# Patient Record
Sex: FEMALE | Race: WHITE | Employment: OTHER | URBAN - METROPOLITAN AREA
[De-identification: names, ages, dates, MRNs, and addresses within clinical notes are randomized per-mention and may not be internally consistent; named-entity substitution may affect disease eponyms.]

---

## 2019-05-08 ENCOUNTER — HOSPITAL ENCOUNTER (EMERGENCY)
Age: 69
Discharge: HOME | End: 2019-05-08
Attending: EMERGENCY MEDICINE
Payer: MEDICARE

## 2019-05-08 VITALS
HEIGHT: 63 IN | WEIGHT: 188.25 LBS | BODY MASS INDEX: 33.36 KG/M2 | HEART RATE: 84 BPM | TEMPERATURE: 98.4 F | RESPIRATION RATE: 16 BRPM | OXYGEN SATURATION: 95 % | DIASTOLIC BLOOD PRESSURE: 94 MMHG | SYSTOLIC BLOOD PRESSURE: 149 MMHG

## 2019-05-08 DIAGNOSIS — L30.9 ECZEMA, UNSPECIFIED TYPE: Primary | ICD-10-CM

## 2019-05-08 PROCEDURE — 6360000002 HC RX W HCPCS: Performed by: EMERGENCY MEDICINE

## 2019-05-08 PROCEDURE — 96372 THER/PROPH/DIAG INJ SC/IM: CPT

## 2019-05-08 PROCEDURE — 99282 EMERGENCY DEPT VISIT SF MDM: CPT

## 2019-05-08 RX ORDER — METHYLPREDNISOLONE SODIUM SUCCINATE 125 MG/2ML
250 INJECTION, POWDER, LYOPHILIZED, FOR SOLUTION INTRAMUSCULAR; INTRAVENOUS ONCE
Status: COMPLETED | OUTPATIENT
Start: 2019-05-08 | End: 2019-05-08

## 2019-05-08 RX ORDER — PREDNISONE 10 MG/1
TABLET ORAL
Qty: 30 TABLET | Refills: 0 | Status: SHIPPED | OUTPATIENT
Start: 2019-05-08 | End: 2019-07-15 | Stop reason: ALTCHOICE

## 2019-05-08 RX ADMIN — METHYLPREDNISOLONE SODIUM SUCCINATE 250 MG: 125 INJECTION, POWDER, FOR SOLUTION INTRAMUSCULAR; INTRAVENOUS at 08:34

## 2019-05-08 SDOH — HEALTH STABILITY: MENTAL HEALTH: HOW OFTEN DO YOU HAVE A DRINK CONTAINING ALCOHOL?: NEVER

## 2019-05-08 ASSESSMENT — ENCOUNTER SYMPTOMS
COUGH: 0
COLOR CHANGE: 0
SHORTNESS OF BREATH: 0
EYE DISCHARGE: 0
FACIAL SWELLING: 0
CONSTIPATION: 0
ABDOMINAL PAIN: 0
VOMITING: 0
DIARRHEA: 0
EYE REDNESS: 0

## 2019-05-08 NOTE — ED PROVIDER NOTES
[facility] ED  eMERGENCY dEPARTMENT eNCOUnter      Pt Name: Juliette Samaniego  MRN: [id number]  [place] 1950  Date of evaluation: 5/8/2019  Provider: Susana Schreiber MD    [address] [place] [address]       Chief Complaint   Patient presents with    Rash         HISTORY OF PRESENT ILLNESS  (Location/Symptom, Timing/Onset, Context/Setting, Quality, Duration, Modifying Factors, Severity.)   Juliette Samaniego is a 78 y.o. female who presents to the emergency department for rash. She's had it for several months and it's on various areas of her body including her back and her arms and her face near her hairline and in particular. She hasn't seen her doctor about it, she's been very busy taking care of her ill . Sometimes there is a small amount of drainage but no significant purulent drainage. No fever. It's continuous. Nursing Notes were reviewed. ALLERGIES     Patient has no known allergies. CURRENT MEDICATIONS       Previous Medications    No medications on file       PAST MEDICAL HISTORY   No past medical history on file. SURGICAL HISTORY     No past surgical history on file. FAMILY HISTORY     No family history on file. No family status information on file. SOCIAL HISTORY      reports that she has never smoked. She has never used smokeless tobacco. She reports that she drank alcohol. She reports that she does not use drugs. REVIEW OF SYSTEMS    (2-9 systems for level 4, 10 or more for level 5)     Review of Systems   Constitutional: Negative for chills, fatigue and fever. HENT: Negative for congestion, ear discharge and facial swelling. Eyes: Negative for discharge and redness. Respiratory: Negative for cough and shortness of breath. Cardiovascular: Negative for chest pain. Gastrointestinal: Negative for abdominal pain, constipation, diarrhea and vomiting. Genitourinary: Negative for dysuria and hematuria. Musculoskeletal: Negative for arthralgias.    Skin: Positive for rash. Negative for color change. Neurological: Negative for syncope, numbness and headaches. Hematological: Negative for adenopathy. Psychiatric/Behavioral: Negative for confusion. The patient is not nervous/anxious. Except as noted above the remainder of the review of systems was reviewed and negative. PHYSICAL EXAM    (up to 7 for level 4, 8 or more for level 5)     Vitals:    05/08/19 0821 05/08/19 0822   BP: (!) 149/94    Pulse: 84    Resp: 16    Temp: 98.4 °F (36.9 °C)    SpO2: 95%    Weight:  188 lb 4 oz (85.4 kg)   Height:  5' 3\" (1.6 m)       Physical Exam   Constitutional: She is oriented to person, place, and time. She appears well-developed and well-nourished. No distress. HENT:   Head: Normocephalic and atraumatic. Eyes: Right eye exhibits no discharge. Left eye exhibits no discharge. No scleral icterus. Neck: Neck supple. Cardiovascular: Normal rate and regular rhythm. Pulmonary/Chest: Effort normal and breath sounds normal. No stridor. No respiratory distress. She has no wheezes. She has no rales. Abdominal: Soft. She exhibits no distension. There is no tenderness. Musculoskeletal: Normal range of motion. Lymphadenopathy:     She has no cervical adenopathy. Neurological: She is alert and oriented to person, place, and time. Skin: Skin is warm and dry. Rash noted. She is not diaphoretic. No erythema. The patient has a dry scaling rash on various areas of her body. It is significant at the hairline on her forehead and in scattered areas on her neck arms and back and legs. Psychiatric: She has a normal mood and affect. Her behavior is normal.   Vitals reviewed.           DIAGNOSTIC RESULTS     EKG: All EKG's are interpreted by the Emergency Department Physician who either signs or Co-signs this chart in the absence of a cardiologist.    Not indicated    RADIOLOGY:   Non-plain film images such as CT, Ultrasound and MRI are read by the radiologist.

## 2019-05-21 ENCOUNTER — TELEPHONE (OUTPATIENT)
Dept: FAMILY MEDICINE CLINIC | Age: 69
End: 2019-05-21

## 2019-05-23 ENCOUNTER — OFFICE VISIT (OUTPATIENT)
Dept: FAMILY MEDICINE CLINIC | Age: 69
End: 2019-05-23
Payer: MEDICARE

## 2019-05-23 VITALS
OXYGEN SATURATION: 96 % | SYSTOLIC BLOOD PRESSURE: 130 MMHG | DIASTOLIC BLOOD PRESSURE: 80 MMHG | WEIGHT: 195 LBS | HEART RATE: 62 BPM | BODY MASS INDEX: 34.54 KG/M2

## 2019-05-23 DIAGNOSIS — L40.9 PSORIASIS: Primary | ICD-10-CM

## 2019-05-23 DIAGNOSIS — Z12.39 BREAST CANCER SCREENING: ICD-10-CM

## 2019-05-23 PROCEDURE — G8427 DOCREV CUR MEDS BY ELIG CLIN: HCPCS | Performed by: FAMILY MEDICINE

## 2019-05-23 PROCEDURE — 1090F PRES/ABSN URINE INCON ASSESS: CPT | Performed by: FAMILY MEDICINE

## 2019-05-23 PROCEDURE — 1036F TOBACCO NON-USER: CPT | Performed by: FAMILY MEDICINE

## 2019-05-23 PROCEDURE — 1123F ACP DISCUSS/DSCN MKR DOCD: CPT | Performed by: FAMILY MEDICINE

## 2019-05-23 PROCEDURE — G8400 PT W/DXA NO RESULTS DOC: HCPCS | Performed by: FAMILY MEDICINE

## 2019-05-23 PROCEDURE — 99203 OFFICE O/P NEW LOW 30 MIN: CPT | Performed by: FAMILY MEDICINE

## 2019-05-23 PROCEDURE — 3017F COLORECTAL CA SCREEN DOC REV: CPT | Performed by: FAMILY MEDICINE

## 2019-05-23 PROCEDURE — 4040F PNEUMOC VAC/ADMIN/RCVD: CPT | Performed by: FAMILY MEDICINE

## 2019-05-23 PROCEDURE — G8417 CALC BMI ABV UP PARAM F/U: HCPCS | Performed by: FAMILY MEDICINE

## 2019-05-23 RX ORDER — FLUOCINOLONE ACETONIDE 0.1 MG/ML
SOLUTION TOPICAL
Qty: 90 ML | Refills: 1 | Status: SHIPPED | OUTPATIENT
Start: 2019-05-23 | End: 2019-07-31 | Stop reason: ALTCHOICE

## 2019-05-23 RX ORDER — PREDNISONE 20 MG/1
TABLET ORAL
Qty: 20 TABLET | Refills: 0 | Status: SHIPPED | OUTPATIENT
Start: 2019-05-23 | End: 2019-07-15 | Stop reason: ALTCHOICE

## 2019-05-23 ASSESSMENT — PATIENT HEALTH QUESTIONNAIRE - PHQ9
SUM OF ALL RESPONSES TO PHQ QUESTIONS 1-9: 1
2. FEELING DOWN, DEPRESSED OR HOPELESS: 1
SUM OF ALL RESPONSES TO PHQ9 QUESTIONS 1 & 2: 1
1. LITTLE INTEREST OR PLEASURE IN DOING THINGS: 0
SUM OF ALL RESPONSES TO PHQ QUESTIONS 1-9: 1

## 2019-05-23 ASSESSMENT — ENCOUNTER SYMPTOMS
BACK PAIN: 0
NAUSEA: 0
DIARRHEA: 0
SINUS PRESSURE: 0
SHORTNESS OF BREATH: 0
VOMITING: 0
SORE THROAT: 0
COUGH: 0

## 2019-05-23 NOTE — PROGRESS NOTES
Kati Puckett is a 78 y.o. female who presents todayfor her medical conditions/complaints as noted below. Kati Puckett is here today c/oEstablish Care and Rash (onset middle of march)  78year old spouse of a pt of mine who presents as a new pt to this practice she is concerned over a rash that started in March. Was treated thru ER dx with eczema and given prednisone with little improvement in rash appearance.    :     Visit Information    Have you changed or started any medications since your last visit including any over-the-counter medicines, vitamins, or herbal medicines? no   Are you having any side effects from any of your medications? -  no  Have you stopped taking any of your medications? Is so, why? -  no    Have you seen any other physician or provider since your last visit? No  Have you had any other diagnostic tests since your last visit? No  Have you been seen in the emergency room and/or had an admission to a hospital since we last saw you? No  Have you had your routine dental cleaning in the past 6 months? no    Have you activated your [employer] account? If not, what are your barriers? No     Patient Care Team:  Constantino Banda MD as PCP - General (Family Medicine)    Medical History Review  Past Medical, Family, and Social History reviewed and does not contribute to the patient presenting condition    Health Maintenance   Topic Date Due    Hepatitis C screen  1950    DTaP/Tdap/Td vaccine (1 - Tdap) 02/04/1968    Lipid screen  02/04/1989    Diabetes screen  02/04/1989    Breast cancer screen  02/04/1999    Shingles Vaccine (1 of 2) 02/04/1999    Colon cancer screen colonoscopy  02/04/1999    DEXA (modify frequency per FRAX score)  02/04/2014    Pneumococcal 65+ years Vaccine (1 of 2 - PCV13) 05/23/2022 (Originally 2/4/2014)    Flu vaccine (Season Ended) 09/01/2019           HPI        History reviewed. No pertinent past medical history. History reviewed.  No pertinent surgical history. History reviewed. No pertinent family history. Social History     Tobacco Use    Smoking status: Never Smoker    Smokeless tobacco: Never Used   Substance Use Topics    Alcohol use: Not Currently     Frequency: Never      Current Outpatient Medications   Medication Sig Dispense Refill    fluocinolone acetonide (SYNALAR) 0.01 % external solution Apply topically 2 times daily. 90 mL 1    predniSONE (DELTASONE) 20 MG tablet Take 3 tabs daily for 3 days, then 2 a day for 3 days, then 1 a day for 3 days, then 1/2 a day for 3 days. 20 tablet 0    predniSONE (DELTASONE) 10 MG tablet Take 4 by mouth daily for 3 days then  3 by mouth daily for 3 days then  2 by mouth daily for 3 days then  1 by mouth daily for 3 days 30 tablet 0     No current facility-administered medications for this visit. No Known Allergies      Subjective:   Review of Systems   Constitutional: Negative for chills, diaphoresis and fever. HENT: Negative for congestion, sinus pressure and sore throat. Eyes: Negative for visual disturbance. Respiratory: Negative for cough and shortness of breath. Cardiovascular: Negative for chest pain and leg swelling. Gastrointestinal: Negative for diarrhea, nausea and vomiting. Genitourinary: Negative for dysuria, menstrual problem, urgency and vaginal discharge. Musculoskeletal: Negative for arthralgias, back pain and myalgias. Skin: Positive for rash. Neurological: Negative for weakness, numbness and headaches. Psychiatric/Behavioral: Negative for sleep disturbance.       :   /80   Pulse 62   Wt 195 lb (88.5 kg)   SpO2 96%   BMI 34.54 kg/m²     Physical Exam   Constitutional: She is oriented to person, place, and time. She appears well-developed and well-nourished. No distress. HENT:   Head: Normocephalic and atraumatic. Mouth/Throat: No oropharyngeal exudate. Eyes: No scleral icterus. Neck: Neck supple. Carotid bruit is not present.

## 2019-06-18 ENCOUNTER — TELEPHONE (OUTPATIENT)
Dept: DERMATOLOGY | Age: 69
End: 2019-06-18

## 2019-06-18 ENCOUNTER — HOSPITAL ENCOUNTER (OUTPATIENT)
Age: 69
Discharge: HOME | End: 2019-06-18
Payer: MEDICARE

## 2019-06-18 ENCOUNTER — OFFICE VISIT (OUTPATIENT)
Dept: DERMATOLOGY | Age: 69
End: 2019-06-18
Payer: MEDICARE

## 2019-06-18 VITALS
HEIGHT: 63 IN | BODY MASS INDEX: 34.76 KG/M2 | HEART RATE: 78 BPM | SYSTOLIC BLOOD PRESSURE: 146 MMHG | WEIGHT: 196.2 LBS | DIASTOLIC BLOOD PRESSURE: 83 MMHG

## 2019-06-18 DIAGNOSIS — Z01.89 ENCOUNTER FOR OTHER SPECIFIED SPECIAL EXAMINATIONS: ICD-10-CM

## 2019-06-18 DIAGNOSIS — Z51.81 THERAPEUTIC DRUG MONITORING: ICD-10-CM

## 2019-06-18 DIAGNOSIS — L40.9 PSORIASIS: ICD-10-CM

## 2019-06-18 DIAGNOSIS — L40.9 PSORIASIS: Primary | ICD-10-CM

## 2019-06-18 LAB
ABSOLUTE EOS #: 0.25 K/UL (ref 0–0.44)
ABSOLUTE IMMATURE GRANULOCYTE: 0.03 K/UL (ref 0–0.3)
ABSOLUTE LYMPH #: 2.51 K/UL (ref 1.1–3.7)
ABSOLUTE MONO #: 0.69 K/UL (ref 0.1–1.2)
ALT SERPL-CCNC: 23 U/L (ref 5–33)
AST SERPL-CCNC: 25 U/L
BASOPHILS # BLD: 1 % (ref 0–2)
BASOPHILS ABSOLUTE: 0.06 K/UL (ref 0–0.2)
BUN BLDV-MCNC: 7 MG/DL (ref 8–23)
CREAT SERPL-MCNC: 0.58 MG/DL (ref 0.5–0.9)
DIFFERENTIAL TYPE: ABNORMAL
EOSINOPHILS RELATIVE PERCENT: 3 % (ref 1–4)
GFR AFRICAN AMERICAN: >60 ML/MIN
GFR NON-AFRICAN AMERICAN: >60 ML/MIN
GFR SERPL CREATININE-BSD FRML MDRD: NORMAL ML/MIN/{1.73_M2}
GFR SERPL CREATININE-BSD FRML MDRD: NORMAL ML/MIN/{1.73_M2}
HAV IGM SER IA-ACNC: NONREACTIVE
HCT VFR BLD CALC: 47.4 % (ref 36.3–47.1)
HEMOGLOBIN: 14.3 G/DL (ref 11.9–15.1)
HEPATITIS B CORE IGM ANTIBODY: NONREACTIVE
HEPATITIS B SURFACE ANTIGEN: NONREACTIVE
HEPATITIS C ANTIBODY: NONREACTIVE
HIV AG/AB: NONREACTIVE
IMMATURE GRANULOCYTES: 0 %
LYMPHOCYTES # BLD: 33 % (ref 24–43)
MCH RBC QN AUTO: 28.3 PG (ref 25.2–33.5)
MCHC RBC AUTO-ENTMCNC: 30.2 G/DL (ref 28.4–34.8)
MCV RBC AUTO: 93.7 FL (ref 82.6–102.9)
MONOCYTES # BLD: 9 % (ref 3–12)
NRBC AUTOMATED: 0 PER 100 WBC
PDW BLD-RTO: 13.7 % (ref 11.8–14.4)
PLATELET # BLD: 469 K/UL (ref 138–453)
PLATELET ESTIMATE: ABNORMAL
PMV BLD AUTO: 9.5 FL (ref 8.1–13.5)
RBC # BLD: 5.06 M/UL (ref 3.95–5.11)
RBC # BLD: ABNORMAL 10*6/UL
SEG NEUTROPHILS: 54 % (ref 36–65)
SEGMENTED NEUTROPHILS ABSOLUTE COUNT: 4.03 K/UL (ref 1.5–8.1)
WBC # BLD: 7.6 K/UL (ref 3.5–11.3)
WBC # BLD: ABNORMAL 10*3/UL

## 2019-06-18 PROCEDURE — 86480 TB TEST CELL IMMUN MEASURE: CPT

## 2019-06-18 PROCEDURE — G8427 DOCREV CUR MEDS BY ELIG CLIN: HCPCS | Performed by: DERMATOLOGY

## 2019-06-18 PROCEDURE — 99203 OFFICE O/P NEW LOW 30 MIN: CPT | Performed by: DERMATOLOGY

## 2019-06-18 PROCEDURE — 87389 HIV-1 AG W/HIV-1&-2 AB AG IA: CPT

## 2019-06-18 PROCEDURE — 1090F PRES/ABSN URINE INCON ASSESS: CPT | Performed by: DERMATOLOGY

## 2019-06-18 PROCEDURE — G8400 PT W/DXA NO RESULTS DOC: HCPCS | Performed by: DERMATOLOGY

## 2019-06-18 PROCEDURE — 84450 TRANSFERASE (AST) (SGOT): CPT

## 2019-06-18 PROCEDURE — 84520 ASSAY OF UREA NITROGEN: CPT

## 2019-06-18 PROCEDURE — 1123F ACP DISCUSS/DSCN MKR DOCD: CPT | Performed by: DERMATOLOGY

## 2019-06-18 PROCEDURE — 84460 ALANINE AMINO (ALT) (SGPT): CPT

## 2019-06-18 PROCEDURE — G8417 CALC BMI ABV UP PARAM F/U: HCPCS | Performed by: DERMATOLOGY

## 2019-06-18 PROCEDURE — 85025 COMPLETE CBC W/AUTO DIFF WBC: CPT

## 2019-06-18 PROCEDURE — 82565 ASSAY OF CREATININE: CPT

## 2019-06-18 PROCEDURE — 36415 COLL VENOUS BLD VENIPUNCTURE: CPT

## 2019-06-18 PROCEDURE — 4040F PNEUMOC VAC/ADMIN/RCVD: CPT | Performed by: DERMATOLOGY

## 2019-06-18 PROCEDURE — 80074 ACUTE HEPATITIS PANEL: CPT

## 2019-06-18 PROCEDURE — 1036F TOBACCO NON-USER: CPT | Performed by: DERMATOLOGY

## 2019-06-18 PROCEDURE — 3017F COLORECTAL CA SCREEN DOC REV: CPT | Performed by: DERMATOLOGY

## 2019-06-18 RX ORDER — TRIAMCINOLONE ACETONIDE 1 MG/G
CREAM TOPICAL
Qty: 454 G | Refills: 1 | Status: SHIPPED | OUTPATIENT
Start: 2019-06-18 | End: 2019-07-31 | Stop reason: SDUPTHER

## 2019-06-18 RX ORDER — FOLIC ACID 1 MG/1
1 TABLET ORAL DAILY
Qty: 30 TABLET | Refills: 0 | Status: SHIPPED | OUTPATIENT
Start: 2019-06-18 | End: 2019-07-31 | Stop reason: ALTCHOICE

## 2019-06-18 NOTE — PROGRESS NOTES
Dermatology Patient Note  [facility] DERMATOLOGY  [address]  [address] [place] [address] [facility] [address]  Dept: [phone]  Dept Fax: [phone]      VISIT DATE: 6/18/2019   REFERRING PROVIDER: Jose Saleh MD      Ruth Ann Vazquez is a 78 y.o. female  who presents today in the office for:    New Patient (Patient is here for Psoriasis which is covering her body. She states it started in March but as of lately she is noticing it in different spot throughtout her body. She was given an oral steroid however it has not helped. She was also given Fluocinolone Acetonide but that does not seem to help either. She does have this on her scalp and her hair is falling out. )      HISTORY OF PRESENT ILLNESS:  HPI Rash:    Sajan Rodriguez was seen today for initial evaluation of Psoriasis    Duration of Rash: March    Course: Worsening    Areas of Involvement: Generalized    Associated Symptoms: Itching    Exacerbating Factors: none     Current Medications for this Rash:  prednisone, fluocinolone     Previously Tried Medications: None    Problem Specific Family Hx: No Contributory Family History      CURRENT MEDICATIONS:   Current Outpatient Medications   Medication Sig Dispense Refill    triamcinolone (KENALOG) 0.1 % cream Apply to rash twice daily (not face, armpit or groin) 454 g 1    fluocinolone acetonide (SYNALAR) 0.01 % external solution Apply topically 2 times daily. 90 mL 1    predniSONE (DELTASONE) 20 MG tablet Take 3 tabs daily for 3 days, then 2 a day for 3 days, then 1 a day for 3 days, then 1/2 a day for 3 days. 20 tablet 0    predniSONE (DELTASONE) 10 MG tablet Take 4 by mouth daily for 3 days then  3 by mouth daily for 3 days then  2 by mouth daily for 3 days then  1 by mouth daily for 3 days 30 tablet 0     No current facility-administered medications for this visit.         ALLERGIES:   No Known Allergies    SOCIAL HISTORY:  Social History     Tobacco Use    Smoking status: Never Smoker    Smokeless tobacco: Never Used   Substance Use Topics    Alcohol use: Not Currently     Frequency: Never       REVIEW OF SYSTEMS:  Review of Systems   Constitutional: Negative. Skin:Denies any new changing, growing or bleeding lesions or rashes except as described in the HPI     PHYSICAL EXAM:   BP (!) 146/83 (Site: Left Upper Arm, Position: Sitting, Cuff Size: Medium Adult)   Pulse 78   Ht 5' 2.99\" (1.6 m)   Wt 196 lb 3.2 oz (89 kg)   BMI 34.76 kg/m²     General Exam:  General Appearance: No acute distress, Well nourished     Neuro: Alert and oriented to person, place and time  Psych: Normal affect   Lymph Node: Not performed    Cutaneous Exam: Performed as documented in clinic note below. Total skin excluding undergarment areas, which includes the head/face, neck, both arms, chest, back, abdomen, both legs, digits and/or nails, was examined. Pertinent Physical Exam Findings:  Physical Exam   Skin:   Psoriasis scalp, trunk, arms and legs 50-70% BSA       Medical Necessity of Exam Performed:   Widespread Rash    Additional Diagnostic Testing performed during exam: Not performed ,  Not performed    ASSESSMENT:   Diagnosis Orders   1. Psoriasis  ALT    AST    BUN    CBC Auto Differential    Creatinine, Serum    Hepatitis Panel, Acute    HIV Screen    Quantiferon TB Gold   2. Therapeutic drug monitoring  ALT    AST    BUN    CBC Auto Differential    Creatinine, Serum    Hepatitis Panel, Acute    HIV Screen    Quantiferon TB Gold   3. Encounter for other specified special examinations   Hepatitis Panel, Acute       Plan of Action is as Follows:  Assessment 1. Psoriasis  1. Discussed the chronic nature of the diagnosis including the association with arthritis (10-15%) and Cardiovascular mortality  2. Discussed the treatments for psoriasis including topical medications, oral medications and biologics  3. Start triamcinolone cream BID to psoriasis on the body  4.  I discussed MTX treatment including risks of elevated LFT's, cytopenias, increased infection, increased malignancy risk, upset stomach, fatigue, sun sensitivity and mouth sores. I reviewed not to get live vaccines on this medication. I reviewed drug interactions of this medication. After discussing the r/b the patient wants to proceed. - Labs  - Pending normal labs start 15 mg once weekly  - Folic acid 1 mg daily    - ALT; Future  - AST; Future  - BUN; Future  - CBC Auto Differential; Future  - Creatinine, Serum; Future  - Hepatitis Panel, Acute; Future  - HIV Screen; Future  - Quantiferon TB Gold; Future    2. Therapeutic drug monitoring  - ALT; Future  - AST; Future  - BUN; Future  - CBC Auto Differential; Future  - Creatinine, Serum; Future  - Hepatitis Panel, Acute; Future  - HIV Screen; Future  - Quantiferon TB Gold; Future    3. Encounter for other specified special examinations   - Hepatitis Panel, Acute; Future          Patient Instructions   1. Labs today  2. Upon receipt of normal labs, prescription will be sent to pharmacy--6 pills one time weekly, folic acid every day  3. Apply triamcinolone twice daily to all of active psoriasis  4. Follow up in 4 weeks      Methotrexate    Side Effects    Methotrexate is a medication that can be helpful in a variety of skin conditions including psoriasis, atopic dermatitis, and morphea. It is a medication that suppresses or turns down the immune system and as a result, can make skin conditions caused by the immune system better. The medication is given by mouth or by shot and is usually given only once weekly when used to treat skin conditions. It is important that the medicine be given exactly as directed by a physician. Use of this medicine requires close monitoring including baseline blood work and a blood test at least once monthly after the medication is started. It takes about 3 months of using Methotrexate to see an effect, and many patients are on methotrexate for 1 year or more.  Severe side monthly and then every 2-3 months. Methotrexate can interact with other medications. Parents should check with their physicians and pharmacist before adding any new medications. Common medications that are likely to interact with methotrexate and that should be avoided if possible include ibuprofen or other non-steroidal antiinflammatory medications. Bactrim or trimethoprim-sulfamethoxazole can also interfere with methotrexate use     Nausea, Vomiting and/or Abdominal Pain:    Some children will complain of abdominal pain or stomach pain while on Methotrexate. This most commonly occurs a day or two after the medication is given. Some children will feel nausea the day after the medicine is given and may even vomit. Please let your physician know if this is occurring as he or she may recommend changes to the dose of the medication or times the medication is given to help improve this. Sun Sensitivity:    Your child will be more sensitive to the sun and more likely to develop sunburn while on methotrexate. Special attention should be given to always wearing sunscreen to exposed skin during peak hours of sun exposure or wearing clothing that protects the skin. Mouth Sores:    Sores in the mouth can occur with methotrexate use, but in general, do not occur at the lower doses we use for dermatology conditions. Please let your physician know if this occurs. Photo surveillance performed: No    Follow-up: 4 weeks    This note was created with the assistance of aspeech-recognition program.  Although the intention is to generate a document that actually reflects thecontent of the visit, no guarantees can be provided that every mistake has been identified and corrected by editing.     Electronically signed by Marya Ibarra MD on 6/18/19 at 12:54 PM

## 2019-06-18 NOTE — PATIENT INSTRUCTIONS
visiting with family members or others when they are ill. Please notify your physician if your child is not up to date on his/her immunizations. Live vaccines (Varivax, MMR, flu mist) should not be given while your child is on this medication, but other routine inactivated immunizations can be given (flu shot is ok but not the flu mist). If your child develops a fever of 101 or has a persistent cough or is otherwise acting ill, it is important that he/she be evaluated by a physician promptly while on this medication. Please let your physician know if your child has regular contact with someone who may have tuberculosis or who has tested positive for tuberculosis. Inflammation of the liver    Methotrexate is a medication that is broken down by the liver. In some instances, methotrexate may cause the liver to become inflamed. For that reason, in addition to checking your childs immune system, it is important that your child has blood tests performed that check on how the liver is doing, initially monthly and then every 2-3 months. Methotrexate can interact with other medications. Parents should check with their physicians and pharmacist before adding any new medications. Common medications that are likely to interact with methotrexate and that should be avoided if possible include ibuprofen or other non-steroidal antiinflammatory medications. Bactrim or trimethoprim-sulfamethoxazole can also interfere with methotrexate use     Nausea, Vomiting and/or Abdominal Pain:    Some children will complain of abdominal pain or stomach pain while on Methotrexate. This most commonly occurs a day or two after the medication is given. Some children will feel nausea the day after the medicine is given and may even vomit. Please let your physician know if this is occurring as he or she may recommend changes to the dose of the medication or times the medication is given to help improve this.     J[place]ica Valerio Sensitivity:    Your child will be more sensitive to the sun and more likely to develop sunburn while on methotrexate. Special attention should be given to always wearing sunscreen to exposed skin during peak hours of sun exposure or wearing clothing that protects the skin. Mouth Sores:    Sores in the mouth can occur with methotrexate use, but in general, do not occur at the lower doses we use for dermatology conditions. Please let your physician know if this occurs.

## 2019-06-21 LAB
QUANTI TB GOLD PLUS: NEGATIVE
QUANTI TB1 MINUS NIL: 0.02 IU/ML (ref 0–0.34)
QUANTI TB2 MINUS NIL: 0.02 IU/ML (ref 0–0.34)
QUANTIFERON MITOGEN: >10 IU/ML
QUANTIFERON NIL: 0.07 IU/ML

## 2019-07-15 ENCOUNTER — OFFICE VISIT (OUTPATIENT)
Dept: DERMATOLOGY | Age: 69
End: 2019-07-15
Payer: MEDICARE

## 2019-07-15 ENCOUNTER — HOSPITAL ENCOUNTER (OUTPATIENT)
Age: 69
Discharge: HOME | End: 2019-07-15
Payer: MEDICARE

## 2019-07-15 VITALS
DIASTOLIC BLOOD PRESSURE: 87 MMHG | HEART RATE: 77 BPM | HEIGHT: 63 IN | WEIGHT: 191.6 LBS | OXYGEN SATURATION: 95 % | BODY MASS INDEX: 33.95 KG/M2 | SYSTOLIC BLOOD PRESSURE: 165 MMHG

## 2019-07-15 DIAGNOSIS — L40.9 PSORIASIS: Primary | ICD-10-CM

## 2019-07-15 DIAGNOSIS — Z79.899 HIGH RISK MEDICATION USE: ICD-10-CM

## 2019-07-15 LAB
ABSOLUTE EOS #: 0.09 K/UL (ref 0–0.44)
ABSOLUTE IMMATURE GRANULOCYTE: 0.03 K/UL (ref 0–0.3)
ABSOLUTE LYMPH #: 2.46 K/UL (ref 1.1–3.7)
ABSOLUTE MONO #: 0.76 K/UL (ref 0.1–1.2)
ALT SERPL-CCNC: 90 U/L (ref 5–33)
AST SERPL-CCNC: 32 U/L
BASOPHILS # BLD: 1 % (ref 0–2)
BASOPHILS ABSOLUTE: 0.07 K/UL (ref 0–0.2)
BUN BLDV-MCNC: 11 MG/DL (ref 8–23)
CREAT SERPL-MCNC: 0.56 MG/DL (ref 0.5–0.9)
DIFFERENTIAL TYPE: ABNORMAL
EOSINOPHILS RELATIVE PERCENT: 1 % (ref 1–4)
GFR AFRICAN AMERICAN: >60 ML/MIN
GFR NON-AFRICAN AMERICAN: >60 ML/MIN
GFR SERPL CREATININE-BSD FRML MDRD: NORMAL ML/MIN/{1.73_M2}
GFR SERPL CREATININE-BSD FRML MDRD: NORMAL ML/MIN/{1.73_M2}
HCT VFR BLD CALC: 47.3 % (ref 36.3–47.1)
HEMOGLOBIN: 14.6 G/DL (ref 11.9–15.1)
IMMATURE GRANULOCYTES: 0 %
LYMPHOCYTES # BLD: 36 % (ref 24–43)
MCH RBC QN AUTO: 29.3 PG (ref 25.2–33.5)
MCHC RBC AUTO-ENTMCNC: 30.9 G/DL (ref 28.4–34.8)
MCV RBC AUTO: 94.8 FL (ref 82.6–102.9)
MONOCYTES # BLD: 11 % (ref 3–12)
NRBC AUTOMATED: 0 PER 100 WBC
PDW BLD-RTO: 14.4 % (ref 11.8–14.4)
PLATELET # BLD: 464 K/UL (ref 138–453)
PLATELET ESTIMATE: ABNORMAL
PMV BLD AUTO: 9.8 FL (ref 8.1–13.5)
RBC # BLD: 4.99 M/UL (ref 3.95–5.11)
RBC # BLD: ABNORMAL 10*6/UL
SEG NEUTROPHILS: 51 % (ref 36–65)
SEGMENTED NEUTROPHILS ABSOLUTE COUNT: 3.46 K/UL (ref 1.5–8.1)
WBC # BLD: 6.9 K/UL (ref 3.5–11.3)
WBC # BLD: ABNORMAL 10*3/UL

## 2019-07-15 PROCEDURE — 1036F TOBACCO NON-USER: CPT | Performed by: DERMATOLOGY

## 2019-07-15 PROCEDURE — 84520 ASSAY OF UREA NITROGEN: CPT

## 2019-07-15 PROCEDURE — 84450 TRANSFERASE (AST) (SGOT): CPT

## 2019-07-15 PROCEDURE — G8400 PT W/DXA NO RESULTS DOC: HCPCS | Performed by: DERMATOLOGY

## 2019-07-15 PROCEDURE — 11900 INJECT SKIN LESIONS </W 7: CPT | Performed by: DERMATOLOGY

## 2019-07-15 PROCEDURE — G8417 CALC BMI ABV UP PARAM F/U: HCPCS | Performed by: DERMATOLOGY

## 2019-07-15 PROCEDURE — 4040F PNEUMOC VAC/ADMIN/RCVD: CPT | Performed by: DERMATOLOGY

## 2019-07-15 PROCEDURE — 36415 COLL VENOUS BLD VENIPUNCTURE: CPT

## 2019-07-15 PROCEDURE — G8427 DOCREV CUR MEDS BY ELIG CLIN: HCPCS | Performed by: DERMATOLOGY

## 2019-07-15 PROCEDURE — 82565 ASSAY OF CREATININE: CPT

## 2019-07-15 PROCEDURE — 1090F PRES/ABSN URINE INCON ASSESS: CPT | Performed by: DERMATOLOGY

## 2019-07-15 PROCEDURE — 3017F COLORECTAL CA SCREEN DOC REV: CPT | Performed by: DERMATOLOGY

## 2019-07-15 PROCEDURE — 84460 ALANINE AMINO (ALT) (SGPT): CPT

## 2019-07-15 PROCEDURE — 1123F ACP DISCUSS/DSCN MKR DOCD: CPT | Performed by: DERMATOLOGY

## 2019-07-15 PROCEDURE — 85025 COMPLETE CBC W/AUTO DIFF WBC: CPT

## 2019-07-15 PROCEDURE — 99214 OFFICE O/P EST MOD 30 MIN: CPT | Performed by: DERMATOLOGY

## 2019-07-15 NOTE — PROGRESS NOTES
Upper Arm, Position: Sitting, Cuff Size: Large Adult)   Pulse 77   Ht 5' 3\" (1.6 m)   Wt 191 lb 9.6 oz (86.9 kg)   SpO2 95%   BMI 33.94 kg/m²     General Exam:  General Appearance: No acute distress, Well nourished     Neuro: Alert and oriented to person, place and time  Psych: Normal affect   Lymph Node: Not performed    Cutaneous Exam: Performed as documented in clinic note below. Total skin excluding undergarment areas, which includes the head/face, neck, both arms, chest, back, abdomen, both legs, digits and/or nails, was examined. Pertinent Physical Exam Findings:  Physical Exam   Skin:   Thick psoriasis on scalp    Thinning psoriasis on arms, legs and trunk still 50-70% BSA       Medical Necessity of Exam Performed:   Widespread Rash    Additional Diagnostic Testing performed during exam: Not performed ,  Not performed    ASSESSMENT:   Diagnosis Orders   1. Psoriasis  INJECTION INTO SKIN LESIONS, UP TO 7   2. High risk medication use  ALT    AST    BUN    CBC Auto Differential    Creatinine, Serum       Plan of Action is as Follows:  Assessment 1. Psoriasis  - Some improvement on body, none on scalp  - Labs today  - Pending normal labs continue MTX 15 mg weekly and folic acid daily  - ILK for scalp  - T/Gel for Scalp  - TAC 0.1% PRN to active areas on body  Intralesional Injection: After verbal consent of the risks of subcutaneous atrophy and hypopigmentation and cleansing with alcohol the 1 psoriasiform plaque (entire scalp) on the scalp were injected with 2 ml of Kenalog 10  - INJECTION INTO SKIN LESIONS, UP TO 7    2. High risk medication use  - ALT; Future  - AST; Future  - BUN; Future  - CBC Auto Differential; Future  - Creatinine, Serum; Future          Patient Instructions   1. Labs today  2. Upon receipt of normal labs, refill will be sent to pharmacy  3. Neutrogena Tgel shampoo daily--leave on for 5 minutes prior to rinsing out  4.  Follow up in 4 weeks      Photo surveillance performed:

## 2019-07-16 ENCOUNTER — TELEPHONE (OUTPATIENT)
Dept: DERMATOLOGY | Age: 69
End: 2019-07-16

## 2019-07-16 NOTE — TELEPHONE ENCOUNTER
I called and spoke with patient - we can't continue MTX due to bump in LFT's so will need to pursue alternative - please pursue PA for Humira - 80 mg day 0, 40 mg week 7 and then 40 mg every other week    Thanks,    Sloan Ruiz

## 2019-07-17 NOTE — TELEPHONE ENCOUNTER
Please contact patient as she has [employer] for prescription coverage. We need her Id#, RxBIN#, in order to process PA request for [facility]. States in her chart \"unable to obtain\".

## 2019-07-30 NOTE — TELEPHONE ENCOUNTER
Spoke with pt's  and he said he spoke with joaquín this morning and they told him that the copay would be $900.

## 2019-07-30 NOTE — TELEPHONE ENCOUNTER
Unfortunately with Medicare they are not eligible for any patient assistance/copay cards due to medicare rules, but there is a yearly maximum out of pocket (she will need to check with her insurance to see what it is) so it would be $900/month until she hits that. Will her secondary   any of the cost? - is there any scenario where either of the above is affordable for her?     If not, unfortunately I do not think MTX is a good option as her LFT's bumped considerably after 1 month on therapy     Any other biologic would have the same copay problems so switching to one of those is not an option    So unfortunately I think that only leaves us with topical medication choices if Humira is not affordable    Let me know,    Delia Weeks

## 2019-07-31 RX ORDER — TRIAMCINOLONE ACETONIDE 1 MG/G
CREAM TOPICAL
Qty: 80 G | Refills: 11 | Status: SHIPPED | OUTPATIENT
Start: 2019-07-31

## 2019-07-31 RX ORDER — FLUOCINONIDE TOPICAL SOLUTION USP, 0.05% 0.5 MG/ML
SOLUTION TOPICAL
Qty: 60 ML | Refills: 11 | Status: SHIPPED | OUTPATIENT
Start: 2019-07-31

## 2020-01-17 ENCOUNTER — TELEPHONE (OUTPATIENT)
Dept: FAMILY MEDICINE CLINIC | Age: 70
End: 2020-01-17

## 2020-07-09 ENCOUNTER — TELEPHONE (OUTPATIENT)
Dept: DERMATOLOGY | Age: 70
End: 2020-07-09

## 2020-07-09 NOTE — TELEPHONE ENCOUNTER
Talked to pt, asked to ayanna, pt said she would call back, her  just got out of hospital. Will wait for pt to call back

## 2023-01-14 ENCOUNTER — APPOINTMENT (OUTPATIENT)
Dept: CT IMAGING | Age: 73
DRG: 030 | End: 2023-01-14
Payer: MEDICARE

## 2023-01-14 ENCOUNTER — APPOINTMENT (OUTPATIENT)
Dept: GENERAL RADIOLOGY | Age: 73
DRG: 030 | End: 2023-01-14
Payer: MEDICARE

## 2023-01-14 ENCOUNTER — HOSPITAL ENCOUNTER (INPATIENT)
Age: 73
LOS: 3 days | Discharge: NURSING, REHAB OR LONG-TERM CARE | DRG: 030 | End: 2023-01-20
Attending: EMERGENCY MEDICINE | Admitting: SURGERY
Payer: MEDICARE

## 2023-01-14 DIAGNOSIS — S93.401A SPRAIN OF RIGHT ANKLE, UNSPECIFIED LIGAMENT, INITIAL ENCOUNTER: Primary | ICD-10-CM

## 2023-01-14 DIAGNOSIS — S14.129S CENTRAL CORD SYNDROME, SEQUELA (HCC): ICD-10-CM

## 2023-01-14 PROBLEM — S93.409A: Status: ACTIVE | Noted: 2023-01-14

## 2023-01-14 LAB
ABO/RH: NORMAL
ABO/RH: NORMAL
ANION GAP SERPL CALCULATED.3IONS-SCNC: 19 MMOL/L (ref 9–17)
ANION GAP SERPL CALCULATED.3IONS-SCNC: 19 MMOL/L (ref 9–17)
ANTIBODY SCREEN: NEGATIVE
ANTIBODY SCREEN: NEGATIVE
BLOOD BANK SPECIMEN: ABNORMAL
BLOOD BANK SPECIMEN: ABNORMAL
BUN BLDV-MCNC: 18 MG/DL (ref 8–23)
BUN BLDV-MCNC: 18 MG/DL (ref 8–23)
CARBOXYHEMOGLOBIN: 0.5 % (ref 0–5)
CARBOXYHEMOGLOBIN: 0.5 % (ref 0–5)
CHLORIDE BLD-SCNC: 97 MMOL/L (ref 98–107)
CHLORIDE BLD-SCNC: 97 MMOL/L (ref 98–107)
CO2: 19 MMOL/L (ref 20–31)
CO2: 19 MMOL/L (ref 20–31)
CREAT SERPL-MCNC: 1.13 MG/DL (ref 0.5–0.9)
CREAT SERPL-MCNC: 1.13 MG/DL (ref 0.5–0.9)
ETHANOL PERCENT: <0.01 %
ETHANOL PERCENT: <0.01 %
ETHANOL: <10 MG/DL
ETHANOL: <10 MG/DL
EXPIRATION DATE: NORMAL
EXPIRATION DATE: NORMAL
FIO2: ABNORMAL
FIO2: ABNORMAL
GFR SERPL CREATININE-BSD FRML MDRD: 33 ML/MIN/1.73M2
GFR SERPL CREATININE-BSD FRML MDRD: 33 ML/MIN/1.73M2
GLUCOSE BLD-MCNC: 120 MG/DL (ref 70–99)
GLUCOSE BLD-MCNC: 120 MG/DL (ref 70–99)
HCG QUALITATIVE: NEGATIVE
HCG QUALITATIVE: NEGATIVE
HCO3 VENOUS: 20.9 MMOL/L (ref 24–30)
HCO3 VENOUS: 20.9 MMOL/L (ref 24–30)
HCT VFR BLD CALC: 37.7 % (ref 36.3–47.1)
HCT VFR BLD CALC: 37.7 % (ref 36.3–47.1)
HEMOGLOBIN: 12.5 G/DL (ref 11.9–15.1)
HEMOGLOBIN: 12.5 G/DL (ref 11.9–15.1)
INR BLD: 1.1
INR BLD: 1.1
MCH RBC QN AUTO: 37 PG (ref 25.2–33.5)
MCH RBC QN AUTO: 37 PG (ref 25.2–33.5)
MCHC RBC AUTO-ENTMCNC: 33.2 G/DL (ref 28.4–34.8)
MCHC RBC AUTO-ENTMCNC: 33.2 G/DL (ref 28.4–34.8)
MCV RBC AUTO: 111.5 FL (ref 82.6–102.9)
MCV RBC AUTO: 111.5 FL (ref 82.6–102.9)
NEGATIVE BASE EXCESS, VEN: 3.5 MMOL/L (ref 0–2)
NEGATIVE BASE EXCESS, VEN: 3.5 MMOL/L (ref 0–2)
NRBC AUTOMATED: 0 PER 100 WBC
NRBC AUTOMATED: 0 PER 100 WBC
O2 SAT, VEN: 24.6 % (ref 60–85)
O2 SAT, VEN: 24.6 % (ref 60–85)
PARTIAL THROMBOPLASTIN TIME: 21.6 SEC (ref 20.5–30.5)
PARTIAL THROMBOPLASTIN TIME: 21.6 SEC (ref 20.5–30.5)
PATIENT TEMP: 37
PATIENT TEMP: 37
PCO2, VEN: 37.7 MM HG (ref 39–55)
PCO2, VEN: 37.7 MM HG (ref 39–55)
PDW BLD-RTO: 14 % (ref 11.8–14.4)
PDW BLD-RTO: 14 % (ref 11.8–14.4)
PH VENOUS: 7.36 (ref 7.32–7.42)
PH VENOUS: 7.36 (ref 7.32–7.42)
PLATELET # BLD: 340 K/UL (ref 138–453)
PLATELET # BLD: 340 K/UL (ref 138–453)
PMV BLD AUTO: 9.4 FL (ref 8.1–13.5)
PMV BLD AUTO: 9.4 FL (ref 8.1–13.5)
PO2, VEN: 19.7 MM HG (ref 30–50)
PO2, VEN: 19.7 MM HG (ref 30–50)
POTASSIUM SERPL-SCNC: 4.1 MMOL/L (ref 3.7–5.3)
POTASSIUM SERPL-SCNC: 4.1 MMOL/L (ref 3.7–5.3)
PROTHROMBIN TIME: 11.5 SEC (ref 9.1–12.3)
PROTHROMBIN TIME: 11.5 SEC (ref 9.1–12.3)
RBC # BLD: 3.38 M/UL (ref 3.95–5.11)
RBC # BLD: 3.38 M/UL (ref 3.95–5.11)
SARS-COV-2, RAPID: NOT DETECTED
SARS-COV-2, RAPID: NOT DETECTED
SODIUM BLD-SCNC: 135 MMOL/L (ref 135–144)
SODIUM BLD-SCNC: 135 MMOL/L (ref 135–144)
SPECIMEN DESCRIPTION: NORMAL
SPECIMEN DESCRIPTION: NORMAL
WBC # BLD: 5.5 K/UL (ref 3.5–11.3)
WBC # BLD: 5.5 K/UL (ref 3.5–11.3)

## 2023-01-14 PROCEDURE — 80051 ELECTROLYTE PANEL: CPT

## 2023-01-14 PROCEDURE — 2580000003 HC RX 258: Performed by: STUDENT IN AN ORGANIZED HEALTH CARE EDUCATION/TRAINING PROGRAM

## 2023-01-14 PROCEDURE — 6360000002 HC RX W HCPCS

## 2023-01-14 PROCEDURE — 72125 CT NECK SPINE W/O DYE: CPT

## 2023-01-14 PROCEDURE — 86850 RBC ANTIBODY SCREEN: CPT

## 2023-01-14 PROCEDURE — 87635 SARS-COV-2 COVID-19 AMP PRB: CPT

## 2023-01-14 PROCEDURE — 84520 ASSAY OF UREA NITROGEN: CPT

## 2023-01-14 PROCEDURE — 82805 BLOOD GASES W/O2 SATURATION: CPT

## 2023-01-14 PROCEDURE — 73610 X-RAY EXAM OF ANKLE: CPT

## 2023-01-14 PROCEDURE — 99285 EMERGENCY DEPT VISIT HI MDM: CPT

## 2023-01-14 PROCEDURE — 82565 ASSAY OF CREATININE: CPT

## 2023-01-14 PROCEDURE — 84703 CHORIONIC GONADOTROPIN ASSAY: CPT

## 2023-01-14 PROCEDURE — 3209999900 CT THORACIC SPINE TRAUMA RECONSTRUCTION

## 2023-01-14 PROCEDURE — 85027 COMPLETE CBC AUTOMATED: CPT

## 2023-01-14 PROCEDURE — 6360000004 HC RX CONTRAST MEDICATION: Performed by: STUDENT IN AN ORGANIZED HEALTH CARE EDUCATION/TRAINING PROGRAM

## 2023-01-14 PROCEDURE — 3209999900 CT LUMBAR SPINE TRAUMA RECONSTRUCTION

## 2023-01-14 PROCEDURE — 85730 THROMBOPLASTIN TIME PARTIAL: CPT

## 2023-01-14 PROCEDURE — G0378 HOSPITAL OBSERVATION PER HR: HCPCS

## 2023-01-14 PROCEDURE — 70450 CT HEAD/BRAIN W/O DYE: CPT

## 2023-01-14 PROCEDURE — 82947 ASSAY GLUCOSE BLOOD QUANT: CPT

## 2023-01-14 PROCEDURE — 86901 BLOOD TYPING SEROLOGIC RH(D): CPT

## 2023-01-14 PROCEDURE — 85610 PROTHROMBIN TIME: CPT

## 2023-01-14 PROCEDURE — 86900 BLOOD TYPING SEROLOGIC ABO: CPT

## 2023-01-14 PROCEDURE — 96374 THER/PROPH/DIAG INJ IV PUSH: CPT

## 2023-01-14 PROCEDURE — G0480 DRUG TEST DEF 1-7 CLASSES: HCPCS

## 2023-01-14 PROCEDURE — 71260 CT THORAX DX C+: CPT

## 2023-01-14 PROCEDURE — 6810039001 HC L1 TRAUMA PRIORITY

## 2023-01-14 PROCEDURE — 6360000002 HC RX W HCPCS: Performed by: STUDENT IN AN ORGANIZED HEALTH CARE EDUCATION/TRAINING PROGRAM

## 2023-01-14 RX ORDER — POTASSIUM CHLORIDE 7.45 MG/ML
10 INJECTION INTRAVENOUS PRN
Status: DISCONTINUED | OUTPATIENT
Start: 2023-01-14 | End: 2023-01-18

## 2023-01-14 RX ORDER — POLYETHYLENE GLYCOL 3350 17 G/17G
17 POWDER, FOR SOLUTION ORAL DAILY PRN
Status: DISCONTINUED | OUTPATIENT
Start: 2023-01-14 | End: 2023-01-18

## 2023-01-14 RX ORDER — SODIUM CHLORIDE 0.9 % (FLUSH) 0.9 %
5-40 SYRINGE (ML) INJECTION EVERY 12 HOURS SCHEDULED
Status: DISCONTINUED | OUTPATIENT
Start: 2023-01-14 | End: 2023-01-20 | Stop reason: HOSPADM

## 2023-01-14 RX ORDER — ACETAMINOPHEN 325 MG/1
650 TABLET ORAL EVERY 6 HOURS PRN
Status: DISCONTINUED | OUTPATIENT
Start: 2023-01-14 | End: 2023-01-18

## 2023-01-14 RX ORDER — FENTANYL CITRATE 50 UG/ML
INJECTION, SOLUTION INTRAMUSCULAR; INTRAVENOUS
Status: COMPLETED
Start: 2023-01-14 | End: 2023-01-14

## 2023-01-14 RX ORDER — ONDANSETRON 2 MG/ML
4 INJECTION INTRAMUSCULAR; INTRAVENOUS EVERY 4 HOURS PRN
Status: DISCONTINUED | OUTPATIENT
Start: 2023-01-14 | End: 2023-01-20 | Stop reason: HOSPADM

## 2023-01-14 RX ORDER — ACETAMINOPHEN 650 MG/1
650 SUPPOSITORY RECTAL EVERY 6 HOURS PRN
Status: DISCONTINUED | OUTPATIENT
Start: 2023-01-14 | End: 2023-01-18

## 2023-01-14 RX ORDER — ENOXAPARIN SODIUM 100 MG/ML
40 INJECTION SUBCUTANEOUS DAILY
Status: DISCONTINUED | OUTPATIENT
Start: 2023-01-15 | End: 2023-01-20 | Stop reason: HOSPADM

## 2023-01-14 RX ORDER — SODIUM CHLORIDE 0.9 % (FLUSH) 0.9 %
5-40 SYRINGE (ML) INJECTION PRN
Status: DISCONTINUED | OUTPATIENT
Start: 2023-01-14 | End: 2023-01-20 | Stop reason: HOSPADM

## 2023-01-14 RX ORDER — FENTANYL CITRATE 50 UG/ML
50 INJECTION, SOLUTION INTRAMUSCULAR; INTRAVENOUS ONCE
Status: COMPLETED | OUTPATIENT
Start: 2023-01-14 | End: 2023-01-14

## 2023-01-14 RX ORDER — SODIUM CHLORIDE 9 MG/ML
25 INJECTION, SOLUTION INTRAVENOUS PRN
Status: DISCONTINUED | OUTPATIENT
Start: 2023-01-14 | End: 2023-01-20 | Stop reason: HOSPADM

## 2023-01-14 RX ORDER — POTASSIUM CHLORIDE 20 MEQ/1
40 TABLET, EXTENDED RELEASE ORAL PRN
Status: DISCONTINUED | OUTPATIENT
Start: 2023-01-14 | End: 2023-01-18

## 2023-01-14 RX ADMIN — FENTANYL CITRATE: 50 INJECTION, SOLUTION INTRAMUSCULAR; INTRAVENOUS at 19:23

## 2023-01-14 RX ADMIN — FENTANYL CITRATE 50 MCG: 50 INJECTION INTRAMUSCULAR; INTRAVENOUS at 20:30

## 2023-01-14 RX ADMIN — IOPAMIDOL 130 ML: 755 INJECTION, SOLUTION INTRAVENOUS at 19:38

## 2023-01-14 RX ADMIN — SODIUM CHLORIDE, PRESERVATIVE FREE 10 ML: 5 INJECTION INTRAVENOUS at 22:44

## 2023-01-14 ASSESSMENT — ENCOUNTER SYMPTOMS
BACK PAIN: 0
SINUS PAIN: 0
CHEST TIGHTNESS: 0
ABDOMINAL PAIN: 0
VOICE CHANGE: 0
FACIAL SWELLING: 0
SHORTNESS OF BREATH: 0

## 2023-01-14 ASSESSMENT — PAIN SCALES - GENERAL: PAINLEVEL_OUTOF10: 10

## 2023-01-15 PROCEDURE — 6360000002 HC RX W HCPCS: Performed by: STUDENT IN AN ORGANIZED HEALTH CARE EDUCATION/TRAINING PROGRAM

## 2023-01-15 PROCEDURE — 6360000002 HC RX W HCPCS

## 2023-01-15 PROCEDURE — 6370000000 HC RX 637 (ALT 250 FOR IP): Performed by: STUDENT IN AN ORGANIZED HEALTH CARE EDUCATION/TRAINING PROGRAM

## 2023-01-15 PROCEDURE — G0378 HOSPITAL OBSERVATION PER HR: HCPCS

## 2023-01-15 PROCEDURE — 96372 THER/PROPH/DIAG INJ SC/IM: CPT

## 2023-01-15 PROCEDURE — 97162 PT EVAL MOD COMPLEX 30 MIN: CPT

## 2023-01-15 PROCEDURE — 97530 THERAPEUTIC ACTIVITIES: CPT

## 2023-01-15 PROCEDURE — 2580000003 HC RX 258: Performed by: STUDENT IN AN ORGANIZED HEALTH CARE EDUCATION/TRAINING PROGRAM

## 2023-01-15 PROCEDURE — 96376 TX/PRO/DX INJ SAME DRUG ADON: CPT

## 2023-01-15 RX ORDER — FENTANYL CITRATE 50 UG/ML
25 INJECTION, SOLUTION INTRAMUSCULAR; INTRAVENOUS ONCE
Status: COMPLETED | OUTPATIENT
Start: 2023-01-15 | End: 2023-01-15

## 2023-01-15 RX ADMIN — ACETAMINOPHEN 650 MG: 325 TABLET ORAL at 00:02

## 2023-01-15 RX ADMIN — ACETAMINOPHEN 650 MG: 325 TABLET ORAL at 08:26

## 2023-01-15 RX ADMIN — SODIUM CHLORIDE, PRESERVATIVE FREE 10 ML: 5 INJECTION INTRAVENOUS at 22:19

## 2023-01-15 RX ADMIN — ENOXAPARIN SODIUM 40 MG: 100 INJECTION SUBCUTANEOUS at 08:28

## 2023-01-15 RX ADMIN — SODIUM CHLORIDE, PRESERVATIVE FREE 10 ML: 5 INJECTION INTRAVENOUS at 08:28

## 2023-01-15 RX ADMIN — FENTANYL CITRATE 25 MCG: 50 INJECTION INTRAMUSCULAR; INTRAVENOUS at 02:31

## 2023-01-15 ASSESSMENT — PAIN DESCRIPTION - ORIENTATION
ORIENTATION: RIGHT

## 2023-01-15 ASSESSMENT — PAIN DESCRIPTION - LOCATION
LOCATION: ANKLE

## 2023-01-15 ASSESSMENT — PAIN - FUNCTIONAL ASSESSMENT: PAIN_FUNCTIONAL_ASSESSMENT: PREVENTS OR INTERFERES SOME ACTIVE ACTIVITIES AND ADLS

## 2023-01-15 ASSESSMENT — PAIN DESCRIPTION - DESCRIPTORS
DESCRIPTORS: THROBBING
DESCRIPTORS: STABBING;DISCOMFORT
DESCRIPTORS: THROBBING

## 2023-01-15 ASSESSMENT — PAIN SCALES - GENERAL
PAINLEVEL_OUTOF10: 0
PAINLEVEL_OUTOF10: 8
PAINLEVEL_OUTOF10: 10
PAINLEVEL_OUTOF10: 8

## 2023-01-15 NOTE — ED NOTES
Pt arrived via M25 c/o fall down 9 steps. Pt denies LOC denies blood thinners low back pain and tingling to hands.       Be Martinez RN  01/14/23 1929

## 2023-01-15 NOTE — H&P
[facility]  CDU / OBSERVATION eNCOUnter  Resident Note     Pt Name: Pallavi Michaels  MRN: [id number]  [place] 1950  Date of evaluation: 1/15/23  Patient's PCP is : No primary care provider on file. CHIEF COMPLAINT     No chief complaint on file. HISTORY OF PRESENT ILLNESS    Pallavi Michaels is a 67 y.o. female who was brought in by EMS after a fall down 9 steps at her home. She is currently complaining of right ankle pain. No LOC. Did not hit her head. Not on anticoagulation. Patient normally ambulates at home with a walker and lives alone. Location/Symptom: Right ankle pain  Timing/Onset: Yesterday  Provocation: Fall at home  Quality: Throbbing  Radiation: None  Severity: Moderate to severe  Timing/Duration: Yesterday, constant  Modifying Factors: Analgesics    REVIEW OF SYSTEMS       General ROS - No fevers, No malaise   Ophthalmic ROS - No discharge, No changes in vision  ENT ROS -  No sore throat, No rhinorrhea,   Respiratory ROS - no shortness of breath, no cough, no  wheezing  Cardiovascular ROS - No chest pain, no dyspnea on exertion  Gastrointestinal ROS - No abdominal pain, no nausea or vomiting, no change in bowel habits, no black or bloody stools  Genito-Urinary ROS - No dysuria, trouble voiding, or hematuria  Musculoskeletal ROS - + right ankle pain  Neurological ROS - No headache, no dizziness/lightheadedness, No focal weakness, no loss of sensation  Dermatological ROS - No lesions, No rash     (PQRS) Advance directives on face sheet per hospital policy. No change unless specifically mentioned in chart    PAST MEDICAL HISTORY    has no past medical history on file. I have reviewed the past medical history with the patient and it is pertinent to this complaint. SURGICAL HISTORY      has no past surgical history on file. I have reviewed and agree with Surgical History entered and it is pertinent to this complaint.      CURRENT MEDICATIONS     sodium chloride flush 0.9 % injection 5-40 mL, 2 times per day  sodium chloride flush 0.9 % injection 5-40 mL, PRN  0.9 % sodium chloride infusion, PRN  potassium chloride (KLOR-CON M) extended release tablet 40 mEq, PRN   Or  potassium bicarb-citric acid (EFFER-K) effervescent tablet 40 mEq, PRN   Or  potassium chloride 10 mEq/100 mL IVPB (Peripheral Line), PRN  enoxaparin (LOVENOX) injection 40 mg, Daily  ondansetron (ZOFRAN) injection 4 mg, Q4H PRN  polyethylene glycol (GLYCOLAX) packet 17 g, Daily PRN  acetaminophen (TYLENOL) tablet 650 mg, Q6H PRN   Or  acetaminophen (TYLENOL) suppository 650 mg, Q6H PRN      All medication charted and reviewed. ALLERGIES     has No Known Allergies. FAMILY HISTORY     has no family status information on file. family history is not on file. The patient denies any pertinent family history. I have reviewed and agree with the family history entered. I have reviewed the Family History and it is not significant to the case    SOCIAL HISTORY      reports that she quit smoking about 8 years ago. Her smoking use included cigarettes. She has never used smokeless tobacco. She reports that she does not drink alcohol and does not use drugs. I have reviewed and agree with all Social.  There are no concerns for substance abuse/use. PHYSICAL EXAM     INITIAL VITALS:  height is 5' 7\" (1.702 m) and weight is 180 lb (81.6 kg). Her temporal temperature is 97.2 °F (36.2 °C). Her blood pressure is 124/64 and her pulse is 78. Her respiration is 16 and oxygen saturation is 94%.       CONSTITUTIONAL: AOx4, no apparent distress, appears stated age    HEAD: normocephalic, atraumatic   EYES: PERRLA, EOMI    ENT: moist mucous membranes, uvula midline   NECK: supple, symmetric   BACK: symmetric   LUNGS: clear to auscultation bilaterally   CARDIOVASCULAR: regular rate and rhythm, no murmurs, rubs or gallops   ABDOMEN: soft, non-tender, non-distended with normal active bowel sounds   NEUROLOGIC: MAEx4, no focal sensory or motor deficits   MUSCULOSKELETAL: no clubbing, cyanosis or edema, tenderness to right ankle   SKIN: no rash or wounds       DIFFERENTIAL DIAGNOSIS/MDM:     Right ankle sprain    DIAGNOSTIC RESULTS     RADIOLOGY:   I directly visualized the following  images and reviewed the radiologist interpretations:    XR ANKLE RIGHT (MIN 3 VIEWS)    Result Date: 1/14/2023  EXAMINATION: THREE XRAY VIEWS OF THE RIGHT ANKLE 1/14/2023 4:54 pm COMPARISON: None. HISTORY: ORDERING SYSTEM PROVIDED HISTORY: fall, pain TECHNOLOGIST PROVIDED HISTORY: fall, pain FINDINGS: Deformity of the ankle, possibly posttraumatic. Benign appearing sclerosis in the distal tibia. .  There is no evidence of fracture or dislocation. Severe degenerative changes of the tibiotalar joint. .  The remaining joint spaces appear well maintained. Soft tissue swelling. Calcaneal spurs. Exostosis along the distal fibula. .     No acute bony abnormalities are noted Degenerative changes     CT HEAD WO CONTRAST    Result Date: 1/14/2023  EXAMINATION: CT OF THE HEAD WITHOUT CONTRAST  1/14/2023 7:27 pm TECHNIQUE: CT of the head was performed without the administration of intravenous contrast. Automated exposure control, iterative reconstruction, and/or weight based adjustment of the mA/kV was utilized to reduce the radiation dose to as low as reasonably achievable. COMPARISON: None. HISTORY: ORDERING SYSTEM PROVIDED HISTORY: trauma TECHNOLOGIST PROVIDED HISTORY: trauma Reason for Exam: fall FINDINGS: BRAIN/VENTRICLES: There is no acute intracranial hemorrhage, mass effect or midline shift. No abnormal extra-axial fluid collection. The gray-white differentiation is maintained without evidence of an acute infarct. There is no evidence of hydrocephalus. ORBITS: The visualized portion of the orbits demonstrate no acute abnormality. SINUSES: The visualized paranasal sinuses and mastoid air cells demonstrate no acute abnormality.  SOFT TISSUES/SKULL: No acute abnormality of the visualized skull or soft tissues. No acute intracranial abnormality. CT CERVICAL SPINE WO CONTRAST    Result Date: 1/14/2023  EXAMINATION: CT OF THE CERVICAL SPINE WITHOUT CONTRAST 1/14/2023 7:27 pm TECHNIQUE: CT of the cervical spine was performed without the administration of intravenous contrast. Multiplanar reformatted images are provided for review. Automated exposure control, iterative reconstruction, and/or weight based adjustment of the mA/kV was utilized to reduce the radiation dose to as low as reasonably achievable. COMPARISON: None. HISTORY: ORDERING SYSTEM PROVIDED HISTORY: trauma TECHNOLOGIST PROVIDED HISTORY: trauma Reason for Exam: fall FINDINGS: BONES/ALIGNMENT: There is no acute fracture or traumatic malalignment. DEGENERATIVE CHANGES: There is degenerative disc disease of the C6-C7 disc, with disc space narrowing and anterior and posterior osteophytes. Anterior osteophytes are noted at C3-C4, C4-C5 and C5-C6 discs. There are osteoarthritic changes of the facet joints noted bilaterally. SOFT TISSUES: There is no prevertebral soft tissue swelling. No acute abnormality of the cervical spine. CT CHEST ABDOMEN PELVIS W CONTRAST Additional Contrast? None    Result Date: 1/14/2023  EXAMINATION: CT OF THE CHEST, ABDOMEN, AND PELVIS WITH CONTRAST 1/14/2023 7:28 pm TECHNIQUE: CT of the chest, abdomen and pelvis was performed with the administration of intravenous contrast. Multiplanar reformatted images are provided for review. Automated exposure control, iterative reconstruction, and/or weight based adjustment of the mA/kV was utilized to reduce the radiation dose to as low as reasonably achievable.  COMPARISON: None HISTORY: ORDERING SYSTEM PROVIDED HISTORY: fall down 9 stairs: fall down steps, low back pain, tingling to the hands FINDINGS: Chest: Technical: Evaluation of the lungs degraded because of motion during imaging, blurring detail and resulting in misregistration artifact. Mediastinum: Cardiac structures and great vessels appear unremarkable. No pericardial effusion. Posterior mediastinal structures appear unremarkable. No mediastinal or hilar adenopathy. Lungs/pleura: Faint pulmonary opacity lateral right upper lobe. Focal subsegmental atelectasis posteromedial right lower lobe and at the lingula left upper lobe. Other portions of the lungs appear clear. No discrete nodule evident. No inspissated secretions or endobronchial lesion evident. No pleural effusion or pneumothorax. Soft Tissues/Bones: No acute traumatic abnormality. No acute superficial soft tissue or osseous structure abnormality evident. Confluent ossification of the anterior longitudinal ligament of thoracic spine reflects diffuse idiopathic skeletal hyperostosis (DISH). Abdomen/Pelvis: Organs: Hypoattenuation throughout the liver reflects hepatic steatosis. Craniocaudal liver length 15-16 cm. No discrete hepatic lesion or intrahepatic bile duct dilatation is seen. The gallbladder has a few scattered densities within it suggesting cholelithiasis without gallbladder wall thickening or pericholecystic fluid. The kidneys, spleen, adrenal glands and pancreas appear unremarkable. GI/Bowel: The stomach and small bowel appear unremarkable. No diffuse or focal small bowel wall thickening or inflammatory changes evident. No obstruction is seen. The appendix is visualized right lower quadrant, unremarkable in appearance. There are a few scattered colonic diverticula without definite inflammatory changes associated. The colon appears otherwise unremarkable. Pelvis: Popcorn like calcification right-sided the uterus measuring 10 x 15 mm indicative of degenerating fibroid (benign finding requiring no additional evaluation, no follow-up imaging recommended). The uterus and adnexal structures appear unremarkable. Urinary bladder is partially filled, unremarkable appearance.   No adenopathy or free fluid. Peritoneum/Retroperitoneum: Calcific atherosclerotic disease aorta. No aneurysm. Unremarkable appearance of the IVC. No adenopathy or fluid. No pneumoperitoneum. Bones/Soft Tissues: No acute superficial soft tissue abnormality evident. Age indeterminate 20-30% compression superior endplate L1, predominantly central endplate. No other definite acute traumatic abnormality demonstrated. 1.  CT CHEST: Evaluation of the lungs degraded because of motion during imaging, blurring detail and resulting in misregistration artifact. 2. No acute traumatic abnormality. 3. A few patchy opacities are demonstrated lateral right upper lobe, lingula left upper lobe and posteromedial right lung base. Focal pneumonia or areas of atelectasis or pulmonary contusion are considerations. No associated overlying rib fractures are evident. No pleural effusion or pneumothorax. 4. CT ABDOMEN/PELVIS: No acute intra-abdominal traumatic abnormality. 5. Age indeterminate 20-30% compression superior endplate L1, predominantly central endplate. Correlate with report from lumbar spine CT performed at the same time. Additional characterization with MRI may be indicated to discern acute versus chronic injury. 6. Hepatic steatosis. 7.  No CT evidence of an acute intra-abdominal or intrapelvic process. 8. Possible cholelithiasis without CT findings of acute cholecystitis. CT LUMBAR SPINE TRAUMA RECONSTRUCTION    Result Date: 1/14/2023  EXAMINATION: CT OF THE LUMBAR SPINE WITHOUT CONTRAST  1/14/2023 TECHNIQUE: CT of the lumbar spine was performed without the administration of intravenous contrast. Multiplanar reformatted images are provided for review. Adjustment of mA and/or kV according to patient size was utilized. Automated exposure control, iterative reconstruction, and/or weight based adjustment of the mA/kV was utilized to reduce the radiation dose to as low as reasonably achievable.  COMPARISON: None HISTORY: [address] PROVIDED HISTORY: TRAUMA TECHNOLOGIST PROVIDED HISTORY: trauma Reason for Exam: fall down steps FINDINGS: BONES/ALIGNMENT: There is normal alignment of the spine. The vertebral body heights are maintained. No osseous destructive lesion is seen. There is mild compression of the L1 vertebral body, without acute fracture lines or cortical breaks, in keeping with a old fracture. DEGENERATIVE CHANGES: No significant degenerative changes of the lumbar spine. Anterior osteophytes are noted in the the lumbar spine. There are osteoarthritic changes of the L4-L5 and L5-S1 facet joints. SOFT TISSUES/RETROPERITONEUM: No paraspinal mass is seen. Mild compression fracture of the L1 vertebral body which appears old. CT THORACIC SPINE TRAUMA RECONSTRUCTION    Result Date: 1/14/2023  EXAMINATION: CT OF THE THORACIC SPINE WITHOUT CONTRAST  1/14/2023 7:28 pm: TECHNIQUE: CT of the thoracic spine was performed without the administration of intravenous contrast. Multiplanar reformatted images are provided for review. Automated exposure control, iterative reconstruction, and/or weight based adjustment of the mA/kV was utilized to reduce the radiation dose to as low as reasonably achievable. COMPARISON: None. HISTORY: ORDERING SYSTEM PROVIDED HISTORY: trauma TECHNOLOGIST PROVIDED HISTORY: trauma Reason for Exam: fall down steps FINDINGS: BONES/ALIGNMENT: There is normal alignment of the spine. The vertebral body heights are maintained. No osseous destructive lesion is seen. There are no fractures noted. DEGENERATIVE CHANGES: Anterior osteophytes are noted in the mid and lower thoracic spine. No gross spinal canal stenosis or bony neural foraminal narrowing of the thoracic spine. SOFT TISSUES: No paraspinal mass is seen. No fractures noted. LABS:  I have reviewed and interpreted all available lab results.   Labs Reviewed   TRAUMA PANEL - Abnormal; Notable for the following components:       Result Value    RBC 3.38 (*) .5 (*)     MCH 37.0 (*)     Creatinine 1.13 (*)     Est, Glom Filt Rate 33 (*)     Glucose 120 (*)     Chloride 97 (*)     CO2 19 (*)     Anion Gap 19 (*)     pCO2, Aakash 37.7 (*)     pO2, Aakash 19.7 (*)     HCO3, Venous 20.9 (*)     Negative Base Excess, Aakash 3.5 (*)     O2 Sat, Aakash 24.6 (*)     All other components within normal limits   COVID-19, RAPID   TYPE AND SCREEN       CDU IMPRESSION / PLAN      Carin Morales is a 67 y.o. female who presents with right ankle pain after a fall    Right ankle pain  Pain control  PT/OT evaluation  Possible need for placement if unable to ambulate. Patient uses a walker at baseline. Continue home medications and pain control  Monitor vitals, labs, and imaging  DISPO: pending consults and clinical improvement    CONSULTS:    None    PROCEDURES:  Not indicated       PATIENT REFERRED TO:    No follow-up provider specified. --  George Jacobs MD   Emergency Medicine Resident     This dictation was generated by voice recognition computer software. Although all attempts are made to edit the dictation for accuracy, there may be errors in the transcription that are not intended.

## 2023-01-15 NOTE — ED NOTES
CTLS cleared per Dr. Nuris Talavera. Writer attempts to place aircast on pt, but she could not tolerate d/t pain. Walker boot placed on right foot. Writer and Juan R MONTE attempt to assist pt to standing position. Pt will not stand and is keeping knees straight while in seating position. Writer educates pt on bending her knees in order to stand. Pt continues to keep knees straight and states she is in too much pain to stand. Pt assisted back into cot with assist x 2 and positioned for comfort. Dr. Eris Mike made aware. Call light with in reach. No further needs expressed at this time.       Vidhi Segura RN  01/14/23 [id number]

## 2023-01-15 NOTE — ED NOTES
Dr. Lupe Burns updates pt on POC. Pt to be placed in observation for PT evaluation and pain control. Pt agreeable to admission.       Joe Reynolds RN  01/14/23 [id number]

## 2023-01-15 NOTE — ED PROVIDER NOTES
I performed a history and physical examination of the patient and discussed management with the resident. I reviewed the residents note and agree with the documented findings and plan of care. Any areas of disagreement are noted on the chart. I was personally present for the key portions of any procedures. I have documented in the chart those procedures where I was not present during the key portions. I have reviewed the emergency nurses triage note. I agree with the chief complaint, past medical history, past surgical history, allergies, medications, social and family history as documented unless otherwise noted below. Documentation of the HPI, Physical Exam and Medical Decision Making performed by medical students or scribes is based on my personal performance of the HPI, PE and MDM. For Phys Assistant/ Nurse Practitioner cases/documentation I have personally evaluated this patient and have completed at least one if not all key elements of the E/M (history, physical exam, and MDM). I find the patient's history and physical exam are consistent with the NP/PA documentation. I agree with the care provided, treatment rendered, disposition and followup plan. Additional findings are as noted. Alpa Franklin. Lissa Mcconnell MD  Attending Emergency  Physician    This patient presented to the emergency department with complaints of right ankle pain following a fall down stairs comprising approximately 8-9 steps. She was descending the steps when she fell forward. She denies any loss of consciousness. No neck pain. No chest or abdominal pain. No back pain. No numbness, weakness, tingling. No dizziness. No disturbance of vision, smell, taste, hearing, speech. No amnestic period. Patient does have a prior history of injury to the right ankle requiring surgical treatment. Patient was transported by EMS from the scene. No cervical collar is in evidence. On examination patient is awake and alert.   She is cooperative and responsive. GCS is 15. Speech is fluent and comprehension is normal.  Patient is oriented x4. Lungs are clear to auscultation bilaterally. Cardiac exam demonstrates an S1-S2, regular rate and rhythm. No murmurs, rubs, gallop. Abdomen is soft, nondistended, nontender. Normal bowel sounds are noted. Pelvis is stable and intact. Scalp is normocephalic and atraumatic. Neck is nontender. Patient is moving all extremities appropriately. Examination of the right ankle reveals swelling over the lateral aspect of the ankle and tenderness over the anterior talofibular and calcaneofibular ligaments. No significant tenderness over either medial nor lateral malleolus. Some mild tenderness over the base of the fifth metatarsal.  No tenderness over the deltoid ligament. Distal sensation, pulses, capillary refill, motor function appear to be preserved. No tenderness of the proximal fibula. Knee is nontender. Impression: Fall down steps. Plan: Trauma priority was declared and the trauma services evaluated the patient is continue to guide her work-up.           Stormy Mariee MD  01/14/23 [id number]

## 2023-01-15 NOTE — PROGRESS NOTES
[facility] CARE DEPARTMENT - [address]     Emergency/Trauma Note    PATIENT NAME: Mehrdad Trauma Vinnie Dillon    Shift date: 01/14/2023  Shift day: Saturday   Shift # 2    Room # TRAUMA A/TRAUMAA   Name: Cm Trauma [facility]            Age: 79 y.o. Gender: female            Trauma/Incident type: Adult Trauma Priority  Admit Date & Time: 1/14/2023  7:11 PM    ADVANCE DIRECTIVES IN CHART? No    NAME OF DECISION MAKER:     RELATIONSHIP OF DECISION MAKER TO PATIENT:     PATIENT/EVENT DESCRIPTION:  Mehrdad Dillon is a 79 y.o. female who arrived via EMS from event as a trauma priority. Per EMS patient fell down steps at home. Did not lose consciousness and arrived alert and oriented. Pt to be admitted to TRAUMA A/TRAUMAA. SPIRITUAL ASSESSMENT-INTERVENTION-OUTCOME:   provided a ministry of presence for the medical staff and patient.  spoke with patient to confirm date of birth and provided emotional support.  inquired if there was anyone on the way or any one they would like contacted. The patient responded that no one was on the way and they did not want anyone contacted at this time. The patient's chart lists Hassel Members as the patient's spouse [phone].  spoke to patient and explained once they were settled,  would return. The patient expressed gratitude and a desire for a visit. PATIENT BELONGINGS:  No belongings seen by this chaplian    ANY BELONGINGS OF SIGNIFICANT VALUE NOTED:      REGISTRATION STAFF NOTIFIED? Yes      WHAT IS YOUR SPIRITUAL CARE PLAN FOR THIS PATIENT?:   Continue to provide emotional and spiritual care as needed.      Electronically signed by Lori Suggs Resident, on 1/14/2023 at 7:30 PM.  [facility]  [phone]

## 2023-01-15 NOTE — DISCHARGE INSTR - COC
Continuity of Care Form    Patient Name: Samy Gilbert   :  1950  MRN:  [id number]    [facility] date:  2023  Discharge date:  23    Code Status Order: Full Code   Advance Directives:   Advance Care Flowsheet Documentation       Date/Time Healthcare Directive Type of Healthcare Directive Copy in [address] Agent's Name Healthcare Agent's Phone Number    [phone] Yes, patient has an advance directive for healthcare treatment Durable power of  for health care; Health care treatment directive; Living will No, copy requested from family -- -- --            Admitting Physician:  Cortes Cihn MD  PCP: No primary care provider on file.  [facility] Unit/Room#:   Discharging Unit Phone Number: [phone]    Emergency Contact:   Extended Emergency Contact Information  Primary Emergency Contact: Kristie Hogan  Home Phone: [phone]  Relation: Spouse  Secondary Emergency Contact: Stan Hogan  Mobile Phone: [phone]  Relation: Spouse  Preferred language: English    Past Surgical History:  Past Surgical History:   Procedure Laterality Date    CERVICAL FUSION      POSTERIOR CERVICAL DECOMPRESSION AND FUSION C3-7    CERVICAL FUSION N/A 2023    C 3-7 POSTERIOR CERVICAL DECOMPRESSION AND FUSION performed by Ely Thomason DO at [facility]       Immunization History: There is no immunization history on file for this patient.     Active Problems:  Patient Active Problem List   Diagnosis Code    Mild ankle sprain, initial encounter S93.409A    Mild sprain of right ankle, initial encounter S93.401A    Stenosis of cervical spine with myelopathy ([address] M48.02, G99.2    Central cord syndrome ([address] X82.683A       Isolation/Infection:   Isolation            No Isolation          Patient Infection Status       None to display            Nurse Assessment:  Last Vital Signs: /81   Pulse 77   Temp 97.5 °F (36.4 °C) (Oral)   Resp 18   Ht 5' 7\" (1.702 m)   Wt 180 lb (81.6 kg)   SpO2 96%   BMI 28.19 kg/m²     Last documented pain score (0-10 scale): Pain Level: 0  Last Weight:   Wt Readings from Last 1 Encounters:   01/14/23 180 lb (81.6 kg)     Mental Status:  oriented and alert    IV Access:  - None    Nursing Mobility/ADLs:  Walking   Dependent  Transfer  Dependent  Bathing  Dependent  Dressing  Dependent  Toileting Dependent  Feeding Needs Assist  Med Admin  Needs Assist  Med Delivery   whole    Wound Care Documentation and Therapy:  Incision 01/18/23 Neck Posterior (Active)   Dressing Status Old drainage noted 01/20/23 0343   Dressing/Treatment Dry dressing 01/20/23 0343   Closure Staples 01/20/23 0343   Drainage Amount Small 01/20/23 0343   Drainage Description Sanguinous 01/20/23 0343   Odor None 01/20/23 0343   Number of days: 1        Elimination:  Continence: Bowel: Yes  Bladder: Yes  Urinary Catheter: None   Colostomy/Ileostomy/Ileal Conduit: No       Date of Last BM: 1/15/23    Intake/Output Summary (Last 24 hours) at 1/20/2023 1059  Last data filed at 1/20/2023 0551  Gross per 24 hour   Intake 450 ml   Output 1140 ml   Net -690 ml     I/O last 3 completed shifts: In: 700 [P.O.:700]  Out: 1400 [Urine:1300; Drains:100]    Safety Concerns: At Risk for Falls    Impairments/Disabilities:      None    Nutrition Therapy:  Current Nutrition Therapy:   - Oral Diet:  General    Routes of Feeding: Oral  Liquids: No Restrictions  Daily Fluid Restriction: no  Last Modified Barium Swallow with Video (Video Swallowing Test): not done    Treatments at the Time of Hospital Discharge:   Respiratory Treatments: n/a  Oxygen Therapy:  is not on home oxygen therapy.   Ventilator:    - No ventilator support    Rehab Therapies: Physical Therapy and Occupational Therapy  Weight Bearing Status/Restrictions: No weight bearing restrictions  Other Medical Equipment (for information only, NOT a DME order):  cane and walker  Other Treatments: n/a    Patient's personal belongings (please select all that are sent with patient):  None    RN SIGNATURE:  Electronically signed by Alfredo Kenyon RN on 1/20/2023 at 12:39 PM EST    CASE MANAGEMENT/SOCIAL WORK SECTION    Inpatient Status Date: ***    Readmission Risk Assessment Score:  Readmission Risk              Risk of Unplanned Readmission:  13           Discharging to Facility/ Agency   Name: [facility]  Address: [address] [place] [address]  Phone: [phone]    / signature: Electronically signed by Dewey Forde RN on 1/20/23 at 10:59 AM EST    PHYSICIAN SECTION    Prognosis: Good    Condition at Discharge: Stable    Rehab Potential (if transferring to Rehab): Good    Recommended Labs or Other Treatments After Discharge:     Physician Certification: I certify the above information and transfer of Adiel Nelson  is necessary for the continuing treatment of the diagnosis listed and that she requires [place] for less 30 days.      Update Admission H&P: No change in H&P    PHYSICIAN SIGNATURE:  Electronically signed by Britt Crowell MD on 1/15/23 at 12:30 PM EST

## 2023-01-15 NOTE — H&P
TRAUMA H&P/CONSULT    PATIENT NAME: Mehrdad Trauma Vinnie  YOB: 1881  MEDICAL RECORD NO. [id number]   DATE: 1/14/2023  PRIMARY CARE PHYSICIAN: No primary care provider on file. PATIENT EVALUATED AT THE REQUEST OF : Gustavo Tavera    ACTIVATION   []Trauma Alert     [x] Trauma Priority     []Trauma Consult. Fall down stairs, (-) LOC      IMPRESSION AND PLAN:       Mechanical fall down stairs, (-) LOC   -Follow-up pan scans   -Follow-up trauma panel   -Follow-up right ankle x-ray   -Will admit to trauma services if any acute traumatic injuries are found    If intracranial hemorrhage is present, is it a:  [] BIG 1  [] BIG 2  [] BIG 3  If chest wall injury: Rib score___    CONSULT SERVICES    [] Neurosurgery     [] Orthopedic Surgery    [] Cardiothoracic     [] Facial Trauma    [] Plastic Surgery (Burn)    [] Pediatric Surgery     [] Internal Medicine    [] Pulmonary Medicine    [] Geriatrics    [] Other:        HISTORY:     Chief Complaint:  \"Fall down stairs\"    GENERAL DATA  Patient information was obtained from patient and EMS personnel. History/Exam limitations: none. Injury Date: 1/14/23   Approximate Injury Time: 7:30        Transport mode:   [x]Ambulance      [] Helicopter     []Car       [] Other  Referring Hospital:     [facility]   Location (e.g., home, farm, industry, street): Home  Specific Details of Location (e.g., bedroom, kitchen, garage, highway): Stairs    MECHANISM OF INJURY         [x] Fall    []From Standing     []From Height __ Ft     [x]Down _9__steps  []Other___    [] Eye protection  []Boots   []Flotation device   []Leather outerwear  []Sports gear []Other:___       HISTORY:     [place] is a female that presented to the Emergency Department following a mechanical fall down several stairs at her home. GCS 15 on arrival. Patient denies loss of consciousness. Patient denies any history of blood thinners. Only complaining of right ankle pain.   Previous surgical scar noted over right ankle. Patient states that she has previously broken that ankle. Traumatic loss of Consciousness [x]No   []Yes Duration(min)       [] Unknown     Total Fluids Given Prior To Arrival  mL    MEDICATIONS:   []  None     []  Information not available due to exam limitations documented above    Prior to Admission medications    Not on File       ALLERGIES:   []  None    []   Information not available due to exam limitations documented above     Patient has no allergy information on record. PAST MEDICAL/SURGICAL HISTORY: []  None   []   Information not available due to exam limitations documented above      has no past medical history on file. has no past surgical history on file. FAMILY HISTORY   []   Information not available due to exam limitations documented above    family history is not on file. SOCIAL HISTORY  []   Information not available due to exam limitations documented above     has no history on file for tobacco use.   has no history on file for alcohol use.   has no history on file for drug use. Review of Systems:    Review of Systems   Constitutional:  Negative for chills and fever. HENT:  Negative for facial swelling, sinus pain and voice change. Eyes:  Negative for visual disturbance. Respiratory:  Negative for chest tightness and shortness of breath. Cardiovascular:  Negative for chest pain. Gastrointestinal:  Negative for abdominal pain. Musculoskeletal:  Negative for back pain and neck pain. Reports right ankle pain   Skin:  Positive for rash. Neurological:  Negative for dizziness, facial asymmetry, numbness and headaches. Reports weakness in right foot         PHYSICAL EXAMINATION:     VITAL SIGNS:   Vitals:    01/14/23 1919   BP:    Pulse:    Resp:    Temp:    SpO2: 96%       Physical Exam  Vitals and nursing note reviewed. Constitutional:       Appearance: Normal appearance.    HENT:      Head: Normocephalic and atraumatic. Nose: Nose normal.      Mouth/Throat:      Mouth: Mucous membranes are moist.      Pharynx: Oropharynx is clear. Eyes:      Extraocular Movements: Extraocular movements intact. Conjunctiva/sclera: Conjunctivae normal.      Pupils: Pupils are equal, round, and reactive to light. Neck:      Comments: C-collar in place  Cardiovascular:      Rate and Rhythm: Normal rate and regular rhythm. Pulses: Normal pulses. Pulmonary:      Effort: Pulmonary effort is normal. No respiratory distress. Breath sounds: Normal breath sounds. No stridor. No wheezing. Abdominal:      General: There is no distension. Palpations: Abdomen is soft. Tenderness: There is no abdominal tenderness. There is no guarding. Musculoskeletal:         General: No deformity. Comments: Tenderness to palpation over right lateral and medial malleolus. No obvious right ankle deformity noted. Skin:     General: Skin is warm. Capillary Refill: Capillary refill takes less than 2 seconds. Findings: No bruising or erythema. Comments: Diffuse eczematous rash over torso and bilateral upper and lower extremities   Neurological:      General: No focal deficit present. Mental Status: She is alert and oriented to person, place, and time. Comments: Focal weakness of right distal lower extremity. Exam may be limited by pain. FOCUSED ABDOMINAL SONOGRAM FOR TRAUMA (FAST): A good  quality examination was performed by Dr. Lafonda Kayser and representative images were obtained.     [] No free fluid in the abdomen   [] Free fluid in RUQ   [] Free fluid in LUQ  [] Free fluid in Pelvis  [] Pericardial fluid  [] Other:        RADIOLOGY  CT CHEST ABDOMEN PELVIS W CONTRAST Additional Contrast? None    (Results Pending)   CT HEAD WO CONTRAST    (Results Pending)   CT CERVICAL SPINE WO CONTRAST    (Results Pending)   CT THORACIC SPINE TRAUMA RECONSTRUCTION    (Results Pending)   CT LUMBAR SPINE TRAUMA RECONSTRUCTION    (Results Pending)   XR ANKLE RIGHT (MIN 3 VIEWS)    (Results Pending)         LABS  Labs Reviewed   TRAUMA PANEL - Abnormal; Notable for the following components:       Result Value    RBC 3.38 (*)     .5 (*)     MCH 37.0 (*)     pCO2, Aakash 37.7 (*)     pO2, Aakash 19.7 (*)     HCO3, Venous 20.9 (*)     Negative Base Excess, Aakash 3.5 (*)     O2 Sat, Aakash 24.6 (*)     All other components within normal limits   TYPE AND SCREEN         Radha Wong DO  1/14/23, 7:42 PM          Attending Note    Patient seen as a trauma priority for a fall down 9 steps. CT imaging demonstrated old  L 1 compression fracture   I have reviewed the above TECSS note(s) and I either performed the key elements of the medical history and physical exam or was present with the resident when the key elements of the medical history and physical exam were performed. I have discussed the findings, established the care plan and recommendations with Residents Yosi Xie and Evan.     Paul Yoo MD  1/14/2023  9:28 PM

## 2023-01-15 NOTE — PROGRESS NOTES
C-Spine Evaluation for Spine Clearance:    Pt is a 79 y.o. female who was admitted on 01/14/23 s/p mechanical fall. Pt w/ complaints of R ankle pain and swelling. C-Spine precautions of C-collar with spinal neutrality maintained since arrival with current exam directed at further evaluation of spine for clearance purposes. Pt chart and current images reviewed. CT C-Spine negative for acute fracture, subluxation, or traumatic injury. Patient does not have a distracting injury, is not acutely intoxicated and is alert, oriented and fully able to participate in exam.      Pt denies c-spine pain while resting in c-collar. C-collar removed w/ c-spine neutrality maintained. Pt denies midline pain with palpation of spinous processes and axial loading. Pt demonstrated full flexion, extension, and SB ROM without complaints of pain. Supine position maintained since arrival.  Pt denies midline pain with palpation of spinous processes. C-spine is considered cleared w/out need for further imaging, evaluation, or continuation of c-collar.      Electronically signed by Nima Pinedo MD on 1/14/2023 at 9:23 PM

## 2023-01-15 NOTE — ED NOTES
Report called to [facility]. Awaiting transport.       Henretta Schaumann, RN  01/14/23 5083

## 2023-01-15 NOTE — PROGRESS NOTES
SPIRITUAL CARE DEPARTMENT - [facility]  PROGRESS NOTE    Shift date: 1/14/2023  Shift day: Saturday   Shift # 2    Room # 23/23   Name: [place]                  Referral:  Nurse and patient    Admit Date & Time: 1/14/2023  7:11 PM    Assessment:  Cm Any Shafer is a 79 y.o. female in the hospital because of a \"fall. \" Upon entering the room writer observes the patient lying in bed, verbally expressing pain. The patient appeared to be in pain and was calm. The patient asked  to call  and tell them the patient \"is fine, staying overnight, and that he [the ] can go to bed. \" Patient requested that  get nurse regarding pain management.  contacted nurse.  called , Flori Peraza [phone], and relayed patient's message.  explained that the patient underwent CT scan and X-ray, that those results were not in to know any details yet. The patient's  explained that they were also on the phone with their daughter in [place] and requested assistance in finding senior assisted living options. Per the , both the patient and they experience mobility issues that are a concern. The  connected with ED social work, who left voicemail for case management to discuss discharge planning with the patient and the patient's . Intervention:  Writer introduced self and title as  Writer offered space for the patient and the patient's   to express feelings, needs, and concerns and provided a ministry presence.  called patient's , connected with social work regarding 's request.     Outcome:  Patient and patient's  expressed gratitude. Plan:  Chaplains will remain available to offer spiritual and emotional support as needed. 01/14/23 2013   Encounter Summary   Service Provided For: Patient;Significant other   Referral/Consult From: Patient   Support System Spouse; Children   Last Encounter  01/14/23   Complexity of Encounter Low   Begin Time 1955   End Time  2010   Total Time Calculated 15 min   Assessment/Intervention/Outcome   Assessment Calm;Coping   Intervention Sustaining Presence/Ministry of presence; Active listening   Outcome Engaged in conversation;Expressed feelings, needs, and concerns;Expressed Gratitude       Electronically signed by Cheryl Mae Resident, on 1/14/2023 at 8:16 PM.  [facility]  [phone]

## 2023-01-15 NOTE — PROGRESS NOTES
[facility]  CDU / OBSERVATION eNCOUnter  Attending NOte       I performed a history and physical examination of the patient and discussed management with the resident. I reviewed the residents note and agree with the documented findings and plan of care. Any areas of disagreement are noted on the chart. I was personally present for the key portions of any procedures. I have documented in the chart those procedures where I was not present during the key portions. I have reviewed the nurses notes. I agree with the chief complaint, past medical history, past surgical history, allergies, medications, social and family history as documented unless otherwise noted below. The Family history, social history, and ROS are effectively unchanged since admission unless noted elsewhere in the chart. Right ankle sprain. Having difficulty with ambulation. Normally uses a walker. Plan for PT and OT to see. Social work.      Marita Hough MD  Attending Emergency  Physician

## 2023-01-15 NOTE — ED NOTES
Report received from [facility]. Pt to room 23 from trauma A. Call light within reach. Awaiting pastoral care to reach out to pts , pt aware.       Gennaro Haywood RN  01/14/23 2462

## 2023-01-15 NOTE — PROGRESS NOTES
Physical Therapy  Facility/Department: [address] OBSERVATION  Physical Therapy Initial Assessment    Name: Jj Huynh  : 1950  MRN: [id number]  Date of Service: 1/15/2023  \"This patient presented to the emergency department with complaints of right ankle pain following a fall down stairs comprising approximately 8-9 steps. She was descending the steps when she fell forward. \"    Discharge Recommendations:  Patient would benefit from continued therapy after discharge   PT Equipment Recommendations  Equipment Needed: No      Patient Diagnosis(es): The encounter diagnosis was Sprain of right ankle, unspecified ligament, initial encounter. Past Medical History:  has no past medical history on file. Past Surgical History:  has no past surgical history on file. Assessment   Body Structures, Functions, Activity Limitations Requiring Skilled Therapeutic Intervention: Decreased functional mobility ; Decreased strength; Increased pain;Decreased posture;Decreased ROM; Decreased endurance;Decreased coordination;Decreased balance  Assessment: Pt required maxA to perform bed mobility, maxA to attempt functional transfers. Pt unable to obtain standing position this date. Pt is a high fall risk and would be unsafe to return to prior living arrangements at this time secondary to poor awareness of deficits and level of assistance required to perform functional mobility. Recommending continued skilled physical therapy to address functional mobility deficits, decrease fall risk and return pt to baseline function.   Therapy Prognosis: Fair  Decision Making: Medium Complexity  Requires PT Follow-Up: Yes  Activity Tolerance  Activity Tolerance: Patient limited by pain     Plan   Physcial Therapy Plan  General Plan:  (5-6x/week)  Current Treatment Recommendations: Strengthening, ROM, Balance training, Endurance training, Safety education & training, Patient/Caregiver education & training, Therapeutic activities, Functional mobility training, Transfer training, Gait training, Equipment evaluation, education, & procurement, Stair training, Home exercise program  Safety Devices  Type of Devices: Nurse notified, Call light within reach, Bed alarm in place, Left in bed, Gait belt, Patient at risk for falls  Restraints  Restraints Initially in Place: No     Restrictions  Restrictions/Precautions  Restrictions/Precautions: Up as Tolerated  Required Braces or Orthoses?: Yes  Required Braces or Orthoses  Right Lower Extremity Brace: Boot  RLE Brace Type: Aircast Walking Boot present in room     Subjective   General  Patient assessed for rehabilitation services?: Yes  Response To Previous Treatment: Not applicable  Family / Caregiver Present: No  Follows Commands: Within Functional Limits  General Comment  Comments: Pt denies pain upon writer's entrance  Subjective  Subjective: RN and pt in agreement for PT eval. Pt supine in bed upon PT arrival, pt cooperative throughout session. Social/Functional History  Social/Functional History  Lives With: Spouse  Type of Home: House  Home Layout: Two level, Bed/Bath upstairs  Home Access: Stairs to enter without rails  Entrance Stairs - Number of Steps: 4  Bathroom Shower/Tub: Tub only  Bathroom Toilet: Standard  Bathroom Equipment: Grab bars in shower, Shower chair  Home Equipment: Walker, rolling  ADL Assistance: Independent  Homemaking Assistance: Independent  Homemaking Responsibilities: Yes  Ambulation Assistance: Independent  Transfer Assistance: Independent  Active : No  Mode of Transportation: Other (Neighbor or  performs driving tasks)  Occupation: Retired  Additional Comments: Pt reports spouse is scheduled to have surgery next week and will be unable to provide assistance upon discharge.   Vision/Hearing  Vision  Vision: Within Functional Limits  Hearing  Hearing: Within functional limits    Cognition   Orientation  Overall Orientation Status: Within Functional Limits  Cognition  Overall Cognitive Status: Exceptions  Safety Judgement: Decreased awareness of need for assistance;Decreased awareness of need for safety  Problem Solving: Assistance required to identify errors made;Assistance required to generate solutions  Insights: Decreased awareness of deficits  Initiation: Requires cues for some  Sequencing: Requires cues for some     Objective   Gross Assessment  Sensation: Intact     Joint Mobility  ROM RLE: Hip WFL; knee flexion lacking ~ 20 degrees; No active DF/PF noted  ROM LLE: Hip and knee WFL; Ankle DF to neutral  ROM RUE: WFL  ROM LUE: WFL  Strength RLE  Strength RLE: Exception  R Hip Flexion: 3/5  R Knee Flexion: 3-/5  R Knee Extension: 3-/5  R Ankle Dorsiflexion: 0/5  R Ankle Plantar flexion: 0/5  Strength LLE  Strength LLE: Exception  L Hip Flexion: 3+/5  L Knee Flexion: 3+/5  L Knee Extension: 3+/5  L Ankle Dorsiflexion: 3+/5  L Ankle Plantar Flexion: 3+/5  Strength RUE  Strength RUE: WFL  Strength LUE  Strength LUE: WFL           Bed mobility  Supine to Sit: Moderate assistance  Sit to Supine: Maximum assistance  Bed Mobility Comments: Significant increased time required to complete. Pt with significant fear of falling with poor awareness of proprioception with performance of bed mobility, pt pushing into trunk extension despite encouragement from writer to maintain upright seated position  Transfers  Sit to Stand: Maximum Assistance  Stand to Sit: Maximum Assistance  Comment: Pt with significant difficulty with attempting functional transfers. Boot donned prior to sit to stand transfer. Pt demonstrates significant difficulty with performing bilateral knee flexion to attempt sit to stand transfer despite max verbal and tactile cueing. Pt pushes into trunk extension with functional mobility.  Pt unable to obtain lift off from the side of the bed despite max A  Ambulation  More Ambulation?: No  Stairs/Curb  Stairs?: No     Balance  Posture: Fair  Sitting - Static: Fair;-  Sitting - Dynamic: Poor;+  Standing - Static: Poor;-  Standing - Dynamic: Poor;-  Comments: pt required maxA to bed mobility and STS transfer    AM-PAC Score  AM-PAC Inpatient Mobility Raw Score : 8 (01/15/23 1408)  AM-PAC Inpatient T-Scale Score : 28.52 (01/15/23 1408)  Mobility Inpatient CMS 0-100% Score: 86.62 (01/15/23 1408)  Mobility Inpatient CMS G-Code Modifier : CM (01/15/23 1408)  Goals  Short Term Goals  Time Frame for Short Term Goals: 14  Short Term Goal 1: Pt to perform bed mobility Dilcia  Short Term Goal 2: Pt to demonstrate functional transfers Dilcia  Short Term Goal 3: Pt to ambulate 50ft w/ RW Dilcia  Short Term Goal 4: Tolerate 30 minutes of therapy to demo increased endurance  Patient Goals   Patient Goals :  To go home       Education  Patient Education  Education Given To: Patient  Education Provided: Role of Therapy;Plan of Care  Education Provided Comments: Donning/doffing walking boot; decreasing fall risk within home  Education Method: Demonstration  Barriers to Learning: None  Education Outcome: Verbalized understanding;Demonstrated understanding      Therapy Time   Individual Concurrent Group Co-treatment   Time In 1104         Time Out 1144         Minutes 40         Timed Code Treatment Minutes: [address], PT

## 2023-01-15 NOTE — PLAN OF CARE
Problem: Skin/Tissue Integrity  Goal: Absence of new skin breakdown  Description: 1. Monitor for areas of redness and/or skin breakdown  2. Assess vascular access sites hourly  3. Every 4-6 hours minimum:  Change oxygen saturation probe site  4. Every 4-6 hours:  If on nasal continuous positive airway pressure, respiratory therapy assess nares and determine need for appliance change or resting period.   1/15/2023 0946 by Ector Nyhan, RN  Outcome: Progressing  1/15/2023 0331 by Jenny Sylvester  Outcome: Progressing     Problem: Safety - Adult  Goal: Free from fall injury  1/15/2023 0946 by Ector Nyhan, RN  Outcome: Progressing  1/15/2023 0331 by Jenny Sylvester  Outcome: Progressing     Problem: ABCDS Injury Assessment  Goal: Absence of physical injury  1/15/2023 0946 by Ector Nyhan, RN  Outcome: Progressing  1/15/2023 0331 by Jenny Sylvester  Outcome: Progressing     Problem: Pain  Goal: Verbalizes/displays adequate comfort level or baseline comfort level  1/15/2023 0946 by Ector Nyhan, RN  Outcome: Progressing  1/15/2023 0331 by Jenny Sylvester  Outcome: Progressing

## 2023-01-15 NOTE — PROGRESS NOTES
Trauma Tertiary Survey    Admit Date: 1/14/2023  Hospital day 0    Fall distance down one flight of stairs     No past medical history on file. Scheduled Meds:  Continuous Infusions:  PRN Meds:    Subjective:     Patient has complaints R ankle pain. Pain is worse on movement, ambulation, and palpation and some relief by rest.  There is not associated numbness. Objective:   Patient Vitals for the past 8 hrs:   BP Temp Temp src Pulse Resp SpO2 Height Weight   01/14/23 1945 (!) 149/78 98 °F (36.7 °C) Oral 97 22 95 % -- --   01/14/23 1943 139/75 -- -- 95 16 95 % -- --   01/14/23 1930 118/68 -- -- 98 18 94 % -- --   01/14/23 1923 (!) 144/75 -- -- 95 16 97 % -- --   01/14/23 1919 -- -- -- -- -- 96 % -- --   01/14/23 1918 -- -- -- -- -- -- 5' 7\" (1.702 m) 180 lb (81.6 kg)   01/14/23 1917 (!) 162/146 -- -- (!) 104 -- -- -- --   01/14/23 1917 -- 98.1 °F (36.7 °C) -- -- -- -- -- --   01/14/23 1912 (!) 158/86 -- -- (!) 104 16 98 % -- --       No intake/output data recorded. No intake/output data recorded. Radiology:  XR ANKLE RIGHT (MIN 3 VIEWS)    Result Date: 1/14/2023  No acute bony abnormalities are noted Degenerative changes     CT HEAD WO CONTRAST    Result Date: 1/14/2023  No acute intracranial abnormality. CT CERVICAL SPINE WO CONTRAST    Result Date: 1/14/2023  No acute abnormality of the cervical spine. CT CHEST ABDOMEN PELVIS W CONTRAST Additional Contrast? None    Result Date: 1/14/2023  1. CT CHEST: Evaluation of the lungs degraded because of motion during imaging, blurring detail and resulting in misregistration artifact. 2. No acute traumatic abnormality. 3. A few patchy opacities are demonstrated lateral right upper lobe, lingula left upper lobe and posteromedial right lung base. Focal pneumonia or areas of atelectasis or pulmonary contusion are considerations. No associated overlying rib fractures are evident. No pleural effusion or pneumothorax.  4. CT ABDOMEN/PELVIS: No acute intra-abdominal traumatic abnormality. 5. Age indeterminate 20-30% compression superior endplate L1, predominantly central endplate. Correlate with report from lumbar spine CT performed at the same time. Additional characterization with MRI may be indicated to discern acute versus chronic injury. 6. Hepatic steatosis. 7.  No CT evidence of an acute intra-abdominal or intrapelvic process. 8. Possible cholelithiasis without CT findings of acute cholecystitis. CT LUMBAR SPINE TRAUMA RECONSTRUCTION    Result Date: 1/14/2023  Mild compression fracture of the L1 vertebral body which appears old. CT THORACIC SPINE TRAUMA RECONSTRUCTION    Result Date: 1/14/2023  No fractures noted. PHYSICAL EXAM:   GCS:  4 - Opens eyes on own   6 - Follows simple motor commands  5 - Alert and oriented    Pupil size:  Left 3 mm Right 3 mm  Pupil reaction: Yes  Wiggles fingers: Left Yes Right Yes  Hand grasp:   Left normal   Right normal  Wiggles toes: Left Yes    Right Yes  Plantar flexion: Left decreased  Right decreased      BP (!) 149/78   Pulse 97   Temp 98 °F (36.7 °C) (Oral)   Resp 22   Ht 5' 7\" (1.702 m)   Wt 180 lb (81.6 kg)   SpO2 95%   BMI 28.19 kg/m²   General appearance: alert, appears stated age, cooperative, and no distress  Head: Normocephalic, without obvious abnormality, atraumatic  Eyes: PERRL, EOM intact  Nose: Nares normal. Septum midline. Mucosa normal. No drainage or sinus tenderness.   Throat: lips, mucosa, and tongue normal; teeth and gums normal  Neck: no JVD and supple, symmetrical, trachea midline  Lungs: clear to auscultation bilaterally  Heart: regular rate and rhythm, S1, S2 normal, no murmur, click, rub or gallop  Abdomen: soft, non-tender; bowel sounds normal; no masses,  no organomegaly  Extremities: extremities normal, atraumatic, no cyanosis or edema, psoriatic plaques evident over lower legs bilaterally  Pulses: 1+ and symmetric DP, 2+ and symmetric radials    Spine:     Spine Tenderness ROM   Cervical 0 /10 Normal   Thoracic 0 /10 Normal   Lumbar 0 /10 Not Indicated     Musculoskeletal    Joint Tenderness Swelling ROM   Right shoulder absent absent normal   Left shoulder absent absent normal   Right elbow absent absent normal   Left elbow absent absent normal   Right wrist absent absent normal   Left wrist absent absent normal   Right hand grasp absent absent normal   Left hand grasp absent absent normal   Right hip absent absent normal   Left hip absent absent normal   Right knee absent absent normal   Left knee absent absent normal   Right ankle present present limited   Left ankle absent absent limited (chronic)   Right foot absent absent normal   Left foot absent absent normal       CONSULTS: None    PROCEDURES: None    INJURIES:  ankle sprain vs strain      There is no problem list on file for this patient.         Assessment/Plan:     Mechanical fall down stairs, (-) LOC              - XR negative for acute fracture              - CT shows old L1 body compression fx   - no acute injury           DISPOSITION: dispo per ED      Alejandra Robbins MD  1/14/23, 9:10 PM

## 2023-01-15 NOTE — ED NOTES
Case management notified that spouse has concerns about both of them at home due to falls and other health concerns. He would like to discuss assisted living and nursing home options.      Abhijeet 32, LINDEN  01/14/23 9942

## 2023-01-15 NOTE — CARE COORDINATION
Case Management Assessment  Initial Evaluation    Date/Time of Evaluation: 1/15/2023 2:02 PM  Assessment Completed by: Reba Delarosa RN    If patient is discharged prior to next notation, then this note serves as note for discharge by case management. Patient Name: Adiel Nelson                   YOB: 1950  Diagnosis: Sprain of right ankle, unspecified ligament, initial encounter [S93.401A]  Mild ankle sprain, initial encounter Collin Molina                   Date / Time: 1/14/2023  7:11 PM    Patient Admission Status: Observation   Readmission Risk (Low < 19, Mod (19-27), High > 27): No data recorded  Current PCP: No primary care provider on file. PCP verified by CM? (P)  (no PCP - clinic list is given)    Chart Reviewed: Yes      History Provided by: (P) Patient  Patient Orientation: (P) Alert and Oriented    Patient Cognition: (P) Alert    Hospitalization in the last 30 days (Readmission):  No    If yes, Readmission Assessment in CM Navigator will be completed. Advance Directives:      Code Status: Full Code   Patient's Primary Decision Maker is:        Discharge Planning:    Patient lives with: (P) Spouse/Significant Other Type of Home: (P) House  Primary Care Giver: (P) Self  Patient Support Systems include: (P) Spouse/Significant Other   Current Financial resources:    Current community resources:    Current services prior to admission: (P) Durable Medical Equipment            Current DME: (P) Walker            Type of Home Care services:  (P) None    ADLS  Prior functional level: (P) Assistance with the following:, Cooking, Housework, Shopping  Current functional level: (P) Assistance with the following:, Cooking, Housework, Shopping    PT AM-PAC:   /24  OT AM-PAC:   /24    Family can provide assistance at DC: (P) Yes  Would you like Case Management to discuss the discharge plan with any other family members/significant others, and if so, who?     Plans to Return to Present Housing: (P) Yes  Other Identified Issues/Barriers to RETURNING to current housing: Inability to safely ambulate   Potential Assistance needed at discharge: (P) Ion Luis            Potential DME:    Patient expects to discharge to: [address] [place] [address] for transportation at discharge:      Financial    Payor: Raymundo Koehler / Plan: MEDICARE PART A AND B / Product Type: *No Product type* /     Does insurance require precert for SNF: No    Potential assistance Purchasing Medications:    Meds-to-Beds request:        [address], [place] - [address] [facility]. - P [phone] - F [phone]  [address] [facility]. [place] [address]  Phone: [phone] Fax: [phone]      Notes:    Factors facilitating achievement of predicted outcomes: Cooperative and Pleasant    Barriers to discharge: Limited safety awareness and Decreased endurance    Additional Case Management Notes: Patient wants a skilled stay at [facility] prior to safely returning home    The Plan for Transition of Care is related to the following treatment goals of Sprain of right ankle, unspecified ligament, initial encounter [S93.401A]  Mild ankle sprain, initial encounter [B23.702D]    IF APPLICABLE: The Patient and/or patient representative Jammie Matt and her family were provided with a choice of provider and agrees with the discharge plan. Freedom of choice list with basic dialogue that supports the patient's individualized plan of care/goals and shares the quality data associated with the providers was provided to:     Patient Representative Name:       The Patient and/or Patient Representative Agree with the Discharge Plan?                          Post Acute Facility/Agency List     Provided patient with the following list, the list includes the overall star ratings obtained from CMS per the Medicare Web site (www.Medicare.gov):     [] [address]  [] Acute Inpatient Rehabilitation Facilities  [x] Skilled Nursing Facilities  [] Home Care    Provided verbal instructions on how to utilize the QR Code to obtain additional detailed star ratings from www. Medicare. gov     offered to print and provide the detailed list:    []Accepted   [x]Declined    The following printed detailed lists were provided:      Neo Willett from [facility] informed writer that they are not accepting new patient's at this time. Writer spoke to the patient about alterative choices. Referrals are made.       Mary Grace Hermosillo RN  Case Management Department  Ph: [phone]

## 2023-01-15 NOTE — ED NOTES
Pt c/o right ankle pain. Pt reports previous dose of fentanyl did not help. Writer informs resident. Orders received.       Enio Azevedo, LAVELL  01/14/23 2028

## 2023-01-16 LAB
ABSOLUTE EOS #: 0.1 K/UL (ref 0–0.4)
ABSOLUTE EOS #: 0.1 K/UL (ref 0–0.4)
ABSOLUTE IMMATURE GRANULOCYTE: 0 K/UL (ref 0–0.3)
ABSOLUTE IMMATURE GRANULOCYTE: 0 K/UL (ref 0–0.3)
ABSOLUTE LYMPH #: 1.53 K/UL (ref 1–4.8)
ABSOLUTE LYMPH #: 1.53 K/UL (ref 1–4.8)
ABSOLUTE MONO #: 0.46 K/UL (ref 0.1–0.8)
ABSOLUTE MONO #: 0.46 K/UL (ref 0.1–0.8)
ALBUMIN SERPL-MCNC: 3.3 G/DL (ref 3.5–5.2)
ALBUMIN SERPL-MCNC: 3.3 G/DL (ref 3.5–5.2)
ALBUMIN/GLOBULIN RATIO: 0.9 (ref 1–2.5)
ALBUMIN/GLOBULIN RATIO: 0.9 (ref 1–2.5)
ALP BLD-CCNC: 71 U/L (ref 35–104)
ALP BLD-CCNC: 71 U/L (ref 35–104)
ALT SERPL-CCNC: 10 U/L (ref 5–33)
ALT SERPL-CCNC: 10 U/L (ref 5–33)
ANION GAP SERPL CALCULATED.3IONS-SCNC: 9 MMOL/L (ref 9–17)
ANION GAP SERPL CALCULATED.3IONS-SCNC: 9 MMOL/L (ref 9–17)
AST SERPL-CCNC: 19 U/L
AST SERPL-CCNC: 19 U/L
BASOPHILS # BLD: 1 % (ref 0–2)
BASOPHILS # BLD: 1 % (ref 0–2)
BASOPHILS ABSOLUTE: 0.05 K/UL (ref 0–0.2)
BASOPHILS ABSOLUTE: 0.05 K/UL (ref 0–0.2)
BILIRUB SERPL-MCNC: 0.6 MG/DL (ref 0.3–1.2)
BILIRUB SERPL-MCNC: 0.6 MG/DL (ref 0.3–1.2)
BUN BLDV-MCNC: 10 MG/DL (ref 8–23)
BUN BLDV-MCNC: 10 MG/DL (ref 8–23)
CALCIUM SERPL-MCNC: 8.8 MG/DL (ref 8.6–10.4)
CALCIUM SERPL-MCNC: 8.8 MG/DL (ref 8.6–10.4)
CHLORIDE BLD-SCNC: 104 MMOL/L (ref 98–107)
CHLORIDE BLD-SCNC: 104 MMOL/L (ref 98–107)
CO2: 25 MMOL/L (ref 20–31)
CO2: 25 MMOL/L (ref 20–31)
CREAT SERPL-MCNC: 0.66 MG/DL (ref 0.5–0.9)
CREAT SERPL-MCNC: 0.66 MG/DL (ref 0.5–0.9)
EOSINOPHILS RELATIVE PERCENT: 2 % (ref 1–4)
EOSINOPHILS RELATIVE PERCENT: 2 % (ref 1–4)
GFR SERPL CREATININE-BSD FRML MDRD: >60 ML/MIN/1.73M2
GFR SERPL CREATININE-BSD FRML MDRD: >60 ML/MIN/1.73M2
GLUCOSE BLD-MCNC: 87 MG/DL (ref 70–99)
GLUCOSE BLD-MCNC: 87 MG/DL (ref 70–99)
HCT VFR BLD CALC: 37.2 % (ref 36.3–47.1)
HCT VFR BLD CALC: 37.2 % (ref 36.3–47.1)
HEMOGLOBIN: 12.2 G/DL (ref 11.9–15.1)
HEMOGLOBIN: 12.2 G/DL (ref 11.9–15.1)
IMMATURE GRANULOCYTES: 0 %
IMMATURE GRANULOCYTES: 0 %
LYMPHOCYTES # BLD: 30 % (ref 24–44)
LYMPHOCYTES # BLD: 30 % (ref 24–44)
MCH RBC QN AUTO: 36.9 PG (ref 25.2–33.5)
MCH RBC QN AUTO: 36.9 PG (ref 25.2–33.5)
MCHC RBC AUTO-ENTMCNC: 32.8 G/DL (ref 28.4–34.8)
MCHC RBC AUTO-ENTMCNC: 32.8 G/DL (ref 28.4–34.8)
MCV RBC AUTO: 112.4 FL (ref 82.6–102.9)
MCV RBC AUTO: 112.4 FL (ref 82.6–102.9)
MONOCYTES # BLD: 9 % (ref 1–7)
MONOCYTES # BLD: 9 % (ref 1–7)
MORPHOLOGY: ABNORMAL
MORPHOLOGY: ABNORMAL
NRBC AUTOMATED: 0 PER 100 WBC
NRBC AUTOMATED: 0 PER 100 WBC
PDW BLD-RTO: 14.5 % (ref 11.8–14.4)
PDW BLD-RTO: 14.5 % (ref 11.8–14.4)
PLATELET # BLD: 290 K/UL (ref 138–453)
PLATELET # BLD: 290 K/UL (ref 138–453)
PMV BLD AUTO: 9.6 FL (ref 8.1–13.5)
PMV BLD AUTO: 9.6 FL (ref 8.1–13.5)
POTASSIUM SERPL-SCNC: 4.1 MMOL/L (ref 3.7–5.3)
POTASSIUM SERPL-SCNC: 4.1 MMOL/L (ref 3.7–5.3)
RBC # BLD: 3.31 M/UL (ref 3.95–5.11)
RBC # BLD: 3.31 M/UL (ref 3.95–5.11)
SEG NEUTROPHILS: 58 % (ref 36–66)
SEG NEUTROPHILS: 58 % (ref 36–66)
SEGMENTED NEUTROPHILS ABSOLUTE COUNT: 2.96 K/UL (ref 1.8–7.7)
SEGMENTED NEUTROPHILS ABSOLUTE COUNT: 2.96 K/UL (ref 1.8–7.7)
SODIUM BLD-SCNC: 138 MMOL/L (ref 135–144)
SODIUM BLD-SCNC: 138 MMOL/L (ref 135–144)
TOTAL PROTEIN: 6.9 G/DL (ref 6.4–8.3)
TOTAL PROTEIN: 6.9 G/DL (ref 6.4–8.3)
WBC # BLD: 5.1 K/UL (ref 3.5–11.3)
WBC # BLD: 5.1 K/UL (ref 3.5–11.3)

## 2023-01-16 PROCEDURE — 85025 COMPLETE CBC W/AUTO DIFF WBC: CPT

## 2023-01-16 PROCEDURE — 81001 URINALYSIS AUTO W/SCOPE: CPT

## 2023-01-16 PROCEDURE — G0378 HOSPITAL OBSERVATION PER HR: HCPCS

## 2023-01-16 PROCEDURE — 2580000003 HC RX 258: Performed by: STUDENT IN AN ORGANIZED HEALTH CARE EDUCATION/TRAINING PROGRAM

## 2023-01-16 PROCEDURE — 96372 THER/PROPH/DIAG INJ SC/IM: CPT

## 2023-01-16 PROCEDURE — 80053 COMPREHEN METABOLIC PANEL: CPT

## 2023-01-16 PROCEDURE — 6360000002 HC RX W HCPCS: Performed by: STUDENT IN AN ORGANIZED HEALTH CARE EDUCATION/TRAINING PROGRAM

## 2023-01-16 PROCEDURE — 6370000000 HC RX 637 (ALT 250 FOR IP): Performed by: STUDENT IN AN ORGANIZED HEALTH CARE EDUCATION/TRAINING PROGRAM

## 2023-01-16 PROCEDURE — 93005 ELECTROCARDIOGRAM TRACING: CPT | Performed by: EMERGENCY MEDICINE

## 2023-01-16 PROCEDURE — 36415 COLL VENOUS BLD VENIPUNCTURE: CPT

## 2023-01-16 RX ADMIN — ACETAMINOPHEN 650 MG: 325 TABLET ORAL at 08:16

## 2023-01-16 RX ADMIN — SODIUM CHLORIDE, PRESERVATIVE FREE 10 ML: 5 INJECTION INTRAVENOUS at 20:53

## 2023-01-16 RX ADMIN — ACETAMINOPHEN 650 MG: 325 TABLET ORAL at 21:53

## 2023-01-16 RX ADMIN — ENOXAPARIN SODIUM 40 MG: 100 INJECTION SUBCUTANEOUS at 08:16

## 2023-01-16 RX ADMIN — SODIUM CHLORIDE, PRESERVATIVE FREE 10 ML: 5 INJECTION INTRAVENOUS at 08:17

## 2023-01-16 ASSESSMENT — PAIN DESCRIPTION - ORIENTATION: ORIENTATION: RIGHT

## 2023-01-16 ASSESSMENT — PAIN SCALES - GENERAL: PAINLEVEL_OUTOF10: 7

## 2023-01-16 ASSESSMENT — PAIN DESCRIPTION - DESCRIPTORS: DESCRIPTORS: STABBING

## 2023-01-16 ASSESSMENT — PAIN DESCRIPTION - LOCATION: LOCATION: ANKLE

## 2023-01-16 NOTE — PROGRESS NOTES
[facility]  CDU / OBSERVATION ENCOUNTER  ATTENDING NOTE       I performed a history and physical examination of the patient and discussed management with the resident or midlevel provider. I reviewed the resident or midlevel provider's note and agree with the documented findings and plan of care. Any areas of disagreement are noted on the chart. I was personally present for the key portions of any procedures. I have documented in the chart those procedures where I was not present during the key portions. I have reviewed the nurses notes. I agree with the chief complaint, past medical history, past surgical history, allergies, medications, social and family history as documented unless otherwise noted below. The Family history, social history, and ROS are effectively unchanged since admission unless noted elsewhere in the chart. Patient admitted for placement. Patient mechanical fall. Patient seen by trauma. Work-up done in ED. Urinalysis does not seem to have been included and patient does have some discolored urine in the suction canister from her pure wick. We will check urinalysis. Patient also had EKG and cardiac enzymes done. Patient denied syncopal episode. Patient being evaluated for other causes of fall. Patient currently comfortable and has appropriate safe disposition. Awaiting urinalysis prior to discharge. Anticipating ECF placement today.     Geetha Calix MD  Attending Emergency  Physician

## 2023-01-16 NOTE — DISCHARGE INSTRUCTIONS
Discharge Instructions for Trauma         What to do after you leave the hospital:  General questions or concerns may be called to the trauma nurse line at [phone] and please leave a message. Trauma is a life-threatening condition. Your doctor will want to closely monitor you. Be sure to go to all of your appointments. Posterior Cervical Decompression and Fusion Surgery Discharge Instructions     Thank you for choosing [facility] for your surgical needs. The following instructions will help to ensure your comfort and that you are well prepared after your surgery. Post-Operative Visit:   Your post-operative appointment is scheduled for Wednesday, February 1, 2023 at 1pm.   The office is located at:    [address], [place] [address], [address] [place] [address], main floor     [address]    [phone]     Please also call your primary care physician to schedule an appointment for further evaluation and care. Diet:   You may resume your regular diet. Be sure to eat a well-balanced diet. Protein promotes wound healing. Pain medication and decreased activity can cause constipation. Drink 8-10 glasses of water a day, eat fresh fruits and vegetables, and add prunes, raisins and bran cereals to your diet if you do become constipated. A stool softener taken 1-2 times a day is helpful. Dulcolax suppositories or Fleets enemas are also available without a prescription. Call our office if the problem continues. Activity and Exercise:   No driving until you are seen in the office. Avoid riding in a car for the first two weeks until you come to the office to have your staples removed. Start taking short, frequent walks in the beginning. Daisy, more frequent walks throughout the day are more beneficial than one long walk each day. You may gradually increase the distance; as tolerated.  Your brace will help give support to your muscles while you walk. If your pain increases, you may be walking too much or too far. Try backing off for a day or two and then resume slowly. If physical therapy has been prescribed, you are not to perform range of motion, flexion, extension or lateral bending. No lifting greater than 5 lbs (gallon of milk) for first few weeks after surgery. No pushing, pulling, or overhead work. No baths, swimming or hot tub until you discuss this with your doctor. Brace: If a cervical collar is prescribed, it should be worn at all times, except while showering and should be replaced immediately thereafter. Incision Care and Hygiene: Your incision may be may be closed with sutures Steri-strips, staples, or glue. -The Steri-strips will fall off on their own in 7-10 days    -The staples or sutures should be removed about 2 weeks after surgery. If they are not removed please call the clinic to have them removed. -The glue will dissolve over time   No ointments or lotions on the incision   It is OK to shower 3-4 days after surgery. Let water run over the incision. Gently pat the incision dry with a clean towel, do not rub. Leave incision site open to air. Pain Management:   Do not take NSAID medications (Ibuprofen, Naprosyn, etc.) or Justin-2 inhibitors (Celebrex, etc.) for  12  weeks following surgery. You will be given a prescription for pain medication. Our hope is that you will eventually be weaned off all pain medications. Try not to take the pain medicine unless you need to. If you feel that you do not need something that strong, you may use regular or extra-strength Tylenol instead. DO NOT drink alcohol, drive or operate heavy machinery while taking your pain medications. Notify the office if your pain is not controlled or you need a medication refill before your appointment.      YOU SHOULD CALL THE OFFICE [phone] IF YOU HAVE ANY OF THE FOLLOWING: Increased pain or pain not relieved with current medications   Increased difficulty with swallowing   If you notice any signs of infection such as bleeding, redness, swelling, tenderness, odor, drainage or opening of the incision. Please check your incisions twice daily. Fevers greater than 101.5 degrees   Flu-like symptoms, chills, shakes, chest pain, shortness of breath, nausea, vomiting, diarrhea   New or increased pain, numbness or tingling in the arms or legs, as well as new or increased balance or coordination issues. New difficulty with urinating or holding your bladder or your bowels     *If you are unable to contact someone at the office and your symptoms persist or increase, call 911 or go to the emergency department.

## 2023-01-16 NOTE — CARE COORDINATION
Notes faxed to [facility] as requested per admissions    [phone] Call from Freida Hsu at [facility], notes refaxed as requested    [phone] Call from Freida Hsu at [facility] they can accept pt when ready.  [facility] notified

## 2023-01-16 NOTE — PROGRESS NOTES
OBS/CDU   RESIDENT NOTE      Patients PCP is: No primary care provider on file. SUBJECTIVE      This morning patient reporting significant pain to right ankle. Patient reports muscle spasm as well. No acute events overnight. Has been able to tolerate a full diet without nausea or vomiting. The patient is urinating on her own and is passing flatus. Denies fever, chills, nausea, vomiting, chest pain, shortness of breath, abdominal pain, focal weakness, numbness, tingling, urinary/bowel symptoms, vision changes, visual hallucinations, or headache. PHYSICAL EXAM      General: NAD, AO X 3  Heent: EMOI, PERRL  Neck: SUPPLE, NO JVD  Cardiovascular: RRR, S1S2  Pulmonary: CTAB, NO SOB  Abdomen: SOFT, NTTP, ND, +BS  Extremities: +2/4 PULSES DISTAL, NO SWELLING, right ankle tense with muscle spasm, nontender to palpation  Neuro / Psych: NO NUMBNESS OR TINGLING, MENTATION AT BASELINE    PERTINENT TEST /EXAMS      I have reviewed all available laboratory results. MEDICATIONS CURRENT   sodium chloride flush 0.9 % injection 5-40 mL, 2 times per day  sodium chloride flush 0.9 % injection 5-40 mL, PRN  0.9 % sodium chloride infusion, PRN  potassium chloride (KLOR-CON M) extended release tablet 40 mEq, PRN   Or  potassium bicarb-citric acid (EFFER-K) effervescent tablet 40 mEq, PRN   Or  potassium chloride 10 mEq/100 mL IVPB (Peripheral Line), PRN  enoxaparin (LOVENOX) injection 40 mg, Daily  ondansetron (ZOFRAN) injection 4 mg, Q4H PRN  polyethylene glycol (GLYCOLAX) packet 17 g, Daily PRN  acetaminophen (TYLENOL) tablet 650 mg, Q6H PRN   Or  acetaminophen (TYLENOL) suppository 650 mg, Q6H PRN        All medication charted and reviewed.     CONSULTS      None    ASSESSMENT/PLAN       Marsha Hager is a 67 y.o. female who presents with right ankle pain after fall    Right ankle pain  Pain controlled with Tylenol  PT and OT evaluated, will require placement in nursing facility  Patient uses walker at baseline  UA, CBC and CMP ordered, possible PREETHI on admission   Echo and EKG as well  Continue home medications and pain control  Monitor vitals, labs, and imaging  DISPO: pending placement    --  Solomon Lawson MD  Emergency Medicine Resident Physician     This dictation was generated by voice recognition computer software. Although all attempts are made to edit the dictation for accuracy, there may be errors in the transcription that are not intended.

## 2023-01-16 NOTE — CARE COORDINATION
SBIRT completed with pt  Pt admitted after fall down stairs    Pt reports no alcohol use in 8yrs. She denies all drug use. Pt denies any recent feelings of depression. SBIRT is negative            Alcohol Screening and Brief Intervention        Recent Labs     01/14/23 1927   ALC <10       Alcohol Pre-screening     (WOMEN ONLY) How many times in the past year have you had 4 or more drinks in a day?: None    Alcohol Screening Audit       Drug Pre-Screening   How many times in the past year have you used a recreational drug or used a prescription medication for nonmedical reasons?: None    Drug Screening DAST       Mood Pre-Screening (PHQ-2)  During the past two weeks, have you been bothered by little interest or pleasure in doing things?: No  During the past two weeks, have you been bothered by feeling down, depressed, or hopeless?: No    Mood Pre-Screening (PHQ-9)         I have interviewed Purnima Campa, [phone] regarding  Her alcohol consumption/drug use and risk for excessive use. Screenings were negative. Patient  N/A intervention at this time.    Deferred []    Completed on: 1/16/2023   JAZMYN Henry

## 2023-01-17 ENCOUNTER — APPOINTMENT (OUTPATIENT)
Dept: MRI IMAGING | Age: 73
DRG: 030 | End: 2023-01-17
Payer: MEDICARE

## 2023-01-17 PROBLEM — S93.401A MILD SPRAIN OF RIGHT ANKLE, INITIAL ENCOUNTER: Status: ACTIVE | Noted: 2023-01-17

## 2023-01-17 LAB
BILIRUBIN URINE: NEGATIVE
BILIRUBIN URINE: NEGATIVE
CASTS UA: ABNORMAL /LPF (ref 0–8)
CASTS UA: ABNORMAL /LPF (ref 0–8)
COLOR: YELLOW
COLOR: YELLOW
EKG ATRIAL RATE: 73 BPM
EKG ATRIAL RATE: 73 BPM
EKG P AXIS: -21 DEGREES
EKG P AXIS: -21 DEGREES
EKG P-R INTERVAL: 118 MS
EKG P-R INTERVAL: 118 MS
EKG Q-T INTERVAL: 396 MS
EKG Q-T INTERVAL: 396 MS
EKG QRS DURATION: 88 MS
EKG QRS DURATION: 88 MS
EKG QTC CALCULATION (BAZETT): 436 MS
EKG QTC CALCULATION (BAZETT): 436 MS
EKG R AXIS: 29 DEGREES
EKG R AXIS: 29 DEGREES
EKG T AXIS: 29 DEGREES
EKG T AXIS: 29 DEGREES
EKG VENTRICULAR RATE: 73 BPM
EKG VENTRICULAR RATE: 73 BPM
EPITHELIAL CELLS UA: ABNORMAL /HPF (ref 0–5)
EPITHELIAL CELLS UA: ABNORMAL /HPF (ref 0–5)
GLUCOSE URINE: NEGATIVE
GLUCOSE URINE: NEGATIVE
KETONES, URINE: ABNORMAL
KETONES, URINE: ABNORMAL
LEUKOCYTE ESTERASE, URINE: ABNORMAL
LEUKOCYTE ESTERASE, URINE: ABNORMAL
NITRITE, URINE: NEGATIVE
NITRITE, URINE: NEGATIVE
PH UA: 5.5 (ref 5–8)
PH UA: 5.5 (ref 5–8)
PROTEIN UA: NEGATIVE
PROTEIN UA: NEGATIVE
RBC UA: ABNORMAL /HPF (ref 0–4)
RBC UA: ABNORMAL /HPF (ref 0–4)
SPECIFIC GRAVITY UA: 1.02 (ref 1–1.03)
SPECIFIC GRAVITY UA: 1.02 (ref 1–1.03)
TURBIDITY: CLEAR
TURBIDITY: CLEAR
URINE HGB: NEGATIVE
URINE HGB: NEGATIVE
UROBILINOGEN, URINE: NORMAL
UROBILINOGEN, URINE: NORMAL
WBC UA: ABNORMAL /HPF (ref 0–5)
WBC UA: ABNORMAL /HPF (ref 0–5)

## 2023-01-17 PROCEDURE — 2580000003 HC RX 258: Performed by: STUDENT IN AN ORGANIZED HEALTH CARE EDUCATION/TRAINING PROGRAM

## 2023-01-17 PROCEDURE — 1200000000 HC SEMI PRIVATE

## 2023-01-17 PROCEDURE — 6360000002 HC RX W HCPCS: Performed by: STUDENT IN AN ORGANIZED HEALTH CARE EDUCATION/TRAINING PROGRAM

## 2023-01-17 PROCEDURE — 72141 MRI NECK SPINE W/O DYE: CPT

## 2023-01-17 PROCEDURE — G0378 HOSPITAL OBSERVATION PER HR: HCPCS

## 2023-01-17 PROCEDURE — 6370000000 HC RX 637 (ALT 250 FOR IP): Performed by: STUDENT IN AN ORGANIZED HEALTH CARE EDUCATION/TRAINING PROGRAM

## 2023-01-17 PROCEDURE — 93010 ELECTROCARDIOGRAM REPORT: CPT | Performed by: INTERNAL MEDICINE

## 2023-01-17 PROCEDURE — 96372 THER/PROPH/DIAG INJ SC/IM: CPT

## 2023-01-17 RX ORDER — SODIUM CHLORIDE, SODIUM LACTATE, POTASSIUM CHLORIDE, CALCIUM CHLORIDE 600; 310; 30; 20 MG/100ML; MG/100ML; MG/100ML; MG/100ML
INJECTION, SOLUTION INTRAVENOUS CONTINUOUS
Status: DISCONTINUED | OUTPATIENT
Start: 2023-01-17 | End: 2023-01-20 | Stop reason: HOSPADM

## 2023-01-17 RX ADMIN — SODIUM CHLORIDE, POTASSIUM CHLORIDE, SODIUM LACTATE AND CALCIUM CHLORIDE: 600; 310; 30; 20 INJECTION, SOLUTION INTRAVENOUS at 17:00

## 2023-01-17 RX ADMIN — ACETAMINOPHEN 650 MG: 325 TABLET ORAL at 02:28

## 2023-01-17 RX ADMIN — ACETAMINOPHEN 650 MG: 325 TABLET ORAL at 09:25

## 2023-01-17 RX ADMIN — ENOXAPARIN SODIUM 40 MG: 100 INJECTION SUBCUTANEOUS at 09:25

## 2023-01-17 RX ADMIN — SODIUM CHLORIDE, PRESERVATIVE FREE 10 ML: 5 INJECTION INTRAVENOUS at 09:25

## 2023-01-17 ASSESSMENT — PAIN SCALES - GENERAL
PAINLEVEL_OUTOF10: 3
PAINLEVEL_OUTOF10: 7

## 2023-01-17 ASSESSMENT — PAIN DESCRIPTION - LOCATION
LOCATION: ANKLE
LOCATION: ANKLE

## 2023-01-17 ASSESSMENT — PAIN DESCRIPTION - ORIENTATION
ORIENTATION: RIGHT
ORIENTATION: RIGHT

## 2023-01-17 ASSESSMENT — PAIN DESCRIPTION - DESCRIPTORS: DESCRIPTORS: THROBBING

## 2023-01-17 NOTE — CARE COORDINATION
Spoke with Jayden Hughes at [facility], they can accept when ready, awaiting echo    Awaiting MRI, transportation on will call with [facility], needs rapid covid before DC

## 2023-01-17 NOTE — PROGRESS NOTES
[address]  CDU / OBSERVATION ENCOUNTER  ATTENDING NOTE       I performed a history and physical examination of the patient and discussed management with the resident or midlevel provider. I reviewed the resident or midlevel provider's note and agree with the documented findings and plan of care. Any areas of disagreement are noted on the chart. I was personally present for the key portions of any procedures. I have documented in the chart those procedures where I was not present during the key portions. I have reviewed the nurses notes. I agree with the chief complaint, past medical history, past surgical history, allergies, medications, social and family history as documented unless otherwise noted below. The Family history, social history, and ROS are effectively unchanged since admission unless noted elsewhere in the chart. Patient with mechanical fall not considered safe at home. Patient seen by case management and  for placement. Waiting urinalysis currently. Patient's  on poor health and not able to safely take care of her. Patient with fall at home and going to [facility]. Awaiting transportation. No new medical complaint. Echo pending as well. Patient was describing ongoing numbness to her hands bilaterally. Concern for central cord syndrome. MRI done of neck. Patient had significant findings in cervical spine and neurosurgery was therefore consulted.     Joanna Lam MD  Attending Emergency  Physician

## 2023-01-17 NOTE — PLAN OF CARE
Please fill out the MRI Screening form and fax to dept @ [phone]. Any questions. .please call [facility] MRI @ [phone]. MRI exam will be scheduled after receiving the completed screening form.  Thank you!!

## 2023-01-17 NOTE — PROGRESS NOTES
OBS/CDU   RESIDENT NOTE      Patients PCP is: No primary care provider on file. SUBJECTIVE      No acute events overnight. Has been able to tolerate a full diet without nausea or vomiting. The patient is urinating on her own and is passing flatus. Patient had an episode of incontinence last night. Denies fever, chills, nausea, vomiting, chest pain, shortness of breath, abdominal pain, focal weakness, numbness, tingling, urinary/bowel symptoms, vision changes, visual hallucinations, or headache. PHYSICAL EXAM      General: NAD, AO X 3  Heent: EMOI, PERRL  Neck: SUPPLE, NO JVD  Cardiovascular: RRR, S1S2  Pulmonary: CTAB, NO SOB  Abdomen: SOFT, NTTP, ND, +BS  Extremities: +2/4 PULSES DISTAL, NO SWELLING  Neuro / Psych: NO NUMBNESS OR TINGLING, MENTATION AT BASELINE    PERTINENT TEST /EXAMS      I have reviewed all available laboratory results. MEDICATIONS CURRENT   sodium chloride flush 0.9 % injection 5-40 mL, 2 times per day  sodium chloride flush 0.9 % injection 5-40 mL, PRN  0.9 % sodium chloride infusion, PRN  potassium chloride (KLOR-CON M) extended release tablet 40 mEq, PRN   Or  potassium bicarb-citric acid (EFFER-K) effervescent tablet 40 mEq, PRN   Or  potassium chloride 10 mEq/100 mL IVPB (Peripheral Line), PRN  enoxaparin (LOVENOX) injection 40 mg, Daily  ondansetron (ZOFRAN) injection 4 mg, Q4H PRN  polyethylene glycol (GLYCOLAX) packet 17 g, Daily PRN  acetaminophen (TYLENOL) tablet 650 mg, Q6H PRN   Or  acetaminophen (TYLENOL) suppository 650 mg, Q6H PRN      All medication charted and reviewed.     CONSULTS      None    ASSESSMENT/PLAN       Nikolai Dejesus is a 67 y.o. female who presents with right ankle pain after fall    Right ankle pain  Pain controlled with Tylenol  PT and OT evaluated, will require placement in nursing facility  Patient uses walker at baseline  UA with ketones, LE and WBC but no bacteria, asymptomatic, likely asymptomatic pyuria with poor nutrition   PREETHI resolved  Echo pending, EKG showing sinus arrhythmia  Placement obtained  Arm weakness  MRI C spine for suspected central cord  Ct without fracture  Continue home medications and pain control  Monitor vitals, labs, and imaging  DISPO: pending results of echo and UA    --  Jose Canas MD  Emergency Medicine Resident Physician     This dictation was generated by voice recognition computer software. Although all attempts are made to edit the dictation for accuracy, there may be errors in the transcription that are not intended.

## 2023-01-17 NOTE — CONSULTS
Department of Neurosurgery                                                             Consult Note      Reason for Consult:  cervical stenosis  Requesting Physician:  RITA Talbert - CNP   Neurosurgeon:   [] Dr. Hong Kilpatrick  [x] Dr. Jose Jeffers  [] Dr. Brenda Cooper  [] Dr. Dionicia Hammans  [] Dr. Aydee Pandey  [] Dr. Emmanuel Phillips      History Obtained From:  patient    CHIEF COMPLAINT:         numbness    HISTORY OF PRESENT ILLNESS:       The patient is a 67 y.o. right handed female who presents with sprained right ankle after falling. Patient states she was going up the stairs in slippery shoes, tripped, and tumbled down. Since then she has had numbness and weakness of bilateral hands. Also has numbness of right ankle but only in area of ankle swelling from fall. Reports she can't feel some things when she's grabbing things out of her purse and that she will intermittently drop things like utensils. Denies hit to the head or LOC. Denies taking any medications at home. States she has no medical history other than maybe some arthritis. She is in of significant right ankle pain that shoots down her foot but denies any back pain or pain coming from her thigh/shooting down the leg. Denies any tingling. PAST MEDICAL HISTORY :       Past Medical History:    History reviewed. No pertinent past medical history. Past Surgical History:    No past surgical history on file.     Social History:   Social History     Socioeconomic History    Marital status: Single     Spouse name: Not on file    Number of children: Not on file    Years of education: Not on file    Highest education level: Not on file   Occupational History    Not on file   Tobacco Use    Smoking status: Former     Types: Cigarettes     Quit date: 2015     Years since quittin.0    Smokeless tobacco: Never   Substance and Sexual Activity    Alcohol use: Never    Drug use: Never    Sexual activity: Not on file   Other Topics Concern    Not on file   Social History Narrative    Not on file     Social Determinants of Health     Financial Resource Strain: Not on file   Food Insecurity: Not on file   Transportation Needs: Not on file   Physical Activity: Not on file   Stress: Not on file   Social Connections: Not on file   Intimate Partner Violence: Not on file   Housing Stability: Not on file       Family History:   No family history on file. Allergies:  Patient has no known allergies. Home Medications:  Prior to Admission medications    Not on File       Current Medications:   Current Facility-Administered Medications: lactated ringers infusion, , IntraVENous, Continuous  sodium chloride flush 0.9 % injection 5-40 mL, 5-40 mL, IntraVENous, 2 times per day  sodium chloride flush 0.9 % injection 5-40 mL, 5-40 mL, IntraVENous, PRN  0.9 % sodium chloride infusion, 25 mL, IntraVENous, PRN  potassium chloride (KLOR-CON M) extended release tablet 40 mEq, 40 mEq, Oral, PRN **OR** potassium bicarb-citric acid (EFFER-K) effervescent tablet 40 mEq, 40 mEq, Oral, PRN **OR** potassium chloride 10 mEq/100 mL IVPB (Peripheral Line), 10 mEq, IntraVENous, PRN  enoxaparin (LOVENOX) injection 40 mg, 40 mg, SubCUTAneous, Daily  ondansetron (ZOFRAN) injection 4 mg, 4 mg, IntraVENous, Q4H PRN  polyethylene glycol (GLYCOLAX) packet 17 g, 17 g, Oral, Daily PRN  acetaminophen (TYLENOL) tablet 650 mg, 650 mg, Oral, Q6H PRN **OR** acetaminophen (TYLENOL) suppository 650 mg, 650 mg, Rectal, Q6H PRN    REVIEW OF SYSTEMS:       Review of Systems   Musculoskeletal:  Positive for gait problem. Negative for back pain and neck pain. Neurological:  Positive for numbness. Negative for syncope and weakness.        PHYSICAL EXAM:       /82   Pulse 81   Temp 98 °F (36.7 °C) (Oral)   Resp 16   Ht 5' 7\" (1.702 m)   Wt 180 lb (81.6 kg)   SpO2 94%   BMI 28.19 kg/m²       CONSTITUTIONAL: no apparent distress, appears stated age   HEAD: normocephalic, atraumatic   ENT: moist mucous membranes NECK: supple, symmetric, ROM good   BACK: without midline tenderness, step-offs or deformities   LUNGS: Unlabored breaths   CARDIOVASCULAR: regular rate and rhythm   NEUROLOGIC:  EYE OPENING     Spontaneous - 4 [x]       To voice - 3 []       To pain - 2 []       None - 1 []    VERBAL RESPONSE     Appropriate, oriented - 5 [x]       Dazed or confused - 4 []       Syllables, expletives - 3 []       Grunts - 2 []       None - 1 []    MOTOR RESPONSE     Spontaneous, command - 6 [x]       Localizes pain - 5 []       Withdraws pain - 4 []       Abnormal flexion - 3 []       Abnormal extension - 2 []       None - 1 []            Total GCS: 15    Mental Status:  alert and oriented x3               Cranial Nerves:    cranial nerves II-XII are grossly intact    Motor Exam:    Drift:  absent  Tone:  normal      UE: 4/5 strength in bilateral UE in intrinsic hand muscles, finger flexion/extension, wrist extension/flexion, abduction worse than adduction, elbow extension/flexion,  strength. Right slightly worse than left. RLE: 4/5 strength with hip flexion, knee flexion/extension. Limited dorsiflexion.     LLE: 5/5 strength with hip flexion, knee flexion/extension, dorsiflexion    Limited ROM of trunk; patient unable to lift herself to sit up despite maximal effort; patient attributes to RLE pain    Decreased ROM of right ankle  Decreased ROM of bilateral hip flexors      Sensory:    No sensation to vibration at bilateral knees or ankles  Intact symmetric sensation to vibration at b/l UE  Decreased sensation to touch at right ankle, otherwise intact sensation to touch at all other limbs  Intact pinprick throughout  Symmetric sensation of temperature throughout  Able to identify she is touching a pen with eyes closed, but unable to identify what she is touching when paper is handed  Negative Tinel's      Deep Tendon Reflexes:    Right Bicep:  1+  Left Bicep:  1+  Right Knee:  1+  Left Knee:  1+      Gait:  not tested; pt unable to even sit up despite effort to sit and assistance from writer; pt attributes limited movement to ankle pain   SKIN: no rash       LABS AND IMAGING:     CBC with Differential:    Lab Results   Component Value Date/Time    WBC 5.1 01/16/2023 11:17 AM    RBC 3.31 01/16/2023 11:17 AM    HGB 12.2 01/16/2023 11:17 AM    HCT 37.2 01/16/2023 11:17 AM     01/16/2023 11:17 AM    .4 01/16/2023 11:17 AM    MCH 36.9 01/16/2023 11:17 AM    MCHC 32.8 01/16/2023 11:17 AM    RDW 14.5 01/16/2023 11:17 AM    LYMPHOPCT 30 01/16/2023 11:17 AM    MONOPCT 9 01/16/2023 11:17 AM    BASOPCT 1 01/16/2023 11:17 AM    MONOSABS 0.46 01/16/2023 11:17 AM    LYMPHSABS 1.53 01/16/2023 11:17 AM    EOSABS 0.10 01/16/2023 11:17 AM    BASOSABS 0.05 01/16/2023 11:17 AM     BMP:    Lab Results   Component Value Date/Time     01/16/2023 11:17 AM    K 4.1 01/16/2023 11:17 AM     01/16/2023 11:17 AM    CO2 25 01/16/2023 11:17 AM    BUN 10 01/16/2023 11:17 AM    LABALBU 3.3 01/16/2023 11:17 AM    CREATININE 0.66 01/16/2023 11:17 AM    CALCIUM 8.8 01/16/2023 11:17 AM    LABGLOM >60 01/16/2023 11:17 AM    GLUCOSE 87 01/16/2023 11:17 AM       Radiology Review:      XR ANKLE RIGHT (MIN 3 VIEWS)    Result Date: 1/14/2023  No acute bony abnormalities are noted Degenerative changes     CT HEAD WO CONTRAST    Result Date: 1/14/2023  No acute intracranial abnormality. CT CERVICAL SPINE WO CONTRAST    Result Date: 1/14/2023  No acute abnormality of the cervical spine. MRI CERVICAL SPINE WO CONTRAST    Result Date: 1/17/2023  Faint diffuse hyperintense T2 signal abnormality within the cervical cord extending from C3 through C6 likely representing mild myelomalacia. Mild congenital spinal canal stenosis superimposed over moderate diffuse degenerative disc disease. Moderate central canal stenosis at C3-C4 and C6-C7. Mild central canal stenosis identified within mid cervical levels.  Moderate left foraminal stenosis at C6-C7 and C7-T1.     CT CHEST ABDOMEN PELVIS W CONTRAST Additional Contrast? None    Result Date: 1/14/2023  1. CT CHEST: Evaluation of the lungs degraded because of motion during imaging, blurring detail and resulting in misregistration artifact. 2. No acute traumatic abnormality. 3. A few patchy opacities are demonstrated lateral right upper lobe, lingula left upper lobe and posteromedial right lung base. Focal pneumonia or areas of atelectasis or pulmonary contusion are considerations. No associated overlying rib fractures are evident. No pleural effusion or pneumothorax. 4. CT ABDOMEN/PELVIS: No acute intra-abdominal traumatic abnormality. 5. Age indeterminate 20-30% compression superior endplate L1, predominantly central endplate. Correlate with report from lumbar spine CT performed at the same time. Additional characterization with MRI may be indicated to discern acute versus chronic injury. 6. Hepatic steatosis. 7.  No CT evidence of an acute intra-abdominal or intrapelvic process. 8. Possible cholelithiasis without CT findings of acute cholecystitis. CT LUMBAR SPINE TRAUMA RECONSTRUCTION    Result Date: 1/14/2023  Mild compression fracture of the L1 vertebral body which appears old. CT THORACIC SPINE TRAUMA RECONSTRUCTION    Result Date: 1/14/2023  No fractures noted. ASSESSMENT AND PLAN:       Patient Active Problem List   Diagnosis    Mild ankle sprain, initial encounter    Mild sprain of right ankle, initial encounter         A/P:  This is a 67 y.o. female with bilateral UE numbness and decreased  strength after fall down the stairs. MRI cervical spine without contrast concerning for multilevel mild myelomalacia and cervical stenosis. MRI Cervical wo: Impression   Faint diffuse hyperintense T2 signal abnormality within the cervical cord   extending from C3 through C6 likely representing mild myelomalacia.        Mild congenital spinal canal stenosis superimposed over moderate diffuse   degenerative disc disease. Moderate central canal stenosis at C3-C4 and C6-C7. Mild central canal   stenosis identified within mid cervical levels. Moderate left foraminal stenosis at C6-C7 and C7-T1.                - No emergent neurosurgical interventions planned for now  - PT/OT  - HOB: 30 degrees   - Neuro checks per protocol  - Hold all antiplatelets and anticoagulants  - Ok to begin prophylactic anticoagulation from neurosurgery stand point. However, we recommend careful evaluation of all other risk factors associated with anticoagulation therapy as applied to this patient's medical condition  - We recommend SBP < 140   - Determine the lower limit of SBP clinically based on mentation    Case will be discussed with attending and patient to be reassessed by attending,    Additional recommendations may follow. Please contact neurosurgery with any changes in patients neurologic status. Thank you for your consult.        Kevin Martinez DO     1/17/2023  6:27 PM

## 2023-01-18 ENCOUNTER — APPOINTMENT (OUTPATIENT)
Dept: GENERAL RADIOLOGY | Age: 73
DRG: 030 | End: 2023-01-18
Payer: MEDICARE

## 2023-01-18 ENCOUNTER — ANESTHESIA EVENT (OUTPATIENT)
Dept: OPERATING ROOM | Age: 73
End: 2023-01-18
Payer: MEDICARE

## 2023-01-18 ENCOUNTER — ANESTHESIA (OUTPATIENT)
Dept: OPERATING ROOM | Age: 73
End: 2023-01-18
Payer: MEDICARE

## 2023-01-18 PROBLEM — M48.02 STENOSIS OF CERVICAL SPINE WITH MYELOPATHY (HCC): Status: ACTIVE | Noted: 2023-01-18

## 2023-01-18 PROBLEM — G99.2 STENOSIS OF CERVICAL SPINE WITH MYELOPATHY (HCC): Status: ACTIVE | Noted: 2023-01-18

## 2023-01-18 PROBLEM — S14.129A CENTRAL CORD SYNDROME (HCC): Status: ACTIVE | Noted: 2023-01-18

## 2023-01-18 PROBLEM — M48.00 SPINAL STENOSIS: Status: ACTIVE | Noted: 2023-01-18

## 2023-01-18 LAB
ABO/RH: NORMAL
ABO/RH: NORMAL
ABSOLUTE EOS #: 0.16 K/UL (ref 0–0.44)
ABSOLUTE EOS #: 0.16 K/UL (ref 0–0.44)
ABSOLUTE IMMATURE GRANULOCYTE: 0.05 K/UL (ref 0–0.3)
ABSOLUTE IMMATURE GRANULOCYTE: 0.05 K/UL (ref 0–0.3)
ABSOLUTE LYMPH #: 2.44 K/UL (ref 1.1–3.7)
ABSOLUTE LYMPH #: 2.44 K/UL (ref 1.1–3.7)
ABSOLUTE MONO #: 0.37 K/UL (ref 0.1–1.2)
ABSOLUTE MONO #: 0.37 K/UL (ref 0.1–1.2)
ALBUMIN SERPL-MCNC: 3 G/DL (ref 3.5–5.2)
ALBUMIN SERPL-MCNC: 3 G/DL (ref 3.5–5.2)
ANION GAP SERPL CALCULATED.3IONS-SCNC: 11 MMOL/L (ref 9–17)
ANION GAP SERPL CALCULATED.3IONS-SCNC: 11 MMOL/L (ref 9–17)
ANTIBODY SCREEN: NEGATIVE
ANTIBODY SCREEN: NEGATIVE
ARM BAND NUMBER: NORMAL
ARM BAND NUMBER: NORMAL
BASOPHILS # BLD: 1 % (ref 0–2)
BASOPHILS # BLD: 1 % (ref 0–2)
BASOPHILS ABSOLUTE: 0.05 K/UL (ref 0–0.2)
BASOPHILS ABSOLUTE: 0.05 K/UL (ref 0–0.2)
BUN BLDV-MCNC: 14 MG/DL (ref 8–23)
BUN BLDV-MCNC: 14 MG/DL (ref 8–23)
CALCIUM SERPL-MCNC: 8.6 MG/DL (ref 8.6–10.4)
CALCIUM SERPL-MCNC: 8.6 MG/DL (ref 8.6–10.4)
CHLORIDE BLD-SCNC: 104 MMOL/L (ref 98–107)
CHLORIDE BLD-SCNC: 104 MMOL/L (ref 98–107)
CO2: 23 MMOL/L (ref 20–31)
CO2: 23 MMOL/L (ref 20–31)
CREAT SERPL-MCNC: 0.72 MG/DL (ref 0.5–0.9)
CREAT SERPL-MCNC: 0.72 MG/DL (ref 0.5–0.9)
EOSINOPHILS RELATIVE PERCENT: 3 % (ref 1–4)
EOSINOPHILS RELATIVE PERCENT: 3 % (ref 1–4)
EXPIRATION DATE: NORMAL
EXPIRATION DATE: NORMAL
FOLATE: 16.4 NG/ML
FOLATE: 16.4 NG/ML
GFR SERPL CREATININE-BSD FRML MDRD: >60 ML/MIN/1.73M2
GFR SERPL CREATININE-BSD FRML MDRD: >60 ML/MIN/1.73M2
GLUCOSE BLD-MCNC: 112 MG/DL (ref 70–99)
GLUCOSE BLD-MCNC: 112 MG/DL (ref 70–99)
HCT VFR BLD CALC: 37.6 % (ref 36.3–47.1)
HCT VFR BLD CALC: 37.6 % (ref 36.3–47.1)
HEMOGLOBIN: 11.9 G/DL (ref 11.9–15.1)
HEMOGLOBIN: 11.9 G/DL (ref 11.9–15.1)
IMMATURE GRANULOCYTES: 1 %
IMMATURE GRANULOCYTES: 1 %
LV EF: 58 %
LV EF: 58 %
LVEF MODALITY: NORMAL
LVEF MODALITY: NORMAL
LYMPHOCYTES # BLD: 46 % (ref 24–43)
LYMPHOCYTES # BLD: 46 % (ref 24–43)
MCH RBC QN AUTO: 36.6 PG (ref 25.2–33.5)
MCH RBC QN AUTO: 36.6 PG (ref 25.2–33.5)
MCHC RBC AUTO-ENTMCNC: 31.6 G/DL (ref 28.4–34.8)
MCHC RBC AUTO-ENTMCNC: 31.6 G/DL (ref 28.4–34.8)
MCV RBC AUTO: 115.7 FL (ref 82.6–102.9)
MCV RBC AUTO: 115.7 FL (ref 82.6–102.9)
MONOCYTES # BLD: 7 % (ref 3–12)
MONOCYTES # BLD: 7 % (ref 3–12)
MORPHOLOGY: ABNORMAL
NRBC AUTOMATED: 0 PER 100 WBC
NRBC AUTOMATED: 0 PER 100 WBC
PDW BLD-RTO: 14.6 % (ref 11.8–14.4)
PDW BLD-RTO: 14.6 % (ref 11.8–14.4)
PLATELET # BLD: 289 K/UL (ref 138–453)
PLATELET # BLD: 289 K/UL (ref 138–453)
PMV BLD AUTO: 9.9 FL (ref 8.1–13.5)
PMV BLD AUTO: 9.9 FL (ref 8.1–13.5)
POTASSIUM SERPL-SCNC: 4.2 MMOL/L (ref 3.7–5.3)
POTASSIUM SERPL-SCNC: 4.2 MMOL/L (ref 3.7–5.3)
RBC # BLD: 3.25 M/UL (ref 3.95–5.11)
RBC # BLD: 3.25 M/UL (ref 3.95–5.11)
SEG NEUTROPHILS: 42 % (ref 36–65)
SEG NEUTROPHILS: 42 % (ref 36–65)
SEGMENTED NEUTROPHILS ABSOLUTE COUNT: 2.23 K/UL (ref 1.5–8.1)
SEGMENTED NEUTROPHILS ABSOLUTE COUNT: 2.23 K/UL (ref 1.5–8.1)
SODIUM BLD-SCNC: 138 MMOL/L (ref 135–144)
SODIUM BLD-SCNC: 138 MMOL/L (ref 135–144)
THYROXINE, FREE: 1.27 NG/DL (ref 0.93–1.7)
THYROXINE, FREE: 1.27 NG/DL (ref 0.93–1.7)
TSH SERPL DL<=0.05 MIU/L-ACNC: 3.86 UIU/ML (ref 0.3–5)
TSH SERPL DL<=0.05 MIU/L-ACNC: 3.86 UIU/ML (ref 0.3–5)
VITAMIN B-12: <150 PG/ML (ref 232–1245)
VITAMIN B-12: <150 PG/ML (ref 232–1245)
VITAMIN D 25-HYDROXY: 6.7 NG/ML
VITAMIN D 25-HYDROXY: 6.7 NG/ML
WBC # BLD: 5.3 K/UL (ref 3.5–11.3)
WBC # BLD: 5.3 K/UL (ref 3.5–11.3)

## 2023-01-18 PROCEDURE — 3700000001 HC ADD 15 MINUTES (ANESTHESIA): Performed by: NEUROLOGICAL SURGERY

## 2023-01-18 PROCEDURE — 22614 ARTHRD PST TQ 1NTRSPC EA ADD: CPT | Performed by: NEUROLOGICAL SURGERY

## 2023-01-18 PROCEDURE — C1713 ANCHOR/SCREW BN/BN,TIS/BN: HCPCS | Performed by: NEUROLOGICAL SURGERY

## 2023-01-18 PROCEDURE — 2500000003 HC RX 250 WO HCPCS: Performed by: NURSE ANESTHETIST, CERTIFIED REGISTERED

## 2023-01-18 PROCEDURE — 86850 RBC ANTIBODY SCREEN: CPT

## 2023-01-18 PROCEDURE — 86901 BLOOD TYPING SEROLOGIC RH(D): CPT

## 2023-01-18 PROCEDURE — 6370000000 HC RX 637 (ALT 250 FOR IP): Performed by: PHYSICIAN ASSISTANT

## 2023-01-18 PROCEDURE — 2060000000 HC ICU INTERMEDIATE R&B

## 2023-01-18 PROCEDURE — 2709999900 HC NON-CHARGEABLE SUPPLY: Performed by: NEUROLOGICAL SURGERY

## 2023-01-18 PROCEDURE — 3209999900 FLUORO FOR SURGICAL PROCEDURES

## 2023-01-18 PROCEDURE — 3700000000 HC ANESTHESIA ATTENDED CARE: Performed by: NEUROLOGICAL SURGERY

## 2023-01-18 PROCEDURE — 7100000001 HC PACU RECOVERY - ADDTL 15 MIN: Performed by: NEUROLOGICAL SURGERY

## 2023-01-18 PROCEDURE — 0RG2071 FUSION OF 2 OR MORE CERVICAL VERTEBRAL JOINTS WITH AUTOLOGOUS TISSUE SUBSTITUTE, POSTERIOR APPROACH, POSTERIOR COLUMN, OPEN APPROACH: ICD-10-PCS | Performed by: NEUROLOGICAL SURGERY

## 2023-01-18 PROCEDURE — 7100000000 HC PACU RECOVERY - FIRST 15 MIN: Performed by: NEUROLOGICAL SURGERY

## 2023-01-18 PROCEDURE — 6370000000 HC RX 637 (ALT 250 FOR IP): Performed by: NEUROLOGICAL SURGERY

## 2023-01-18 PROCEDURE — 3600000004 HC SURGERY LEVEL 4 BASE: Performed by: NEUROLOGICAL SURGERY

## 2023-01-18 PROCEDURE — 93306 TTE W/DOPPLER COMPLETE: CPT

## 2023-01-18 PROCEDURE — 84443 ASSAY THYROID STIM HORMONE: CPT

## 2023-01-18 PROCEDURE — 6360000002 HC RX W HCPCS: Performed by: ANESTHESIOLOGY

## 2023-01-18 PROCEDURE — 83036 HEMOGLOBIN GLYCOSYLATED A1C: CPT

## 2023-01-18 PROCEDURE — 3600000014 HC SURGERY LEVEL 4 ADDTL 15MIN: Performed by: NEUROLOGICAL SURGERY

## 2023-01-18 PROCEDURE — 2500000003 HC RX 250 WO HCPCS: Performed by: NEUROLOGICAL SURGERY

## 2023-01-18 PROCEDURE — 86900 BLOOD TYPING SEROLOGIC ABO: CPT

## 2023-01-18 PROCEDURE — 82607 VITAMIN B-12: CPT

## 2023-01-18 PROCEDURE — 2580000003 HC RX 258: Performed by: NEUROLOGICAL SURGERY

## 2023-01-18 PROCEDURE — 2720000010 HC SURG SUPPLY STERILE: Performed by: NEUROLOGICAL SURGERY

## 2023-01-18 PROCEDURE — 82746 ASSAY OF FOLIC ACID SERUM: CPT

## 2023-01-18 PROCEDURE — 6360000002 HC RX W HCPCS: Performed by: NURSE ANESTHETIST, CERTIFIED REGISTERED

## 2023-01-18 PROCEDURE — 63015 REMOVE SPINE LAMINA >2 CRVCL: CPT | Performed by: NEUROLOGICAL SURGERY

## 2023-01-18 PROCEDURE — 36415 COLL VENOUS BLD VENIPUNCTURE: CPT

## 2023-01-18 PROCEDURE — 84439 ASSAY OF FREE THYROXINE: CPT

## 2023-01-18 PROCEDURE — 82040 ASSAY OF SERUM ALBUMIN: CPT

## 2023-01-18 PROCEDURE — 6360000002 HC RX W HCPCS: Performed by: PHYSICIAN ASSISTANT

## 2023-01-18 PROCEDURE — 2580000003 HC RX 258: Performed by: STUDENT IN AN ORGANIZED HEALTH CARE EDUCATION/TRAINING PROGRAM

## 2023-01-18 PROCEDURE — 22600 ARTHRD PST TQ 1NTRSPC CRV: CPT | Performed by: NEUROLOGICAL SURGERY

## 2023-01-18 PROCEDURE — 22842 INSERT SPINE FIXATION DEVICE: CPT | Performed by: NEUROLOGICAL SURGERY

## 2023-01-18 PROCEDURE — 2580000003 HC RX 258: Performed by: PHYSICIAN ASSISTANT

## 2023-01-18 PROCEDURE — 00NW0ZZ RELEASE CERVICAL SPINAL CORD, OPEN APPROACH: ICD-10-PCS | Performed by: NEUROLOGICAL SURGERY

## 2023-01-18 PROCEDURE — 82306 VITAMIN D 25 HYDROXY: CPT

## 2023-01-18 PROCEDURE — 85025 COMPLETE CBC W/AUTO DIFF WBC: CPT

## 2023-01-18 PROCEDURE — 80048 BASIC METABOLIC PNL TOTAL CA: CPT

## 2023-01-18 PROCEDURE — 99223 1ST HOSP IP/OBS HIGH 75: CPT | Performed by: NEUROLOGICAL SURGERY

## 2023-01-18 PROCEDURE — 2580000003 HC RX 258: Performed by: NURSE ANESTHETIST, CERTIFIED REGISTERED

## 2023-01-18 DEVICE — SCREW SPNL 3.5X14MM SYMPHONY: Type: IMPLANTABLE DEVICE | Site: SPINE CERVICAL | Status: FUNCTIONAL

## 2023-01-18 DEVICE — ROD SPNL LORDTC 4X60 MM TI NS SYM: Type: IMPLANTABLE DEVICE | Site: SPINE CERVICAL | Status: FUNCTIONAL

## 2023-01-18 DEVICE — SCREW SPINAL F/SYMPHONY OCT SYSTEM: Type: IMPLANTABLE DEVICE | Site: SPINE CERVICAL | Status: FUNCTIONAL

## 2023-01-18 RX ORDER — ONDANSETRON 2 MG/ML
INJECTION INTRAMUSCULAR; INTRAVENOUS PRN
Status: DISCONTINUED | OUTPATIENT
Start: 2023-01-18 | End: 2023-01-18 | Stop reason: SDUPTHER

## 2023-01-18 RX ORDER — SODIUM CHLORIDE 9 MG/ML
INJECTION, SOLUTION INTRAVENOUS CONTINUOUS
Status: DISCONTINUED | OUTPATIENT
Start: 2023-01-18 | End: 2023-01-19

## 2023-01-18 RX ORDER — MAGNESIUM HYDROXIDE 1200 MG/15ML
LIQUID ORAL CONTINUOUS PRN
Status: COMPLETED | OUTPATIENT
Start: 2023-01-18 | End: 2023-01-18

## 2023-01-18 RX ORDER — SODIUM CHLORIDE 0.9 % (FLUSH) 0.9 %
5-40 SYRINGE (ML) INJECTION EVERY 12 HOURS SCHEDULED
Status: DISCONTINUED | OUTPATIENT
Start: 2023-01-18 | End: 2023-01-18 | Stop reason: HOSPADM

## 2023-01-18 RX ORDER — SODIUM CHLORIDE, SODIUM LACTATE, POTASSIUM CHLORIDE, CALCIUM CHLORIDE 600; 310; 30; 20 MG/100ML; MG/100ML; MG/100ML; MG/100ML
INJECTION, SOLUTION INTRAVENOUS CONTINUOUS PRN
Status: DISCONTINUED | OUTPATIENT
Start: 2023-01-18 | End: 2023-01-18 | Stop reason: SDUPTHER

## 2023-01-18 RX ORDER — PHENYLEPHRINE HCL IN 0.9% NACL 50MG/250ML
PLASTIC BAG, INJECTION (ML) INTRAVENOUS CONTINUOUS PRN
Status: DISCONTINUED | OUTPATIENT
Start: 2023-01-18 | End: 2023-01-18 | Stop reason: SDUPTHER

## 2023-01-18 RX ORDER — ROCURONIUM BROMIDE 10 MG/ML
INJECTION, SOLUTION INTRAVENOUS PRN
Status: DISCONTINUED | OUTPATIENT
Start: 2023-01-18 | End: 2023-01-18 | Stop reason: SDUPTHER

## 2023-01-18 RX ORDER — BISACODYL 10 MG
10 SUPPOSITORY, RECTAL RECTAL DAILY PRN
Status: DISCONTINUED | OUTPATIENT
Start: 2023-01-18 | End: 2023-01-20 | Stop reason: HOSPADM

## 2023-01-18 RX ORDER — SODIUM CHLORIDE 0.9 % (FLUSH) 0.9 %
5-40 SYRINGE (ML) INJECTION PRN
Status: DISCONTINUED | OUTPATIENT
Start: 2023-01-18 | End: 2023-01-20 | Stop reason: HOSPADM

## 2023-01-18 RX ORDER — ESMOLOL HYDROCHLORIDE 10 MG/ML
INJECTION INTRAVENOUS PRN
Status: DISCONTINUED | OUTPATIENT
Start: 2023-01-18 | End: 2023-01-18 | Stop reason: SDUPTHER

## 2023-01-18 RX ORDER — GLYCOPYRROLATE 0.2 MG/ML
INJECTION INTRAMUSCULAR; INTRAVENOUS PRN
Status: DISCONTINUED | OUTPATIENT
Start: 2023-01-18 | End: 2023-01-18 | Stop reason: SDUPTHER

## 2023-01-18 RX ORDER — PROPOFOL 10 MG/ML
INJECTION, EMULSION INTRAVENOUS PRN
Status: DISCONTINUED | OUTPATIENT
Start: 2023-01-18 | End: 2023-01-18 | Stop reason: SDUPTHER

## 2023-01-18 RX ORDER — CYCLOBENZAPRINE HCL 10 MG
10 TABLET ORAL 3 TIMES DAILY PRN
Status: DISCONTINUED | OUTPATIENT
Start: 2023-01-18 | End: 2023-01-19

## 2023-01-18 RX ORDER — OXYCODONE HYDROCHLORIDE 5 MG/1
5 TABLET ORAL EVERY 4 HOURS PRN
Status: DISCONTINUED | OUTPATIENT
Start: 2023-01-18 | End: 2023-01-19

## 2023-01-18 RX ORDER — GINSENG 100 MG
CAPSULE ORAL PRN
Status: DISCONTINUED | OUTPATIENT
Start: 2023-01-18 | End: 2023-01-18 | Stop reason: ALTCHOICE

## 2023-01-18 RX ORDER — SODIUM CHLORIDE 9 MG/ML
INJECTION, SOLUTION INTRAVENOUS PRN
Status: DISCONTINUED | OUTPATIENT
Start: 2023-01-18 | End: 2023-01-20 | Stop reason: HOSPADM

## 2023-01-18 RX ORDER — FAMOTIDINE 20 MG/1
20 TABLET, FILM COATED ORAL 2 TIMES DAILY
Status: DISCONTINUED | OUTPATIENT
Start: 2023-01-18 | End: 2023-01-20 | Stop reason: HOSPADM

## 2023-01-18 RX ORDER — POLYETHYLENE GLYCOL 3350 17 G/17G
17 POWDER, FOR SOLUTION ORAL DAILY
Status: DISCONTINUED | OUTPATIENT
Start: 2023-01-18 | End: 2023-01-20 | Stop reason: HOSPADM

## 2023-01-18 RX ORDER — LIDOCAINE HYDROCHLORIDE AND EPINEPHRINE 10; 10 MG/ML; UG/ML
INJECTION, SOLUTION INFILTRATION; PERINEURAL PRN
Status: DISCONTINUED | OUTPATIENT
Start: 2023-01-18 | End: 2023-01-18 | Stop reason: ALTCHOICE

## 2023-01-18 RX ORDER — SENNA AND DOCUSATE SODIUM 50; 8.6 MG/1; MG/1
1 TABLET, FILM COATED ORAL 2 TIMES DAILY
Status: DISCONTINUED | OUTPATIENT
Start: 2023-01-18 | End: 2023-01-20 | Stop reason: HOSPADM

## 2023-01-18 RX ORDER — SENNA PLUS 8.6 MG/1
1 TABLET ORAL DAILY PRN
Status: DISCONTINUED | OUTPATIENT
Start: 2023-01-18 | End: 2023-01-20 | Stop reason: HOSPADM

## 2023-01-18 RX ORDER — OXYCODONE HYDROCHLORIDE 5 MG/1
10 TABLET ORAL EVERY 4 HOURS PRN
Status: DISCONTINUED | OUTPATIENT
Start: 2023-01-18 | End: 2023-01-19

## 2023-01-18 RX ORDER — SODIUM CHLORIDE 9 MG/ML
INJECTION, SOLUTION INTRAVENOUS PRN
Status: DISCONTINUED | OUTPATIENT
Start: 2023-01-18 | End: 2023-01-18 | Stop reason: HOSPADM

## 2023-01-18 RX ORDER — FENTANYL CITRATE 50 UG/ML
INJECTION, SOLUTION INTRAMUSCULAR; INTRAVENOUS PRN
Status: DISCONTINUED | OUTPATIENT
Start: 2023-01-18 | End: 2023-01-18 | Stop reason: SDUPTHER

## 2023-01-18 RX ORDER — SODIUM CHLORIDE 0.9 % (FLUSH) 0.9 %
5-40 SYRINGE (ML) INJECTION EVERY 12 HOURS SCHEDULED
Status: DISCONTINUED | OUTPATIENT
Start: 2023-01-18 | End: 2023-01-20 | Stop reason: HOSPADM

## 2023-01-18 RX ORDER — ACETAMINOPHEN 325 MG/1
650 TABLET ORAL EVERY 6 HOURS
Status: DISCONTINUED | OUTPATIENT
Start: 2023-01-18 | End: 2023-01-19

## 2023-01-18 RX ORDER — SODIUM CHLORIDE 0.9 % (FLUSH) 0.9 %
5-40 SYRINGE (ML) INJECTION PRN
Status: DISCONTINUED | OUTPATIENT
Start: 2023-01-18 | End: 2023-01-18 | Stop reason: HOSPADM

## 2023-01-18 RX ORDER — LIDOCAINE HYDROCHLORIDE 10 MG/ML
INJECTION, SOLUTION EPIDURAL; INFILTRATION; INTRACAUDAL; PERINEURAL PRN
Status: DISCONTINUED | OUTPATIENT
Start: 2023-01-18 | End: 2023-01-18 | Stop reason: SDUPTHER

## 2023-01-18 RX ORDER — MIDAZOLAM HYDROCHLORIDE 1 MG/ML
INJECTION INTRAMUSCULAR; INTRAVENOUS PRN
Status: DISCONTINUED | OUTPATIENT
Start: 2023-01-18 | End: 2023-01-18 | Stop reason: SDUPTHER

## 2023-01-18 RX ORDER — PHENYLEPHRINE HYDROCHLORIDE 10 MG/ML
INJECTION INTRAVENOUS PRN
Status: DISCONTINUED | OUTPATIENT
Start: 2023-01-18 | End: 2023-01-18 | Stop reason: SDUPTHER

## 2023-01-18 RX ADMIN — ACETAMINOPHEN 650 MG: 325 TABLET ORAL at 20:24

## 2023-01-18 RX ADMIN — ESMOLOL HYDROCHLORIDE 30 MG: 100 INJECTION, SOLUTION INTRAVENOUS at 13:30

## 2023-01-18 RX ADMIN — PROPOFOL 80 MG: 10 INJECTION, EMULSION INTRAVENOUS at 13:37

## 2023-01-18 RX ADMIN — ROCURONIUM BROMIDE 30 MG: 10 SOLUTION INTRAVENOUS at 13:14

## 2023-01-18 RX ADMIN — FENTANYL CITRATE 100 MCG: 0.05 INJECTION, SOLUTION INTRAMUSCULAR; INTRAVENOUS at 12:41

## 2023-01-18 RX ADMIN — PROPOFOL 100 MG: 10 INJECTION, EMULSION INTRAVENOUS at 13:40

## 2023-01-18 RX ADMIN — LIDOCAINE HYDROCHLORIDE 100 MG: 10 INJECTION, SOLUTION EPIDURAL; INFILTRATION; INTRACAUDAL; PERINEURAL at 14:46

## 2023-01-18 RX ADMIN — ROCURONIUM BROMIDE 50 MG: 10 SOLUTION INTRAVENOUS at 12:22

## 2023-01-18 RX ADMIN — HYDROMORPHONE HYDROCHLORIDE 0.5 MG: 1 INJECTION, SOLUTION INTRAMUSCULAR; INTRAVENOUS; SUBCUTANEOUS at 16:04

## 2023-01-18 RX ADMIN — PROPOFOL 100 MG: 10 INJECTION, EMULSION INTRAVENOUS at 13:34

## 2023-01-18 RX ADMIN — SODIUM CHLORIDE, POTASSIUM CHLORIDE, SODIUM LACTATE AND CALCIUM CHLORIDE: 600; 310; 30; 20 INJECTION, SOLUTION INTRAVENOUS at 13:47

## 2023-01-18 RX ADMIN — PROPOFOL 100 MG: 10 INJECTION, EMULSION INTRAVENOUS at 13:32

## 2023-01-18 RX ADMIN — PHENYLEPHRINE HYDROCHLORIDE 100 MCG: 10 INJECTION INTRAVENOUS at 12:39

## 2023-01-18 RX ADMIN — SODIUM CHLORIDE, SODIUM LACTATE, POTASSIUM CHLORIDE, CALCIUM CHLORIDE: 600; 310; 30; 20 INJECTION, SOLUTION INTRAVENOUS at 12:32

## 2023-01-18 RX ADMIN — SODIUM CHLORIDE, PRESERVATIVE FREE 10 ML: 5 INJECTION INTRAVENOUS at 20:27

## 2023-01-18 RX ADMIN — PHENYLEPHRINE HYDROCHLORIDE 100 MCG: 10 INJECTION INTRAVENOUS at 13:47

## 2023-01-18 RX ADMIN — PHENYLEPHRINE HYDROCHLORIDE 100 MCG: 10 INJECTION INTRAVENOUS at 14:25

## 2023-01-18 RX ADMIN — PROPOFOL 70 MG: 10 INJECTION, EMULSION INTRAVENOUS at 12:43

## 2023-01-18 RX ADMIN — CYCLOBENZAPRINE 10 MG: 10 TABLET, FILM COATED ORAL at 20:24

## 2023-01-18 RX ADMIN — PHENYLEPHRINE HYDROCHLORIDE 200 MCG: 10 INJECTION INTRAVENOUS at 12:40

## 2023-01-18 RX ADMIN — Medication 2000 MG: at 20:24

## 2023-01-18 RX ADMIN — Medication 2 G: at 12:55

## 2023-01-18 RX ADMIN — SODIUM CHLORIDE: 9 INJECTION, SOLUTION INTRAVENOUS at 18:53

## 2023-01-18 RX ADMIN — HYDROMORPHONE HYDROCHLORIDE 0.5 MG: 1 INJECTION, SOLUTION INTRAMUSCULAR; INTRAVENOUS; SUBCUTANEOUS at 15:45

## 2023-01-18 RX ADMIN — ONDANSETRON 4 MG: 2 INJECTION INTRAMUSCULAR; INTRAVENOUS at 14:24

## 2023-01-18 RX ADMIN — GLYCOPYRROLATE 0.2 MG: 0.2 INJECTION INTRAMUSCULAR; INTRAVENOUS at 13:08

## 2023-01-18 RX ADMIN — PROPOFOL 150 MG: 10 INJECTION, EMULSION INTRAVENOUS at 12:22

## 2023-01-18 RX ADMIN — LIDOCAINE HYDROCHLORIDE 50 MG: 10 INJECTION, SOLUTION EPIDURAL; INFILTRATION; INTRACAUDAL; PERINEURAL at 12:22

## 2023-01-18 RX ADMIN — PHENYLEPHRINE HYDROCHLORIDE 50 MCG: 10 INJECTION INTRAVENOUS at 13:02

## 2023-01-18 RX ADMIN — Medication 25 MCG/MIN: at 13:02

## 2023-01-18 RX ADMIN — SENNOSIDES 8.6 MG: 8.6 TABLET, COATED ORAL at 20:24

## 2023-01-18 RX ADMIN — PHENYLEPHRINE HYDROCHLORIDE 100 MCG: 10 INJECTION INTRAVENOUS at 13:05

## 2023-01-18 RX ADMIN — PROPOFOL 100 MG: 10 INJECTION, EMULSION INTRAVENOUS at 13:53

## 2023-01-18 RX ADMIN — PHENYLEPHRINE HYDROCHLORIDE 250 MCG: 10 INJECTION INTRAVENOUS at 13:07

## 2023-01-18 RX ADMIN — SUGAMMADEX 320 MG: 100 INJECTION, SOLUTION INTRAVENOUS at 14:53

## 2023-01-18 RX ADMIN — SODIUM CHLORIDE, POTASSIUM CHLORIDE, SODIUM LACTATE AND CALCIUM CHLORIDE: 600; 310; 30; 20 INJECTION, SOLUTION INTRAVENOUS at 14:13

## 2023-01-18 RX ADMIN — FENTANYL CITRATE 100 MCG: 0.05 INJECTION, SOLUTION INTRAMUSCULAR; INTRAVENOUS at 12:22

## 2023-01-18 RX ADMIN — PROPOFOL 70 MG: 10 INJECTION, EMULSION INTRAVENOUS at 12:44

## 2023-01-18 RX ADMIN — FAMOTIDINE 20 MG: 20 TABLET, FILM COATED ORAL at 20:24

## 2023-01-18 RX ADMIN — PHENYLEPHRINE HYDROCHLORIDE 100 MCG: 10 INJECTION INTRAVENOUS at 12:55

## 2023-01-18 RX ADMIN — MIDAZOLAM 2 MG: 1 INJECTION INTRAMUSCULAR; INTRAVENOUS at 12:22

## 2023-01-18 RX ADMIN — PROPOFOL 70 MG: 10 INJECTION, EMULSION INTRAVENOUS at 13:14

## 2023-01-18 RX ADMIN — FENTANYL CITRATE 50 MCG: 0.05 INJECTION, SOLUTION INTRAMUSCULAR; INTRAVENOUS at 13:17

## 2023-01-18 RX ADMIN — OXYCODONE 10 MG: 5 TABLET ORAL at 20:24

## 2023-01-18 RX ADMIN — PHENYLEPHRINE HYDROCHLORIDE 200 MCG: 10 INJECTION INTRAVENOUS at 13:06

## 2023-01-18 ASSESSMENT — PAIN DESCRIPTION - DESCRIPTORS
DESCRIPTORS: SHARP;STABBING
DESCRIPTORS: PATIENT UNABLE TO DESCRIBE

## 2023-01-18 ASSESSMENT — PAIN DESCRIPTION - ONSET
ONSET: GRADUAL
ONSET: AWAKENED FROM SLEEP

## 2023-01-18 ASSESSMENT — PAIN DESCRIPTION - FREQUENCY
FREQUENCY: INTERMITTENT
FREQUENCY: INTERMITTENT

## 2023-01-18 ASSESSMENT — PAIN SCALES - GENERAL
PAINLEVEL_OUTOF10: 0
PAINLEVEL_OUTOF10: 8
PAINLEVEL_OUTOF10: 8
PAINLEVEL_OUTOF10: 5
PAINLEVEL_OUTOF10: 8
PAINLEVEL_OUTOF10: 2
PAINLEVEL_OUTOF10: 10

## 2023-01-18 ASSESSMENT — PAIN DESCRIPTION - LOCATION
LOCATION: NECK
LOCATION: FOOT

## 2023-01-18 ASSESSMENT — PAIN DESCRIPTION - ORIENTATION
ORIENTATION: RIGHT
ORIENTATION: POSTERIOR

## 2023-01-18 ASSESSMENT — PAIN DESCRIPTION - PAIN TYPE
TYPE: ACUTE PAIN
TYPE: SURGICAL PAIN

## 2023-01-18 ASSESSMENT — ENCOUNTER SYMPTOMS: BACK PAIN: 0

## 2023-01-18 NOTE — PROGRESS NOTES
Neurosurgery Post op Progress Note      SUBJECTIVE:  S/P C3-C7 posterior spinal decompression and fusion secondary to central cord syndrome. Patient seen in recovery. Sedated, but will follow commands. Eyes closed, but will open on command. She doesn't appear to be in discomfort. No nausea or emesis. OBJECTIVE      Physical exam   VITALS:    Vitals:    01/18/23 1130   BP: 134/82   Pulse: 68   Resp: 16   Temp: 97.2 °F (36.2 °C)   SpO2: 95%     INTAKE:    Intake/Output Summary (Last 24 hours) at 1/18/2023 1521  Last data filed at 1/18/2023 1508  Gross per 24 hour   Intake 800 ml   Output 710 ml   Net 90 ml          DRAIN/TUBE OUTPUT: less than 25 mi. No evidence of DVT seen on physical exam.    Neurological exam reveals Responds to voice and Responds to tactile stimuli  moves all extremities well and no involuntary movements  . the upper and lower extremities no muscle wasting or atrophy, no fasciculations noted, no involuntary movements, no abnormalities of position, and normal resting muscle tone  Difficult to fully access strength and motor function due to sedation. Wound   Post op wound:  posterior cervical spine   Dressing clean, dry and intact. TONEY drain in place and working. Closed w/ staples. Data      LABS:   Lab Results   Component Value Date    WBC 5.3 01/18/2023    HGB 11.9 01/18/2023    HCT 37.6 01/18/2023    .7 (H) 01/18/2023     01/18/2023      Lab Results   Component Value Date     01/18/2023    K 4.2 01/18/2023     01/18/2023    CO2 23 01/18/2023     Lab Results   Component Value Date    BUN 14 01/18/2023      Lab Results   Component Value Date    CREATININE 0.72 01/18/2023      Micro:    TREATMENTS; RADIOLOGY [24990]    ASSESSMENT AND PLAN  PT/OT to ambulate. Advance diet as tolerated. IV antibiotics for 24 hours. Radiographs: standing AP/lateral C- spine with flexion/extension views. C-collar when oob.   Start lovenox POD #1  if drain output isn't of signifance. Continue to follow closely.

## 2023-01-18 NOTE — ANESTHESIA PRE PROCEDURE
Department of Anesthesiology  Preprocedure Note       Name:  Adiel Nelson   Age:  67 y.o.  :  1950                                          MRN:  [id number]         Date:  2023      Surgeon: Carleen Ortiz):  Mignon Mckenzie DO    Procedure: Procedure(s):   *ADD- ON REQ TO FOLLOW** C 3-7 POSTERIOR CERVICAL DECOMPRESSION AND FUSION ( BHARGAV SPINE TABLE, CARRILLO PINS)    Medications prior to admission:   Prior to Admission medications    Not on File       Current medications:    Current Facility-Administered Medications   Medication Dose Route Frequency Provider Last Rate Last Admin    lactated ringers infusion   IntraVENous Continuous Mary Hodge MD 75 mL/hr at 23 1700 New Bag at 23 1700    sodium chloride flush 0.9 % injection 5-40 mL  5-40 mL IntraVENous 2 times per day Des Solano MD   10 mL at 23 0925    sodium chloride flush 0.9 % injection 5-40 mL  5-40 mL IntraVENous PRTRACY Solano MD        0.9 % sodium chloride infusion  25 mL IntraVENous PRN Des Solano MD        potassium chloride (KLOR-CON M) extended release tablet 40 mEq  40 mEq Oral PRN Des Solano MD        Or    potassium bicarb-citric acid (EFFER-K) effervescent tablet 40 mEq  40 mEq Oral ESTHERN Des Solano MD        Or    potassium chloride 10 mEq/100 mL IVPB (Peripheral Line)  10 mEq IntraVENous PRN Des Solano MD        [Held by provider] enoxaparin (LOVENOX) injection 40 mg  40 mg SubCUTAneous Daily Des Solano MD   40 mg at 23 0925    ondansetron (ZOFRAN) injection 4 mg  4 mg IntraVENous Q4H PRN Des Solano MD        polyethylene glycol (GLYCOLAX) packet 17 g  17 g Oral Daily PRN Des Solano MD        acetaminophen (TYLENOL) tablet 650 mg  650 mg Oral Q6H PRN Des Solano MD   650 mg at 23 2583    Or    acetaminophen (TYLENOL) suppository 650 mg  650 mg Rectal Q6H PRN Des Solano MD           Allergies:  No Known Allergies    Problem List:    Patient Active Problem List   Diagnosis Code    Mild ankle sprain, initial encounter S93.409A    Mild sprain of right ankle, initial encounter S93.401A       Past Medical History:  History reviewed. No pertinent past medical history. Past Surgical History:  No past surgical history on file. Social History:    Social History     Tobacco Use    Smoking status: Former     Types: Cigarettes     Quit date: 2015     Years since quittin.0    Smokeless tobacco: Never   Substance Use Topics    Alcohol use: Never                                Counseling given: No      Vital Signs (Current):   Vitals:    23 0751 23 0716 23 0737   BP: 136/82 125/77  122/85   Pulse: 81 68 75 70   Resp:    Temp: 98 °F (36.7 °C) 97.3 °F (36.3 °C)  97 °F (36.1 °C)   TempSrc: Oral Oral  Oral   SpO2: 94% 93%  93%   Weight:       Height:                                                  BP Readings from Last 3 Encounters:   23 122/85       NPO Status: Time of last liquid consumption: 1800                        Time of last solid consumption: 1800                        Date of last liquid consumption: 23                        Date of last solid food consumption: 23    BMI:   Wt Readings from Last 3 Encounters:   23 180 lb (81.6 kg)   23 180 lb (81.6 kg)     Body mass index is 28.19 kg/m².     CBC:   Lab Results   Component Value Date/Time    WBC 5.3 2023 05:11 AM    RBC 3.25 2023 05:11 AM    HGB 11.9 2023 05:11 AM    HCT 37.6 2023 05:11 AM    .7 2023 05:11 AM    RDW 14.6 2023 05:11 AM     2023 05:11 AM       CMP:   Lab Results   Component Value Date/Time     2023 05:11 AM    K 4.2 2023 05:11 AM     2023 05:11 AM    CO2 23 2023 05:11 AM    BUN 14 2023 05:11 AM    CREATININE 0.72 2023 05:11 AM    LABGLOM >60 2023 05:11 AM    GLUCOSE 112 2023 05:11 AM    PROT 6.9 01/16/2023 11:17 AM    CALCIUM 8.6 01/18/2023 05:11 AM    BILITOT 0.6 01/16/2023 11:17 AM    ALKPHOS 71 01/16/2023 11:17 AM    AST 19 01/16/2023 11:17 AM    ALT 10 01/16/2023 11:17 AM       POC Tests: No results for input(s): POCGLU, POCNA, POCK, POCCL, POCBUN, POCHEMO, POCHCT in the last 72 hours. Coags:   Lab Results   Component Value Date/Time    PROTIME 11.5 01/14/2023 07:27 PM    INR 1.1 01/14/2023 07:27 PM    APTT 21.6 01/14/2023 07:27 PM       HCG (If Applicable): No results found for: PREGTESTUR, PREGSERUM, HCG, HCGQUANT     ABGs: No results found for: PHART, PO2ART, UYC4BAR, SAB6USL, BEART, O2KQMECC     Type & Screen (If Applicable):  No results found for: LABABO, LABRH    Drug/Infectious Status (If Applicable):  No results found for: HIV, HEPCAB    COVID-19 Screening (If Applicable):   Lab Results   Component Value Date/Time    COVID19 Not Detected 01/14/2023 10:27 PM           Anesthesia Evaluation    Airway: Mallampati: II     Neck ROM: full     Dental: normal exam         Pulmonary:Negative Pulmonary ROS breath sounds clear to auscultation                             Cardiovascular:Negative CV ROS            Rhythm: regular  Rate: normal                 ROS comment: Global left ventricular systolic function is normal. Estimated LVEF 55-60%. Mild left ventricular hypertrophy. Grade I (mild) left ventricular diastolic dysfunction. Normal right ventricular size and function. No significant valvular regurgitation or stenosis seen. Neuro/Psych:   (+) neuromuscular disease:,             GI/Hepatic/Renal: Neg GI/Hepatic/Renal ROS            Endo/Other: Negative Endo/Other ROS                    Abdominal:         (-) obese       Vascular: negative vascular ROS. Other Findings:           Anesthesia Plan      general     ASA 2       Induction: intravenous. arterial line    Anesthetic plan and risks discussed with patient.     Use of blood products discussed with patient whom consented to blood products. Plan discussed with CRNA.                     Kal Pierce MD   1/18/2023

## 2023-01-18 NOTE — PROGRESS NOTES
I personally evaluated the patient and directed the medical decision making with Resident/MATHEUS after the physical/radiologic exam and laboratory values were reviewed and confirmed. 72yoF s/p mech fall down stairs w/ persistent numbness in bilateral hands. NSG plans for OR for decompression. RCRI 0. Echo shows EF 55-60%. No history of cardiac disease per patient. No further workup indicated. To OR w/ NSG today. JULIO CESAR Orellana MD  Acute Care Surgery          PROGRESS NOTE          PATIENT NAME: Mickiel Pulse  MEDICAL RECORD NO. [id number]  DATE: 1/18/2023  PRIMARY CARE PHYSICIAN: No primary care provider on file. HD: # 1    ASSESSMENT    Patient Active Problem List   Diagnosis    Mild ankle sprain, initial encounter    Mild sprain of right ankle, initial encounter   Patient is a female that presented to the Emergency Department following a mechanical fall down several stairs at her home. GCS 15 on arrival. Patient denies loss of consciousness. Patient denies any history of blood thinners. Only complaining of right ankle pain. Previous surgical scar noted over right ankle. Patient states that she has previously broken that ankle. MEDICAL DECISION MAKING AND PLAN     Mechanical fall down stairs, (-) LOC   MRI: 1/17  Central canal stenosis at C3-C4 and C6-C7   Plan: Neurosurgery is planning to take patient to the OR today for cervical spinal cord decompression for central cord syndrome. Asymptomatic pyuria   UA with ketones, LE and WBC but no bacteria, asymptomatic, likely asymptomatic pyuria with poor nutrition     ECHO: LVEF 55-60%         Chief Complaint: \"weakness on b/l hand\"    SUBJECTIVE    Mickiel Pulse is seen and examined this morning. This morning, patient was evaluated by neurosurgery team, patient complains ofnumbness of right ankle but only in area of ankle swelling from fall.  Reports she can't feel some things when she's grabbing things out of her purse and that she will intermittently drop things like utensils. OBJECTIVE  VITALS: Temp: Temp: 97 °F (36.1 °C)Temp  Av.2 °F (36.2 °C)  Min: 97 °F (36.1 °C)  Max: 97.3 °F (77.7 °C) BP Systolic (77CBQ), OAU:744 , Min:122 , IWF:599   Diastolic (64YRE), WCP:26, Min:77, Max:85   Pulse Pulse  Av  Min: 68  Max: 75 Resp Resp  Av  Min: 18  Max: 18 Pulse ox SpO2  Av %  Min: 93 %  Max: 93 %  GENERAL: alert, no distress  NEURO: AXO4  HEENT: normocephalic   : normal  LUNGS: clear to ausculation, without wheezes, rales or rhonci  HEART: normal rate and regular rhythm  ABDOMEN: soft, non-tender, non-distended, and no guarding or peritoneal signs present  EXTREMITY: +2/4 PULSES DISTAL, NO SWELLING  Neuro / Psych: Numbness and tingling of bilateral hands. Mild weakness on the left upper extremity      I/O last 3 completed shifts:  In: -   Out: 300 [Urine:300]    Drain/tube output: In: -   Out: 300 [Urine:300]    LAB:  CBC:   Recent Labs     23  1117 23  0511   WBC 5.1 5.3   HGB 12.2 11.9   HCT 37.2 37.6   .4* 115.7*    289     BMP:   Recent Labs     23  1117 23  0511    138   K 4.1 4.2    104   CO2 25 23   BUN 10 14   CREATININE 0.66 0.72   GLUCOSE 87 112*     COAGS:   Recent Labs     23  111   PROT 6.9       RADIOLOGY:  CXR:   MRI CERVICAL SPINE WO CONTRAST    Result Date: 2023  Faint diffuse hyperintense T2 signal abnormality within the cervical cord extending from C3 through C6 likely representing mild myelomalacia. Mild congenital spinal canal stenosis superimposed over moderate diffuse degenerative disc disease. Moderate central canal stenosis at C3-C4 and C6-C7. Mild central canal stenosis identified within mid cervical levels. Moderate left foraminal stenosis at C6-C7 and C7-T1.           Edu Alvarado, DO  23, 10:41 AM

## 2023-01-18 NOTE — ANESTHESIA POSTPROCEDURE EVALUATION
Department of Anesthesiology  Postprocedure Note    Patient: Samy Gilbert  MRN: [id number]  [place]: 1950  Date of evaluation: 1/18/2023      Procedure Summary     Date: 01/18/23 Room / Location: [facility] 12 / [facility]    Anesthesia Start: 3118 Anesthesia Stop: 0205    Procedure: C 3-7 POSTERIOR CERVICAL DECOMPRESSION AND FUSION Diagnosis:       Central cord syndrome, [facility] ([facility]      ([address] [facility])    Surgeons: Ely Thomason DO Responsible Provider: Britney William MD    Anesthesia Type: general ASA Status: 2          Anesthesia Type: No value filed.     Rachel Phase I: Rachel Score: 8    Rachel Phase II:        Anesthesia Post Evaluation    Patient location during evaluation: PACU  Patient participation: complete - patient participated  Level of consciousness: awake  Airway patency: patent  Nausea & Vomiting: no nausea and no vomiting  Complications: no  Cardiovascular status: blood pressure returned to baseline  Respiratory status: acceptable  Hydration status: euvolemic  Comments: BP (!) 96/50   Pulse 76   Temp 97.9 °F (36.6 °C) (Axillary)   Resp 19   Ht 5' 7\" (1.702 m)   Wt 180 lb (81.6 kg)   SpO2 91%   BMI 28.19 kg/m²

## 2023-01-18 NOTE — CARE COORDINATION
Updated by Guillaume Jolly RN trauma coordinator that patient may be going for OR with neurosurgery.   Updated Destini Booker at [facility]

## 2023-01-18 NOTE — PROGRESS NOTES
[facility]  Occupational Therapy Not Seen Note    DATE: 2023    NAME: Renée Miller  MRN: [id number]   : 1950      Patient not seen this date for Occupational Therapy due to:    Testing: Pt currently off floor at echo.  Will check back in PM as able if not     Electronically signed by DYLAN Francis on 2023 at 8:48 AM

## 2023-01-18 NOTE — PROGRESS NOTES
CDU Transfer Summary        Patient:  Javi Skinner  YOB: 1950    MRN: [id number]   Acct: [de-identified]    Primary Care Physician: No primary care provider on file. Admit date:  1/14/2023  7:11 PM  Transfer date: 1/18/2023    Transfer Diagnoses:     1.)  Bilateral hand numbness thought secondary to acute central cord syndrome. for OR per neurosurgery. 2.  Sprained ankle secondary to mechanical fall  3. Fall probably secondary to mechanical issue. Syncopal work-up initiated with echocardiogram.           Medication List      You have not been prescribed any medications. Diet:  Diet NPO, advance as tolerated     Activity:  As tolerated    Consultants: IP CONSULT TO NEUROSURGERY  IP CONSULT TO IV TEAM    Procedures: See neurosurgical note    Diagnostic Test:   Results for orders placed or performed during the hospital encounter of 01/14/23   COVID-19, Rapid    Specimen: Nasopharyngeal Swab   Result Value Ref Range    Specimen Description . NASOPHARYNGEAL SWAB     SARS-CoV-2, Rapid Not Detected Not Detected   Trauma Panel   Result Value Ref Range    Ethanol <10 <10 mg/dL    Ethanol percent <0.010 <0.010 %    Blood Bank Specimen BILL FOR SERVICES PERFORMED     BUN 18 8 - 23 mg/dL    WBC 5.5 3.5 - 11.3 k/uL    RBC 3.38 (L) 3.95 - 5.11 m/uL    Hemoglobin 12.5 11.9 - 15.1 g/dL    Hematocrit 37.7 36.3 - 47.1 %    .5 (H) 82.6 - 102.9 fL    MCH 37.0 (H) 25.2 - 33.5 pg    MCHC 33.2 28.4 - 34.8 g/dL    RDW 14.0 11.8 - 14.4 %    Platelets 665 801 - 522 k/uL    MPV 9.4 8.1 - 13.5 fL    NRBC Automated 0.0 0.0 per 100 WBC    Creatinine 1.13 (H) 0.50 - 0.90 mg/dL    Est, Glom Filt Rate 33 (L) >60 mL/min/1.73m2    Glucose 120 (H) 70 - 99 mg/dL    hCG Qual NEGATIVE NEGATIVE    Sodium 135 135 - 144 mmol/L    Potassium 4.1 3.7 - 5.3 mmol/L    Chloride 97 (L) 98 - 107 mmol/L    CO2 19 (L) 20 - 31 mmol/L    Anion Gap 19 (H) 9 - 17 mmol/L    Protime 11.5 9.1 - 12.3 sec    INR 1.1     PTT 21.6 20.5 - 30.5 sec    pH, Aakash 7.363 7.320 - 7.420    pCO2, Aakash 37.7 (L) 39 - 55 mm Hg    pO2, Aakash 19.7 (L) 30 - 50 mm Hg    HCO3, Venous 20.9 (L) 24 - 30 mmol/L    Negative Base Excess, Aakash 3.5 (H) 0.0 - 2.0 mmol/L    O2 Sat, Aakash 24.6 (L) 60.0 - 85.0 %    Carboxyhemoglobin 0.5 0 - 5 %    Pt Temp 37.0     FIO2 INFORMATION NOT PROVIDED    Comprehensive Metabolic Panel w/ Reflex to MG   Result Value Ref Range    Glucose 87 70 - 99 mg/dL    BUN 10 8 - 23 mg/dL    Creatinine 0.66 0.50 - 0.90 mg/dL    Est, Glom Filt Rate >60 >60 mL/min/1.73m2    Calcium 8.8 8.6 - 10.4 mg/dL    Sodium 138 135 - 144 mmol/L    Potassium 4.1 3.7 - 5.3 mmol/L    Chloride 104 98 - 107 mmol/L    CO2 25 20 - 31 mmol/L    Anion Gap 9 9 - 17 mmol/L    Alkaline Phosphatase 71 35 - 104 U/L    ALT 10 5 - 33 U/L    AST 19 <32 U/L    Total Bilirubin 0.6 0.3 - 1.2 mg/dL    Total Protein 6.9 6.4 - 8.3 g/dL    Albumin 3.3 (L) 3.5 - 5.2 g/dL    Albumin/Globulin Ratio 0.9 (L) 1.0 - 2.5   CBC with Auto Differential   Result Value Ref Range    WBC 5.1 3.5 - 11.3 k/uL    RBC 3.31 (L) 3.95 - 5.11 m/uL    Hemoglobin 12.2 11.9 - 15.1 g/dL    Hematocrit 37.2 36.3 - 47.1 %    .4 (H) 82.6 - 102.9 fL    MCH 36.9 (H) 25.2 - 33.5 pg    MCHC 32.8 28.4 - 34.8 g/dL    RDW 14.5 (H) 11.8 - 14.4 %    Platelets 152 206 - 148 k/uL    MPV 9.6 8.1 - 13.5 fL    NRBC Automated 0.0 0.0 per 100 WBC    Immature Granulocytes 0 0 %    Seg Neutrophils 58 36 - 66 %    Lymphocytes 30 24 - 44 %    Monocytes 9 (H) 1 - 7 %    Eosinophils % 2 1 - 4 %    Basophils 1 0 - 2 %    Absolute Immature Granulocyte 0.00 0.00 - 0.30 k/uL    Segs Absolute 2.96 1.8 - 7.7 k/uL    Absolute Lymph # 1.53 1.0 - 4.8 k/uL    Absolute Mono # 0.46 0.1 - 0.8 k/uL    Absolute Eos # 0.10 0.0 - 0.4 k/uL    Basophils Absolute 0.05 0.0 - 0.2 k/uL    Morphology MACROCYTOSIS PRESENT    Urinalysis with Microscopic   Result Value Ref Range    Color, UA Yellow Yellow    Turbidity UA Clear Clear    Glucose, Ur NEGATIVE NEGATIVE Bilirubin Urine NEGATIVE NEGATIVE    Ketones, Urine TRACE (A) NEGATIVE    Specific Gravity, UA 1.020 1.005 - 1.030    Urine Hgb NEGATIVE NEGATIVE    pH, UA 5.5 5.0 - 8.0    Protein, UA NEGATIVE NEGATIVE    Urobilinogen, Urine Normal Normal    Nitrite, Urine NEGATIVE NEGATIVE    Leukocyte Esterase, Urine MODERATE (A) NEGATIVE    WBC, UA 20 TO 50 0 - 5 /HPF    RBC, UA 2 TO 5 0 - 4 /HPF    Casts UA  0 - 8 /LPF     0 TO 2 HYALINE Reference range defined for non-centrifuged specimen.     Epithelial Cells UA 0 TO 2 0 - 5 /HPF   CBC with Auto Differential   Result Value Ref Range    WBC 5.3 3.5 - 11.3 k/uL    RBC 3.25 (L) 3.95 - 5.11 m/uL    Hemoglobin 11.9 11.9 - 15.1 g/dL    Hematocrit 37.6 36.3 - 47.1 %    .7 (H) 82.6 - 102.9 fL    MCH 36.6 (H) 25.2 - 33.5 pg    MCHC 31.6 28.4 - 34.8 g/dL    RDW 14.6 (H) 11.8 - 14.4 %    Platelets 948 113 - 607 k/uL    MPV 9.9 8.1 - 13.5 fL    NRBC Automated 0.0 0.0 per 100 WBC    Immature Granulocytes 1 (H) 0 %    Seg Neutrophils 42 36 - 65 %    Lymphocytes 46 (H) 24 - 43 %    Monocytes 7 3 - 12 %    Eosinophils % 3 1 - 4 %    Basophils 1 0 - 2 %    Absolute Immature Granulocyte 0.05 0.00 - 0.30 k/uL    Segs Absolute 2.23 1.50 - 8.10 k/uL    Absolute Lymph # 2.44 1.10 - 3.70 k/uL    Absolute Mono # 0.37 0.10 - 1.20 k/uL    Absolute Eos # 0.16 0.00 - 0.44 k/uL    Basophils Absolute 0.05 0.00 - 0.20 k/uL    Morphology ANISOCYTOSIS PRESENT     Morphology MACROCYTOSIS PRESENT    Basic Metabolic Panel   Result Value Ref Range    Glucose 112 (H) 70 - 99 mg/dL    BUN 14 8 - 23 mg/dL    Creatinine 0.72 0.50 - 0.90 mg/dL    Est, Glom Filt Rate >60 >60 mL/min/1.73m2    Calcium 8.6 8.6 - 10.4 mg/dL    Sodium 138 135 - 144 mmol/L    Potassium 4.2 3.7 - 5.3 mmol/L    Chloride 104 98 - 107 mmol/L    CO2 23 20 - 31 mmol/L    Anion Gap 11 9 - 17 mmol/L   EKG 12 Lead   Result Value Ref Range    Ventricular Rate 73 BPM    Atrial Rate 73 BPM    P-R Interval 118 ms    QRS Duration 88 ms    Q-T Interval 396 ms    QTc Calculation (Bazett) 436 ms    P Axis -21 degrees    R Axis 29 degrees    T Axis 29 degrees   TYPE AND SCREEN   Result Value Ref Range    Expiration Date 01/17/2023,2359     ABO/Rh O POSITIVE     Antibody Screen NEGATIVE    TYPE AND SCREEN   Result Value Ref Range    Expiration Date 01/21/2023,2359     Arm Band Number [id number]     ABO/Rh O POSITIVE     Antibody Screen NEGATIVE      XR ANKLE RIGHT (MIN 3 VIEWS)    Result Date: 1/14/2023  EXAMINATION: THREE XRAY VIEWS OF THE RIGHT ANKLE 1/14/2023 4:54 pm COMPARISON: None. HISTORY: ORDERING SYSTEM PROVIDED HISTORY: fall, pain TECHNOLOGIST PROVIDED HISTORY: fall, pain FINDINGS: Deformity of the ankle, possibly posttraumatic. Benign appearing sclerosis in the distal tibia. .  There is no evidence of fracture or dislocation. Severe degenerative changes of the tibiotalar joint. .  The remaining joint spaces appear well maintained. Soft tissue swelling. Calcaneal spurs. Exostosis along the distal fibula. .     No acute bony abnormalities are noted Degenerative changes     CT HEAD WO CONTRAST    Result Date: 1/14/2023  EXAMINATION: CT OF THE HEAD WITHOUT CONTRAST  1/14/2023 7:27 pm TECHNIQUE: CT of the head was performed without the administration of intravenous contrast. Automated exposure control, iterative reconstruction, and/or weight based adjustment of the mA/kV was utilized to reduce the radiation dose to as low as reasonably achievable. COMPARISON: None. HISTORY: ORDERING SYSTEM PROVIDED HISTORY: trauma TECHNOLOGIST PROVIDED HISTORY: trauma Reason for Exam: fall FINDINGS: BRAIN/VENTRICLES: There is no acute intracranial hemorrhage, mass effect or midline shift. No abnormal extra-axial fluid collection. The gray-white differentiation is maintained without evidence of an acute infarct. There is no evidence of hydrocephalus. ORBITS: The visualized portion of the orbits demonstrate no acute abnormality.  SINUSES: The visualized paranasal sinuses and mastoid air cells demonstrate no acute abnormality. SOFT TISSUES/SKULL:  No acute abnormality of the visualized skull or soft tissues. No acute intracranial abnormality. CT CERVICAL SPINE WO CONTRAST    Result Date: 1/14/2023  EXAMINATION: CT OF THE CERVICAL SPINE WITHOUT CONTRAST 1/14/2023 7:27 pm TECHNIQUE: CT of the cervical spine was performed without the administration of intravenous contrast. Multiplanar reformatted images are provided for review. Automated exposure control, iterative reconstruction, and/or weight based adjustment of the mA/kV was utilized to reduce the radiation dose to as low as reasonably achievable. COMPARISON: None. HISTORY: ORDERING SYSTEM PROVIDED HISTORY: trauma TECHNOLOGIST PROVIDED HISTORY: trauma Reason for Exam: fall FINDINGS: BONES/ALIGNMENT: There is no acute fracture or traumatic malalignment. DEGENERATIVE CHANGES: There is degenerative disc disease of the C6-C7 disc, with disc space narrowing and anterior and posterior osteophytes. Anterior osteophytes are noted at C3-C4, C4-C5 and C5-C6 discs. There are osteoarthritic changes of the facet joints noted bilaterally. SOFT TISSUES: There is no prevertebral soft tissue swelling. No acute abnormality of the cervical spine. CT CHEST ABDOMEN PELVIS W CONTRAST Additional Contrast? None    Result Date: 1/14/2023  EXAMINATION: CT OF THE CHEST, ABDOMEN, AND PELVIS WITH CONTRAST 1/14/2023 7:28 pm TECHNIQUE: CT of the chest, abdomen and pelvis was performed with the administration of intravenous contrast. Multiplanar reformatted images are provided for review. Automated exposure control, iterative reconstruction, and/or weight based adjustment of the mA/kV was utilized to reduce the radiation dose to as low as reasonably achievable.  COMPARISON: None HISTORY: ORDERING SYSTEM PROVIDED HISTORY: fall down 9 stairs: fall down steps, low back pain, tingling to the hands FINDINGS: Chest: Technical: Evaluation of the lungs degraded because of motion during imaging, blurring detail and resulting in misregistration artifact. Mediastinum: Cardiac structures and great vessels appear unremarkable. No pericardial effusion. Posterior mediastinal structures appear unremarkable. No mediastinal or hilar adenopathy. Lungs/pleura: Faint pulmonary opacity lateral right upper lobe. Focal subsegmental atelectasis posteromedial right lower lobe and at the lingula left upper lobe. Other portions of the lungs appear clear. No discrete nodule evident. No inspissated secretions or endobronchial lesion evident. No pleural effusion or pneumothorax. Soft Tissues/Bones: No acute traumatic abnormality. No acute superficial soft tissue or osseous structure abnormality evident. Confluent ossification of the anterior longitudinal ligament of thoracic spine reflects diffuse idiopathic skeletal hyperostosis (DISH). Abdomen/Pelvis: Organs: Hypoattenuation throughout the liver reflects hepatic steatosis. Craniocaudal liver length 15-16 cm. No discrete hepatic lesion or intrahepatic bile duct dilatation is seen. The gallbladder has a few scattered densities within it suggesting cholelithiasis without gallbladder wall thickening or pericholecystic fluid. The kidneys, spleen, adrenal glands and pancreas appear unremarkable. GI/Bowel: The stomach and small bowel appear unremarkable. No diffuse or focal small bowel wall thickening or inflammatory changes evident. No obstruction is seen. The appendix is visualized right lower quadrant, unremarkable in appearance. There are a few scattered colonic diverticula without definite inflammatory changes associated. The colon appears otherwise unremarkable. Pelvis: Popcorn like calcification right-sided the uterus measuring 10 x 15 mm indicative of degenerating fibroid (benign finding requiring no additional evaluation, no follow-up imaging recommended). The uterus and adnexal structures appear unremarkable.   Urinary bladder is partially filled, unremarkable appearance. No adenopathy or free fluid. Peritoneum/Retroperitoneum: Calcific atherosclerotic disease aorta. No aneurysm. Unremarkable appearance of the IVC. No adenopathy or fluid. No pneumoperitoneum. Bones/Soft Tissues: No acute superficial soft tissue abnormality evident. Age indeterminate 20-30% compression superior endplate L1, predominantly central endplate. No other definite acute traumatic abnormality demonstrated. 1.  CT CHEST: Evaluation of the lungs degraded because of motion during imaging, blurring detail and resulting in misregistration artifact. 2. No acute traumatic abnormality. 3. A few patchy opacities are demonstrated lateral right upper lobe, lingula left upper lobe and posteromedial right lung base. Focal pneumonia or areas of atelectasis or pulmonary contusion are considerations. No associated overlying rib fractures are evident. No pleural effusion or pneumothorax. 4. CT ABDOMEN/PELVIS: No acute intra-abdominal traumatic abnormality. 5. Age indeterminate 20-30% compression superior endplate L1, predominantly central endplate. Correlate with report from lumbar spine CT performed at the same time. Additional characterization with MRI may be indicated to discern acute versus chronic injury. 6. Hepatic steatosis. 7.  No CT evidence of an acute intra-abdominal or intrapelvic process. 8. Possible cholelithiasis without CT findings of acute cholecystitis. CT LUMBAR SPINE TRAUMA RECONSTRUCTION    Result Date: 1/14/2023  EXAMINATION: CT OF THE LUMBAR SPINE WITHOUT CONTRAST  1/14/2023 TECHNIQUE: CT of the lumbar spine was performed without the administration of intravenous contrast. Multiplanar reformatted images are provided for review. Adjustment of mA and/or kV according to patient size was utilized.   Automated exposure control, iterative reconstruction, and/or weight based adjustment of the mA/kV was utilized to reduce the radiation dose to as low as reasonably achievable. COMPARISON: None HISTORY: ORDERING SYSTEM PROVIDED HISTORY: TRAUMA TECHNOLOGIST PROVIDED HISTORY: trauma Reason for Exam: fall down steps FINDINGS: BONES/ALIGNMENT: There is normal alignment of the spine. The vertebral body heights are maintained. No osseous destructive lesion is seen. There is mild compression of the L1 vertebral body, without acute fracture lines or cortical breaks, in keeping with a old fracture. DEGENERATIVE CHANGES: No significant degenerative changes of the lumbar spine. Anterior osteophytes are noted in the the lumbar spine. There are osteoarthritic changes of the L4-L5 and L5-S1 facet joints. SOFT TISSUES/RETROPERITONEUM: No paraspinal mass is seen. Mild compression fracture of the L1 vertebral body which appears old. CT THORACIC SPINE TRAUMA RECONSTRUCTION    Result Date: 1/14/2023  EXAMINATION: CT OF THE THORACIC SPINE WITHOUT CONTRAST  1/14/2023 7:28 pm: TECHNIQUE: CT of the thoracic spine was performed without the administration of intravenous contrast. Multiplanar reformatted images are provided for review. Automated exposure control, iterative reconstruction, and/or weight based adjustment of the mA/kV was utilized to reduce the radiation dose to as low as reasonably achievable. COMPARISON: None. HISTORY: ORDERING SYSTEM PROVIDED HISTORY: trauma TECHNOLOGIST PROVIDED HISTORY: trauma Reason for Exam: fall down steps FINDINGS: BONES/ALIGNMENT: There is normal alignment of the spine. The vertebral body heights are maintained. No osseous destructive lesion is seen. There are no fractures noted. DEGENERATIVE CHANGES: Anterior osteophytes are noted in the mid and lower thoracic spine. No gross spinal canal stenosis or bony neural foraminal narrowing of the thoracic spine. SOFT TISSUES: No paraspinal mass is seen. No fractures noted.            Physical Exam:    General appearance - NAD, AOx 3    Lungs -CTAB, no R/R/R  Heart - RRR, no M/R/G  Abdomen - Soft, NT/ND  Neurological: Bilateral numbness to hands  Extremities - Cap refil <2 sec in all ext., no edema  Skin -warm, dry        Hospital Course:  Clinical course has improved, labs and imaging reviewed. Roman Alfonso originally presented to the hospital on 1/14/2023  7:11 PM with mechanical fall. At that time it was determined that She required further observation and analgesia for sprained ankle. While patient was here she was seen by PT OT and assessment was being made for ECF placement secondary to difficulty caring for self at home. Over the course of 24 to 48 hours patient did develop some complaints of numbness to her hands. This was further evaluated with MRI which showed concerns for potential central cord syndrome. Patient was evaluated by neurosurgery and due to nature of injury was transferred back to trauma service. Patient also had initiation for syncopal work-up while here. Patient was unable to give a clear memory of what caused the fall and there were concerns that this may have been a syncopal event. Patient currently well. .     Discussed with trauma team    Pt discussed directly with the admitting team    Disposition: Transfer  Condition: Good    Time Spent: 1 day      --  Celine Chan MD  Emergency Medicine Attending Physician    This dictation was generated by voice recognition computer software. Although all attempts are made to edit the dictation for accuracy, there may be errors in the transcription that are not intended.

## 2023-01-18 NOTE — PROGRESS NOTES
OBS/CDU   Attending NOTE      Patients PCP is: No primary care provider on file. SUBJECTIVE      Persistent numbness and weakness to bilateral hands. Patient has echo done. Patient without cardiac complaint. Patient has ongoing neurologic discomfort and findings. For reevaluation by neurosurgery today. PHYSICAL EXAM      General: NAD, AO X 3  Heent: EMOI, PERRL  Neck: SUPPLE, NO JVD  Cardiovascular: RRR, S1S2  Pulmonary: CTAB, NO SOB  Abdomen: SOFT, NTTP, ND, +BS  Extremities: +2/4 PULSES DISTAL, NO SWELLING  Neuro / Psych: Numbness to bilateral hands. PERTINENT TEST /EXAMS      I have reviewed all available laboratory results. MEDICATIONS CURRENT   polyethylene glycol (GLYCOLAX) packet 17 g, Daily  lactated ringers infusion, Continuous  sodium chloride flush 0.9 % injection 5-40 mL, 2 times per day  sodium chloride flush 0.9 % injection 5-40 mL, PRN  0.9 % sodium chloride infusion, PRN  [Held by provider] enoxaparin (LOVENOX) injection 40 mg, Daily  ondansetron (ZOFRAN) injection 4 mg, Q4H PRN        All medication charted and reviewed. CONSULTS      IP CONSULT TO NEUROSURGERY  IP CONSULT TO IV TEAM    ASSESSMENT/PLAN       Marlee Miller is a 67 y.o. female who presents with       Mechanical fall  Syncopal work-up to include echocardiogram.  Done today. Evidence of central cord syndrome on clinical grounds. Patient with MRI done  Appreciate neurosurgical intervention. Patient for operative intervention today. Ongoing pain to ankle. History of ankle sprain after mechanical fall. Case management following regarding home care situation. Due to traumatic nature of the event and ongoing symptoms and need for inpatient evaluation and treatment patient will be transferred back to trauma service.     Continue home medications and pain control  Monitor vitals, labs, and imaging  DISPO: pending consults and clinical improvement    --  Candice Mohamud MD  Emergency Medicine Attending Physician     This dictation was generated by voice recognition computer software. Although all attempts are made to edit the dictation for accuracy, there may be errors in the transcription that are not intended.

## 2023-01-18 NOTE — PROGRESS NOTES
Physical Therapy        Physical Therapy Cancel Note      DATE: 2023    NAME: Rudi Marroquin  MRN: [id number]   : 1950      Patient not seen this date for Physical Therapy due to:      Per james Cunningham scheduled for C 3-7 POSTERIOR CERVICAL DECOMPRESSION AND FUSION today late afternoon, defer PT until after surgery.     Electronically signed by Lyndsay Story PT on 2023 at 10:49 AM

## 2023-01-19 ENCOUNTER — APPOINTMENT (OUTPATIENT)
Dept: GENERAL RADIOLOGY | Age: 73
DRG: 030 | End: 2023-01-19
Payer: MEDICARE

## 2023-01-19 LAB
ABSOLUTE EOS #: 0.16 K/UL (ref 0–0.44)
ABSOLUTE EOS #: 0.16 K/UL (ref 0–0.44)
ABSOLUTE IMMATURE GRANULOCYTE: 0 K/UL (ref 0–0.3)
ABSOLUTE IMMATURE GRANULOCYTE: 0 K/UL (ref 0–0.3)
ABSOLUTE LYMPH #: 1.64 K/UL (ref 1.1–3.7)
ABSOLUTE LYMPH #: 1.64 K/UL (ref 1.1–3.7)
ABSOLUTE MONO #: 0.48 K/UL (ref 0.1–1.2)
ABSOLUTE MONO #: 0.48 K/UL (ref 0.1–1.2)
ANION GAP SERPL CALCULATED.3IONS-SCNC: 11 MMOL/L (ref 9–17)
ANION GAP SERPL CALCULATED.3IONS-SCNC: 11 MMOL/L (ref 9–17)
BASOPHILS # BLD: 1 % (ref 0–2)
BASOPHILS # BLD: 1 % (ref 0–2)
BASOPHILS ABSOLUTE: 0.05 K/UL (ref 0–0.2)
BASOPHILS ABSOLUTE: 0.05 K/UL (ref 0–0.2)
BUN BLDV-MCNC: 8 MG/DL (ref 8–23)
BUN BLDV-MCNC: 8 MG/DL (ref 8–23)
CALCIUM SERPL-MCNC: 8.5 MG/DL (ref 8.6–10.4)
CALCIUM SERPL-MCNC: 8.5 MG/DL (ref 8.6–10.4)
CHLORIDE BLD-SCNC: 104 MMOL/L (ref 98–107)
CHLORIDE BLD-SCNC: 104 MMOL/L (ref 98–107)
CO2: 25 MMOL/L (ref 20–31)
CO2: 25 MMOL/L (ref 20–31)
CREAT SERPL-MCNC: 0.69 MG/DL (ref 0.5–0.9)
CREAT SERPL-MCNC: 0.69 MG/DL (ref 0.5–0.9)
EOSINOPHILS RELATIVE PERCENT: 3 % (ref 1–4)
EOSINOPHILS RELATIVE PERCENT: 3 % (ref 1–4)
ESTIMATED AVERAGE GLUCOSE: 105 MG/DL
ESTIMATED AVERAGE GLUCOSE: 105 MG/DL
GFR SERPL CREATININE-BSD FRML MDRD: >60 ML/MIN/1.73M2
GFR SERPL CREATININE-BSD FRML MDRD: >60 ML/MIN/1.73M2
GLUCOSE BLD-MCNC: 126 MG/DL (ref 70–99)
GLUCOSE BLD-MCNC: 126 MG/DL (ref 70–99)
HBA1C MFR BLD: 5.3 % (ref 4–6)
HBA1C MFR BLD: 5.3 % (ref 4–6)
HCT VFR BLD CALC: 35.6 % (ref 36.3–47.1)
HCT VFR BLD CALC: 35.6 % (ref 36.3–47.1)
HEMOGLOBIN: 11.6 G/DL (ref 11.9–15.1)
HEMOGLOBIN: 11.6 G/DL (ref 11.9–15.1)
IMMATURE GRANULOCYTES: 0 %
IMMATURE GRANULOCYTES: 0 %
LYMPHOCYTES # BLD: 31 % (ref 24–43)
LYMPHOCYTES # BLD: 31 % (ref 24–43)
MAGNESIUM: 1.4 MG/DL (ref 1.6–2.6)
MAGNESIUM: 1.4 MG/DL (ref 1.6–2.6)
MCH RBC QN AUTO: 36.9 PG (ref 25.2–33.5)
MCH RBC QN AUTO: 36.9 PG (ref 25.2–33.5)
MCHC RBC AUTO-ENTMCNC: 32.6 G/DL (ref 28.4–34.8)
MCHC RBC AUTO-ENTMCNC: 32.6 G/DL (ref 28.4–34.8)
MCV RBC AUTO: 113.4 FL (ref 82.6–102.9)
MCV RBC AUTO: 113.4 FL (ref 82.6–102.9)
MONOCYTES # BLD: 9 % (ref 3–12)
MONOCYTES # BLD: 9 % (ref 3–12)
MORPHOLOGY: ABNORMAL
NRBC AUTOMATED: 0 PER 100 WBC
NRBC AUTOMATED: 0 PER 100 WBC
PDW BLD-RTO: 14.6 % (ref 11.8–14.4)
PDW BLD-RTO: 14.6 % (ref 11.8–14.4)
PHOSPHORUS: 3.4 MG/DL (ref 2.6–4.5)
PHOSPHORUS: 3.4 MG/DL (ref 2.6–4.5)
PLATELET # BLD: 257 K/UL (ref 138–453)
PLATELET # BLD: 257 K/UL (ref 138–453)
PMV BLD AUTO: 9.9 FL (ref 8.1–13.5)
PMV BLD AUTO: 9.9 FL (ref 8.1–13.5)
POTASSIUM SERPL-SCNC: 4 MMOL/L (ref 3.7–5.3)
POTASSIUM SERPL-SCNC: 4 MMOL/L (ref 3.7–5.3)
RBC # BLD: 3.14 M/UL (ref 3.95–5.11)
RBC # BLD: 3.14 M/UL (ref 3.95–5.11)
SEG NEUTROPHILS: 56 % (ref 36–65)
SEG NEUTROPHILS: 56 % (ref 36–65)
SEGMENTED NEUTROPHILS ABSOLUTE COUNT: 2.97 K/UL (ref 1.5–8.1)
SEGMENTED NEUTROPHILS ABSOLUTE COUNT: 2.97 K/UL (ref 1.5–8.1)
SODIUM BLD-SCNC: 140 MMOL/L (ref 135–144)
SODIUM BLD-SCNC: 140 MMOL/L (ref 135–144)
WBC # BLD: 5.3 K/UL (ref 3.5–11.3)
WBC # BLD: 5.3 K/UL (ref 3.5–11.3)

## 2023-01-19 PROCEDURE — 97530 THERAPEUTIC ACTIVITIES: CPT

## 2023-01-19 PROCEDURE — 6370000000 HC RX 637 (ALT 250 FOR IP): Performed by: PHYSICIAN ASSISTANT

## 2023-01-19 PROCEDURE — 2060000000 HC ICU INTERMEDIATE R&B

## 2023-01-19 PROCEDURE — 6360000002 HC RX W HCPCS: Performed by: PHYSICIAN ASSISTANT

## 2023-01-19 PROCEDURE — 97167 OT EVAL HIGH COMPLEX 60 MIN: CPT

## 2023-01-19 PROCEDURE — 2700000000 HC OXYGEN THERAPY PER DAY

## 2023-01-19 PROCEDURE — APPSS15 APP SPLIT SHARED TIME 0-15 MINUTES: Performed by: NURSE PRACTITIONER

## 2023-01-19 PROCEDURE — 6370000000 HC RX 637 (ALT 250 FOR IP)

## 2023-01-19 PROCEDURE — 36415 COLL VENOUS BLD VENIPUNCTURE: CPT

## 2023-01-19 PROCEDURE — 2580000003 HC RX 258: Performed by: PHYSICIAN ASSISTANT

## 2023-01-19 PROCEDURE — 83735 ASSAY OF MAGNESIUM: CPT

## 2023-01-19 PROCEDURE — 94761 N-INVAS EAR/PLS OXIMETRY MLT: CPT

## 2023-01-19 PROCEDURE — 80048 BASIC METABOLIC PNL TOTAL CA: CPT

## 2023-01-19 PROCEDURE — 84100 ASSAY OF PHOSPHORUS: CPT

## 2023-01-19 PROCEDURE — 72050 X-RAY EXAM NECK SPINE 4/5VWS: CPT

## 2023-01-19 PROCEDURE — 90791 PSYCH DIAGNOSTIC EVALUATION: CPT | Performed by: SOCIAL WORKER

## 2023-01-19 PROCEDURE — 97164 PT RE-EVAL EST PLAN CARE: CPT

## 2023-01-19 PROCEDURE — 6370000000 HC RX 637 (ALT 250 FOR IP): Performed by: STUDENT IN AN ORGANIZED HEALTH CARE EDUCATION/TRAINING PROGRAM

## 2023-01-19 PROCEDURE — 85025 COMPLETE CBC W/AUTO DIFF WBC: CPT

## 2023-01-19 RX ORDER — FENTANYL CITRATE 50 UG/ML
25 INJECTION, SOLUTION INTRAMUSCULAR; INTRAVENOUS
Status: DISCONTINUED | OUTPATIENT
Start: 2023-01-19 | End: 2023-01-20 | Stop reason: HOSPADM

## 2023-01-19 RX ORDER — ACETAMINOPHEN 500 MG
1000 TABLET ORAL EVERY 8 HOURS SCHEDULED
Status: DISCONTINUED | OUTPATIENT
Start: 2023-01-19 | End: 2023-01-20 | Stop reason: HOSPADM

## 2023-01-19 RX ORDER — METHOCARBAMOL 750 MG/1
750 TABLET, FILM COATED ORAL EVERY 6 HOURS
Status: DISCONTINUED | OUTPATIENT
Start: 2023-01-19 | End: 2023-01-20 | Stop reason: HOSPADM

## 2023-01-19 RX ORDER — OXYCODONE HYDROCHLORIDE 5 MG/1
2.5 TABLET ORAL EVERY 4 HOURS PRN
Status: DISCONTINUED | OUTPATIENT
Start: 2023-01-19 | End: 2023-01-20 | Stop reason: HOSPADM

## 2023-01-19 RX ORDER — GABAPENTIN 300 MG/1
300 CAPSULE ORAL EVERY 8 HOURS
Status: DISCONTINUED | OUTPATIENT
Start: 2023-01-19 | End: 2023-01-20 | Stop reason: HOSPADM

## 2023-01-19 RX ADMIN — METHOCARBAMOL TABLETS 750 MG: 750 TABLET, COATED ORAL at 03:53

## 2023-01-19 RX ADMIN — GABAPENTIN 300 MG: 300 CAPSULE ORAL at 03:53

## 2023-01-19 RX ADMIN — Medication 2000 MG: at 03:53

## 2023-01-19 RX ADMIN — SODIUM CHLORIDE, PRESERVATIVE FREE 10 ML: 5 INJECTION INTRAVENOUS at 09:26

## 2023-01-19 RX ADMIN — SENNOSIDES 8.6 MG: 8.6 TABLET, COATED ORAL at 20:13

## 2023-01-19 RX ADMIN — METHOCARBAMOL TABLETS 750 MG: 750 TABLET, COATED ORAL at 20:47

## 2023-01-19 RX ADMIN — GABAPENTIN 300 MG: 300 CAPSULE ORAL at 17:33

## 2023-01-19 RX ADMIN — FAMOTIDINE 20 MG: 20 TABLET, FILM COATED ORAL at 09:21

## 2023-01-19 RX ADMIN — ACETAMINOPHEN 1000 MG: 500 TABLET ORAL at 06:43

## 2023-01-19 RX ADMIN — SODIUM CHLORIDE, PRESERVATIVE FREE 10 ML: 5 INJECTION INTRAVENOUS at 03:53

## 2023-01-19 RX ADMIN — FAMOTIDINE 20 MG: 20 TABLET, FILM COATED ORAL at 20:10

## 2023-01-19 RX ADMIN — SODIUM CHLORIDE, PRESERVATIVE FREE 10 ML: 5 INJECTION INTRAVENOUS at 09:29

## 2023-01-19 RX ADMIN — ACETAMINOPHEN 650 MG: 325 TABLET ORAL at 00:48

## 2023-01-19 RX ADMIN — GABAPENTIN 300 MG: 300 CAPSULE ORAL at 20:47

## 2023-01-19 RX ADMIN — SODIUM CHLORIDE, PRESERVATIVE FREE 10 ML: 5 INJECTION INTRAVENOUS at 20:11

## 2023-01-19 RX ADMIN — ACETAMINOPHEN 1000 MG: 500 TABLET ORAL at 17:36

## 2023-01-19 RX ADMIN — ACETAMINOPHEN 1000 MG: 500 TABLET ORAL at 20:10

## 2023-01-19 RX ADMIN — OXYCODONE HYDROCHLORIDE 2.5 MG: 5 TABLET ORAL at 16:59

## 2023-01-19 RX ADMIN — METHOCARBAMOL TABLETS 750 MG: 750 TABLET, COATED ORAL at 14:47

## 2023-01-19 RX ADMIN — SENNOSIDES 8.6 MG: 8.6 TABLET, COATED ORAL at 20:10

## 2023-01-19 RX ADMIN — SENNOSIDES 8.6 MG: 8.6 TABLET, COATED ORAL at 20:12

## 2023-01-19 RX ADMIN — DOCUSATE SODIUM 50 MG AND SENNOSIDES 8.6 MG 1 TABLET: 8.6; 5 TABLET, FILM COATED ORAL at 09:21

## 2023-01-19 RX ADMIN — POLYETHYLENE GLYCOL 3350 17 G: 17 POWDER, FOR SOLUTION ORAL at 09:26

## 2023-01-19 ASSESSMENT — PAIN DESCRIPTION - ORIENTATION
ORIENTATION: RIGHT
ORIENTATION: RIGHT

## 2023-01-19 ASSESSMENT — PAIN SCALES - GENERAL
PAINLEVEL_OUTOF10: 6
PAINLEVEL_OUTOF10: 2
PAINLEVEL_OUTOF10: 7
PAINLEVEL_OUTOF10: 4
PAINLEVEL_OUTOF10: 1
PAINLEVEL_OUTOF10: 8
PAINLEVEL_OUTOF10: 8
PAINLEVEL_OUTOF10: 2

## 2023-01-19 ASSESSMENT — PAIN SCALES - WONG BAKER
WONGBAKER_NUMERICALRESPONSE: 2
WONGBAKER_NUMERICALRESPONSE: 2

## 2023-01-19 ASSESSMENT — PAIN DESCRIPTION - LOCATION
LOCATION: ANKLE
LOCATION: ANKLE

## 2023-01-19 ASSESSMENT — PAIN DESCRIPTION - DESCRIPTORS
DESCRIPTORS: ACHING
DESCRIPTORS: ACHING

## 2023-01-19 ASSESSMENT — PAIN - FUNCTIONAL ASSESSMENT: PAIN_FUNCTIONAL_ASSESSMENT: PREVENTS OR INTERFERES SOME ACTIVE ACTIVITIES AND ADLS

## 2023-01-19 NOTE — PLAN OF CARE
Problem: Skin/Tissue Integrity  Goal: Absence of new skin breakdown  Description: 1. Monitor for areas of redness and/or skin breakdown  2. Assess vascular access sites hourly  3. Every 4-6 hours minimum:  Change oxygen saturation probe site  4. Every 4-6 hours:  If on nasal continuous positive airway pressure, respiratory therapy assess nares and determine need for appliance change or resting period.   1/19/2023 1838 by Bettie Galeazzi, RN  Outcome: Progressing  1/19/2023 1410 by Bettie Galeazzi, RN  Outcome: Progressing  1/19/2023 0540 by Yolanda Ventura RN  Outcome: Progressing     Problem: Safety - Adult  Goal: Free from fall injury  1/19/2023 1838 by Bettie Galeazzi, RN  Outcome: Progressing  1/19/2023 1410 by Bettie Galeazzi, RN  Outcome: Progressing  1/19/2023 0540 by Yolanda Ventura RN  Outcome: Progressing     Problem: ABCDS Injury Assessment  Goal: Absence of physical injury  1/19/2023 1838 by Bettie Galeazzi, RN  Outcome: Progressing  1/19/2023 1410 by Bettie Galeazzi, RN  Outcome: Progressing  1/19/2023 0540 by Yolanda Ventura RN  Outcome: Progressing     Problem: Pain  Goal: Verbalizes/displays adequate comfort level or baseline comfort level  1/19/2023 1838 by Bettie Galeazzi, RN  Outcome: Progressing  Flowsheets (Taken 1/19/2023 1833)  Verbalizes/displays adequate comfort level or baseline comfort level: Encourage patient to monitor pain and request assistance  1/19/2023 1410 by Bettie Galeazzi, RN  Outcome: Progressing  1/19/2023 0540 by Yolanda Ventura RN  Outcome: Progressing     Problem: Discharge Planning  Goal: Discharge to home or other facility with appropriate resources  1/19/2023 1838 by Bettie Galeazzi, RN  Outcome: Progressing  1/19/2023 1410 by Bettie Galeazzi, RN  Outcome: Progressing  1/19/2023 0540 by Yolanda Ventura RN  Outcome: Progressing

## 2023-01-19 NOTE — PROGRESS NOTES
Neurosurgery MATHEUS/Resident    Daily Progress Note   CC:No chief complaint on file. 1/19/2023  12:12 PM    Chart reviewed. No acute events overnight. No new complaints.   Was patient's morning of patient reports that she could not feel or locate her left arm, patient did have surgery yesterday posterior cervical decompression fusion C3-7, has not been out of bed yet since surgery    Vitals:    01/18/23 2012 01/18/23 2030 01/19/23 0045 01/19/23 0350   BP:  (!) 141/72 (!) 165/92 (!) 167/85   Pulse: 69 83 71 86   Resp: 15 21 13 13   Temp:  98.1 °F (36.7 °C) 98 °F (36.7 °C) 98 °F (36.7 °C)   TempSrc:  Axillary Axillary Axillary   SpO2: 99% 94% 97% 95%   Weight:       Height:           PE:   AOx3   PERRL, EOMI  Cranial Nerves:    II: Visual acuity:  normal  III: Pupils:  equal, round, reactive to light  III,IV,VI: Extra Ocular Movements:intact  V: Facial sensation:  intact  VII: Facial strength: intact  VIII: Hearing:  intact  IX: Palate:  intact  XI: Shoulder shrug: intact  XII: Tongue movement: intact      Motor   L deltoid 2-3/5; R deltoid 5/5  L biceps 3+-4/5; R biceps 5/5  L triceps 4/5; R triceps 5/5  L wrist extension 5/5; R wrist extension 5/5  L intrinsics 5/5; R intrinsics 5/5      L iliopsoas 5/5 , R iliopsoas 5/5  L quadriceps 5/5; R quadriceps 5/5  L Dorsiflexion 5/5; R dorsiflexion 5/5  L Plantarflexion 5/5; R plantarflexion 5/5  L EHL 5/5; R EHL 5/5      Sensation: Intact    Incision: CDI      Lab Results   Component Value Date    WBC 5.3 01/19/2023    HGB 11.6 (L) 01/19/2023    HCT 35.6 (L) 01/19/2023     01/19/2023    ALT 10 01/16/2023    AST 19 01/16/2023     01/19/2023    K 4.0 01/19/2023     01/19/2023    CREATININE 0.69 01/19/2023    BUN 8 01/19/2023    CO2 25 01/19/2023    TSH 3.86 01/18/2023    INR 1.1 01/14/2023       Radiology   Pending postop cervical x-rays      A/P  67 y.o. female who presents with central cord syndrome, cervical stenosis with myelopathy, C5 nerve palsy  POD #1 s/p posterior cervical decompression fusion C3-7    Patient will need therapy for C5 nerve palsy  Cervical collar on while out of bed okay to remove while resting in bed eating and drinking  PT OT  Encourage incentive spirometer  Neuro checks per floor protocol  Okay for Lovenox for DVT prophylaxis      Please contact neurosurgery with any changes in patients neurologic status.        Shabbir Zelaya CNP  1/19/23  12:12 PM

## 2023-01-19 NOTE — CONSULTS
[id number] [facility]. JAZMYN SCHULER   1/19/2023  4:29 PM  Fara Sandifer  1950  [id number]      Length of session:  20 Minutes         Presenting Situation/Reason for Referral:  Therapist met with pt following [address] placed by the trauma team for geriatric screening. Pt presented as agitated and in pain. Pt's mood was low and irritable, but was appropriate for current situation and pain level. Therapist offered pt space to express thoughts and feelings. Pt denied any current or hx of anxiety and depression and reported she was \"upset to be hurt\" but wasn't going to let herself be too bothered by it. Pt has no thoughts of hurting herself or others. Pt had no other bedside therapy needs at this time     Living Situation, Family History and Pertinent information:  Pt lives with her  and has animals. Pt reports her  needs home senior care. Therapist provided pt with ways to access information about caregivers. Pt also reports she is usually active and does a lot of house work. Pt prefers to stay home rather than go out. Pt has six grandchildren. Loss, Trauma History:   No trauma history reported. Strengths & Limitations:  Pt is a hard worker. Social, Peer support, Friendships, Meaningful Activity, Community Support:  Pt has good support from her  and daughter who is visiting from [facility]. Pt also has some friends. Spirituality:  None reported. Sexual History/Concerns:  None reported. Cultural, Ethnic Issues, Concerns:  None reported. Education History:  -Learning or cognitive difficulties:  None reported. Special Communication Needs:  None reported. Employment History:  Pt is retired but worked as a . 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  History:  None reported.         Alcohol, Drug Use, History:(SBIRT available in flow sheets)  None       Prior Mental Health Treatment/History:  Diagnoses: None reported. Medications: None reported. Agency Involvement: None reported. Psychiatrist: None reported. Therapist:None reported. MSE:     Appearance    tired, in pain, mild distress, pale  Appetite normal  Sleep disturbance Yes  Fatigue Yes  Loss of pleasure No  Impulsive behavior No  Speech    normal volume, well articulated, and slow  Mood    Irritability  Affect    agitation  Thought Content    Negative   Thought Process    linear, goal directed, and coherent  Associations    logical connections  Insight    Good  Judgment    Intact  Orientation    oriented to person, place, time, and general circumstances  Memory    recent and remote memory intact  Attention/Concentration    intact  Morbid ideation No  Suicide Assessment    no suicidal ideation    (See C-SSRS in flow sheets if suicidal ideations are present)  (PCL-5, PHQ-9, SERVANDO-7 available in flow sheets)    Assessment:  Pt presented inpatient after a fall down her stairs. Pt was in pain and needed her bed adjusted multiple dimes during assessment, which nurse helped pt with. Pt was agitated and irritable but denied any anxiety or depression. Pt's pain and current hospitalization likely influenced her mood. Pt's mood is appropriate for situation. Pt is typically happy and in good spirits. Despite her low mood, pt had a good outlook on her situation and was looking forward to getting to do more PT at a nursing home. Pt scored a 1 on the Geriatric depression screen. Pt denied any bedside needs at this time but was given business card if needs arise. Screening Scale Results:   Geriatric Depression Screen: 1 (no depression indicated)       Diagnoses: The following Diagnoses are based on currently available information and may change as additional information becomes available.     No psychiatric disorder found after evaluation (Z03.89)      Recommendations / Plan:  -Pt or tx team may contact therapist if pt wishes to process anything/engage in bedside services Massiel Ricketts: ext [phone])  -Monitor for intrusive memories or nightmares  -No other needs at this time

## 2023-01-19 NOTE — CARE COORDINATION
Patient updated that [facility] can accept.  Update given to Markos Ceballos from [facility]

## 2023-01-19 NOTE — PROGRESS NOTES
Physical Therapy  Facility/Department: [facility] CAR 2- STEPDOWN  Physical Therapy Re-Evaluation Assessment    Name: Carin Morales  : 1950  MRN: [id number]  Date of Service: 2023    Discharge Recommendations:  Patient would benefit from continued therapy after discharge   PT Equipment Recommendations  Equipment Needed: No      Patient Diagnosis(es): The encounter diagnosis was Sprain of right ankle, unspecified ligament, initial encounter. Past Medical History:  has a past medical history of Psoriasis. Past Surgical History:  has a past surgical history that includes cervical fusion and cervical fusion (N/A, 2023). Assessment   Body Structures, Functions, Activity Limitations Requiring Skilled Therapeutic Intervention: Decreased functional mobility ; Decreased strength; Increased pain;Decreased posture;Decreased ROM; Decreased endurance;Decreased coordination;Decreased balance  Assessment: Pt required maxAx2 to perform bed mobility, pt unable to safely attempt sit to stand transfers at this time. Pt is a high fall risk and would be unsafe to perform functional mobility unassisted. Recommending continued skilled physical therapy to address functional mobility deficits, decrease fall risk and return pt to baseline function.   Therapy Prognosis: Fair  Decision Making: Medium Complexity  Requires PT Follow-Up: Yes  Activity Tolerance  Activity Tolerance: Patient limited by pain;Treatment limited secondary to decreased cognition     Plan   Physcial Therapy Plan  General Plan:  (5-6x/week)  Current Treatment Recommendations: Strengthening, ROM, Balance training, Endurance training, Safety education & training, Patient/Caregiver education & training, Therapeutic activities, Functional mobility training, Transfer training, Gait training, Equipment evaluation, education, & procurement, Stair training, Home exercise program  Safety Devices  Type of Devices: Nurse notified, Call light within reach, Bed alarm in place, Left in bed, Gait belt, Patient at risk for falls  Restraints  Restraints Initially in Place: No     Restrictions  Restrictions/Precautions  Restrictions/Precautions: Up as Tolerated  Required Braces or Orthoses?: Yes  Required Braces or Orthoses  Cervical: c-collar (hard c-collar when out of bed)  Right Lower Extremity Brace: Boot  RLE Brace Type: Aircast Walking Boot present in room  Position Activity Restriction  Other position/activity restrictions: activity as tolerated, ambulate pt; s/p C 3-7 POSTERIOR CERVICAL DECOMPRESSION AND FUSION (1/18/23)     Subjective   General  Patient assessed for rehabilitation services?: Yes  Response To Previous Treatment: Patient with no complaints from previous session. Family / Caregiver Present: No  Follows Commands: Within Functional Limits  General Comment  Comments: pt reporting 2/10 cervical pain upon writer's entrance. Subjective  Subjective: RN and pt in agreement for PT re-eval. Pt supine in bed upon PT arrival, pt cooperative throughout session. C-collar donned prior to mobility. Significant bloody drainage noted from TONEY site upon writer's arrival, RN notified and aware.          Social/Functional History  Social/Functional History  Lives With: Spouse  Type of Home: House  Home Layout: Two level, Bed/Bath upstairs  Home Access: Stairs to enter without rails  Entrance Stairs - Number of Steps: 4  Bathroom Shower/Tub: Tub only  Bathroom Toilet: Standard  Bathroom Equipment: Grab bars in shower, Shower chair  Home Equipment: Walker, rolling  ADL Assistance: Independent  Homemaking Assistance: Independent  Homemaking Responsibilities: Yes  Ambulation Assistance: Independent  Transfer Assistance: Independent  Active : No  Patient's  Info:  or neighbor drive  Mode of Transportation: Other (Neighbor or  performs driving tasks)  Occupation: Retired  Additional Comments: Pt reports spouse is scheduled to have surgery next week and will be unable to provide assistance upon discharge. Vision/Hearing  Vision  Vision: Within Functional Limits  Hearing  Hearing: Within functional limits    Cognition   Orientation  Overall Orientation Status: Within Functional Limits  Cognition  Overall Cognitive Status: Exceptions  Safety Judgement: Decreased awareness of need for assistance;Decreased awareness of need for safety  Problem Solving: Assistance required to identify errors made;Assistance required to generate solutions  Insights: Decreased awareness of deficits  Initiation: Requires cues for some  Sequencing: Requires cues for some     Objective   Gross Assessment  Sensation: Impaired (pt reports acute numbness of LUE)     Joint Mobility  ROM RLE: Hip WFL; knee flexion lacking ~ 20 degrees; No active DF/PF noted  ROM LLE: Hip and knee WFL; Ankle DF to neutral  ROM RUE: WFL  ROM LUE: WFL  Strength RLE  Strength RLE: Exception  R Hip Flexion: 4-/5  R Knee Flexion: 4-/5  R Knee Extension: 4-/5  R Ankle Dorsiflexion: 0/5  R Ankle Plantar flexion: 0/5  Strength LLE  Strength LLE: Exception  L Hip Flexion: 4-/5  L Knee Flexion: 4-/5  L Knee Extension: 4-/5  L Ankle Dorsiflexion: 3+/5  L Ankle Plantar Flexion: 3+/5  Strength RUE  Strength RUE: WFL  Strength LUE  Strength LUE: Exception  L Shoulder Flexion: 2-/5  L Elbow Flexion: 1+/5  L Elbow Extension: 1+/5  L Wrist Flexion: 2-/5  L Wrist Extension: 2-/5    Bed mobility  Supine to Sit: Maximum assistance;2 Person assistance  Sit to Supine: Maximum assistance;2 Person assistance  Bed Mobility Comments: Pt educated on log roll technique with poor return demo. Pt require mod-maxA to maintain seated balance while EOB for ~7 minutes. Transfers  Comment: Attempted functional transfers with use of RW. Pt demonstrates signifcant posterior lean with increasing extensor tone with attempts of sit to stand transfer, significant trunk extension with inability to maintain bilateral LE knee flexion.  Unable to safely attempt at this time. Ambulation  More Ambulation?: No  Stairs/Curb  Stairs?: No     Balance  Posture: Fair  Sitting - Static: Poor;+  Sitting - Dynamic: Poor;+  Standing - Static: Poor;-  Standing - Dynamic: Poor;-  Comments: pt required maxA to bed mobility and STS transfers    AM-PAC Score  AM-PAC Inpatient Mobility Raw Score : 6 (01/19/23 1503)  AM-PAC Inpatient T-Scale Score : 23.55 (01/19/23 1503)  Mobility Inpatient CMS 0-100% Score: 100 (01/19/23 1503)  Mobility Inpatient CMS G-Code Modifier : CN (01/19/23 1503)    Goals  Short Term Goals  Time Frame for Short Term Goals: 14  Short Term Goal 1: Pt to perform bed mobility Dilcia  Short Term Goal 2: Pt to demonstrate functional transfers modA  Short Term Goal 3: Pt to to ambulate 10ft w/ RW modA  Short Term Goal 4: Tolerate 30 minutes of therapy to demo increased endurance  Patient Goals   Patient Goals :  To go home       Education  Patient Education  Education Given To: Patient  Education Provided: Role of Therapy;Plan of Care  Education Provided Comments: Donning/doffing walking boot; donning/doffing c-collar  Education Method: Demonstration  Barriers to Learning: Cognition  Education Outcome: Verbalized understanding;Continued education needed      Therapy Time   Individual Concurrent Group Co-treatment   Time In 1101         Time Out 1137         Minutes 36         Timed Code Treatment Minutes: 8 Minutes       Art Martines PT

## 2023-01-19 NOTE — PROGRESS NOTES
PROGRESS NOTE          PATIENT NAME: Alicia Fox  MEDICAL RECORD NO. [id number]  DATE: 1/19/2023  PRIMARY CARE PHYSICIAN: No primary care provider on file. HD: # 2    ASSESSMENT    Patient Active Problem List   Diagnosis    Mild ankle sprain, initial encounter    Mild sprain of right ankle, initial encounter    Stenosis of cervical spine with myelopathy ([address]    Central cord syndrome [facility])   Patient is a female that presented to the Emergency Department following a mechanical fall down several stairs at her home. GCS 15 on arrival. Patient denies loss of consciousness. Patient denies any history of blood thinners. Only complaining of right ankle pain. Previous surgical scar noted over right ankle. Patient states that she has previously broken that ankle. MEDICAL DECISION MAKING AND PLAN     Mechanical fall down stairs, (-) LOC   MRI: 1/17  Central canal stenosis at C3-C4 and C6-C7   Plan: Neurosurgery Laminectomy of C3-C7 and segmental fixation of C3-C7. Asymptomatic pyuria   UA with ketones, LE and WBC but no bacteria, asymptomatic, likely asymptomatic pyuria with poor nutrition     Constipation   Resume bowel regimen    ECHO: LVEF 55-60%         Chief Complaint: \"weakness on b/l hand\"    SUBJECTIVE    Alicia Fox is seen and examined this morning. This morning, patient was not able to locate or find her left upper arm with mild weakness there. She was able to feel and have intact sensations and able to wrinkle her fingers. Nursing staff called it and worry about she is having a stroke. Trauma team and neurosurgery was notified and evaluated patient promptly. Neurosurgery did not think it is a stroke, it is most likely a C5 neuro palsy from the surgery therefore no stroke alert is called. Patient also mentioned she did not have bowel movement for last couple days since admission. Denies chest pain, shortness of breath or other medical problem.     OBJECTIVE  VITALS: Temp: Temp: 98 °F (36.7 °C)Temp  Av.7 °F (36.5 °C)  Min: 96.1 °F (35.6 °C)  Max: 98.1 °F (64.3 °C) BP Systolic (62WFT), WTK:863 , Min:87 , UNQ:982   Diastolic (20JJO), GDW:24, Min:40, Max:92   Pulse Pulse  Av.2  Min: 61  Max: 87 Resp Resp  Avg: 15.4  Min: 11  Max: 21 Pulse ox SpO2  Av %  Min: 91 %  Max: 99 %  GENERAL: alert, no distress  NEURO: AXO4  HEENT: normocephalic   : normal  LUNGS: clear to ausculation, without wheezes, rales or rhonci  HEART: normal rate and regular rhythm  ABDOMEN: soft, non-tender, non-distended, and no guarding or peritoneal signs present  EXTREMITY: was not able to locate or find her left upper arm with mild weakness there. She was able to feel and have intact sensations and able to wrinkle her fingers. Neuro / Psych: Numbness and tingling of bilateral hands. Mild weakness on the left upper extremity      I/O last 3 completed shifts: In: 1100 [I.V.:1100]  Out: 1085 [Urine:860; Drains:175; Blood:50]    Drain/tube output: In: 1100 [I.V.:1100]  Out: 1085 [Urine:860; Drains:175]    LAB:  CBC:   Recent Labs     23  1117 23  0511   WBC 5.1 5.3   HGB 12.2 11.9   HCT 37.2 37.6   .4* 115.7*    289       BMP:   Recent Labs     23  1117 23  0511    138   K 4.1 4.2    104   CO2 25 23   BUN 10 14   CREATININE 0.66 0.72   GLUCOSE 87 112*       COAGS:   Recent Labs     23  1117   PROT 6.9         RADIOLOGY:  MRI CERVICAL SPINE WO CONTRAST    Result Date: 2023  Faint diffuse hyperintense T2 signal abnormality within the cervical cord extending from C3 through C6 likely representing mild myelomalacia. Mild congenital spinal canal stenosis superimposed over moderate diffuse degenerative disc disease. Moderate central canal stenosis at C3-C4 and C6-C7. Mild central canal stenosis identified within mid cervical levels. Moderate left foraminal stenosis at C6-C7 and C7-T1.         Electronically signed by Saman Piña DO on 2023 at 8: 40 AM          Trauma Attending Attestation      I have reviewed the above GCS note(s) and confirmed the key elements of the medical history and physical exam. I have seen and examined the pt. I have discussed the findings, established the care plan and recommendations with Resident.         Mariangel Glynn DO  1/19/2023  8:53 PM

## 2023-01-19 NOTE — PLAN OF CARE
Trauma Tertiary Survey    Admit Date: 1/14/2023  Hospital day 2      Subjective:     Martina Tomas is seen and examined this morning. This morning, patient was not able to locate or find her left upper arm with mild weakness there. She was able to feel and have intact sensations and able to wrinkle her fingers. Nursing staff called it and worry about she is having a stroke. Trauma team and neurosurgery was notified and evaluated patient promptly. Neurosurgery did not think it is a stroke, it is most likely a C5 neuro palsy from the surgery therefore no stroke alert is called. Patient also mentioned she did not have bowel movement for last couple days since admission. Denies chest pain, shortness of breath or other medical problem. Objective:   PHYSICAL EXAM:   GENERAL: alert, no distress  NEURO: AXO4  HEENT: normocephalic   : normal  LUNGS: clear to ausculation, without wheezes, rales or rhonci  HEART: normal rate and regular rhythm  ABDOMEN: soft, non-tender, non-distended, and no guarding or peritoneal signs present  EXTREMITY: was not able to locate or find her left upper arm with mild weakness there. She was able to feel and have intact sensations and able to wrinkle her fingers. Neuro / Psych: Numbness and tingling of bilateral hands.   Mild weakness on the left upper extremity         Spine:     Spine Tenderness ROM   Cervical 5 /10 Reduced due to pain   Thoracic 0 /10 Normal   Lumbar 0 /10 Normal     Musculoskeletal    Joint Tenderness Swelling ROM   Right shoulder absent absent normal   Left shoulder absent absent normal   Right elbow absent absent abnormal - reduced    Left elbow absent absent abnormal - reduced    Right wrist absent absent normal   Left wrist absent absent normal   Right hand grasp absent absent normal   Left hand grasp absent absent normal   Right hip absent absent normal   Left hip absent absent normal   Right knee absent absent normal   Left knee absent absent normal Right ankle absent absent normal   Left ankle absent absent normal   Right foot absent absent normal   Left foot absent absent normal       CONSULTS:      PROCEDURES: surgery with NS   Laminectomy to decompress the spinal cord at C3, C4, C5, C6, C7  Posterior lateral arthrodesis at C3, C4, C5, C6, C7  Segmental fixation with lateral mass screws and rods at C3, C4, C5, C6, C7  Use of morselized autograft via same incision  Use of fluoroscopy    [] Reviewed radiology reports  MRI CERVICAL SPINE WO CONTRAST    Result Date: 1/17/2023  Faint diffuse hyperintense T2 signal abnormality within the cervical cord extending from C3 through C6 likely representing mild myelomalacia. Mild congenital spinal canal stenosis superimposed over moderate diffuse degenerative disc disease. Moderate central canal stenosis at C3-C4 and C6-C7. Mild central canal stenosis identified within mid cervical levels. Moderate left foraminal stenosis at C6-C7 and C7-T1. [] Incidental findings:    [] Patient/family notified and letter given    Assessment/Plan:   Mechanical fall down stairs, (-) LOC              MRI: 1/17  Central canal stenosis at C3-C4 and C6-C7              Plan: Neurosurgery Laminectomy of C3-C7 and segmental fixation of C3-C7.     PT, OT     Asymptomatic pyuria   UA with ketones, LE and WBC but no bacteria, asymptomatic, likely asymptomatic pyuria with poor nutrition      Constipation              Resume bowel regimen     ECHO: LVEF 55-60%

## 2023-01-19 NOTE — PLAN OF CARE
15 Variable Trauma-Specific Frailty Index   Comorbidities   Cancer History Yes (1) No (0)   Coronary Heart Disease MI (1) CABG (0.75) Mild (0.25)  No (0)   Dementia Severe (1) Moderate (0.5) Mild (0.25)  No (0)   Daily Activities   Help With Grooming Yes (1) No (0)   Help with Managing Money Yes (1) No (0)   Help doing Housework Yes (1) No (0)   Help with Toileting Yes (1) No (0)   Help Walking Wheelchair (1) Walker (0.75) Cane (0.5) No (0)   Health Attitude   Feel Less Useful Most Time (1) Sometimes (0.5) Never (0)   Feel Sad Most Time (1) Sometimes (0.5) Never (0)   Feel Effort to Do Everything Most Time (1) Sometimes (0.5) Never (0)   Feels Lonely Most Time (1) Sometimes (0.5) Never (0)   Falls Most Time (1) Sometimes (0.5) Never (0)   Function   Sexually Active Yes (0)  No(1)   Nutrition   Albumin < 3g/dL (1)  > 3g/dL   Scoring   Score   FI (Score/15)   > 0.25 = Frail   *Based on 2-weeks prior to hospital admission   Trauma Specific Fraility Index > or = to 4 (4/15 = 0.26)  Trauma Specific Fraility Index Score:    1.75

## 2023-01-19 NOTE — PROGRESS NOTES
Occupational Therapy  Facility/Department: [facility] CAR 2- STEPDOWN  Occupational Therapy Initial Assessment    Name: Javi Skinner  : 1950  MRN: [id number]  Date of Service: 2023    Patient Active Problem List   Diagnosis    Mild ankle sprain, initial encounter    Mild sprain of right ankle, initial encounter    Stenosis of cervical spine with myelopathy ([facility] [address]    Central cord syndrome [facility])   Patient is a female that presented to the Emergency Department following a mechanical fall down several stairs at her home. GCS 15 on arrival. Patient denies loss of consciousness. Patient denies any history of blood thinners. Only complaining of right ankle pain. Previous surgical scar noted over right ankle. Patient states that she has previously broken that ankle. Discharge Recommendations:  Patient would benefit from continued therapy after discharge  OT Equipment Recommendations  Equipment Needed:  (CTA)       Patient Diagnosis(es): The encounter diagnosis was Sprain of right ankle, unspecified ligament, initial encounter. Past Medical History:  has a past medical history of Psoriasis. Past Surgical History:  has a past surgical history that includes cervical fusion. Assessment   Performance deficits / Impairments: Decreased functional mobility ; Decreased ADL status; Decreased ROM; Decreased strength;Decreased endurance;Decreased high-level IADLs;Decreased fine motor control;Decreased balance;Decreased coordination;Decreased posture;Decreased cognition  Assessment: Pt demo decreased use of LUE throughout session, unable to raise L shoulder or elbow against gravity. Pt demo ability to squeeze L hand. Pt required 2 person assist for all transfers and tasks this date and was unable to maintain sitting balance without significant assistance d/t posterior lean with acitivty.  OT/PT attempted to assist pt in completing sit<>stand transfer however unable to follow through safely d/t pt flexing BLE into straight position and unable to keep feet under knees for safe transfer. Pt would benefit from continued therapy to increase IND/safety in ADL's prior to returning home. Prognosis: Fair  Decision Making: High Complexity  REQUIRES OT FOLLOW-UP: Yes  Activity Tolerance  Activity Tolerance: Patient Tolerated treatment well;Patient limited by fatigue        Plan   Occupational Therapy Plan  Times Per Week: 3-5x/week  Current Treatment Recommendations: Strengthening, ROM, Balance training, Functional mobility training, Endurance training, Neuromuscular re-education, Cognitive reorientation, Pain management, Safety education & training, Patient/Caregiver education & training, Self-Care / ADL, Coordination training     Restrictions  Restrictions/Precautions  Restrictions/Precautions: Up as Tolerated  Required Braces or Orthoses?: Yes  Required Braces or Orthoses  Cervical: c-collar  Right Lower Extremity Brace: Boot  RLE Brace Type: Aircast Walking Boot present in room  Position Activity Restriction  Other position/activity restrictions: activity as tolerated, ambulate pt, C-collar OOB    Subjective   General  Patient assessed for rehabilitation services?: Yes  Family / Caregiver Present: No  General Comment  Comments: RN okayed for therapy. Pt agreeable and cooperative with therapy evaluation. pt reports 2/10 pain at surgical site throughout session and is able to progress with therapy.      Social/Functional History  Social/Functional History  Lives With: Spouse  Type of Home: House  Home Layout: Two level, Bed/Bath upstairs  Home Access: Stairs to enter without rails  Entrance Stairs - Number of Steps: 3 back door, 5 front door  Bathroom Shower/Tub: Tub only  Bathroom Toilet: Standard  Bathroom Equipment: Grab bars in shower, Shower chair  Home Equipment: Deepa Ply, rolling  ADL Assistance: [facility]: Independent  Homemaking Responsibilities: Yes  Ambulation Assistance: Independent  Transfer Assistance: Independent  Active : No  Patient's  Info:  or neighbor drive  Mode of Transportation: Other (Neighbor or  performs driving tasks)  Occupation: Retired  Additional Comments: Pt reports spouse had surgery recently and is unable to provide assistance upon discharge       Objective        Safety Devices  Type of Devices: Call light within reach; Left in bed;Gait belt;Patient at risk for falls;Nurse notified  Restraints  Restraints Initially in Place: No    Bed Mobility Training  Bed Mobility Training: Yes  Overall Level of Assistance: Maximum assistance;Assist X2;Additional time  Rolling: Maximum assistance;Assist X2;Additional time (education for log roll technique in bed)  Supine to Sit: Maximum assistance;Assist X2;Additional time  Sit to Supine: Maximum assistance;Assist X2;Additional time  Scooting: Maximum assistance;Assist X2;Additional time    Balance  Sitting: With support (Max A to maintain sitting balance EOB)  Standing:  (unable to attempt this date for pt safety)    Transfer Training  Transfer Training: No (began to attempt however sit<>stand transfer defered d/t pt unable to keep knees bent and kicking B legs out)     AROM: Generally decreased, functional (RUE: shoulder ~90 degrees flexion; elbow/hand WFL; LUE: able to  however no AROM proximal LUE noted)  PROM: Within functional limits  Strength: Generally decreased, functional (RUE: 4/5 grossly; LUE: 1/5 shoulder/elbow, 3/5 hand)  Coordination: Generally decreased, functional (decreased FMC/GMC LUE)  Tone: Abnormal (increased tone in LE/trunk at times; not obsered in UE)  Sensation: Intact    ADL  Feeding: Minimal assistance  Grooming: Moderate assistance  UE Bathing: Moderate assistance  LE Bathing: Maximum assistance  UE Dressing:  Moderate assistance  UE Dressing Skilled Clinical Factors: assist to don/doff c-collar  LE Dressing: Maximum assistance  LE Dressing Skilled Clinical Factors: assist to don B socks and boot  Toileting: Maximum assistance  Additional Comments: Pt demo decreased use of LUE limiting performance in ADL's requiring bilateral UE use. Pt unable to stand this date limiting performance in ADL's              Vision  Vision: Within Functional Limits  Hearing  Hearing: Within functional limits    Cognition  Overall Cognitive Status: Exceptions  Problem Solving: Assistance required to identify errors made;Assistance required to generate solutions  Initiation: Requires cues for some  Sequencing: Requires cues for some  Orientation  Overall Orientation Status: Within Functional Limits                  Education Given To: Patient  Education Provided: Role of Therapy;Plan of Care;Transfer Training;Precautions  Education Provided Comments: percautions, log roll technique, use of c-collar, safety;  Fair return  Education Method: Verbal;Demonstration  Barriers to Learning: None  Education Outcome: Continued education needed        AM-PAC Score        AM-PAC Inpatient Daily Activity Raw Score: 13 (01/19/23 1317)  AM-PAC Inpatient ADL T-Scale Score : 32.03 (01/19/23 1317)  ADL Inpatient CMS 0-100% Score: 63.03 (01/19/23 1317)  ADL Inpatient CMS G-Code Modifier : CL (01/19/23 1317)    Goals  Short Term Goals  Time Frame for Short Term Goals: By discharge  Short Term Goal 1: Pt will complete UB ADL's Min A with AE/DME/modified techniques  Short Term Goal 2: Pt will complete LB ADL's Mod A with AE/DME/modified techniques  Short Term Goal 3: Pt will complete bed mobility Min A x2  Short Term Goal 4: Pt will tolerate sitting EOB 5+ mins Min A to complete functoinal task  Short Term Goal 5: Pt will complete transfer Mod A x2 w/RW  Short Term Goal 6: Pt will don/doff c-collar with Mod A  Short Term Goal 7: Pt will demo Good safety throughout session with min VC's  Long Term Goals  Time Frame for Long Term Goals : NOTIFY OTR TO UPDATE GOALS AS PT PROGRESSESS       Therapy Time   Individual Concurrent Group Co-treatment Time In 1101         Time Out 1136         Minutes 35      Co-eval w/PT   Timed Code Treatment Minutes: [address], OTR/L

## 2023-01-19 NOTE — OP NOTE
Operative Note      Patient: Tata Pulido  YOB: 1950  MRN: [id number]    Date of Procedure: 1/18/2023    Pre-Op Diagnosis: CENTRAL CORD SYNDROME, cervical  stenosis with myelopathy. Post-Op Diagnosis: Same    Indications for procedure. Patient with severe cervical stenosis with deficits indicative of central cord syndrome along with severe myelopathy. MRI indicating severe multilevel stenosis warranting decompression posteriorly. I explained to the patient that decompression in the setting of stenosis does require fixation due to the progressive risk of kyphotic deformity and swan-neck deformity which is high in elderly patients. Procedure  Laminectomy to decompress the spinal cord at C3, C4, C5, C6, C7  Posterior lateral arthrodesis at C3, C4, C5, C6, C7  Segmental fixation with lateral mass screws and rods at C3, C4, C5, C6, C7  Use of morselized autograft via same incision  Use of fluoroscopy    Surgeon(s):  Mai Velarde DO    Assistant:   * No surgical staff found *    Anesthesia: General    Estimated Blood Loss (mL): 134     Complications: None    Specimens:   * No specimens in log *    Implants:  Implant Name Type Inv.  Item Serial No.  Lot No. LRB No. Used Action   SCREW SPNL 3.5X14MM SYMPHONY - [id number]  SCREW SPNL 3.5X14MM SYMPHONY  [facility]  N/A 1 Implanted   SCREW SPNL 3.5X14MM SYMPHONY - [id number]  SCREW SPNL 3.5X14MM SYMPHONY  [facility] SPINE-WD  N/A 1 Implanted   SCREW SPNL 3.5X14MM SYMPHONY - [id number]  SCREW SPNL 3.5X14MM SYMPHONY  [facility] [employer] SYNTHES SPINE-WD  N/A 1 Implanted   SCREW SPNL 3.5X14MM SYMPHONY - [id number]  SCREW SPNL 3.5X14MM SYMPHONY  [facility] [employer] SYNTHES SPINE-WD  N/A 1 Implanted   SCREW SPNL 3.5X14MM SYMPHONY - [id number]  SCREW SPNL 3.5X14MM SYMPHONY  [facility] [employer] SPINE-WD  N/A 1 Implanted   SCREW SPNL 3.5X14MM SYMPHONY - [id number]  SCREW SPNL 3.5X14MM SYMPHONY  [facility] [employer] SPINE-WD  N/A 1 Implanted SCREW SPNL 3.5X14MM SYMPHONY - [id number]  SCREW SPNL 3.5X14MM SYMPHONY  [facility] SPINE-WD  N/A 1 Implanted   SCREW SPNL 3.5X14MM SYMPHONY - [id number]  SCREW SPNL 3.5X14MM SYMPHONY  [facility] SPINE-WD  N/A 1 Implanted   SCREW SPNL 3.5X14MM SYMPHONY - [id number]  SCREW SPNL 3.5X14MM SYMPHONY  [facility] SPINE-WD  N/A 1 Implanted   SCREW SPNL 3.5X14MM SYMPHONY - [id number]  SCREW SPNL 3.5X14MM SYMPHONY  [facility] SPINE-WD  N/A 1 Implanted   SCREW SPINAL F/SYMPHONY OCT SYSTEM - [id number]  SCREW SPINAL F/SYMPHONY OCT SYSTEM  [facility] SPINE-WD  N/A 1 Implanted   SCREW SPINAL F/SYMPHONY OCT SYSTEM - [id number]  SCREW SPINAL F/SYMPHONY OCT SYSTEM  [facility] SPINE-WD  N/A 1 Implanted   SCREW SPINAL F/SYMPHONY OCT SYSTEM - [id number]  SCREW SPINAL F/SYMPHONY OCT SYSTEM  [facility] SPINE-WD  N/A 1 Implanted   SCREW SPINAL F/SYMPHONY OCT SYSTEM - [id number]  SCREW SPINAL F/SYMPHONY OCT SYSTEM  [facility] SPINE-WD  N/A 1 Implanted   SCREW SPINAL F/SYMPHONY OCT SYSTEM - [id number]  SCREW SPINAL F/SYMPHONY OCT SYSTEM  [facility] SPINE-WD  N/A 1 Implanted   SCREW SPINAL F/SYMPHONY OCT SYSTEM - [id number]  SCREW SPINAL F/SYMPHONY OCT SYSTEM  [facility] SPINE-WD  N/A 1 Implanted   SCREW SPINAL F/SYMPHONY OCT SYSTEM - [id number]  SCREW SPINAL F/SYMPHONY OCT SYSTEM  [facility] SPINE-WD  N/A 1 Implanted   SCREW SPINAL F/SYMPHONY OCT SYSTEM - [id number]  SCREW SPINAL F/SYMPHONY OCT SYSTEM  [facility] SPINE-WD  N/A 1 Implanted   SCREW SPINAL F/SYMPHONY OCT SYSTEM - [id number]  SCREW SPINAL F/SYMPHONY OCT SYSTEM  [facility] SPINE-WD  N/A 1 Implanted   SCREW SPINAL F/SYMPHONY OCT SYSTEM - [id number]  SCREW SPINAL F/SYMPHONY OCT SYSTEM  [employer] SYNTHES SPINE-WD  N/A 1 Implanted   DYLON SPNL LORDTC 4X60 MM TI NS SYM - [id number]  DYLON SPNL LORDTC 4X60 MM TI NS SYM  [facility] DEPUY SYNTHES SPINE-WD  N/A 1 Implanted   DYLON SPNL LORDTC 4X60 MM TI NS SYM - [id number]  DYLON SPNL LORDTC 4X60 MM TI NS SYM  [employer] SYNTHES SPINE-WD  N/A 1 Implanted         Drains:   Closed/Suction Drain Posterior Neck Bulb (Active)   Site Description Clean, dry & intact 01/18/23 1930   Dressing Status Clean, dry & intact 01/18/23 1930   Drainage Appearance Bright red 01/18/23 1930   Drain Status To bulb suction 01/18/23 1930   Output (ml) 55 ml 01/18/23 1830       External Urinary Catheter (Active)   Site Assessment Clean,dry & intact 01/18/23 1834   Placement Initiated 01/18/23 1834   Securement Method Tape 01/18/23 1834   Catheter Care Catheter/Wick replaced 01/18/23 1834   Perineal Care Yes 01/18/23 1834   Suction 40 mmgHg continuous 01/18/23 1834   Urine Color Yellow 01/18/23 1834       [REMOVED] Urinary Catheter 01/18/23 Ca (Removed)       [REMOVED] External Urinary Catheter (Removed)   Catheter Care Catheter/Wick replaced 01/17/23 0930   Suction 40 mmgHg continuous 01/17/23 0930   Urine Color Edwina 01/17/23 0930       Findings: Severe cervical stenosis with relaxation of the dura. Intraoperative fluoroscopy showed good placement of all hardware    Detailed Description of Procedure: The patient was consented preoperatively. She was brought into the operative room and placed under general esthesia. She was placed in the Gonzalez head doyle by myself. She was placed prone on a Jaiden table with all pressure points padded arms tucked at the sides of the Gonzalez head doyle placed neutrally in the bed frame. All pressure points were tucked and padded. Shoulders were taped caudad. Suboccipital region was shaved and midline of the posterior cervical spine was marked after neutral positioning of the neck. After sterile prep and draping and timeout was performed I infiltrated the incision with 1% lidocaine with epinephrine. 10 blade is used to make the incision followed Andriy to perform a subperiosteal dissection to the lateral facet line from C3-C7. Retractors were then advanced. Appropriate levels were confirmed under fluoroscopy. High-speed bur was then used to make gutters bilaterally medial to the medial facet line, pleat release of the C3-C6 lamina was then ensured. Bur was then used to make starting points at the midpoint of the lateral masses bilaterally from C3-C7 followed by the handrolled used to drill trajectories 40 mm depth 45 degrees cephalad and 25 degrees lateralized. Ball-tipped was used to ensure that there were no breaches. 14 mm depth lateral mass screws were then placed bilaterally from C3-C7. Heads were appropriately oriented the riki was appropriately measured and bent to anatomic lordosis and affixed with multiple caps with final tightening. AP and lateral x-ray showed all hardware to be in good position. Bovie was then used to cut the interspinous and supraspinous ligament. The C6 lamina was then tented followed by upgoing curette and 2 punch used to resect the flavum and en bloc removal of the lamina from C3-C6 was completed. The superior half of the C7 lamina was removed with a drill followed an upgoing curette and 2 punch. After circumferential decompression from C3-C7. Gelfoam used to obtain hemostasis within the gutter of the laminectomy. Thorough irrigation with Irrisept was then performed. High-speed bur was then used to arthrodesed bilaterally along the facet joints and lateral to the hardware from C3-C7. The lamina was then morselized using the bone mill and placed lateral to the hardware. 7 flat TONEY drain was tunneled below the fascia and secured with a drain stitch. Strata fix was used to close the fascia followed by inverted 2-0 Vicryl's for the subcutaneous layer and staples for the skin along with a drain stitch. Bacitracin island were placed over the wound. The patient was then taken out of the [place] head doyle flipped back supine and extubated in stable condition.     Electronically signed by Collin Medrano DO Paulo on 1/18/2023 at 10:23 PM

## 2023-01-19 NOTE — PROGRESS NOTES
POST OP NOTE    SUBJECTIVE  Pt s/p laminectomy and posterior lateral arthrodesis of C3, C4, C5, C6, C7    OBJECTIVE  VITALS:  BP (!) 165/92   Pulse 71   Temp 98 °F (36.7 °C) (Axillary)   Resp 13   Ht 5' 7\" (1.702 m)   Wt 180 lb (81.6 kg)   SpO2 97%   BMI 28.19 kg/m²         GENERAL:  Awake and alert. No acute distress  CARDIOVASCULAR:  Regular Rate and Rhythm   LUNGS:  CTA Bilaterally  ABDOMEN:   Abdomen soft, non-tender, non-distended  INCISION: Incision site with scant bloody discharge, TONEY drain with 60 mL serosanguinous discharge. ASSESSMENT  1. POD# 0 s/p laminectomy and posterior lateral arthrodesis of C3, C4, C5, C6, C7    PLAN  1. Pain management- MMPT  2.  DVT proph-HELD  3. GI proph-radha Nieto MD  Trauma/Surgery Service  1/19/2023 at 2:28 AM

## 2023-01-19 NOTE — PLAN OF CARE
Problem: Skin/Tissue Integrity  Goal: Absence of new skin breakdown  Description: 1. Monitor for areas of redness and/or skin breakdown  2. Assess vascular access sites hourly  3. Every 4-6 hours minimum:  Change oxygen saturation probe site  4. Every 4-6 hours:  If on nasal continuous positive airway pressure, respiratory therapy assess nares and determine need for appliance change or resting period.   1/19/2023 1410 by Guzman Jarquin RN  Outcome: Progressing  1/19/2023 0540 by Mt Boyer RN  Outcome: Progressing     Problem: Safety - Adult  Goal: Free from fall injury  1/19/2023 1410 by Guzman Jarquin RN  Outcome: Progressing  1/19/2023 0540 by Mt Boyer RN  Outcome: Progressing     Problem: ABCDS Injury Assessment  Goal: Absence of physical injury  1/19/2023 1410 by Guzman Jarquin RN  Outcome: Progressing  1/19/2023 0540 by Mt Boyer RN  Outcome: Progressing     Problem: Pain  Goal: Verbalizes/displays adequate comfort level or baseline comfort level  1/19/2023 1410 by Guzman Jarquin RN  Outcome: Progressing  1/19/2023 0540 by Mt Boyer RN  Outcome: Progressing     Problem: Discharge Planning  Goal: Discharge to home or other facility with appropriate resources  1/19/2023 1410 by Guzman Jarquin RN  Outcome: Progressing  1/19/2023 0540 by Mt Boyer RN  Outcome: Progressing

## 2023-01-19 NOTE — PLAN OF CARE
Problem: Skin/Tissue Integrity  Goal: Absence of new skin breakdown  Description: 1. Monitor for areas of redness and/or skin breakdown  2. Assess vascular access sites hourly  3. Every 4-6 hours minimum:  Change oxygen saturation probe site  4. Every 4-6 hours:  If on nasal continuous positive airway pressure, respiratory therapy assess nares and determine need for appliance change or resting period.   Outcome: Progressing     Problem: Safety - Adult  Goal: Free from fall injury  Outcome: Progressing     Problem: ABCDS Injury Assessment  Goal: Absence of physical injury  Outcome: Progressing     Problem: Pain  Goal: Verbalizes/displays adequate comfort level or baseline comfort level  Outcome: Progressing     Problem: Discharge Planning  Goal: Discharge to home or other facility with appropriate resources  Outcome: Progressing

## 2023-01-20 VITALS
RESPIRATION RATE: 18 BRPM | BODY MASS INDEX: 28.25 KG/M2 | HEART RATE: 77 BPM | RESPIRATION RATE: 18 BRPM | SYSTOLIC BLOOD PRESSURE: 128 MMHG | OXYGEN SATURATION: 96 % | WEIGHT: 180 LBS | HEIGHT: 67 IN | TEMPERATURE: 97.5 F | SYSTOLIC BLOOD PRESSURE: 128 MMHG | WEIGHT: 180 LBS | TEMPERATURE: 97.5 F | BODY MASS INDEX: 28.25 KG/M2 | DIASTOLIC BLOOD PRESSURE: 81 MMHG | HEIGHT: 67 IN | OXYGEN SATURATION: 96 % | HEART RATE: 77 BPM | DIASTOLIC BLOOD PRESSURE: 81 MMHG

## 2023-01-20 PROCEDURE — 2580000003 HC RX 258: Performed by: PHYSICIAN ASSISTANT

## 2023-01-20 PROCEDURE — 6360000002 HC RX W HCPCS: Performed by: STUDENT IN AN ORGANIZED HEALTH CARE EDUCATION/TRAINING PROGRAM

## 2023-01-20 PROCEDURE — 6370000000 HC RX 637 (ALT 250 FOR IP): Performed by: PHYSICIAN ASSISTANT

## 2023-01-20 PROCEDURE — APPSS15 APP SPLIT SHARED TIME 0-15 MINUTES: Performed by: NURSE PRACTITIONER

## 2023-01-20 PROCEDURE — 97110 THERAPEUTIC EXERCISES: CPT

## 2023-01-20 PROCEDURE — 6370000000 HC RX 637 (ALT 250 FOR IP): Performed by: STUDENT IN AN ORGANIZED HEALTH CARE EDUCATION/TRAINING PROGRAM

## 2023-01-20 PROCEDURE — 6370000000 HC RX 637 (ALT 250 FOR IP)

## 2023-01-20 PROCEDURE — 97530 THERAPEUTIC ACTIVITIES: CPT

## 2023-01-20 RX ORDER — POLYETHYLENE GLYCOL 3350 17 G/17G
17 POWDER, FOR SOLUTION ORAL DAILY
Qty: 30 EACH | Refills: 0 | Status: SHIPPED | OUTPATIENT
Start: 2023-01-21 | End: 2023-02-20

## 2023-01-20 RX ORDER — GABAPENTIN 300 MG/1
300 CAPSULE ORAL EVERY 8 HOURS
Qty: 21 CAPSULE | Refills: 0 | Status: SHIPPED | OUTPATIENT
Start: 2023-01-20 | End: 2023-01-27

## 2023-01-20 RX ORDER — OXYCODONE HYDROCHLORIDE 5 MG/1
2.5 TABLET ORAL EVERY 6 HOURS PRN
Qty: 15 TABLET | Refills: 0 | Status: SHIPPED | OUTPATIENT
Start: 2023-01-20 | End: 2023-01-20 | Stop reason: HOSPADM

## 2023-01-20 RX ORDER — METHOCARBAMOL 750 MG/1
750 TABLET, FILM COATED ORAL EVERY 6 HOURS
Qty: 40 TABLET | Refills: 0 | Status: SHIPPED | OUTPATIENT
Start: 2023-01-20 | End: 2023-01-30

## 2023-01-20 RX ADMIN — ACETAMINOPHEN 1000 MG: 500 TABLET ORAL at 05:02

## 2023-01-20 RX ADMIN — SODIUM CHLORIDE, PRESERVATIVE FREE 10 ML: 5 INJECTION INTRAVENOUS at 09:00

## 2023-01-20 RX ADMIN — ENOXAPARIN SODIUM 40 MG: 100 INJECTION SUBCUTANEOUS at 09:17

## 2023-01-20 RX ADMIN — METHOCARBAMOL TABLETS 750 MG: 750 TABLET, COATED ORAL at 05:02

## 2023-01-20 RX ADMIN — METHOCARBAMOL TABLETS 750 MG: 750 TABLET, COATED ORAL at 09:18

## 2023-01-20 RX ADMIN — SODIUM CHLORIDE, PRESERVATIVE FREE 10 ML: 5 INJECTION INTRAVENOUS at 09:18

## 2023-01-20 RX ADMIN — SODIUM CHLORIDE, POTASSIUM CHLORIDE, SODIUM LACTATE AND CALCIUM CHLORIDE: 600; 310; 30; 20 INJECTION, SOLUTION INTRAVENOUS at 05:08

## 2023-01-20 RX ADMIN — FAMOTIDINE 20 MG: 20 TABLET, FILM COATED ORAL at 09:18

## 2023-01-20 RX ADMIN — GABAPENTIN 300 MG: 300 CAPSULE ORAL at 05:02

## 2023-01-20 RX ADMIN — ACETAMINOPHEN 1000 MG: 500 TABLET ORAL at 15:11

## 2023-01-20 RX ADMIN — GABAPENTIN 300 MG: 300 CAPSULE ORAL at 12:41

## 2023-01-20 RX ADMIN — METHOCARBAMOL TABLETS 750 MG: 750 TABLET, COATED ORAL at 15:11

## 2023-01-20 ASSESSMENT — PAIN SCALES - GENERAL: PAINLEVEL_OUTOF10: 0

## 2023-01-20 ASSESSMENT — PAIN SCALES - WONG BAKER: WONGBAKER_NUMERICALRESPONSE: 0

## 2023-01-20 NOTE — PROGRESS NOTES
PROGRESS NOTE          PATIENT NAME: Fara Sandifer  MEDICAL RECORD NO. [id number]  DATE: 1/20/2023  PRIMARY CARE PHYSICIAN: No primary care provider on file. HD: # 3    ASSESSMENT    Patient Active Problem List   Diagnosis    Mild ankle sprain, initial encounter    Mild sprain of right ankle, initial encounter    Stenosis of cervical spine with myelopathy ([facility] [address]    Central cord syndrome [facility])   Patient is a female that presented to the Emergency Department following a mechanical fall down several stairs at her home. GCS 15 on arrival. Patient denies loss of consciousness. Patient denies any history of blood thinners. Only complaining of right ankle pain. Previous surgical scar noted over right ankle. Patient states that she has previously broken that ankle. MEDICAL DECISION MAKING AND PLAN     Mechanical fall down stairs, (-) LOC   MRI: 1/17  Central canal stenosis at C3-C4 and C6-C7   Plan: Neurosurgery Laminectomy of C3-C7 and segmental fixation of C3-C7.    -C5 nerve palsy NS recommend rehab.    -On Lovenox for DVT prophylaxis    -remove drain per NS today    Asymptomatic pyuria   UA with ketones, LE and WBC but no bacteria, asymptomatic, likely asymptomatic pyuria with poor nutrition     Constipation   Resume bowel regimen    ECHO: LVEF 55-60%     Dispo: Patient is medically stable to be discharged to rehab once worked with PT OT, neurosurgery cleared the drain. Plan for [facility]       Chief Complaint: \"weakness on b/l hand\"    SUBJECTIVE    Fara Sandifer is seen and examined this morning. Patient is resting in bed comfortably. She worked with PT OT yesterday, tolerated diet but had poor appetite. Patient still have weakness on left upper extremity. Neurosurgery evaluated patient for left upper extremity weakness, they recommend C5 nerve palsy treatment and for PE at the rehab center. Patient denies of chest pain, shortness of breath.     OBJECTIVE  VITALS: Temp: Temp: 97.5 °F (36.4 °C)Temp  Av.7 °F (36.5 °C)  Min: 97.5 °F (36.4 °C)  Max: 97.9 °F (34.8 °C) BP Systolic (83OTY), FGH:993 , Min:111 , CASSIUS:744   Diastolic (96FUN), MAN:67, Min:72, Max:84   Pulse Pulse  Av.2  Min: 77  Max: 95 Resp Resp  Av.8  Min: 18  Max: 21 Pulse ox SpO2  Av.2 %  Min: 88 %  Max: 96 %  GENERAL: alert, no distress  NEURO: AXO4  HEENT: normocephalic   : normal  LUNGS: clear to ausculation, without wheezes, rales or rhonci  HEART: normal rate and regular rhythm  ABDOMEN: soft, non-tender, non-distended, and no guarding or peritoneal signs present  EXTREMITY: was not able to locate or find her left upper arm with mild weakness there. She was able to feel and have intact sensations and able to wrinkle her fingers. Neuro / Psych: Numbness and tingling of bilateral hands. Mild weakness on the left upper extremity      I/O last 3 completed shifts: In: 700 [P.O.:700]  Out: 1400 [Urine:1300; Drains:100]    Drain/tube output: In: 700 [P.O.:700]  Out: 1140 [Urine:1100; Drains:40]    LAB:  CBC:   Recent Labs     23  0511 23  1015   WBC 5.3 5.3   HGB 11.9 11.6*   HCT 37.6 35.6*   .7* 113.4*    257       BMP:   Recent Labs     23  0511 23  1015    140   K 4.2 4.0    104   CO2 23 25   BUN 14 8   CREATININE 0.72 0.69   GLUCOSE 112* 126*       COAGS:   No results for input(s): APTT, PROT, INR in the last 72 hours. RADIOLOGY:  MRI CERVICAL SPINE WO CONTRAST    Result Date: 2023  Faint diffuse hyperintense T2 signal abnormality within the cervical cord extending from C3 through C6 likely representing mild myelomalacia. Mild congenital spinal canal stenosis superimposed over moderate diffuse degenerative disc disease. Moderate central canal stenosis at C3-C4 and C6-C7. Mild central canal stenosis identified within mid cervical levels. Moderate left foraminal stenosis at C6-C7 and C7-T1.         Electronically signed by Mary Muse DO on 1/20/2023 at 10:54 AM

## 2023-01-20 NOTE — DISCHARGE SUMMARY
Physician Discharge Summary     Patient ID:  Melodie Olson  [id number]  73 y.o.  1950    Admit date: 1/14/2023    Discharge date and time: No discharge date for patient encounter. Admitting Physician: Sherie Knapp MD     Discharge Physician: Dr Gary Rao    Admission Diagnoses: Sprain of right ankle, unspecified ligament, initial encounter [S93.401A]  Mild ankle sprain, initial encounter [S93.409A]  Mild sprain of right ankle, initial encounter [S93.401A]  Spinal stenosis [M48.00]    Discharge Diagnoses: same    Admission Condition: fair    Discharged Condition: good    Indication for Admission: [address] Course:   Fall down 9 stairs, (-) LOC, GCS 15 on arrival, central cord syndrome    Inj: no traumatic injuries, chronic appearing L1 endplate fx    0/41: NS surgery C3-7, C5 nerve palsy after surgery  1/19: restarted lovenox, CLD, 0.25 naheed, DRAIN OUT    Mechanical fall down stairs, (-) LOC              MRI: 1/17  Central canal stenosis at C3-C4 and C6-C7              Plan: Neurosurgery Laminectomy of C3-C7 and segmental fixation of C3-C7.                          -C5 nerve palsy NS recommend rehab.                          -On Lovenox for DVT prophylaxis                          -remove drain per NS today     Asymptomatic pyuria   UA with ketones, LE and WBC but no bacteria, asymptomatic, likely asymptomatic pyuria with poor nutrition      Constipation              Resume bowel regimen     ECHO: LVEF 55-60%     Dispo: Patient is medically stable to be discharged to rehab once worked with PT OT, neurosurgery cleared the drain. Plan for [facility]        Consults: neurosurgery    Significant Diagnostic Studies:   XR CERVICAL SPINE (4-5 VIEWS)    Result Date: 1/19/2023  Normal baseline study after posterior decompression and metallic fusion from C3 through C7.         Treatments: surgery     Discharge Exam:  GENERAL: alert, no distress  NEURO: AXO4  HEENT: normocephalic   : normal  LUNGS: clear to ausculation, without wheezes, rales or rhonci  HEART: normal rate and regular rhythm  ABDOMEN: soft, non-tender, non-distended, and no guarding or peritoneal signs present  EXTREMITY: was not able to locate or find her left upper arm with mild weakness there. She was able to feel and have intact sensations and able to wrinkle her fingers. Neuro / Psych: Numbness and tingling of bilateral hands. Mild weakness on the left upper extremity          Disposition:   [facility]    In process/preliminary results:  Outstanding Order Results       Date and Time Order Name Status Description    1/18/2023 10:19 AM BLOOD BANK SPECIMEN In process             Patient Instructions: There are no discharge medications for this patient. Activity: activity as tolerated  Diet: regular diet  Wound Care: keep wound clean and dry    Follow-up with neurosurgery in 2 weeks.     Signed:  Electronically signed by Cheng Fernandez DO on 1/20/2023 at 11:54 AM

## 2023-01-20 NOTE — PROGRESS NOTES
Physical Therapy  Facility/Department: [facility]- [facility]PD[facility]  Physical Therapy daily treatment note    Name: Carolynn Tavera  : 1950  MRN: [id number]  Date of Service: 2023    Discharge Recommendations:  Patient would benefit from continued therapy after discharge   PT Equipment Recommendations  Equipment Needed: No      Patient Diagnosis(es): The encounter diagnosis was Sprain of right ankle, unspecified ligament, initial encounter. Past Medical History:  has a past medical history of Psoriasis. Past Surgical History:  has a past surgical history that includes cervical fusion and cervical fusion (N/A, 2023). Assessment   Body Structures, Functions, Activity Limitations Requiring Skilled Therapeutic Intervention: Decreased functional mobility ; Decreased strength; Increased pain;Decreased posture;Decreased ROM; Decreased endurance;Decreased coordination;Decreased balance  Assessment: Pt requires MAX A x2 for bed mobility and nunu steady transfer to chair. LImited by weakness, decreased balance, strength, and c/o numbness to B UE. Pt would benefit from continued PT after d/c to address deficits.   Therapy Prognosis: Fair  Activity Tolerance  Activity Tolerance: Patient tolerated treatment well;Patient limited by endurance     Plan   Physcial Therapy Plan  General Plan:  (5-6x)  Current Treatment Recommendations: Strengthening, ROM, Balance training, Endurance training, Safety education & training, Patient/Caregiver education & training, Therapeutic activities, Functional mobility training, Transfer training, Gait training, Equipment evaluation, education, & procurement, Stair training, Home exercise program  Safety Devices  Type of Devices: Nurse notified, Call light within reach, Gait belt, Patient at risk for falls, Left in chair, Chair alarm in place  Restraints  Restraints Initially in Place: No     Restrictions  Restrictions/Precautions  Restrictions/Precautions: Up as Tolerated  Required Braces or Orthoses?: Yes  Required Braces or Orthoses  Cervical: c-collar (C collar when out of bed)  Right Lower Extremity Brace: Boot  RLE Brace Type: Aircast Walking Boot present in room  Position Activity Restriction  Other position/activity restrictions: activity as tolerated, ambulate pt; s/p C 3-7 POSTERIOR CERVICAL DECOMPRESSION AND FUSION (1/18/23)     Subjective   General  Patient assessed for rehabilitation services?: Yes  Response To Previous Treatment: Patient with no complaints from previous session. Family / Caregiver Present: No  Follows Commands: Within Functional Limits  Subjective  Subjective: Pt resting in bed upon arrival, agreeable to PT, pleasant and cooperative. Noted significant swelling to distal L UE and hand, RN aware, reports keeping an eye on it        Cognition   Orientation  Overall Orientation Status: Within Functional Limits     Objective      Bed mobility  Rolling to Left: Maximum assistance  Supine to Sit: Maximum assistance;2 Person assistance  Scooting: Maximal assistance  Bed Mobility Comments: C collar donned in supine, prior to mobility. CUes for log rolling  Transfers  Sit to Stand: Maximum Assistance;2 Person Assistance  Stand to Sit: Maximum Assistance;2 Person Assistance  Bed to Chair: Dependent/Total (nunu steady to chair)  Comment: Aircast boot donned to R LE prior to transfer  Ambulation  More Ambulation?: No     Balance  Posture: Fair  Sitting - Static: Fair;-  Sitting - Dynamic: Poor;+ (Pt sat EOB x ~12 min, requiring up to MIN A due to post lean)  Standing - Static: Poor  Standing - Dynamic: Poor (Pt requires MAX A x2 to  nunu steady.  Tolerated standing 2 trials, <1 min each, MAX A to maintain)   Exercise  Seated LAQ and ankle pumps x 15  Reclined sitting SLR, hip abd/add x 10, AAROM  B gastroc stretch 2 x 30 sec  L UE PROM in all planes x 10    AM-PAC Score  AM-PAC Inpatient Mobility Raw Score : 9 (01/20/23 1133)  AM-PAC Inpatient T-Scale Score : 30.55 (01/20/23 1133)  Mobility Inpatient CMS 0-100% Score: 81.38 (01/20/23 1133)  Mobility Inpatient CMS G-Code Modifier : CM (01/20/23 1133)        Goals  Short Term Goals  Time Frame for Short Term Goals: 14  Short Term Goal 1: Pt to perform bed mobility Dilcia  Short Term Goal 2: Pt to demonstrate functional transfers modA  Short Term Goal 3: Pt to to ambulate 10ft w/ RW modA  Short Term Goal 4: Tolerate 30 minutes of therapy to demo increased endurance  Patient Goals   Patient Goals :  To go home     Therapy Time   Individual Concurrent Group Co-treatment   Time In 7763         Time Out 1115         Minutes 40         Timed Code Treatment Minutes: [address] PTA

## 2023-01-20 NOTE — PLAN OF CARE
Output by Drain (mL) 01/18/23 0701 - 01/18/23 1900 01/18/23 1901 - 01/19/23 0700 01/19/23 0701 - 01/19/23 1900 01/19/23 1901 - 01/20/23 0700 01/20/23 0701 - 01/20/23 1141   Closed/Suction Drain Posterior Neck Bulb 115 60  40       Drain Removal Note    [x]Subfasial Drain   []Subgaleal Drain  []Ventriculostomy Drain    Drain removed from suction, prepped with betadine and sterile towels placed to create sterile field. Drain suture cut and drain removed. A 2x2 and tegaderm placed  drain hole with hemostasis. No complications, patient tolerated procedure well.     --  Memo Ravi CNP  11:41 AM EST

## 2023-01-20 NOTE — PLAN OF CARE
Problem: Skin/Tissue Integrity  Goal: Absence of new skin breakdown  Description: 1. Monitor for areas of redness and/or skin breakdown  2. Assess vascular access sites hourly  3. Every 4-6 hours minimum:  Change oxygen saturation probe site  4. Every 4-6 hours:  If on nasal continuous positive airway pressure, respiratory therapy assess nares and determine need for appliance change or resting period.   Outcome: Progressing     Problem: Safety - Adult  Goal: Free from fall injury  Outcome: Progressing     Problem: ABCDS Injury Assessment  Goal: Absence of physical injury  Outcome: Progressing     Problem: Pain  Goal: Verbalizes/displays adequate comfort level or baseline comfort level  Outcome: Progressing  Flowsheets (Taken 1/20/2023 0925)  Verbalizes/displays adequate comfort level or baseline comfort level:   Encourage patient to monitor pain and request assistance   Assess pain using appropriate pain scale   Administer analgesics based on type and severity of pain and evaluate response     Problem: Discharge Planning  Goal: Discharge to home or other facility with appropriate resources  Outcome: Progressing  Flowsheets (Taken 1/20/2023 0800)  Discharge to home or other facility with appropriate resources:   Identify barriers to discharge with patient and caregiver   Arrange for needed discharge resources and transportation as appropriate   Identify discharge learning needs (meds, wound care, etc)

## 2023-01-20 NOTE — CARE COORDINATION
Patient ready for discharge today once TONEY drain is taken out. Transportation request faxed to [facility] for [address] from [facility]. Notified patient    1200 notified by Ene Howard from  [address] that [facility] will transport patient at [address] [facility]. Patient notified. Notified her , Jessica Amaro, per her request. Voicemail left for Christine Langston from [facility] to notify.  PAS completed    1423 AVS faxed to [facility]    1600 H&P and 3 day [facility] faxed to Christine Langston from [place] as requested    [facility]  Written by: Vik Ellsworth RN    Patient Name: Tata Pulido  Attending Provider: Howard Jeffrey MD  Admit Date: 2023  7:11 PM  MRN: [id number]  Account: [de-identified]                     : 1950  Discharge Date: 2023      Disposition: [facility]    Vik Ellsworth RN

## 2023-01-20 NOTE — PLAN OF CARE
Problem: Skin/Tissue Integrity  Goal: Absence of new skin breakdown  Description: 1. Monitor for areas of redness and/or skin breakdown  2. Assess vascular access sites hourly  3. Every 4-6 hours minimum:  Change oxygen saturation probe site  4. Every 4-6 hours:  If on nasal continuous positive airway pressure, respiratory therapy assess nares and determine need for appliance change or resting period.   1/19/2023 2222 by Cheyenne Clark RN  Outcome: Progressing  1/19/2023 1838 by Saige Soto RN  Outcome: Progressing  1/19/2023 1410 by Saige Soto RN  Outcome: Progressing     Problem: Safety - Adult  Goal: Free from fall injury  1/19/2023 2222 by Cheyenne Clark RN  Outcome: Progressing  1/19/2023 1838 by Saige Soto RN  Outcome: Progressing  1/19/2023 1410 by Saige Soto RN  Outcome: Progressing     Problem: ABCDS Injury Assessment  Goal: Absence of physical injury  1/19/2023 2222 by Cheyenne Clark RN  Outcome: Progressing  1/19/2023 1838 by Saige Soto RN  Outcome: Progressing  1/19/2023 1410 by Saige Soto RN  Outcome: Progressing     Problem: Pain  Goal: Verbalizes/displays adequate comfort level or baseline comfort level  1/19/2023 2222 by Cheyenne Clark RN  Outcome: Progressing  1/19/2023 1838 by Saige Soto RN  Outcome: Progressing  Flowsheets (Taken 1/19/2023 1833)  Verbalizes/displays adequate comfort level or baseline comfort level: Encourage patient to monitor pain and request assistance  1/19/2023 1410 by Saige Soto RN  Outcome: Progressing     Problem: Discharge Planning  Goal: Discharge to home or other facility with appropriate resources  1/19/2023 2222 by Cheyenne Clark RN  Outcome: Progressing  1/19/2023 1838 by Saige Soto RN  Outcome: Progressing  Flowsheets (Taken 1/19/2023 1700)  Discharge to home or other facility with appropriate resources:   Identify barriers to discharge with patient and caregiver   Identify discharge learning needs (meds, wound care, etc)  1/19/2023 1410 by Bettie Galeazzi, RN  Outcome: Progressing  Flowsheets (Taken 1/19/2023 0900)  Discharge to home or other facility with appropriate resources: Identify barriers to discharge with patient and caregiver

## 2023-01-20 NOTE — PLAN OF CARE
Problem: Skin/Tissue Integrity  Goal: Absence of new skin breakdown  Description: 1. Monitor for areas of redness and/or skin breakdown  2. Assess vascular access sites hourly  3. Every 4-6 hours minimum:  Change oxygen saturation probe site  4. Every 4-6 hours:  If on nasal continuous positive airway pressure, respiratory therapy assess nares and determine need for appliance change or resting period.   1/20/2023 1648 by Jered Patterson RN  Outcome: Adequate for Discharge  1/20/2023 1548 by Jered Patterson RN  Outcome: Progressing     Problem: Safety - Adult  Goal: Free from fall injury  1/20/2023 1648 by Jered Patterson RN  Outcome: Adequate for Discharge  1/20/2023 1548 by Jered Patterson RN  Outcome: Progressing     Problem: ABCDS Injury Assessment  Goal: Absence of physical injury  1/20/2023 1648 by Jered Patterson RN  Outcome: Adequate for Discharge  1/20/2023 1548 by Jered Patterson RN  Outcome: Progressing     Problem: Pain  Goal: Verbalizes/displays adequate comfort level or baseline comfort level  1/20/2023 1648 by Jered Patterson RN  Outcome: Adequate for Discharge  1/20/2023 1548 by Jered Patterson RN  Outcome: Progressing  Flowsheets (Taken 1/20/2023 0925)  Verbalizes/displays adequate comfort level or baseline comfort level:   Encourage patient to monitor pain and request assistance   Assess pain using appropriate pain scale   Administer analgesics based on type and severity of pain and evaluate response     Problem: Discharge Planning  Goal: Discharge to home or other facility with appropriate resources  1/20/2023 1648 by Jered Patterson RN  Outcome: Adequate for Discharge  1/20/2023 1548 by Jeerd Patterson RN  Outcome: Progressing  Flowsheets (Taken 1/20/2023 0800)  Discharge to home or other facility with appropriate resources:   Identify barriers to discharge with patient and caregiver   Arrange for needed discharge resources and transportation as appropriate   Identify discharge learning needs (meds, wound care, etc)

## 2023-01-20 NOTE — PROGRESS NOTES
Neurosurgery MATHEUS/Resident    Daily Progress Note   CC:No chief complaint on file. 1/20/2023  8:15 AM    Chart reviewed. No acute events overnight. No new complaints. Resting in bed worked with PT yesterday MAX Ax2, tolerating  diet well, afebrile, drain in place with 60ml/12 hours     Vitals:    01/19/23 1833 01/19/23 1915 01/19/23 2300 01/20/23 0412   BP: 126/76 111/72 116/75 (!) 146/84   Pulse: 95 85 79 80   Resp: 21 20 19 21   Temp: 97.9 °F (36.6 °C) 97.7 °F (36.5 °C) 97.9 °F (36.6 °C)    TempSrc: Oral Oral Oral    SpO2: 96% 96% 95% (!) 88%   Weight:       Height:           PE:   AOx3   PERRL, EOMI  Cranial Nerves:    II: Visual acuity:  normal  III: Pupils:  equal, round, reactive to light  III,IV,VI: Extra Ocular Movements:intact  V: Facial sensation:  intact  VII: Facial strength: intact  VIII: Hearing:  intact  IX: Palate:  intact  XI: Shoulder shrug: intact  XII: Tongue movement: intact      Motor   L deltoid 1/5; R deltoid 5/5  L biceps 2/5; R biceps 5/5  L triceps 4/5; R triceps 5/5  L wrist extension 5/5; R wrist extension 5/5  L intrinsics 5/5; R intrinsics 5/5      L iliopsoas 5/5 , R iliopsoas 5/5  L quadriceps 5/5; R quadriceps 5/5  L Dorsiflexion 5/5; R dorsiflexion 5/5  L Plantarflexion 5/5; R plantarflexion 5/5  L EHL 5/5; R EHL 5/5    Sensation: intact     Drain output: 60ml/12 hours   Incision: CDI       Lab Results   Component Value Date    WBC 5.3 01/19/2023    HGB 11.6 (L) 01/19/2023    HCT 35.6 (L) 01/19/2023     01/19/2023    ALT 10 01/16/2023    AST 19 01/16/2023     01/19/2023    K 4.0 01/19/2023     01/19/2023    CREATININE 0.69 01/19/2023    BUN 8 01/19/2023    CO2 25 01/19/2023    TSH 3.86 01/18/2023    INR 1.1 01/14/2023    LABA1C 5.3 01/18/2023       Radiology   XR CERVICAL SPINE (4-5 VIEWS)    Result Date: 1/19/2023  EXAMINATION: 5 XRAY VIEWS OF THE CERVICAL SPINE 1/19/2023 10:14 am COMPARISON: None.  HISTORY: ORDERING SYSTEM PROVIDED HISTORY: followup postop; UPRIGHT AP AND LATERAL, FLEX/EXTENSION TECHNOLOGIST PROVIDED HISTORY: followup postop; UPRIGHT AP AND LATERAL, FLEX/EXTENSION followup postop; UPRIGHT AP AND LATERAL, FLEX/EXTENSION Reason for Exam: uprt on stretcher FINDINGS: Postop changes related to laminectomies and posterior metallic fusion from C3 through C7 are now noted. The hardware appears normally position with no evidence of loosening. Spinal alignment appears normal.  There is a drain in place. There is no prevertebral soft tissue swelling. Normal baseline study after posterior decompression and metallic fusion from C3 through C7. A/P  67 y.o. female who presents with  central cord syndrome, cervical stenosis with myelopathy, C5 nerve palsy  POD #2 s/p posterior cervical decompression fusion C3-7    Patient will need therapy for C5 nerve palsy- [facility] able to accept patient   Cervical collar on while out of bed okay to remove while resting in bed eating and drinking  PT OT  Encourage incentive spirometer  Neuro checks per floor protocol  On Lovenox for DVT prophylaxis  Will likely remove drain today     Please contact neurosurgery with any changes in patients neurologic status.        Alireza Parekh CNP  1/20/23  8:15 AM

## 2023-02-01 ENCOUNTER — OFFICE VISIT (OUTPATIENT)
Dept: NEUROSURGERY | Age: 73
End: 2023-02-01

## 2023-02-01 VITALS
HEIGHT: 67 IN | OXYGEN SATURATION: 99 % | WEIGHT: 180 LBS | SYSTOLIC BLOOD PRESSURE: 122 MMHG | DIASTOLIC BLOOD PRESSURE: 79 MMHG | TEMPERATURE: 97.3 F | HEART RATE: 90 BPM | BODY MASS INDEX: 28.25 KG/M2

## 2023-02-01 DIAGNOSIS — S14.129D CENTRAL CORD SYNDROME, SUBSEQUENT ENCOUNTER (HCC): Primary | ICD-10-CM

## 2023-02-01 DIAGNOSIS — Z98.1 S/P CERVICAL SPINAL FUSION: ICD-10-CM

## 2023-02-01 DIAGNOSIS — G99.2 STENOSIS OF CERVICAL SPINE WITH MYELOPATHY (HCC): ICD-10-CM

## 2023-02-01 DIAGNOSIS — M48.02 STENOSIS OF CERVICAL SPINE WITH MYELOPATHY (HCC): ICD-10-CM

## 2023-02-01 PROCEDURE — 99024 POSTOP FOLLOW-UP VISIT: CPT | Performed by: NURSE PRACTITIONER

## 2023-02-01 NOTE — PROGRESS NOTES
[address]  [facility] [address] [facility] [address]  Dept: [phone]    Patient:  Callie Dickinson  YOB: 1950  Date: 2/1/23    The patient is a 75 y.o. female who presents today for consult of the following problems:     Chief Complaint   Patient presents with    Other     Post op, wound check         HPI:     Callie Dickinson is a 75 y.o. female who presents to the office for post-op evaluation s/p posterior C3-C7 decompression fusion. Feels slight interval improvement to left arm and leg strength. Has been working ongoing therapy at facility. Still with axial discomfort, fairly well controlled with current regimen at facility. Incision healing well, no discharge, drainage, fevers or chills. Has been compliant with cervical collar. RUE 5/5  LUE 4/5 distally; 1/5 deltoid, 2-3/5 bicep  RLE 5/5 (exception ankle-in ortho boot)  LLE 3-4/5, diminished sensation. Date of surgery: 1/18/2023    Assessment and Plan:      1. Central cord syndrome, subsequent encounter ([facility] [address]    2. Stenosis of cervical spine with myelopathy ([facility] [address]    3. S/P cervical spinal fusion          Plan: Incision healing well, intact staples removed without difficulty, patient tolerated well. Reviewed cervical collar instructions okay to remove to sleep, eat and for hygiene, written order provided for facility. We will obtain upright x-rays prior to next postop in 6 weeks. Continue working with therapy, occupational as well as physical.    Followup: Return in about 6 weeks (around 3/15/2023), or if symptoms worsen or fail to improve. Prescriptions Ordered:  No orders of the defined types were placed in this encounter.        Orders Placed:  Orders Placed This Encounter   Procedures    XR CERVICAL SPINE (2-3 VIEWS)     Standing Status:   Future     Standing Expiration Date:   2/1/2024     Scheduling Instructions:      Standing AP/lateral     Order Specific Question:   Reason for exam:     Answer:   post-op posterior fusion    Nursing communication     Standing Status:   Future     Standing Expiration Date:   5/1/2023     Scheduling Instructions:      Collar when out of bed, ok to remove to eat, shower and for hygiene. Electronically signed by RITA Clayton CNP on 2/6/2023 at 1:42 PM    Please note that this chart was generated using voice recognition Dragon dictation software. Although every effort was made to ensure the accuracy of this automated transcription, some errors in transcription may have occurred.

## 2023-03-08 ENCOUNTER — APPOINTMENT (OUTPATIENT)
Dept: GENERAL RADIOLOGY | Age: 73
DRG: 853 | End: 2023-03-08
Payer: MEDICARE

## 2023-03-08 ENCOUNTER — ANESTHESIA (OUTPATIENT)
Dept: OPERATING ROOM | Age: 73
End: 2023-03-08
Payer: MEDICARE

## 2023-03-08 ENCOUNTER — APPOINTMENT (OUTPATIENT)
Dept: CT IMAGING | Age: 73
DRG: 853 | End: 2023-03-08
Payer: MEDICARE

## 2023-03-08 ENCOUNTER — HOSPITAL ENCOUNTER (INPATIENT)
Age: 73
LOS: 8 days | Discharge: NURSING, REHAB OR LONG-TERM CARE | DRG: 853 | End: 2023-03-16
Attending: EMERGENCY MEDICINE | Admitting: INTERNAL MEDICINE
Payer: MEDICARE

## 2023-03-08 ENCOUNTER — ANESTHESIA EVENT (OUTPATIENT)
Dept: OPERATING ROOM | Age: 73
End: 2023-03-08
Payer: MEDICARE

## 2023-03-08 DIAGNOSIS — R65.21 SEPTIC SHOCK (HCC): ICD-10-CM

## 2023-03-08 DIAGNOSIS — R79.89 ELEVATED BRAIN NATRIURETIC PEPTIDE (BNP) LEVEL: ICD-10-CM

## 2023-03-08 DIAGNOSIS — R41.82 ALTERED MENTAL STATUS, UNSPECIFIED ALTERED MENTAL STATUS TYPE: ICD-10-CM

## 2023-03-08 DIAGNOSIS — I26.99 OTHER ACUTE PULMONARY EMBOLISM, UNSPECIFIED WHETHER ACUTE COR PULMONALE PRESENT (HCC): Primary | ICD-10-CM

## 2023-03-08 DIAGNOSIS — K83.09 CHOLANGITIS: ICD-10-CM

## 2023-03-08 DIAGNOSIS — A41.9 SEPTIC SHOCK (HCC): ICD-10-CM

## 2023-03-08 DIAGNOSIS — N39.0 ACUTE UTI: ICD-10-CM

## 2023-03-08 DIAGNOSIS — K80.50 CHOLEDOCHOLITHIASIS: ICD-10-CM

## 2023-03-08 PROBLEM — J96.01 ACUTE RESPIRATORY FAILURE WITH HYPOXIA (HCC): Status: ACTIVE | Noted: 2023-03-08

## 2023-03-08 PROBLEM — A41.50 GRAM NEGATIVE SEPTICEMIA (HCC): Status: ACTIVE | Noted: 2023-03-08

## 2023-03-08 PROBLEM — G93.41 METABOLIC ENCEPHALOPATHY: Status: ACTIVE | Noted: 2023-03-08

## 2023-03-08 PROBLEM — K80.20 CHOLELITHIASIS: Status: ACTIVE | Noted: 2023-03-08

## 2023-03-08 LAB
ABSOLUTE EOS #: 0 K/UL (ref 0–0.4)
ABSOLUTE EOS #: <0.03 K/UL (ref 0–0.44)
ABSOLUTE IMMATURE GRANULOCYTE: 0 K/UL (ref 0–0.3)
ABSOLUTE IMMATURE GRANULOCYTE: 0.04 K/UL (ref 0–0.3)
ABSOLUTE LYMPH #: 0.23 K/UL (ref 1.1–3.7)
ABSOLUTE LYMPH #: 0.47 K/UL (ref 1–4.8)
ABSOLUTE MONO #: 0 K/UL (ref 0.1–0.8)
ABSOLUTE MONO #: 0.04 K/UL (ref 0.1–1.2)
ALBUMIN SERPL-MCNC: 2.4 G/DL (ref 3.5–5.2)
ALBUMIN SERPL-MCNC: 2.5 G/DL (ref 3.5–5.2)
ALBUMIN/GLOBULIN RATIO: 0.8 (ref 1–2.5)
ALBUMIN/GLOBULIN RATIO: 1 (ref 1–2.5)
ALLEN TEST: ABNORMAL
ALP SERPL-CCNC: 294 U/L (ref 35–104)
ALP SERPL-CCNC: 308 U/L (ref 35–104)
ALT SERPL-CCNC: 215 U/L (ref 5–33)
ALT SERPL-CCNC: 232 U/L (ref 5–33)
AMMONIA PLAS-SCNC: 39 UMOL/L (ref 11–51)
ANION GAP SERPL CALCULATED.3IONS-SCNC: 11 MMOL/L (ref 9–17)
ANION GAP SERPL CALCULATED.3IONS-SCNC: 16 MMOL/L (ref 9–17)
ANION GAP SERPL CALCULATED.3IONS-SCNC: 20 MMOL/L (ref 9–17)
ANION GAP: 12 MMOL/L (ref 7–16)
AST SERPL-CCNC: 463 U/L
AST SERPL-CCNC: 476 U/L
BACTERIA: ABNORMAL
BASOPHILS # BLD: 0 % (ref 0–2)
BASOPHILS # BLD: 1 % (ref 0–2)
BASOPHILS ABSOLUTE: 0 K/UL (ref 0–0.2)
BASOPHILS ABSOLUTE: 0.03 K/UL (ref 0–0.2)
BILIRUB SERPL-MCNC: 3.3 MG/DL (ref 0.3–1.2)
BILIRUB SERPL-MCNC: 4.2 MG/DL (ref 0.3–1.2)
BILIRUBIN URINE: ABNORMAL
BNP SERPL-MCNC: 4111 PG/ML
BUN SERPL-MCNC: 11 MG/DL (ref 8–23)
BUN SERPL-MCNC: 11 MG/DL (ref 8–23)
BUN SERPL-MCNC: 13 MG/DL (ref 8–23)
CA-I BLD-SCNC: 0.97 MMOL/L (ref 1.13–1.33)
CA-I BLD-SCNC: 1.15 MMOL/L (ref 1.13–1.33)
CALCIUM SERPL-MCNC: 6.9 MG/DL (ref 8.6–10.4)
CALCIUM SERPL-MCNC: 7.3 MG/DL (ref 8.6–10.4)
CALCIUM SERPL-MCNC: 8.1 MG/DL (ref 8.6–10.4)
CASTS UA: ABNORMAL /LPF (ref 0–2)
CASTS UA: ABNORMAL /LPF (ref 0–2)
CHLORIDE SERPL-SCNC: 109 MMOL/L (ref 98–107)
CHLORIDE SERPL-SCNC: 112 MMOL/L (ref 98–107)
CHLORIDE SERPL-SCNC: 118 MMOL/L (ref 98–107)
CO2 SERPL-SCNC: 13 MMOL/L (ref 20–31)
CO2 SERPL-SCNC: 14 MMOL/L (ref 20–31)
CO2 SERPL-SCNC: 16 MMOL/L (ref 20–31)
COLOR: ABNORMAL
CORTISOL COLLECTION INFO: ABNORMAL
CORTISOL COLLECTION INFO: ABNORMAL
CORTISOL: 47 UG/DL (ref 2.7–18.4)
CORTISOL: 93 UG/DL (ref 2.7–18.4)
CREAT SERPL-MCNC: 0.28 MG/DL (ref 0.5–0.9)
CREAT SERPL-MCNC: 0.34 MG/DL (ref 0.5–0.9)
CREAT SERPL-MCNC: 0.44 MG/DL (ref 0.5–0.9)
CRP SERPL HS-MCNC: 105.8 MG/L (ref 0–5)
EGFR, POC: >60 ML/MIN/1.73M2
EOSINOPHILS RELATIVE PERCENT: 0 % (ref 1–4)
EOSINOPHILS RELATIVE PERCENT: 0 % (ref 1–4)
EPITHELIAL CELLS UA: ABNORMAL /HPF (ref 0–5)
FDP: >5 UG/ML
FIBRINOGEN: 426 MG/DL (ref 140–420)
FIO2: 40
GFR SERPL CREATININE-BSD FRML MDRD: >60 ML/MIN/1.73M2
GLUCOSE BLD-MCNC: 106 MG/DL (ref 65–105)
GLUCOSE BLD-MCNC: 125 MG/DL (ref 74–100)
GLUCOSE BLD-MCNC: 129 MG/DL (ref 74–100)
GLUCOSE SERPL-MCNC: 107 MG/DL (ref 70–99)
GLUCOSE SERPL-MCNC: 113 MG/DL (ref 70–99)
GLUCOSE SERPL-MCNC: 126 MG/DL (ref 70–99)
GLUCOSE UR STRIP.AUTO-MCNC: NEGATIVE MG/DL
HCO3 VENOUS: 22.4 MMOL/L (ref 22–29)
HCT VFR BLD AUTO: 33.2 % (ref 36.3–47.1)
HCT VFR BLD AUTO: 33.4 % (ref 36.3–47.1)
HCT VFR BLD AUTO: 38.8 % (ref 36.3–47.1)
HGB BLD-MCNC: 10.8 G/DL (ref 11.9–15.1)
HGB BLD-MCNC: 11.1 G/DL (ref 11.9–15.1)
HGB BLD-MCNC: 13.3 G/DL (ref 11.9–15.1)
IMMATURE GRANULOCYTES: 0 %
IMMATURE GRANULOCYTES: 1 %
INR PPP: 1.9
KETONES UR STRIP.AUTO-MCNC: ABNORMAL MG/DL
LACTIC ACID, SEPSIS WHOLE BLOOD: 3.1 MMOL/L (ref 0.5–1.9)
LACTIC ACID, SEPSIS WHOLE BLOOD: 3.2 MMOL/L (ref 0.5–1.9)
LACTIC ACID, WHOLE BLOOD: 1.9 MMOL/L (ref 0.7–2.1)
LACTIC ACID, WHOLE BLOOD: 2.5 MMOL/L (ref 0.7–2.1)
LEUKOCYTE ESTERASE UR QL STRIP.AUTO: ABNORMAL
LIPASE SERPL-CCNC: 382 U/L (ref 13–60)
LYMPHOCYTES # BLD: 6 % (ref 24–43)
LYMPHOCYTES # BLD: 6 % (ref 24–44)
MAGNESIUM SERPL-MCNC: 1.1 MG/DL (ref 1.6–2.6)
MAGNESIUM SERPL-MCNC: 2.2 MG/DL (ref 1.6–2.6)
MCH RBC QN AUTO: 33.8 PG (ref 25.2–33.5)
MCH RBC QN AUTO: 34.5 PG (ref 25.2–33.5)
MCH RBC QN AUTO: 34.8 PG (ref 25.2–33.5)
MCHC RBC AUTO-ENTMCNC: 32.3 G/DL (ref 28.4–34.8)
MCHC RBC AUTO-ENTMCNC: 33.4 G/DL (ref 28.4–34.8)
MCHC RBC AUTO-ENTMCNC: 34.3 G/DL (ref 28.4–34.8)
MCV RBC AUTO: 101.2 FL (ref 82.6–102.9)
MCV RBC AUTO: 101.6 FL (ref 82.6–102.9)
MCV RBC AUTO: 106.7 FL (ref 82.6–102.9)
MONOCYTES # BLD: 0 % (ref 1–7)
MONOCYTES # BLD: 1 % (ref 3–12)
MORPHOLOGY: ABNORMAL
NEGATIVE BASE EXCESS, ART: 8 (ref 0–2)
NITRITE UR QL STRIP.AUTO: POSITIVE
NRBC AUTOMATED: 0 PER 100 WBC
NUCLEATED RED BLOOD CELLS: 3 PER 100 WBC
O2 DEVICE/FLOW/%: ABNORMAL
O2 SAT, VEN: 64 % (ref 60–85)
PCO2, VEN: 27.4 MM HG (ref 41–51)
PDW BLD-RTO: 15.8 % (ref 11.8–14.4)
PDW BLD-RTO: 15.9 % (ref 11.8–14.4)
PDW BLD-RTO: 16 % (ref 11.8–14.4)
PH VENOUS: 7.52 (ref 7.32–7.43)
PLATELET # BLD AUTO: 101 K/UL (ref 138–453)
PLATELET # BLD AUTO: 102 K/UL (ref 138–453)
PLATELET # BLD AUTO: 197 K/UL (ref 138–453)
PMV BLD AUTO: 11.7 FL (ref 8.1–13.5)
PMV BLD AUTO: 9.7 FL (ref 8.1–13.5)
PMV BLD AUTO: 9.9 FL (ref 8.1–13.5)
PO2, VEN: 28.7 MM HG (ref 30–50)
POC BUN: 15 MG/DL (ref 8–26)
POC CHLORIDE: 103 MMOL/L (ref 98–107)
POC CREATININE: 0.9 MG/DL (ref 0.51–1.19)
POC HCO3: 15.1 MMOL/L (ref 21–28)
POC HEMATOCRIT: 44 % (ref 36–46)
POC HEMOGLOBIN: 14.9 G/DL (ref 12–16)
POC IONIZED CALCIUM: 0.98 MMOL/L (ref 1.15–1.33)
POC LACTIC ACID: 2.28 MMOL/L (ref 0.56–1.39)
POC O2 SATURATION: 99 % (ref 94–98)
POC PCO2: 24.6 MM HG (ref 35–48)
POC PH: 7.4 (ref 7.35–7.45)
POC PO2: 120.8 MM HG (ref 83–108)
POC POTASSIUM: 7.3 MMOL/L (ref 3.5–5.1)
POC SODIUM: 135 MMOL/L (ref 138–146)
POC TCO2: 21 MMOL/L (ref 22–30)
POSITIVE BASE EXCESS, VEN: 1 (ref 0–3)
POTASSIUM SERPL-SCNC: 2.8 MMOL/L (ref 3.7–5.3)
POTASSIUM SERPL-SCNC: 4 MMOL/L (ref 3.7–5.3)
POTASSIUM SERPL-SCNC: 4.2 MMOL/L (ref 3.7–5.3)
PROCALCITONIN SERPL-MCNC: 6.62 NG/ML
PROT SERPL-MCNC: 5.1 G/DL (ref 6.4–8.3)
PROT SERPL-MCNC: 5.3 G/DL (ref 6.4–8.3)
PROT UR STRIP.AUTO-MCNC: 5.5 MG/DL (ref 5–8)
PROT UR STRIP.AUTO-MCNC: ABNORMAL MG/DL
PROTHROMBIN TIME: 19.7 SEC (ref 9.1–12.3)
RBC # BLD: 3.13 M/UL (ref 3.95–5.11)
RBC # BLD: 3.28 M/UL (ref 3.95–5.11)
RBC # BLD: 3.82 M/UL (ref 3.95–5.11)
RBC # BLD: ABNORMAL 10*6/UL
RBC CLUMPS #/AREA URNS AUTO: ABNORMAL /HPF (ref 0–4)
REASON FOR REJECTION: NORMAL
SAMPLE SITE: ABNORMAL
SARS-COV-2 RDRP RESP QL NAA+PROBE: NOT DETECTED
SEG NEUTROPHILS: 91 % (ref 36–65)
SEG NEUTROPHILS: 94 % (ref 36–66)
SEGMENTED NEUTROPHILS ABSOLUTE COUNT: 3.26 K/UL (ref 1.5–8.1)
SEGMENTED NEUTROPHILS ABSOLUTE COUNT: 7.33 K/UL (ref 1.8–7.7)
SODIUM SERPL-SCNC: 141 MMOL/L (ref 135–144)
SODIUM SERPL-SCNC: 143 MMOL/L (ref 135–144)
SODIUM SERPL-SCNC: 145 MMOL/L (ref 135–144)
SPECIFIC GRAVITY UA: 1.02 (ref 1–1.03)
SPECIMEN DESCRIPTION: NORMAL
TROPONIN I SERPL DL<=0.01 NG/ML-MCNC: 31 NG/L (ref 0–14)
TROPONIN I SERPL DL<=0.01 NG/ML-MCNC: 44 NG/L (ref 0–14)
TROPONIN I SERPL DL<=0.01 NG/ML-MCNC: 47 NG/L (ref 0–14)
TURBIDITY: ABNORMAL
URINE HGB: NEGATIVE
UROBILINOGEN, URINE: ABNORMAL
WBC # BLD AUTO: 3.6 K/UL (ref 3.5–11.3)
WBC # BLD AUTO: 7.8 K/UL (ref 3.5–11.3)
WBC # BLD AUTO: 8.5 K/UL (ref 3.5–11.3)
WBC UA: ABNORMAL /HPF (ref 0–5)
ZZ NTE CLEAN UP: ORDERED TEST: NORMAL
ZZ NTE WITH NAME CLEAN UP: SPECIMEN SOURCE: NORMAL

## 2023-03-08 PROCEDURE — 87186 SC STD MICRODIL/AGAR DIL: CPT

## 2023-03-08 PROCEDURE — 6360000004 HC RX CONTRAST MEDICATION: Performed by: EMERGENCY MEDICINE

## 2023-03-08 PROCEDURE — 99291 CRITICAL CARE FIRST HOUR: CPT

## 2023-03-08 PROCEDURE — 82565 ASSAY OF CREATININE: CPT

## 2023-03-08 PROCEDURE — 74328 X-RAY BILE DUCT ENDOSCOPY: CPT | Performed by: INTERNAL MEDICINE

## 2023-03-08 PROCEDURE — 3609014900 HC ERCP W/SPHINCTEROTOMY &/OR PAPILLOTOMY: Performed by: INTERNAL MEDICINE

## 2023-03-08 PROCEDURE — 85610 PROTHROMBIN TIME: CPT

## 2023-03-08 PROCEDURE — 2580000003 HC RX 258

## 2023-03-08 PROCEDURE — 82140 ASSAY OF AMMONIA: CPT

## 2023-03-08 PROCEDURE — C1769 GUIDE WIRE: HCPCS | Performed by: INTERNAL MEDICINE

## 2023-03-08 PROCEDURE — 82803 BLOOD GASES ANY COMBINATION: CPT

## 2023-03-08 PROCEDURE — 6360000002 HC RX W HCPCS: Performed by: PEDIATRICS

## 2023-03-08 PROCEDURE — 70450 CT HEAD/BRAIN W/O DYE: CPT

## 2023-03-08 PROCEDURE — 93005 ELECTROCARDIOGRAM TRACING: CPT

## 2023-03-08 PROCEDURE — C2625 STENT, NON-COR, TEM W/DEL SY: HCPCS | Performed by: INTERNAL MEDICINE

## 2023-03-08 PROCEDURE — 96367 TX/PROPH/DG ADDL SEQ IV INF: CPT

## 2023-03-08 PROCEDURE — 2000000000 HC ICU R&B

## 2023-03-08 PROCEDURE — 87088 URINE BACTERIA CULTURE: CPT

## 2023-03-08 PROCEDURE — 81001 URINALYSIS AUTO W/SCOPE: CPT

## 2023-03-08 PROCEDURE — 87086 URINE CULTURE/COLONY COUNT: CPT

## 2023-03-08 PROCEDURE — 36556 INSERT NON-TUNNEL CV CATH: CPT | Performed by: INTERNAL MEDICINE

## 2023-03-08 PROCEDURE — 85025 COMPLETE CBC W/AUTO DIFF WBC: CPT

## 2023-03-08 PROCEDURE — 94002 VENT MGMT INPAT INIT DAY: CPT

## 2023-03-08 PROCEDURE — 71045 X-RAY EXAM CHEST 1 VIEW: CPT

## 2023-03-08 PROCEDURE — 83605 ASSAY OF LACTIC ACID: CPT

## 2023-03-08 PROCEDURE — 87635 SARS-COV-2 COVID-19 AMP PRB: CPT

## 2023-03-08 PROCEDURE — 85014 HEMATOCRIT: CPT

## 2023-03-08 PROCEDURE — 87040 BLOOD CULTURE FOR BACTERIA: CPT

## 2023-03-08 PROCEDURE — 2580000003 HC RX 258: Performed by: STUDENT IN AN ORGANIZED HEALTH CARE EDUCATION/TRAINING PROGRAM

## 2023-03-08 PROCEDURE — 85027 COMPLETE CBC AUTOMATED: CPT

## 2023-03-08 PROCEDURE — 3700000000 HC ANESTHESIA ATTENDED CARE: Performed by: INTERNAL MEDICINE

## 2023-03-08 PROCEDURE — 36620 INSERTION CATHETER ARTERY: CPT

## 2023-03-08 PROCEDURE — 3609015100 HC ERCP STENT PLACEMENT BILIARY/PANCREATIC DUCT: Performed by: INTERNAL MEDICINE

## 2023-03-08 PROCEDURE — 74018 RADEX ABDOMEN 1 VIEW: CPT

## 2023-03-08 PROCEDURE — 83735 ASSAY OF MAGNESIUM: CPT

## 2023-03-08 PROCEDURE — 82533 TOTAL CORTISOL: CPT

## 2023-03-08 PROCEDURE — 80053 COMPREHEN METABOLIC PANEL: CPT

## 2023-03-08 PROCEDURE — 0FC98ZZ EXTIRPATION OF MATTER FROM COMMON BILE DUCT, VIA NATURAL OR ARTIFICIAL OPENING ENDOSCOPIC: ICD-10-PCS | Performed by: INTERNAL MEDICINE

## 2023-03-08 PROCEDURE — 3609015200 HC ERCP REMOVE CALCULI/DEBRIS BILIARY/PANCREAS DUCT: Performed by: INTERNAL MEDICINE

## 2023-03-08 PROCEDURE — 87077 CULTURE AEROBIC IDENTIFY: CPT

## 2023-03-08 PROCEDURE — 3700000001 HC ADD 15 MINUTES (ANESTHESIA): Performed by: INTERNAL MEDICINE

## 2023-03-08 PROCEDURE — 72125 CT NECK SPINE W/O DYE: CPT

## 2023-03-08 PROCEDURE — 36620 INSERTION CATHETER ARTERY: CPT | Performed by: INTERNAL MEDICINE

## 2023-03-08 PROCEDURE — 83880 ASSAY OF NATRIURETIC PEPTIDE: CPT

## 2023-03-08 PROCEDURE — 6370000000 HC RX 637 (ALT 250 FOR IP): Performed by: EMERGENCY MEDICINE

## 2023-03-08 PROCEDURE — 2720000010 HC SURG SUPPLY STERILE: Performed by: INTERNAL MEDICINE

## 2023-03-08 PROCEDURE — 84145 PROCALCITONIN (PCT): CPT

## 2023-03-08 PROCEDURE — 96374 THER/PROPH/DIAG INJ IV PUSH: CPT

## 2023-03-08 PROCEDURE — 43274 ERCP DUCT STENT PLACEMENT: CPT | Performed by: INTERNAL MEDICINE

## 2023-03-08 PROCEDURE — 80048 BASIC METABOLIC PNL TOTAL CA: CPT

## 2023-03-08 PROCEDURE — 6360000004 HC RX CONTRAST MEDICATION: Performed by: INTERNAL MEDICINE

## 2023-03-08 PROCEDURE — 86140 C-REACTIVE PROTEIN: CPT

## 2023-03-08 PROCEDURE — 2580000003 HC RX 258: Performed by: EMERGENCY MEDICINE

## 2023-03-08 PROCEDURE — 96366 THER/PROPH/DIAG IV INF ADDON: CPT

## 2023-03-08 PROCEDURE — 84484 ASSAY OF TROPONIN QUANT: CPT

## 2023-03-08 PROCEDURE — 80051 ELECTROLYTE PANEL: CPT

## 2023-03-08 PROCEDURE — 43264 ERCP REMOVE DUCT CALCULI: CPT | Performed by: INTERNAL MEDICINE

## 2023-03-08 PROCEDURE — 5A1935Z RESPIRATORY VENTILATION, LESS THAN 24 CONSECUTIVE HOURS: ICD-10-PCS | Performed by: INTERNAL MEDICINE

## 2023-03-08 PROCEDURE — 36556 INSERT NON-TUNNEL CV CATH: CPT

## 2023-03-08 PROCEDURE — 37799 UNLISTED PX VASCULAR SURGERY: CPT

## 2023-03-08 PROCEDURE — 71260 CT THORAX DX C+: CPT | Performed by: EMERGENCY MEDICINE

## 2023-03-08 PROCEDURE — 03HY32Z INSERTION OF MONITORING DEVICE INTO UPPER ARTERY, PERCUTANEOUS APPROACH: ICD-10-PCS | Performed by: INTERNAL MEDICINE

## 2023-03-08 PROCEDURE — 87205 SMEAR GRAM STAIN: CPT

## 2023-03-08 PROCEDURE — 85384 FIBRINOGEN ACTIVITY: CPT

## 2023-03-08 PROCEDURE — 83690 ASSAY OF LIPASE: CPT

## 2023-03-08 PROCEDURE — 74177 CT ABD & PELVIS W/CONTRAST: CPT

## 2023-03-08 PROCEDURE — 2500000003 HC RX 250 WO HCPCS: Performed by: EMERGENCY MEDICINE

## 2023-03-08 PROCEDURE — 96365 THER/PROPH/DIAG IV INF INIT: CPT

## 2023-03-08 PROCEDURE — 99291 CRITICAL CARE FIRST HOUR: CPT | Performed by: INTERNAL MEDICINE

## 2023-03-08 PROCEDURE — 6360000002 HC RX W HCPCS: Performed by: EMERGENCY MEDICINE

## 2023-03-08 PROCEDURE — 82947 ASSAY GLUCOSE BLOOD QUANT: CPT

## 2023-03-08 PROCEDURE — 6360000002 HC RX W HCPCS

## 2023-03-08 PROCEDURE — 87154 CUL TYP ID BLD PTHGN 6+ TRGT: CPT

## 2023-03-08 PROCEDURE — 0F798DZ DILATION OF COMMON BILE DUCT WITH INTRALUMINAL DEVICE, VIA NATURAL OR ARTIFICIAL OPENING ENDOSCOPIC: ICD-10-PCS | Performed by: INTERNAL MEDICINE

## 2023-03-08 PROCEDURE — 2500000003 HC RX 250 WO HCPCS

## 2023-03-08 PROCEDURE — 85362 FIBRIN DEGRADATION PRODUCTS: CPT

## 2023-03-08 PROCEDURE — 2709999900 HC NON-CHARGEABLE SUPPLY: Performed by: INTERNAL MEDICINE

## 2023-03-08 PROCEDURE — 0BH17EZ INSERTION OF ENDOTRACHEAL AIRWAY INTO TRACHEA, VIA NATURAL OR ARTIFICIAL OPENING: ICD-10-PCS | Performed by: INTERNAL MEDICINE

## 2023-03-08 PROCEDURE — 99221 1ST HOSP IP/OBS SF/LOW 40: CPT | Performed by: INTERNAL MEDICINE

## 2023-03-08 PROCEDURE — 02HV33Z INSERTION OF INFUSION DEVICE INTO SUPERIOR VENA CAVA, PERCUTANEOUS APPROACH: ICD-10-PCS | Performed by: INTERNAL MEDICINE

## 2023-03-08 PROCEDURE — 82330 ASSAY OF CALCIUM: CPT

## 2023-03-08 PROCEDURE — 6360000002 HC RX W HCPCS: Performed by: STUDENT IN AN ORGANIZED HEALTH CARE EDUCATION/TRAINING PROGRAM

## 2023-03-08 PROCEDURE — 96372 THER/PROPH/DIAG INJ SC/IM: CPT

## 2023-03-08 PROCEDURE — 3209999900 FLUORO FOR SURGICAL PROCEDURES

## 2023-03-08 PROCEDURE — 84520 ASSAY OF UREA NITROGEN: CPT

## 2023-03-08 DEVICE — BILIARY STENT WITH NAVIFLEXTM RX DELIVERY SYSTEM
Type: IMPLANTABLE DEVICE | Status: FUNCTIONAL
Brand: ADVANIX™ BILIARY

## 2023-03-08 RX ORDER — ROCURONIUM BROMIDE 10 MG/ML
INJECTION, SOLUTION INTRAVENOUS PRN
Status: DISCONTINUED | OUTPATIENT
Start: 2023-03-08 | End: 2023-03-08 | Stop reason: SDUPTHER

## 2023-03-08 RX ORDER — SODIUM CHLORIDE, SODIUM LACTATE, POTASSIUM CHLORIDE, CALCIUM CHLORIDE 600; 310; 30; 20 MG/100ML; MG/100ML; MG/100ML; MG/100ML
INJECTION, SOLUTION INTRAVENOUS CONTINUOUS PRN
Status: DISCONTINUED | OUTPATIENT
Start: 2023-03-08 | End: 2023-03-08 | Stop reason: SDUPTHER

## 2023-03-08 RX ORDER — VASOPRESSIN 20 [USP'U]/ML
INJECTION, SOLUTION INTRAMUSCULAR; SUBCUTANEOUS PRN
Status: DISCONTINUED | OUTPATIENT
Start: 2023-03-08 | End: 2023-03-08 | Stop reason: SDUPTHER

## 2023-03-08 RX ORDER — ACETAMINOPHEN 325 MG/1
650 TABLET ORAL EVERY 6 HOURS PRN
Status: DISCONTINUED | OUTPATIENT
Start: 2023-03-08 | End: 2023-03-13

## 2023-03-08 RX ORDER — PROPOFOL 10 MG/ML
5-50 INJECTION, EMULSION INTRAVENOUS CONTINUOUS
Status: DISCONTINUED | OUTPATIENT
Start: 2023-03-08 | End: 2023-03-10

## 2023-03-08 RX ORDER — SODIUM CHLORIDE, SODIUM LACTATE, POTASSIUM CHLORIDE, AND CALCIUM CHLORIDE .6; .31; .03; .02 G/100ML; G/100ML; G/100ML; G/100ML
30 INJECTION, SOLUTION INTRAVENOUS ONCE
Status: COMPLETED | OUTPATIENT
Start: 2023-03-08 | End: 2023-03-08

## 2023-03-08 RX ORDER — ETOMIDATE 2 MG/ML
INJECTION INTRAVENOUS PRN
Status: DISCONTINUED | OUTPATIENT
Start: 2023-03-08 | End: 2023-03-08 | Stop reason: SDUPTHER

## 2023-03-08 RX ORDER — SUCCINYLCHOLINE/SOD CL,ISO/PF 100 MG/5ML
SYRINGE (ML) INTRAVENOUS PRN
Status: DISCONTINUED | OUTPATIENT
Start: 2023-03-08 | End: 2023-03-08 | Stop reason: SDUPTHER

## 2023-03-08 RX ORDER — NOREPINEPHRINE BIT/0.9 % NACL 16MG/250ML
1-100 INFUSION BOTTLE (ML) INTRAVENOUS CONTINUOUS
Status: DISCONTINUED | OUTPATIENT
Start: 2023-03-08 | End: 2023-03-10

## 2023-03-08 RX ORDER — HEPARIN SODIUM 1000 [USP'U]/ML
40 INJECTION, SOLUTION INTRAVENOUS; SUBCUTANEOUS PRN
Status: DISCONTINUED | OUTPATIENT
Start: 2023-03-09 | End: 2023-03-10

## 2023-03-08 RX ORDER — IPRATROPIUM BROMIDE AND ALBUTEROL SULFATE 2.5; .5 MG/3ML; MG/3ML
SOLUTION RESPIRATORY (INHALATION)
COMMUNITY
Start: 2023-02-23

## 2023-03-08 RX ORDER — ACETAMINOPHEN 650 MG/1
650 SUPPOSITORY RECTAL EVERY 6 HOURS PRN
Status: DISCONTINUED | OUTPATIENT
Start: 2023-03-08 | End: 2023-03-13

## 2023-03-08 RX ORDER — METHOCARBAMOL 750 MG/1
750 TABLET, FILM COATED ORAL DAILY
Status: ON HOLD | COMMUNITY
End: 2023-03-16 | Stop reason: HOSPADM

## 2023-03-08 RX ORDER — ACETAMINOPHEN 325 MG/1
325 TABLET ORAL ONCE
Status: DISCONTINUED | OUTPATIENT
Start: 2023-03-08 | End: 2023-03-10

## 2023-03-08 RX ORDER — MAGNESIUM SULFATE IN WATER 40 MG/ML
2000 INJECTION, SOLUTION INTRAVENOUS ONCE
Status: COMPLETED | OUTPATIENT
Start: 2023-03-08 | End: 2023-03-08

## 2023-03-08 RX ORDER — SODIUM CHLORIDE 0.9 % (FLUSH) 0.9 %
5-40 SYRINGE (ML) INJECTION EVERY 12 HOURS SCHEDULED
Status: DISCONTINUED | OUTPATIENT
Start: 2023-03-08 | End: 2023-03-16 | Stop reason: HOSPADM

## 2023-03-08 RX ORDER — ENOXAPARIN SODIUM 100 MG/ML
1 INJECTION SUBCUTANEOUS ONCE
Status: COMPLETED | OUTPATIENT
Start: 2023-03-08 | End: 2023-03-08

## 2023-03-08 RX ORDER — MIDAZOLAM HYDROCHLORIDE 1 MG/ML
1-10 INJECTION, SOLUTION INTRAVENOUS CONTINUOUS
Status: DISCONTINUED | OUTPATIENT
Start: 2023-03-08 | End: 2023-03-10

## 2023-03-08 RX ORDER — HEPARIN SODIUM AND DEXTROSE 10000; 5 [USP'U]/100ML; G/100ML
5-30 INJECTION INTRAVENOUS CONTINUOUS
Status: DISCONTINUED | OUTPATIENT
Start: 2023-03-09 | End: 2023-03-10

## 2023-03-08 RX ORDER — SODIUM CHLORIDE, SODIUM LACTATE, POTASSIUM CHLORIDE, CALCIUM CHLORIDE 600; 310; 30; 20 MG/100ML; MG/100ML; MG/100ML; MG/100ML
INJECTION, SOLUTION INTRAVENOUS CONTINUOUS
Status: DISCONTINUED | OUTPATIENT
Start: 2023-03-08 | End: 2023-03-10

## 2023-03-08 RX ORDER — ONDANSETRON 2 MG/ML
4 INJECTION INTRAMUSCULAR; INTRAVENOUS EVERY 6 HOURS PRN
Status: DISCONTINUED | OUTPATIENT
Start: 2023-03-08 | End: 2023-03-16 | Stop reason: HOSPADM

## 2023-03-08 RX ORDER — FENTANYL CITRATE 50 UG/ML
50 INJECTION, SOLUTION INTRAMUSCULAR; INTRAVENOUS
Status: DISCONTINUED | OUTPATIENT
Start: 2023-03-08 | End: 2023-03-10

## 2023-03-08 RX ORDER — ACETAMINOPHEN 500 MG
1000 TABLET ORAL EVERY 6 HOURS PRN
Status: ON HOLD | COMMUNITY
End: 2023-03-16 | Stop reason: HOSPADM

## 2023-03-08 RX ORDER — FENTANYL CITRATE 50 UG/ML
INJECTION, SOLUTION INTRAMUSCULAR; INTRAVENOUS PRN
Status: DISCONTINUED | OUTPATIENT
Start: 2023-03-08 | End: 2023-03-08 | Stop reason: SDUPTHER

## 2023-03-08 RX ORDER — SODIUM CHLORIDE 0.9 % (FLUSH) 0.9 %
5-40 SYRINGE (ML) INJECTION PRN
Status: DISCONTINUED | OUTPATIENT
Start: 2023-03-08 | End: 2023-03-16 | Stop reason: HOSPADM

## 2023-03-08 RX ORDER — HEPARIN SODIUM 1000 [USP'U]/ML
80 INJECTION, SOLUTION INTRAVENOUS; SUBCUTANEOUS PRN
Status: DISCONTINUED | OUTPATIENT
Start: 2023-03-09 | End: 2023-03-10

## 2023-03-08 RX ORDER — POLYETHYLENE GLYCOL 3350 17 G/17G
17 POWDER, FOR SOLUTION ORAL DAILY PRN
Status: DISCONTINUED | OUTPATIENT
Start: 2023-03-08 | End: 2023-03-16 | Stop reason: HOSPADM

## 2023-03-08 RX ORDER — POTASSIUM CHLORIDE 29.8 MG/ML
20 INJECTION INTRAVENOUS
Status: COMPLETED | OUTPATIENT
Start: 2023-03-08 | End: 2023-03-08

## 2023-03-08 RX ORDER — ACETAMINOPHEN 650 MG/1
650 SUPPOSITORY RECTAL ONCE
Status: COMPLETED | OUTPATIENT
Start: 2023-03-08 | End: 2023-03-08

## 2023-03-08 RX ORDER — CALCIUM GLUCONATE 20 MG/ML
2000 INJECTION, SOLUTION INTRAVENOUS ONCE
Status: COMPLETED | OUTPATIENT
Start: 2023-03-08 | End: 2023-03-08

## 2023-03-08 RX ORDER — SODIUM CHLORIDE 9 MG/ML
INJECTION, SOLUTION INTRAVENOUS CONTINUOUS
Status: DISCONTINUED | OUTPATIENT
Start: 2023-03-08 | End: 2023-03-08

## 2023-03-08 RX ORDER — LIDOCAINE HYDROCHLORIDE 10 MG/ML
INJECTION, SOLUTION EPIDURAL; INFILTRATION; INTRACAUDAL; PERINEURAL PRN
Status: DISCONTINUED | OUTPATIENT
Start: 2023-03-08 | End: 2023-03-08 | Stop reason: SDUPTHER

## 2023-03-08 RX ORDER — HEPARIN SODIUM 1000 [USP'U]/ML
80 INJECTION, SOLUTION INTRAVENOUS; SUBCUTANEOUS ONCE
Status: COMPLETED | OUTPATIENT
Start: 2023-03-09 | End: 2023-03-09

## 2023-03-08 RX ORDER — ONDANSETRON 4 MG/1
4 TABLET, ORALLY DISINTEGRATING ORAL EVERY 8 HOURS PRN
Status: DISCONTINUED | OUTPATIENT
Start: 2023-03-08 | End: 2023-03-16 | Stop reason: HOSPADM

## 2023-03-08 RX ORDER — SODIUM CHLORIDE 9 MG/ML
INJECTION, SOLUTION INTRAVENOUS PRN
Status: DISCONTINUED | OUTPATIENT
Start: 2023-03-08 | End: 2023-03-16 | Stop reason: HOSPADM

## 2023-03-08 RX ORDER — 0.9 % SODIUM CHLORIDE 0.9 %
2000 INTRAVENOUS SOLUTION INTRAVENOUS ONCE
Status: COMPLETED | OUTPATIENT
Start: 2023-03-08 | End: 2023-03-08

## 2023-03-08 RX ADMIN — LIDOCAINE HYDROCHLORIDE 50 MG: 10 INJECTION, SOLUTION EPIDURAL; INFILTRATION; INTRACAUDAL; PERINEURAL at 13:34

## 2023-03-08 RX ADMIN — VASOPRESSIN 2 UNITS: 20 INJECTION INTRAVENOUS at 13:34

## 2023-03-08 RX ADMIN — VASOPRESSIN 1 UNITS: 20 INJECTION INTRAVENOUS at 14:54

## 2023-03-08 RX ADMIN — SODIUM CHLORIDE, PRESERVATIVE FREE 10 ML: 5 INJECTION INTRAVENOUS at 20:16

## 2023-03-08 RX ADMIN — PIPERACILLIN AND TAZOBACTAM 4500 MG: 4; .5 INJECTION, POWDER, FOR SOLUTION INTRAVENOUS at 09:20

## 2023-03-08 RX ADMIN — POTASSIUM CHLORIDE 20 MEQ: 29.8 INJECTION, SOLUTION INTRAVENOUS at 20:15

## 2023-03-08 RX ADMIN — PIPERACILLIN AND TAZOBACTAM 3375 MG: 3; .375 INJECTION, POWDER, LYOPHILIZED, FOR SOLUTION INTRAVENOUS at 17:27

## 2023-03-08 RX ADMIN — VANCOMYCIN HYDROCHLORIDE 2000 MG: 1 INJECTION, POWDER, LYOPHILIZED, FOR SOLUTION INTRAVENOUS at 10:03

## 2023-03-08 RX ADMIN — CALCIUM GLUCONATE 2000 MG: 20 INJECTION, SOLUTION INTRAVENOUS at 20:24

## 2023-03-08 RX ADMIN — SODIUM CHLORIDE, POTASSIUM CHLORIDE, SODIUM LACTATE AND CALCIUM CHLORIDE 1848 ML: 600; 310; 30; 20 INJECTION, SOLUTION INTRAVENOUS at 09:16

## 2023-03-08 RX ADMIN — MAGNESIUM SULFATE HEPTAHYDRATE 2000 MG: 40 INJECTION, SOLUTION INTRAVENOUS at 20:26

## 2023-03-08 RX ADMIN — ETOMIDATE 14 MG: 2 INJECTION, SOLUTION INTRAVENOUS at 13:34

## 2023-03-08 RX ADMIN — Medication 5 MCG/MIN: at 09:57

## 2023-03-08 RX ADMIN — POTASSIUM CHLORIDE 20 MEQ: 29.8 INJECTION, SOLUTION INTRAVENOUS at 18:50

## 2023-03-08 RX ADMIN — SODIUM CHLORIDE, POTASSIUM CHLORIDE, SODIUM LACTATE AND CALCIUM CHLORIDE: 600; 310; 30; 20 INJECTION, SOLUTION INTRAVENOUS at 17:56

## 2023-03-08 RX ADMIN — POTASSIUM CHLORIDE 20 MEQ: 29.8 INJECTION, SOLUTION INTRAVENOUS at 17:45

## 2023-03-08 RX ADMIN — FENTANYL CITRATE 25 MCG: 50 INJECTION, SOLUTION INTRAMUSCULAR; INTRAVENOUS at 13:56

## 2023-03-08 RX ADMIN — SODIUM CHLORIDE, POTASSIUM CHLORIDE, SODIUM LACTATE AND CALCIUM CHLORIDE: 600; 310; 30; 20 INJECTION, SOLUTION INTRAVENOUS at 13:28

## 2023-03-08 RX ADMIN — SODIUM CHLORIDE: 9 INJECTION, SOLUTION INTRAVENOUS at 20:22

## 2023-03-08 RX ADMIN — IOPAMIDOL 75 ML: 755 INJECTION, SOLUTION INTRAVENOUS at 11:32

## 2023-03-08 RX ADMIN — SODIUM CHLORIDE: 9 INJECTION, SOLUTION INTRAVENOUS at 12:49

## 2023-03-08 RX ADMIN — Medication 200 MG: at 13:34

## 2023-03-08 RX ADMIN — SODIUM CHLORIDE 2000 ML: 9 INJECTION, SOLUTION INTRAVENOUS at 15:25

## 2023-03-08 RX ADMIN — ROCURONIUM BROMIDE 50 MG: 10 INJECTION INTRAVENOUS at 14:26

## 2023-03-08 RX ADMIN — ACETAMINOPHEN 650 MG: 650 SUPPOSITORY RECTAL at 09:09

## 2023-03-08 RX ADMIN — ENOXAPARIN SODIUM 80 MG: 100 INJECTION SUBCUTANEOUS at 12:46

## 2023-03-08 RX ADMIN — SODIUM CHLORIDE: 9 INJECTION, SOLUTION INTRAVENOUS at 20:20

## 2023-03-08 ASSESSMENT — PULMONARY FUNCTION TESTS
PIF_VALUE: 21
PIF_VALUE: 22
PIF_VALUE: 21
PIF_VALUE: 21
PIF_VALUE: 22
PIF_VALUE: 21
PIF_VALUE: 21
PIF_VALUE: 23
PIF_VALUE: 22
PIF_VALUE: 24
PIF_VALUE: 21
PIF_VALUE: 22
PIF_VALUE: 21
PIF_VALUE: 22
PIF_VALUE: 23

## 2023-03-08 ASSESSMENT — PAIN SCALES - GENERAL: PAINLEVEL_OUTOF10: 0

## 2023-03-08 ASSESSMENT — PAIN - FUNCTIONAL ASSESSMENT: PAIN_FUNCTIONAL_ASSESSMENT: 0-10

## 2023-03-08 NOTE — CONSULTS
Department of Neurosurgery                                         Resident Consult Note      Reason for Consult: Post op eval   Requesting Physician: Kirt Zapata  Neurosurgeon:     History Obtained From: And patient's daughter    CHIEF COMPLAINT:     AMS    HISTORY OF PRESENT ILLNESS:       The patient is a 75 y.o. female who presents today from an F for altered mental status and hypotension. Was reportedly evaluated by staff at this ECF where she was found to be hypotensive, hypoxic, febrile. The patient was then brought to the emergency department for further work-up. On evaluation in ER patient was found to be febrile with elevated inflammatory markers. Patient is being admitted to ICU for work-up and treatment of sepsis. From a neurosurgical standpoint patient has a history of central cord syndrome with myelopathy and underwent decompressive laminectomy of C3-C7 with posterior lateral arthrodesis with Dr. Landry Montoya on 1/18/2022. We are consulted for postop evaluation. On examination today the patient's daughter is present. She reports that the patient is much more somnolent in confused than her usual self. She does report that she has been working with physical therapy at the [facility]. She reports that she notices her mother still having weakness with the left upper extremity. She also has some clawing of the right hand which apparently has been present prior to procedure. Ice, she denies any issues with her surgical incision or any acute neuro changes.     PAST MEDICAL HISTORY :       Past Medical History:        Diagnosis Date    Psoriasis        Past Surgical History:        Procedure Laterality Date    CERVICAL FUSION      POSTERIOR CERVICAL DECOMPRESSION AND FUSION C3-7    CERVICAL FUSION N/A 1/18/2023    C 3-7 POSTERIOR CERVICAL DECOMPRESSION AND FUSION performed by Nataliya Sahu DO at [address] [facility] ER History:   Social History     Socioeconomic History    Marital status: Single     Spouse name: Not on file    Number of children: Not on file    Years of education: Not on file    Highest education level: Not on file   Occupational History    Not on file   Tobacco Use    Smoking status: Former     Types: Cigarettes     Quit date: 2015     Years since quittin.1    Smokeless tobacco: Never   Vaping Use    Vaping Use: Never used   Substance and Sexual Activity    Alcohol use: Never    Drug use: Never    Sexual activity: Not on file   Other Topics Concern    Not on file   Social History Narrative    ** Merged History Encounter **          Social Determinants of Health     Financial Resource Strain: Not on file   Food Insecurity: Not on file   Transportation Needs: Not on file   Physical Activity: Not on file   Stress: Not on file   Social Connections: Not on file   Intimate Partner Violence: Not on file   Housing Stability: Not on file       Family History:   History reviewed. No pertinent family history. Allergies:  Patient has no known allergies. Home Medications:  Prior to Admission medications    Medication Sig Start Date End Date Taking? Authorizing Provider   gabapentin (NEURONTIN) 300 MG capsule Take 1 capsule by mouth in the morning and 1 capsule at noon and 1 capsule in the evening. Do all this for 7 days.  23  Edu Guzman Clarity, DO   triamcinolone (KENALOG) 0.1 % cream Apply to active psoriasis twice daily (not face, armpit or groin) 19   Charo Rosario MD   fluocinonide (LIDEX) 0.05 % external solution Apply to scalp psoriasis daily for rash 19   Charo Rosario MD       Current Medications:   Current Facility-Administered Medications: norepinephrine (LEVOPHED) 16 mg in sodium chloride 0.9 % 250 mL infusion, 1-100 mcg/min, IntraVENous, Continuous  acetaminophen (TYLENOL) tablet 325 mg, 325 mg, Oral, Once  0.9 % sodium chloride infusion, , IntraVENous, Continuous    REVIEW OF SYSTEMS:       CONSTITUTIONAL: Positive for fevers and chills   EYES: negative for double vision and photophobia    HEENT: negative for sore throat   RESPIRATORY: Positive for cough   CARDIOVASCULAR: negative for chest pain   GASTROINTESTINAL: Positive for nausea, vomiting   GENITOURINARY: negative for incontinence   MUSCULOSKELETAL: Positive for neck or back pain   NEUROLOGICAL: negative for visual disturbance and gait problems  Positive for weakness         Review of systems otherwise negative. PHYSICAL EXAM:       BP (!) 128/54   Pulse (!) 149   Temp (!) 103.5 °F (39.7 °C)   Resp (!) 37   Wt 180 lb (81.6 kg)   SpO2 100%   BMI 28.19 kg/m²     CONSTITUTIONAL:  Somnolent on examination. She responds to simple questions. She is oriented to self. HEAD:  normocephalic, atraumatic    EYES:  PERRLA, EOMI,     ENT:  moist mucous membranes    NECK:  Posterior incision is healing well with no significant erythema, fluctuance, drainage   BACK:  without midline tenderness, step-offs or deformities    LUNGS:  Equal air entry bilaterally     CARDIOVASCULAR:  Tachycardic   ABDOMEN:  Soft, no rigidity    NEUROLOGIC:  Patient somnolent on examination. Requires multiple prompts to respond to simple questions. Sensation intact to BUE. Motor examination limited, unclear if limited due to AMS. L/R deltoid 2/5, L/R bicpes 3/5. L/R wrist extensor 4/5. 4/5 L/R intrinsics. 4/5 motor lower extremity. SKIN:  no rash      LABS AND IMAGING:       CT OF THE CERVICAL SPINE WITHOUT CONTRAST 3/8/2023 10:38 am     TECHNIQUE:   CT of the cervical spine was performed without the administration of   intravenous contrast. Multiplanar reformatted images are provided for review. Automated exposure control, iterative reconstruction, and/or weight based   adjustment of the mA/kV was utilized to reduce the radiation dose to as low   as reasonably achievable. COMPARISON:   None.      HISTORY:   ORDERING SYSTEM PROVIDED HISTORY: recent surgery, now febrile, and hypotensive   TECHNOLOGIST PROVIDED HISTORY:   recent surgery, now febrile, and hypotensive   Decision Support Exception - unselect if not a suspected or confirmed   emergency medical condition->Emergency Medical Condition (MA)   Reason for Exam: febrile and hypotensive     FINDINGS:   BONES/ALIGNMENT: There is no acute fracture or traumatic malalignment. Lateral mass screws are noted at C3, C4, C5, C6 and C7 bilaterally. There   appear to be in good position. The patient has had laminectomies from C3 to   C6. DEGENERATIVE CHANGES: There is some anterior osteophytes noted at C4-C5,   C5-C6 and C6-C7 discs. SOFT TISSUES: There is no prevertebral soft tissue swelling. There are no   fluid collections noted. Impression:     Postoperative changes as described above. No evidence for any fluid   collections in the area to suggest CSF leak or abscess. EXAMINATION:   CT OF THE HEAD WITHOUT CONTRAST  3/8/2023 10:38 am     TECHNIQUE:   CT of the head was performed without the administration of intravenous   contrast. Automated exposure control, iterative reconstruction, and/or weight   based adjustment of the mA/kV was utilized to reduce the radiation dose to as   low as reasonably achievable. COMPARISON:   None. HISTORY:   ORDERING SYSTEM PROVIDED HISTORY: Altered, hypotensive   TECHNOLOGIST PROVIDED HISTORY:     Altered, hypotensive   Decision Support Exception - unselect if not a suspected or confirmed   emergency medical condition->Emergency Medical Condition (MA)   Reason for Exam: febrile and hypotensive     FINDINGS:   BRAIN/VENTRICLES: There is some mild cerebral atrophy. There are white   matter hypodensities in keeping with underlying microvascular disease. Posterior fossa structures are unremarkable. There is no acute intracranial   hemorrhage, mass effect or midline shift. No abnormal extra-axial fluid   collection. The gray-white differentiation is maintained without evidence of   an acute infarct.   There is no evidence of hydrocephalus. ORBITS: The visualized portion of the orbits demonstrate no acute abnormality. SINUSES: The visualized paranasal sinuses and mastoid air cells demonstrate   no acute abnormality. SOFT TISSUES/SKULL:  No acute abnormality of the visualized skull or soft   tissues. Impression:     No acute intracranial abnormality. Mild cerebral atrophy. White matter microvascular disease. ASSESSMENT AND PLAN:       Patient Active Problem List   Diagnosis    Mild ankle sprain, initial encounter    Mild sprain of right ankle, initial encounter    Stenosis of cervical spine with myelopathy (HCC)    Central cord syndrome ([facility] [address]       75 y.o. female postop from C3-C7 laminectomy and fusion admitted to ICU for sepsis work-up     -Low suspicion for postop infection.  -Patient does not need C-collar  -No further imaging needed from neurosurgical standpoint  -Please contact neurosurgery with any changes in patients neurologic status. Thank you for your consult.        Ulices Brown DO    3/8/2023  10:42 AM

## 2023-03-08 NOTE — PROCEDURES
PROCEDURE NOTE - CENTRAL VENOUS LINE PLACEMENT    PATIENT NAME: Julio Childs RECORD NO. [id number]  DATE: 3/8/2023  ATTENDING PHYSICIAN: Dr Olya Mooney DIAGNOSIS:  Need for IV access  POSTOPERATIVE DIAGNOSIS:  Same  PROCEDURE PERFORMED:  Right Internal Jugular Vein Central Line Insertion  PERFORMING PHYSICIAN: Kavita Dobbs MD  ANESTHESIA:  Local utilizing 1% lidocaine  ESTIMATED BLOOD LOSS:  Less than 25 ml  COMPLICATIONS:  None immediately appreciated. DISCUSSION:  Delmar Mosqueda is a 75y.o.-year-old female who requires central IV access. The history and physical examination were reviewed and confirmed. Due to emergent nature, consent was not obtained. PROCEDURE:  A timeout was initiated by the bedside nurse and was confirmed by those present. The patient was placed in a supine position. The skin overlying the Right Internal Jugular Vein was prepped with chlorhexadine and draped in sterile fashion. The skin was infiltrated with local anesthetic. The vessel and surrounding anatomy was visualized using ultrasound. Through the anesthetized region, the introducer needle was inserted into the internal jugular vein returning dark red non pulsatile blood. A guidewire was placed through the center of the needle with no resistance. Ultrasound confirmed presence of wire in the vein. A small incision made in the skin with a #11 scalpel blade. The dilator was inserted into the skin and vein over guidewire using Seldinger technique. The dilator was then removed and the catheter was placed in the vein over the guidewire using Seldinger technique. The guidewire was then removed and all ports aspirated and flushed appropriately. The catheter then secured using silk suture and a temporary sterile dressing was applied. No immediate complication was evident. All sponge, instrument and needle counts were correct at the completion of the procedure.     Postprocedural chest x-ray showed good position of the catheter with no evidence of hemothorax or pneumothorax. The patient tolerated the procedure well with no immediate complication evident. Mathew Garcia MD  PGY-1, Internal Medicine Resident  [place] [id number]         3/8/2023, 4:24 PM       I was present and supervise the procedure.   No immediate complications    Aldair Cornelius MD3/8/18578:11 PM

## 2023-03-08 NOTE — PLAN OF CARE
Problem: Respiratory - Adult  Goal: Achieves optimal ventilation and oxygenation  3/8/2023 1524 by Viridiana Rashid RCP  Outcome: Progressing

## 2023-03-08 NOTE — ED NOTES
Patient presents to the ED via EMS with c/o AMS and hypotension. Per report, patient was brought from an ECF and staff noted hypotension and AMS. Per report her BP was in the 12'G systolic at the [facility], EMS had 685'Y systolic. EMS stated that she had a cervical surgery at the end of January after a fall down the stairs. Patient has been at the [facility] for rehab. Patient is typically able to ambulate and speak in full sentences. Patient is changed into a gown, placed on cardiac monitor, EKG done, IV established, will continue to monitor. Dr. Tessa Joseph at bedside. Patient unable to answer any questions, able to follow commands. Patient is tachycardic, febrile, and hypoxic, placed on 4L nasal cannula.            Hailee Chester RN  03/08/23 3256

## 2023-03-08 NOTE — CONSULTS
Initial GI Consult Note  Today's Date and Time: 3/8/2023, 1:44 PM      Chief complain/Reason for consultation:   Possible ERCP    History of Present Illness:   Clotilda Hashimoto is a 75y.o.-year-old  female who was initially admitted on 3/8/2023. Patient seen at the request of Dr. Jada Tesfaye in the emergency room for possible cholangitis. Patient had recent neck surgery and has been for an extended care facility and currently confused and cannot provide any significant history. She was brought into the emergency room because of the fever and was found to be hypotensive tachycardic. The work-up in the emergency room showed abnormal liver chemistries and cholestatic pattern with minimal dilation of the bile duct and small PE. She received 1 dose of Lovenox for PE. I have personally reviewed the past medical history, past surgical history, medications, social history, and family history, and I have updated the database accordingly.   Past Medical History:     Past Medical History:   Diagnosis Date    Psoriasis        Past Surgical  History:     Past Surgical History:   Procedure Laterality Date    CERVICAL FUSION      POSTERIOR CERVICAL DECOMPRESSION AND FUSION C3-7    CERVICAL FUSION N/A 2023    C 3-7 POSTERIOR CERVICAL DECOMPRESSION AND FUSION performed by Kurtis Zelaya DO at [facility] OR       Medications:      acetaminophen  325 mg Oral Once       Social History:     Social History     Socioeconomic History    Marital status: Single     Spouse name: Not on file    Number of children: Not on file    Years of education: Not on file    Highest education level: Not on file   Occupational History    Not on file   Tobacco Use    Smoking status: Former     Types: Cigarettes     Quit date: 2015     Years since quittin.1    Smokeless tobacco: Never   Vaping Use    Vaping Use: Never used   Substance and Sexual Activity    Alcohol use: Never    Drug use: Never    Sexual activity: Not on file   Other Topics Concern    Not on file   Social History Narrative    ** Merged History Encounter **          Social Determinants of Health     Financial Resource Strain: Not on file   Food Insecurity: Not on file   Transportation Needs: Not on file   Physical Activity: Not on file   Stress: Not on file   Social Connections: Not on file   Intimate Partner Violence: Not on file   Housing Stability: Not on file       Family History:   History reviewed. No pertinent family history. Allergies:   Patient has no known allergies. Review of Systems:   Review of systems is not obtainable as patient is confused.   Physical Examination :   Patient Vitals for the past 8 hrs:   BP Temp Temp src Pulse Resp SpO2 Weight   03/08/23 1238 (!) 84/58 (!) 101.7 °F (38.7 °C) -- (!) 149 22 98 % --   03/08/23 1233 (!) 95/56 (!) 101.7 °F (38.7 °C) -- (!) 148 23 99 % --   03/08/23 1232 -- (!) 101.7 °F (38.7 °C) -- (!) 148 23 99 % --   03/08/23 1230 95/60 (!) 101.7 °F (38.7 °C) -- (!) 149 22 99 % --   03/08/23 1228 -- (!) 101.7 °F (38.7 °C) -- (!) 144 23 99 % --   03/08/23 1223 95/71 (!) 101.7 °F (38.7 °C) -- (!) 145 22 98 % --   03/08/23 1218 92/60 (!) 101.7 °F (38.7 °C) -- (!) 146 23 99 % --   03/08/23 1213 98/61 (!) 101.7 °F (38.7 °C) -- (!) 143 15 99 % --   03/08/23 1208 (!) 106/59 (!) 101.7 °F (38.7 °C) -- (!) 144 23 -- --   03/08/23 1203 (!) 105/56 (!) 101.7 °F (38.7 °C) -- (!) 145 22 -- --   03/08/23 1136 (!) 109/52 (!) 102.7 °F (39.3 °C) Bladder (!) 143 30 -- --   03/08/23 1129 (!) 92/52 (!) 102.7 °F (39.3 °C) -- (!) 134 16 100 % --   03/08/23 1128 (!) 73/45 (!) 102.7 °F (39.3 °C) -- (!) 133 (!) 8 96 % --   03/08/23 1127 -- (!) 102.7 °F (39.3 °C) -- (!) 131 13 92 % --   03/08/23 1123 (!) 68/48 (!) 102.4 °F (39.1 °C) -- (!) 129 (!) 38 96 % --   03/08/23 1103 -- (!) 102.9 °F (39.4 °C) -- (!) 143 24 -- --   03/08/23 1058 132/69 (!) 103.1 °F (39.5 °C) -- (!) 144 22 -- --   03/08/23 1053 (!) 107/50 (!) 103.1 °F (39.5 °C) -- (!) 147 27 98 % -- 03/08/23 1049 123/76 (!) 103.3 °F (39.6 °C) Rectal (!) 149 16 98 % --   03/08/23 1048 123/76 (!) 103.3 °F (39.6 °C) -- (!) 148 30 96 % --   03/08/23 1043 -- (!) 103.3 °F (39.6 °C) -- (!) 150 (!) 32 -- --   03/08/23 1033 -- -- -- -- -- 99 % --   03/08/23 1032 -- (!) 103.5 °F (39.7 °C) -- (!) 149 (!) 37 -- --   03/08/23 1028 -- (!) 103.6 °F (39.8 °C) -- (!) 146 29 -- --   03/08/23 1023 -- (!) 103.6 °F (39.8 °C) -- (!) 144 (!) 41 100 % --   03/08/23 1018 (!) 128/54 (!) 103.8 °F (39.9 °C) -- (!) 139 (!) 33 99 % --   03/08/23 1015 (!) 110/50 (!) 104 °F (40 °C) -- (!) 132 (!) 31 99 % --   03/08/23 1013 -- (!) 103.8 °F (39.9 °C) -- (!) 128 21 98 % --   03/08/23 1011 94/73 (!) 104.2 °F (40.1 °C) -- (!) 121 15 100 % --   03/08/23 1008 (!) 82/44 (!) 104.2 °F (40.1 °C) -- (!) 116 30 -- --   03/08/23 1003 (!) 83/43 (!) 104 °F (40 °C) -- (!) 116 29 99 % --   03/08/23 0958 (!) 83/46 (!) 103.3 °F (39.6 °C) -- (!) 116 30 -- --   03/08/23 0953 (!) 88/44 (!) 104.2 °F (40.1 °C) -- (!) 116 (!) 31 -- --   03/08/23 0948 (!) 83/50 (!) 104.4 °F (40.2 °C) -- (!) 119 30 -- --   03/08/23 0943 (!) 88/50 (!) 104.5 °F (40.3 °C) -- (!) 119 (!) 31 -- --   03/08/23 0938 (!) 84/54 (!) 104.5 °F (40.3 °C) -- (!) 120 (!) 33 98 % --   03/08/23 0933 (!) 89/53 (!) 104.5 °F (40.3 °C) -- (!) 122 (!) 35 97 % --   03/08/23 0928 (!) 95/44 (!) 104.4 °F (40.2 °C) -- (!) 125 (!) 33 -- --   03/08/23 0923 104/71 (!) 104 °F (40 °C) -- (!) 128 (!) 34 97 % --   03/08/23 0921 -- (!) 103.6 °F (39.8 °C) Rectal -- -- -- --   03/08/23 0918 114/62 (!) 103.1 °F (39.5 °C) -- (!) 127 (!) 42 -- --   03/08/23 0913 108/67 (!) 100.9 °F (38.3 °C) Bladder (!) 131 21 100 % --   03/08/23 0911 -- (!) 103.1 °F (39.5 °C) Bladder -- -- -- --   03/08/23 0908 94/64 (!) 102.6 °F (39.2 °C) -- (!) 129 (!) 38 99 % --   03/08/23 0907 94/64 (!) 102.4 °F (39.1 °C) Bladder (!) 130 16 99 % --   03/08/23 0904 -- (!) 101.1 °F (38.4 °C) Bladder -- -- -- --   03/08/23 0903 103/70 (!) 100.6 °F (38.1 °C) Bladder (!) 127 (!) 35 99 % --   03/08/23 0858 88/63 -- -- (!) 129 (!) 32 93 % --   03/08/23 0853 (!) 87/54 -- -- (!) 135 (!) 32 93 % --   03/08/23 0851 (!) 87/54 -- -- (!) 125 15 93 % 180 lb (81.6 kg)     General Appearance: Opens eyes on verbal command but disoriented x3. Head:  Normocephalic, no trauma  Eyes: No icterus, no pallor. Pulmonary/Chest: Breathing normal bilaterally. No accessory muscle use. Abdomen: Soft, non tender. Mild tenderness in the right upper quadrant but otherwise soft. All four Extremities: No cyanosis, clubbing, edema, or effusions. Neurologic: No asterixis. Medical Decision Making:   I have independently reviewed/ordered the following labs:  CBC with Differential:   Recent Labs     03/08/23  0906   WBC 3.6   HGB 13.3   HCT 38.8      LYMPHOPCT 6*   MONOPCT 1*     BMP:   Recent Labs     03/08/23  0850 03/08/23  0943   NA  --  143   K  --  4.2   CL  --  109*   CO2  --  14*   BUN  --  13   CREATININE 0.90 0.44*     Hepatic Function Panel:  @LABRCNT(PROT:2,LABALBU:2,BILIDIR:2,IBILI:2,BILITOT:2,ALKPHOS:2,ALT:2,AST:2)  )  Lab Results   Component Value Date/Time    RBC 3.82 03/08/2023 09:06 AM    WBC 3.6 03/08/2023 09:06 AM    TURBIDITY Turbid 03/08/2023 09:09 AM     Lab Results   Component Value Date/Time    CREATININE 0.44 03/08/2023 09:43 AM    CREATININE 0.90 03/08/2023 08:50 AM    GLUCOSE 107 03/08/2023 09:43 AM       Imaging: CT images were reviewed which showed distended gallbladder as well as distended bile duct. Impression :   Fever associated with cholestatic pattern LFT's and new dilation of bile duct with altered mental status are suspicious for cholangitis  Septic shock likely from cholangitis. Small PE currently on Lovenox. Recent neck fusion. Recommendations   I agree with ICU admission. Will plan for emergent ERCP. Further recommendations will be after the review of ERCP results.       Thank you for allowing us to participate in the care of this patient. Please call with questions.   Jeri Rashid MD, [facility]

## 2023-03-08 NOTE — ED NOTES
Patient to OR with Roney Galvan and surgical transporter. Patient transported on monitor.       Lord Cortney RN  03/08/23 1996

## 2023-03-08 NOTE — ED NOTES
The following labs were labeled with appropriate pt sticker and tubed to lab:     [x] Blue     [x] Lavender   [] on ice  [x] Green/yellow  [x] Green/black [] on ice  [] Reynolds Rohan  [] on ice  [x] Yellow  [x] Red  [] Type/ Screen  [] ABG  [] VBG    [] COVID-19 swab    [] Rapid  [] PCR  [] Flu swab  [] Peds Viral Panel     [] Urine Sample  [] Fecal Sample  [] Pelvic Cultures  [x] Blood Cultures  [x] X 2  [] STREP Cultures     Berniece Dubin, RN  03/08/23 2631

## 2023-03-08 NOTE — ANESTHESIA PRE PROCEDURE
Department of Anesthesiology  Preprocedure Note       Name:  Kaye Sin   Age:  75 y.o.  :  1950                                          MRN:  [id number]         Date:  3/8/2023      Surgeon: Jamel Berg):  Panchito Salguero MD    Procedure: Procedure(s):  ERCP STONE REMOVAL  ERCP SPHINCTER/PAPILLOTOMY  ERCP STENT INSERTION    Medications prior to admission:   Prior to Admission medications    Medication Sig Start Date End Date Taking? Authorizing Provider   ipratropium-albuterol (DUONEB) 0.5-2.5 (3) MG/3ML SOLN nebulizer solution  23   Historical Provider, MD   methocarbamol (ROBAXIN) 750 MG tablet Take 750 mg by mouth daily    Historical Provider, MD   acetaminophen (TYLENOL) 500 MG tablet Take 1,000 mg by mouth every 6 hours as needed for Pain    Historical Provider, MD   gabapentin (NEURONTIN) 300 MG capsule Take 1 capsule by mouth in the morning and 1 capsule at noon and 1 capsule in the evening. Do all this for 7 days. 23  Edu Wilson, DO   triamcinolone (KENALOG) 0.1 % cream Apply to active psoriasis twice daily (not face, armpit or groin) 19   Thanh Benson MD   fluocinonide (LIDEX) 0.05 % external solution Apply to scalp psoriasis daily for rash 19   Thanh Benson MD       Current medications:    No current facility-administered medications for this visit. No current outpatient medications on file.      Facility-Administered Medications Ordered in Other Visits   Medication Dose Route Frequency Provider Last Rate Last Admin    norepinephrine (LEVOPHED) 16 mg in sodium chloride 0.9 % 250 mL infusion  1-100 mcg/min IntraVENous Continuous Scherry Morale, DO 7.5 mL/hr at 23 1523 8 mcg/min at 23 1523    acetaminophen (TYLENOL) tablet 325 mg  325 mg Oral Once Scherry Morale, DO        0.9 % sodium chloride infusion   IntraVENous Continuous Scherry Morale,  mL/hr at 23 1328 NoRateChange at 23 1328    sodium chloride flush 0.9 % injection 5-40 mL  5-40 mL IntraVENous 2 times per day Terri Canard, DO        sodium chloride flush 0.9 % injection 5-40 mL  5-40 mL IntraVENous PRN Terri Canard, DO        0.9 % sodium chloride infusion   IntraVENous PRN Terri Canard, DO        ondansetron (ZOFRAN-ODT) disintegrating tablet 4 mg  4 mg Oral Q8H PRN Terri Canard, DO        Or    [facility] injection 4 mg  4 mg IntraVENous Q6H PRN Terri Canard, DO        polyethylene glycol (GLYCOLAX) packet 17 g  17 g Oral Daily PRN Terri Canard, DO        acetaminophen (TYLENOL) tablet 650 mg  650 mg Oral Q6H PRN Terri Canard, DO        Or    acetaminophen (TYLENOL) suppository 650 mg  650 mg Rectal Q6H PRN Terri Canard, DO        piperacillin-tazobactam (ZOSYN) 3,375 mg in sodium chloride 0.9 % 50 mL IVPB (mini-bag)  3,375 mg IntraVENous Q8H Frank Aguilar MD        midazolam (VERSED) 1 mg/mL in NS infusion  1-10 mg/hr IntraVENous Continuous Frank Aguilar MD        fentaNYL (SUBLIMAZE) injection 50 mcg  50 mcg IntraVENous Q2H PRN Frank Aguilar MD        propofol injection  5-50 mcg/kg/min IntraVENous Continuous Peggy Juan MD 14.7 mL/hr at 03/08/23 1522 30 mcg/kg/min at 03/08/23 1522    vancomycin (VANCOCIN) intermittent dosing (placeholder)   Other RX Placeholder Frank Aguilar MD        vancomycin (VANCOCIN) 1250 mg in sodium chloride 0.9% 250 mL IVPB  1,250 mg IntraVENous Q12H Frank Aguilar MD        0.9 % sodium chloride bolus  2,000 mL IntraVENous Once Peggy Juan .7 mL/hr at 03/08/23 1525 2,000 mL at 03/08/23 1525       Allergies:  No Known Allergies    Problem List:    Patient Active Problem List   Diagnosis Code    Mild ankle sprain, initial encounter S93.409A    Mild sprain of right ankle, initial encounter S93.401A    Stenosis of cervical spine with myelopathy ([facility] [address] M48.02, G99.2    Central cord syndrome ([facility] [address] Y14.269V    Septic shock ([facility] [address] A41.9, R65.21       Past Medical History:        Diagnosis Date    Psoriasis        Past Surgical History:        Procedure Laterality Date    CERVICAL FUSION      POSTERIOR CERVICAL DECOMPRESSION AND FUSION C3-7    CERVICAL FUSION N/A 2023    C 3-7 POSTERIOR CERVICAL DECOMPRESSION AND FUSION performed by Keeley Martinez DO at [address] [place] [address] Drive ERCP  2023       Social History:    Social History     Tobacco Use    Smoking status: Former     Types: Cigarettes     Quit date: 2015     Years since quittin.1    Smokeless tobacco: Never   Substance Use Topics    Alcohol use: Never                                Counseling given: Not Answered      Vital Signs (Current): There were no vitals filed for this visit.                                            BP Readings from Last 3 Encounters:   23 (!) 84/58   23 122/79   23 128/81       NPO Status:                                                                                 BMI:   Wt Readings from Last 3 Encounters:   23 180 lb (81.6 kg)   23 180 lb (81.6 kg)   23 180 lb (81.6 kg)     There is no height or weight on file to calculate BMI.    CBC:   Lab Results   Component Value Date/Time    WBC 3.6 2023 09:06 AM    RBC 3.82 2023 09:06 AM    HGB 13.3 2023 09:06 AM    HCT 38.8 2023 09:06 AM    .6 2023 09:06 AM    RDW 15.9 2023 09:06 AM     2023 09:06 AM       CMP:   Lab Results   Component Value Date/Time     2023 09:43 AM    K 4.2 2023 09:43 AM     2023 09:43 AM    CO2 14 2023 09:43 AM    BUN 13 2023 09:43 AM    CREATININE 0.44 2023 09:43 AM    CREATININE 0.90 2023 08:50 AM    GFRAA >60 07/15/2019 09:03 AM    LABGLOM >60 2023 09:43 AM    GLUCOSE 107 2023 09:43 AM    PROT 5.3 2023 09:43 AM    CALCIUM 7.3 2023 09:43 AM    BILITOT 3.3 2023 09:43 AM    ALKPHOS 294 2023 09:43 AM     2023 09:43 AM  03/08/2023 09:43 AM       POC Tests:   Recent Labs     03/08/23  0850   POCGLU 125*   POCNA 135*   POCK 7.3*   POCCL 103   POCBUN 15   POCHEMO 14.9   POCHCT 44       Coags:   Lab Results   Component Value Date/Time    PROTIME 11.5 01/14/2023 07:27 PM    INR 1.1 01/14/2023 07:27 PM    APTT 21.6 01/14/2023 07:27 PM       HCG (If Applicable): No results found for: PREGTESTUR, PREGSERUM, HCG, HCGQUANT     ABGs: No results found for: PHART, PO2ART, WON6HTP, TKE4LQP, BEART, V7HMPETX     Type & Screen (If Applicable):  No results found for: LABABO, LABRH    Drug/Infectious Status (If Applicable):  Lab Results   Component Value Date/Time    HEPCAB NONREACTIVE 06/18/2019 10:50 AM       COVID-19 Screening (If Applicable):   Lab Results   Component Value Date/Time    COVID19 Not Detected 03/08/2023 09:10 AM           Anesthesia Evaluation  Patient summary reviewed no history of anesthetic complications:   Airway: Mallampati: II     Neck ROM: full     Dental:    (+) edentulous      Pulmonary:Negative Pulmonary ROS and normal exam                               Cardiovascular:Negative CV ROS              Rate: abnormal                 ROS comment: Global left ventricular systolic function is normal. Estimated LVEF 55-60%. Mild left ventricular hypertrophy. Grade I (mild) left ventricular diastolic dysfunction. Normal right ventricular size and function. No significant valvular regurgitation or stenosis seen. PE comment: Tachycardic, on pressors. Neuro/Psych:   (+) neuromuscular disease:,              ROS comment: S/p cervical fusion. GI/Hepatic/Renal:            ROS comment: Presented septic. .   Endo/Other: Negative Endo/Other ROS                    Abdominal:             Vascular:   + PE. Other Findings:             Anesthesia Plan      general     ASA 3 - emergent       Induction: intravenous and rapid sequence. MIPS: Postoperative ventilation.   Anesthetic plan and risks discussed with Unable to obtain due to emergent nature. Plan discussed with CRNA.                     Renee Flynn MD   3/8/2023

## 2023-03-08 NOTE — ANESTHESIA POSTPROCEDURE EVALUATION
Department of Anesthesiology  Postprocedure Note    Patient: Desire Mckeon  MRN: [id number]  [place]: 1950  Date of evaluation: 3/8/2023      Procedure Summary     Date: 03/08/23 Room / Location: [address]    Anesthesia Start: 8792 Anesthesia Stop: 4227    Procedures:       ERCP STONE REMOVAL      ERCP SPHINCTER/PAPILLOTOMY      ERCP STENT INSERTION Diagnosis:       Cholangitis      (CHOLANGITIS)    Surgeons: Mark Ybarra MD Responsible Provider: Christy Montanez MD    Anesthesia Type: general ASA Status: 3 - Emergent          Anesthesia Type: No value filed.     Rachel Phase I:      Rachel Phase II:        Anesthesia Post Evaluation    Patient location during evaluation: ICU  Patient participation: complete - patient cannot participate  Level of consciousness: sedated and ventilated  Pain score: 0  Airway patency: patent  Nausea & Vomiting: no nausea and no vomiting  Complications: no  Cardiovascular status: hemodynamically stable  Respiratory status: acceptable  Hydration status: euvolemic

## 2023-03-08 NOTE — ED NOTES
RN brought pt to the OR on monitor. Detailed report given to OR RN and anesthesiologist. OR aware pt to go to inpatient floor.      Judi Medley RN  03/08/23 2333

## 2023-03-08 NOTE — ED NOTES
Pt resting comfortably on the cot, even and nonlabored RR, patient denies any needs at this time. Bed in lowest position. Call light within reach, will continue to monitor.        Michelle Albert RN  03/08/23 9521

## 2023-03-08 NOTE — OP NOTE
ERCP      Patient:   Melissa Mac   :    1950  Acc#:    [id number]   Referring/PCP: Gurmeet Thomas MD  Facility:   [facility]  Date:     3/8/2023   Endoscopist:  Graciela Burroughs MD, [facility]    Procedure: ERCP with  stone removal and stent placement. Indication: Cholangitis    Postprocedure diagnosis: Same    Anesthesia:  General     EBL: None from the procedure    Specimen: None    Description of Procedure:  Prior to the procedure, a history and physical exam was performed and informed consent was obtained. The risks were discussed including pancreatitis, bleeding, and perforation. After the patient was placed in the supine position eved, the therapeutic duodenoscope scope was inserted into the mouth and advanced to the second portion of the duodenum allowing the papilla to be visualized. Using the a wire guided approach with the sphincterotome, the CBD was cannulated and a cholangiogram was performed. Findings: The initial cholangiogram revealed Dilated CBD with filling defect in distal CBD    A 2 mm sphincterotomy was performed. The sphincterotome was exchanged, over a wire for a 12 mm above injection balloon. Multiple balloon sweeps were performed revealing Stone and pus. A terminal balloon occlusion cholangiongram Showed dilated duct and stone in cystic duct. The duodenoscope was removed and the patient tolerated the procedure well. The pancreatic duct was not manipulated during the procedure. Plan:  Transfer to ICU  Surgery consult for possible cholecystectomy once patient is more stable.    Repeat ERCP in 2 months for stent removal.    Electronically signed by Graciela Burroughs MD, FAC on 3/8/2023 at 3:00 PM

## 2023-03-08 NOTE — ED NOTES
Labeled blood specimens sent to lab via tube system. [x] Lavender   [x] on ice   [x] Blue   [x] Green/yellow  [x] Green/black [] on ice  [] Pink  [x] Red  [x] Yellow  [] on ice  [x] Blood Cultures     Labeled urine specimen sent to lab via tube system.     [x] Urine Sample   []  Clean catch   [] Straight cath   [] Urine voided   [x]  Indwelling catheter   []  Suprapubic catheter       Kerry Zelaya RN  03/08/23 5521

## 2023-03-08 NOTE — ED NOTES
Patient back to room with writer from CT, placed back on full cardiac monitor.       Carmen Vegas RN  03/08/23 3251

## 2023-03-08 NOTE — H&P
Critical Care - History and Physical Examination    Patient's name:  Anuja Toney  Medical Record Number: [id number]  Patient's account/billing number: [de-identified]  Patient's YOB: 1950  Age: 75 y.o. Date of Admission: 3/8/2023  8:48 AM  Reason of ICU admission:   Date of History and Physical Examination: 3/8/2023      Primary Care Physician: Dave Gonzalez MD  Attending Physician:    Code Status: Full Code    Chief complaint:     Reason for ICU admission:       History Of Present Illness:   History was obtained from chart review. Anuja Toney is a 75 y.o. presented from [facility] for altered mental status and hypotension. As per report her systolic BP was in the 73Z. She has a recent history of cervical spine fusion on 01/18, follows up with neurosurgery. On arrival to the ER she had a GCS of 11, was febrile, tachycardic and hypotensive. She was also found to be hypoxic was placed on 4 L of oxygen. CT head showed no acute intracranial abnormality, mild cerebral atrophy with white matter microvascular disease. CT cervical spine did not reveal any CSF leak or abscess. CT chest abdomen and pelvis was suggestive of acute PE and subsegmental branches of the posterior basal right lower lobe, cholelithiasis with biliary ductal dilatation. Labs showed creatinine 1.4, lactic acid of 3.2, Pro-[place] of 6.62, , BNP of 4111, alk phos of 300, , , bilirubin 3.3, WBC count 3.6, UA positive for and positive nitrate. Neurosurgery was consulted from the ER, had a low suspicion for postop infection. GI was consulted, patient underwent ERCP with stone removal and stent placement. During the procedure she was hypotensive was started on Levophed. She remained intubated and sedated with propofol. After the procedure she was transferred to ICU.         Past Medical History:        Diagnosis Date    Psoriasis        Past Surgical History:        Procedure Laterality Date    CERVICAL FUSION      POSTERIOR CERVICAL DECOMPRESSION AND FUSION C3-7    CERVICAL FUSION N/A 01/18/2023    C 3-7 POSTERIOR CERVICAL DECOMPRESSION AND FUSION performed by Ijeoma Tavarez DO at [facility]    ERCP  03/08/2023    ERCP STONE REMOVAL, ERCP SPHINCTER/PAPILLOTOMY,    ERCP STENT INSERTION       Allergies:    No Known Allergies      Home Medications:   Prior to Admission medications    Medication Sig Start Date End Date Taking? Authorizing Provider   methocarbamol (ROBAXIN) 750 MG tablet Take 750 mg by mouth daily   Yes Historical Provider, MD   acetaminophen (TYLENOL) 500 MG tablet Take 1,000 mg by mouth every 6 hours as needed for Pain   Yes Historical Provider, MD   ipratropium-albuterol (DUONEB) 0.5-2.5 (3) MG/3ML SOLN nebulizer solution  2/23/23   Historical Provider, MD   gabapentin (NEURONTIN) 300 MG capsule Take 1 capsule by mouth in the morning and 1 capsule at noon and 1 capsule in the evening. Do all this for 7 days. 1/20/23 1/27/23  Edu Brown DO   triamcinolone (KENALOG) 0.1 % cream Apply to active psoriasis twice daily (not face, armpit or groin) 7/31/19   Marcy Austin MD   fluocinonide (LIDEX) 0.05 % external solution Apply to scalp psoriasis daily for rash 7/31/19   Marcy Austin MD       Social History:   TOBACCO:   reports that she quit smoking about 8 years ago. Her smoking use included cigarettes. She has never used smokeless tobacco.  ETOH:   reports no history of alcohol use. DRUGS:  reports no history of drug use. OCCUPATION:      Family History:   History reviewed. No pertinent family history.         REVIEW OF SYSTEMS (ROS):  Review of Systems - Negative except mentioned in HPI   General ROS: negative  Psychological ROS: negative  Ophthalmic ROS: negative  ENT: negative  Hematological and Lymphatic ROS: negative  Endocrine ROS: negative  Breast ROS: negative  Respiratory ROS: no cough, shortness of breath, or wheezing  Cardiovascular ROS: no chest pain or dyspnea on exertion  Gastrointestinal ROS:negative  Genito-Urinary ROS: negative  Musculoskeletal ROS: negative  Neurological ROS: negative  Dermatological ROS: negative      Physical Exam:    Vitals: BP (!) 114/58   Pulse (!) 127   Temp 99.3 °F (37.4 °C)   Resp 22   Wt 180 lb (81.6 kg)   SpO2 97%   BMI 28.19 kg/m²     Body weight:   Wt Readings from Last 3 Encounters:   03/08/23 180 lb (81.6 kg)   02/01/23 180 lb (81.6 kg)   01/14/23 180 lb (81.6 kg)       Body Mass Index : Body mass index is 28.19 kg/m². PHYSICAL EXAMINATION :  Constitutional: Appears well, in no distress  EENT: PERRLA, EOMI, sclera clear, anicteric, oropharynx clear, no lesions, neck supple with midline trachea. Neck: Supple, symmetrical, trachea midline, no adenopathy, thyroid symmetric, no jvd skin normal  Respiratory: clear to auscultation, no wheezes or rales and unlabored breathing. No intercostal tenderness  Cardiovascular: regular rate and rhythm, normal S1, S2, no murmur noted and 2+ pulses throughout  Abdomen: soft, nontender, nondistended, no masses or organomegaly  Neurological:  Extremities:  peripheral pulses normal, no pedal edema, no clubbing or cyanosis      Laboratory findings:-    CBC:   Recent Labs     03/08/23  1546   WBC 7.8   HGB 10.8*   *     BMP:    Recent Labs     03/08/23  0850 03/08/23  0943   NA  --  143   K  --  4.2   CL  --  109*   CO2  --  14*   BUN  --  13   CREATININE 0.90 0.44*   GLUCOSE  --  107*     S. Calcium:  Recent Labs     03/08/23  0943   CALCIUM 7.3*     S. Ionized Calcium:No results for input(s): IONCA in the last 72 hours. S. Magnesium:No results for input(s): MG in the last 72 hours. S. Phosphorus:No results for input(s): PHOS in the last 72 hours. S. Glucose:  Recent Labs     03/08/23  0850 03/08/23  1620   POCGLU 125* 129*     Glycosylated hemoglobin A1C: No results for input(s): LABA1C in the last 72 hours.   INR:   Recent Labs     03/08/23  1546   INR 1.9     Hepatic functions:   Recent Labs     03/08/23  0943   ALKPHOS 294*   *   *   PROT 5.3*   BILITOT 3.3*   LABALBU 2.4*     Pancreatic functions:No results for input(s): LACTA, AMYLASE in the last 72 hours. S. Lactic Acid: No results for input(s): LACTA in the last 72 hours. Cardiac enzymes:No results for input(s): CKTOTAL, CKMB, CKMBINDEX, TROPONINI in the last 72 hours. BNP:No results for input(s): BNP in the last 72 hours. Lipid profile: No results for input(s): CHOL, TRIG, HDL, LDL, LDLCALC in the last 72 hours. Blood Gases: No results found for: PH, PCO2, PO2, HCO3, O2SAT  Thyroid functions:   Lab Results   Component Value Date/Time    TSH 3.86 01/18/2023 06:37 PM        Urinalysis:     Microbiology:  Cultures during this admission:     Blood cultures:                 [] None drawn      [] Negative             []  Positive (Details:  )  Urine Culture:                   [] None drawn      [] Negative             []  Positive (Details:  )  Sputum Culture:               [] None drawn       [] Negative             []  Positive (Details:  )   Endotracheal aspirate:     [] None drawn       [] Negative             []  Positive (Details:  )         -----------------------------------------------------------------  Radiological reports:    CXR:    Electrocardiogram:     Last Echocardiogram findings:          Assessment and Plan     Principal Problem:    Septic shock ([facility] [address]  Active Problems:    Ascending cholangitis    Cholelithiasis  Resolved Problems:    * No resolved hospital problems. *    PLAN/MEDICAL DECISION MAKING:    Neurologic: Hx of Cervical spine fusion, Altered mental status on arrival.   Neuro intact  Neuro checks per protocol  propofol and versed  Currently intubated and sedated, with propofol. Wean off propofol and start versed. Cardiovascular: Septic shock likely secondary to ascending cholangitis  Hemodynamically stable  MAP goal more than 65  Continue Levophed.   IV fluid bolus.    Pulmonary: Acute subsegmental PE, intubated sedated  Maintain oxygen sats >92%  Pulmonary toilet  Vent Information  Ventilator ID: [id number]  Vent settings at 20/490/5/30 percent  ABG shows pH of 7.39/PCO2 24.6/PO2 120/bicarb 15.1. Continue on IV heparin for PE    GI/Nutrition: Ascending cholangitis, cholelithiasis  S/p ERCP with stone removal and stent placement. Ulcer Prophylaxis:  not indicated  Diet:Diet NPO  GI recommended surgery consult for cholecystectomy once more stable. Repeat ERCP in 2 months for stent removal.    Renal/Fluid/Electrolyte: Septic shock secondary to cholangitis  IV Fluids: 0.9NS @ 100 mL/Hr   I/O: In: 700 [I.V.:700]  Out: 475 [Urine:475]  Monitor electrolytes, replace PRN   S/p 2 L bolus fluid. ID: Ascending colitis, UTI, febrile  WBC: 7.8  Lab Results   Component Value Date    WBC 7.8 2023     Tmax: Temp (24hrs), Av.5 °F (39.2 °C), Min:99.3 °F (37.4 °C), Max:104.5 °F (40.3 °C)  Start on on Vanco and Zosyn. Hematology:  Recent Labs     23  0906 23  1546   HGB 13.3 10.8*    stable  Endocrine:   glucose controlled - most recent BGL is   Recent Labs     23  0943   GLUCOSE 107*     DVT Prophylaxis  Heparin  Discharge Needs:  PT, OT, and Case Management      CODE STATUS: Full Code      Nik Hahn MD. PGY- 1  Internal Medicine Resident  [place], [place]  3/8/2023, 4:50 PM      Attending Physician Statement  I have discussed the care of Melissa Mac, including pertinent history and exam findings with the resident. I have reviewed the key elements of all parts of the encounter with the resident. I have seen and examined the patient with the resident. I agree with the assessment and plan and status of the problem list as documented. I have seen the patient after patient arrived to medical ICU have reviewed the chart multiple labs seen, ventilator setting reviewed and ventilator adjustment done.   Patient has a history of central cord syndrome with history of decompressive laminectomy of cervical spine by neurosurgery on 01/18/2023 and had been in rehabilitation apparently was more somnolent and had altered mental status she was also febrile and tachycardic. She was sent to emergency room and there she was significantly febrile, tachycardic with heart rate of 130s to 140s with sinus tachycardia hypoxic requiring 4 L of oxygen altered mental status and was tachypneic. Patient in the emergency room had received 750 mL of lactated Ringer's for hypotension and is started on Levophed. She had a CT scan of the head done which was negative for acute bleeding or infarction CT cervical spine was done also and was not suggestive of any leak or infectious cause the ER CT scan of the abdomen shows that she has bili direct dilatation bilirubin were elevated, she was febrile of greater than 101 and tachycardic in the emergency room. CTA chest was positive for small subsegmental isolated right lower lobe pulm embolism very small lower. Patient did receive 1 dose of Lovenox full dose by ER attending. GI was consulted and patient was taken directly to the OR and had ERCP done she was intubated in the emergency room and will in the OR and was kept intubated. Dr. Frank Izaguirre had performed ERCP and she had a stone retracted from the bile duct no incision of ampulla was done because of patient received Lovenox and a stent was placed. Initial labs shows metabolic acidosis with anion gap and non-anion gap potassium was 4.2 sodium 143 her procalcitonin was 6.62 CRP was 105 troponin was 31 AST was 476 ALT was 215 bilirubin was 3.3 and lipase was 382 WBC was 3.6 hemoglobin 13.3 and platelet count was 706. When I examined her she was intubated on ventilator she was also paralyzed in the OR so she did not have corneal reflex but pupil reflex were present there was no spontaneous movement at that time.   Patient was on propofol 50 mcg and she was on Levophed 10 mcg. On ventilator she was on rate of 16 tidal volume 500 PEEP was 5 and she was 60% she was saturating 100% at that time. Ascending cholangitis secondary to bile duct stone because of further sepsis and septic shock. Hypovolemia along with sepsis and septic shock. Likely gram-negative bacteremia from ascending cholangitis. Metabolic acidosis anion gap and nongap. Acute hypoxic respiratory failure secondary to sepsis septic shock  Small isolated subsegmental right lower lobe pulm embolism. History of cervical spine surgery January 2023. We will give 2 L of IV fluid now. Continue with IV fluid at 100 to 125 mill an hour with lactated Ringer's. Chest x-ray. We will get an ABG. Zosyn and vancomycin to continue pending cultures. Follow-up blood cultures. Lactic acid and will follow-up lactic acid. Follow-up LFTs daily. Will check INR fibrinogen fibrin split products as she likely have derangement secondary to severe sepsis. If INR is elevated then will follow-up INR in the morning. Follow-up urine output and renal function. Ventilator adjustments were done and will adjust further according to ABG. We will continue with Levophed drip and will try to wean down Levophed drip. She may and likely will need additional fluid boluses. Currently she is on propofol we will try to wean down propofol and if needed will start on Versed drip and if she is hypotensive will wean off propofol drip. Will use fentanyl as needed. Follow-up neurological status and mentation. Arterial line and central line placement. I was present and supervised the procedure. Please see separate procedure note. Will start heparin drip tonight as she had received full dose of Lovenox 1 dose    Discussed with nursing staff, treatment and plan discussed.   Discussed with respiratory therapist.     Total critical care time caring for this patient with life threatening, unstable organ failure, including direct patient contact, management of life support systems, review of data including imaging and labs, discussions with other team members and physicians at least 60 min so far today, excluding procedures. Please note that this chart was generated using voice recognition Dragon dictation software. Although every effort was made to ensure the accuracy of this automated transcription, some errors in transcription may have occurred.       Anna Arnold MD  3/8/2023 3:11 PM

## 2023-03-08 NOTE — ED PROVIDER NOTES
[facility] ED  Emergency Department Encounter  Emergency Medicine Resident     Pt Name:Kristie Palacios  MRN: [id number]  [place] 1950  Date of evaluation: 3/8/23  PCP:  Meena Cary MD      [address] [place] [address]       Chief Complaint   Patient presents with    Altered Mental Status    Hypotension       HISTORY OF PRESENT ILLNESS  (Location/Symptom, Timing/Onset, Context/Setting, Quality, Duration, Modifying Factors, Severity.)      Clay Mendez is a 75 y.o. female who presents with from [facility]. Patient was evaluated by staff for morning. Recent cervical surgery  , living at facility. Vitals were hypotensive for staff, and reported episode of emesis last night. Code status is full  Was also hypoxic. And placed on oxygen. 4L, o2 sat on room air was 83%    Upon arrival patient does open eyes, and bey simple commands. GCS of 11, dry mucus membranes, tachycardic. Surgery on  was  Laminectomy to decompress the spinal cord at C3, C4, C5, C6, C7  Posterior lateral arthrodesis at C3, C4, C5, C6, C7  Segmental fixation with lateral mass screws and rods at C3, C4, C5, C6, C7  Use of morselized autograft via same incision  Use of fluoroscopy    PAST MEDICAL / SURGICAL / SOCIAL / FAMILY HISTORY      has a past medical history of Psoriasis. has a past surgical history that includes cervical fusion; cervical fusion (N/A, 2023); and ERCP (2023).       Social History     Socioeconomic History    Marital status: Single     Spouse name: Not on file    Number of children: Not on file    Years of education: Not on file    Highest education level: Not on file   Occupational History    Not on file   Tobacco Use    Smoking status: Former     Types: Cigarettes     Quit date: 2015     Years since quittin.1    Smokeless tobacco: Never   Vaping Use    Vaping Use: Never used   Substance and Sexual Activity    Alcohol use: Never    Drug use: Never    Sexual activity: Not on file Other Topics Concern    Not on file   Social History Narrative    ** Merged History Encounter **          Social Determinants of Health     Financial Resource Strain: Not on file   Food Insecurity: Not on file   Transportation Needs: Not on file   Physical Activity: Not on file   Stress: Not on file   Social Connections: Not on file   Intimate Partner Violence: Not on file   Housing Stability: Not on file       History reviewed. No pertinent family history. Allergies:  Patient has no known allergies. Home Medications:  Prior to Admission medications    Medication Sig Start Date End Date Taking? Authorizing Provider   methocarbamol (ROBAXIN) 750 MG tablet Take 750 mg by mouth daily   Yes Historical Provider, MD   acetaminophen (TYLENOL) 500 MG tablet Take 1,000 mg by mouth every 6 hours as needed for Pain   Yes Historical Provider, MD   ipratropium-albuterol (DUONEB) 0.5-2.5 (3) MG/3ML SOLN nebulizer solution  2/23/23   Historical Provider, MD   gabapentin (NEURONTIN) 300 MG capsule Take 1 capsule by mouth in the morning and 1 capsule at noon and 1 capsule in the evening. Do all this for 7 days. 1/20/23 1/27/23  Edu Hair, DO   triamcinolone (KENALOG) 0.1 % cream Apply to active psoriasis twice daily (not face, armpit or groin) 7/31/19   Ambar Gracia MD   fluocinonide (LIDEX) 0.05 % external solution Apply to scalp psoriasis daily for rash 7/31/19   Ambar Gracia MD         REVIEW OF SYSTEMS       See hpi    PHYSICAL EXAM      INITIAL VITALS:   BP (!) 84/58   Pulse (!) 149   Temp (!) 101.7 °F (38.7 °C)   Resp 22   Wt 180 lb (81.6 kg)   SpO2 98%   BMI 28.19 kg/m²     Physical Exam  Constitutional:       Comments: Patient ill appearing, dry mucus membranes, will open eyes to voice, and moan, will obey commands, and EOMI, PERRLA  Skin is warm to the touch. She is tachycardic with regular rhythm.    Abdominal:      Comments: Soft abdomen ,non peritoneal   Skin:     Comments: Psoriatic patches and dry skin areas diffusely over legs and abdomen. Neurological:      GCS: GCS verbal subscore is 4. GCS motor subscore is 6. DDX/DIAGNOSTIC RESULTS / EMERGENCY DEPARTMENT COURSE / MDM     Medical Decision Making  Patient arrives altered, hypotensive. With fever. Mono thermal was placed initially with dark minimal urine output. She was concern for septic shock, and started on a full 30 mill per KG bolus. Labs and blood cultures were obtained and rectal Tylenol was given. Patient then did not respond to fluids so she was started on Levophed. As her blood pressure improved she was mentating more and was more awake. She still was confused. Patient's urine did show nitrite positive urinary tract infection she was covered broad-spectrum with Vanco and Zosyn. Her initial lactate was 3.1 and repeat was 3.2. Multiple inflammatory markers are elevated. Patient also was hypoxic for EMS. Was placed on oxygen. Concern for possible pneumonia so CT chest was ordered that did show acute PE so she was given full dose Lovenox. Patient also had à la abnormal LFTs, so a CT abdomen and pelvis were obtained. That did show some common bile duct dilation and possible choledocholithiasis. So GI was consulted and patient was taken emergently for ERCP due to concern for possible ascending cholangitis given her fever hypotension, as well as altered mentation. ICU was contacted for admission. Source of infection also for possible neck given her recent instrumentation so neurosurgery was consulted. Amount and/or Complexity of Data Reviewed  Labs: ordered. Decision-making details documented in ED Course. Radiology: ordered. ECG/medicine tests: ordered. Risk  OTC drugs. Prescription drug management. Decision regarding hospitalization.   Risk Details: Sepsis Times and Checklist  Vital Signs: BP: (!) 84/58  Heart Rate: (!) 149  Resp: 22  Temp: (!) 101.7 °F (38.7 °C) SpO2: 98 %  SIRS (>2) Non Pregnant       Temp > 38.3C or < 36C   HR > 90   RR > 20   WBC > 12 or < 4 or >10% bands  SIRS (>2) Pregnant 20 weeks until 3 days postpartum   Temp > 38C or <36C   HR >110   RR > 24   WBC >15 or < 4 or >10% bands   SIRS (>2) and confirmed or suspected source of infection = Sepsis  Is Sepsis due to likely bacterial infection?: Yes      Sepsis Identified:  Date: upon arrival to ER, and trejo placed  Sepsis Orders:   CBC(required): Yes   CMP: Yes   PT/PTT: No   Blood Cultures x2(required): Yes   Urinalysis and Urine Culture: Yes   Lactate(required): Yes   Antibiotics Given (within 3 hours of sepsis identification, after blood cultures):  Yes    (If unable to obtain IV access for IV antibiotics within 3 hours of identification of sepsis, IM antibiotics is acceptable.)              If lactate >2.0 MUST repeat within 6 hours    If elevated, is elevated lactate from a likely infectious source?: Yes. IV Fluid Bolus:  Is lactate > 4.0:  No  If lactate >  4.0 OR hypotension (MAP<65 mmHg,BP<90 or SBP<85 pregnancy 20 weeks to 3 days  postpartum) (with 2 BP readings) 30 ml/kg crystalloid MUST be ordered. If 30 ml/kg crystalloid not ordered the following must be documented in the medical record:  Administration of 30 mL/kg of crystalloid fluids would be detrimental or harmful for the patient;   AND that the patient has one of the following conditions, OR that a portion of the crystalloid fluid volume was administered as colloids (if a portion consisted of colloids, there must be an order and documentation that colloids were started or noted as given);    the volume of crystalloid fluids in place of 30 mL/kg the patient was to receive is ~1800ml, (Order for this amount of fluid must be placed)     Fluids must be initiated within 3 hours of sepsis identification. These fluids must have a rate > 125 ml/hr.     Is the patient Morbidly Obese (BMI > 30): No  IV Fluids given prior to arrival can be used in this calculation but the following information must be documented:  negligible IV given prior to arrival   Does the patient or patient advocate refuse the entire 30 ml/kg IV fluid bolus? No      Septic Shock Identified (Initial lactate > 4.0 or 2 Hypotensive Blood Pressure readings within the first 6 hours): For septic shock sepsis focus physical exam must be completed AND documented (within 6 hours). Date: upon arrival to ER   Sepsis focus exam completed. For persistent hypotension after 30ml/kg fluid bolus vasopressors must be started (within 6 hours)      Critical Care  Total time providing critical care: 30-74 minutes      EKG  Sinus tachycardia, ventricular rate of 134 no ST elevations or depressions noted. Q waves noted in lead III, QRS of 74 QT corrected of 442 NJ interval of 126. All EKG's are interpreted by the Emergency Department Physician who either signs or Co-signs this chart in the absence of a cardiologist.        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  [CE]   0952 BP still low, patient is receiving full 30 ml/kg bolus due to hypotension, and minimally responsive. Will start levophed [CE]   1015 Spoke with neurosurgery resident on the phone [phone] face to face with NS resident who willl speak with attending to determine plan if additional imaging needed  Urine is Nitrite positive   [CE]   1057 Patient more awake, asking for water. On levophed now. BP 145M systolic,  Updated daughter at bedside   [CE]   1057 Urine more clear at this time. [CE]   1114 Alk Phos(!): 294 [CE]   1114 ALT(!): 215 [CE]   7229 Spoke with icu resident, and accepted,  Given Ct findings will cover with lvoenox [CE]   1307 Gi at beside and plan for ERCP and concern for cholangitis [CE]   1313 ICU resident updated on course.   Dr. Anamaria Box speak in on phone with  to get consent [CE]      ED Course User Index  [facility] DO Brayan           CONSULTS:  IP CONSULT TO NEUROSURGERY  IP CONSULT TO CRITICAL CARE  IP CONSULT TO GI  IP CONSULT TO GI    CRITICAL CARE:  There was significant risk of life threatening deterioration of patient's condition requiring my direct management. Critical care time 45 minutes, excluding any documented procedures. FINAL IMPRESSION      1. Other acute pulmonary embolism, unspecified whether acute cor pulmonale present ([facility]    2. Acute UTI    3. Septic shock ([facility] [address]    4. Choledocholithiasis    5. Elevated brain natriuretic peptide (BNP) level    6. Altered mental status, unspecified altered mental status type          DISPOSITION / [address] Admitted 03/08/2023 01:00:51 PM      PATIENT REFERRED TO:  No follow-up provider specified.     DISCHARGE MEDICATIONS:  New Prescriptions    No medications on file       Kayode Tai DO  Emergency Medicine Resident    (Please note that portions of thisnote were completed with a voice recognition program.  Efforts were made to edit the dictations but occasionally words are mis-transcribed.)        Verenice Bean,   03/08/23 [facility], DO  03/08/23 5649

## 2023-03-08 NOTE — PROCEDURES
PROCEDURE NOTE - ARTERIAL LINE PLACEMENT    PATIENT NAME: Vladimir Sosa RECORD NO. [id number]  DATE: 3/8/2023  ATTENDING PHYSICIAN:  Dr Crista Vu DIAGNOSIS:  Need for blood pressure monitoring  POSTOPERATIVE DIAGNOSIS:  Same  PROCEDURE PERFORMED: Left Radial Arterial Line Insertion  PERFORMING PHYSICIAN:  Zaki Dunbar MD  ESTIMATED BLOOD LOSS:  Less than 25 ml  COMPLICATIONS:  None immediately appreciated. DISCUSSION:  Rizwana Guzman is a 75 y.o. female who requires invasive pressure monitoring. The history and physical examination were reviewed and confirmed. CONSENT: Unable to be obtained due to patient's condition. PROCEDURE:  A timeout was initiated by the bedside nurse and was confirmed by those present. The patient was placed in a supine position. The skin overlying the Left Radial was prepped with chlorhexadine. Through this region, the introducer needle through catheter was inserted into left Radial until pulsatile bright blood was seen in collection tubing. Guidewire was advanced with no resistance. Catheter was advanced into the artery, wire was pulled with brisk bleeding noted. Pressure monitoring setup was connected to the catheter, it aspirated and flushed easily. The catheter was secured to the wrist with 3-0 silk. No immediate complication was evident. All sponge, instrument and needle counts were correct at the completion of the procedure. Zaki Dunbar MD  4:51 PM, 3/8/23      I was present and supervise the procedure.   No immediate complications    Aldair Cornelius MD3/8/29738:11 PM

## 2023-03-08 NOTE — ED NOTES
The following labs were labeled with appropriate pt sticker and tubed to lab:     [] Blue     [] Lavender   [] on ice  [x] Green/yellow  [] Green/black [] on ice  [] Nolvia Gonzales  [] on ice  [] Yellow  [] Red  [] Type/ Screen  [] ABG  [] VBG    [] COVID-19 swab    [] Rapid  [] PCR  [] Flu swab  [] Peds Viral Panel     [] Urine Sample  [] Fecal Sample  [] Pelvic Cultures  [] Blood Cultures  [] X 2  [] STREP Cultures         Nazario Hernandez RN  03/08/23 9534

## 2023-03-08 NOTE — PROGRESS NOTES
[facility] Pharmacokinetic Monitoring Service - Vancomycin     Giovanny Callahan is a 75 y.o. female starting on vancomycin therapy for intra-abdominal infection. Pharmacy consulted by Dr. Randall Galdamez for monitoring and adjustment. Target Concentration: Goal AUC/NESHA 400-600 mg*hr/L    Additional Antimicrobials: Zosyn    Pertinent Laboratory Values: Wt Readings from Last 1 Encounters:   03/08/23 180 lb (81.6 kg)     Temp Readings from Last 1 Encounters:   03/08/23 (!) 101.7 °F (38.7 °C)     Estimated Creatinine Clearance: 123 mL/min (A) (based on SCr of 0.44 mg/dL (L)). Recent Labs     03/08/23  0850 03/08/23  0906 03/08/23  0943   CREATININE 0.90  --  0.44*   WBC  --  3.6  --      Procalcitonin: 6.62 ng/mL    Pertinent Cultures:  Culture Date Source Results   03.08 Blood x2 Pending   MRSA Nasal Swab: N/A. Non-respiratory infection.     Plan:  Vancomycin 2000mg IV x1 given in ED  Start vancomycin 1250mg IV Q12H this evening  Anticipated AUC of 480 and trough concentration of 14 at steady state  Renal labs as indicated   Pharmacy will continue to monitor patient and adjust therapy as indicated    Thank you for the consult,  William Sethi PharmD [facility]  3/8/2023 3:21 PM

## 2023-03-08 NOTE — ED NOTES
Report given to car 3 nurse, patient to go to the floor after surgery.       Kimberlee Kwong RN  03/08/23 2351

## 2023-03-09 LAB
ABSOLUTE EOS #: 0 K/UL (ref 0–0.4)
ABSOLUTE IMMATURE GRANULOCYTE: 0 K/UL (ref 0–0.3)
ABSOLUTE LYMPH #: 0.8 K/UL (ref 1–4.8)
ABSOLUTE MONO #: 0.07 K/UL (ref 0.1–0.8)
ALBUMIN SERPL-MCNC: 2.4 G/DL (ref 3.5–5.2)
ALBUMIN/GLOBULIN RATIO: 0.9 (ref 1–2.5)
ALP SERPL-CCNC: 285 U/L (ref 35–104)
ALT SERPL-CCNC: 185 U/L (ref 5–33)
ANION GAP SERPL CALCULATED.3IONS-SCNC: 13 MMOL/L (ref 9–17)
ANION GAP SERPL CALCULATED.3IONS-SCNC: 9 MMOL/L (ref 9–17)
AST SERPL-CCNC: 271 U/L
BASOPHILS # BLD: 0 % (ref 0–2)
BASOPHILS ABSOLUTE: 0 K/UL (ref 0–0.2)
BILIRUB DIRECT SERPL-MCNC: 3.5 MG/DL
BILIRUB INDIRECT SERPL-MCNC: 0 MG/DL (ref 0–1)
BILIRUB SERPL-MCNC: 3.5 MG/DL (ref 0.3–1.2)
BUN SERPL-MCNC: 11 MG/DL (ref 8–23)
CALCIUM SERPL-MCNC: 7.9 MG/DL (ref 8.6–10.4)
CHLORIDE SERPL-SCNC: 117 MMOL/L (ref 98–107)
CHLORIDE SERPL-SCNC: 117 MMOL/L (ref 98–107)
CO2 SERPL-SCNC: 16 MMOL/L (ref 20–31)
CO2 SERPL-SCNC: 19 MMOL/L (ref 20–31)
CREAT SERPL-MCNC: 0.34 MG/DL (ref 0.5–0.9)
EOSINOPHILS RELATIVE PERCENT: 0 % (ref 1–4)
FIO2: 30
GFR SERPL CREATININE-BSD FRML MDRD: >60 ML/MIN/1.73M2
GLUCOSE BLD-MCNC: 115 MG/DL (ref 74–100)
GLUCOSE BLD-MCNC: 81 MG/DL (ref 65–105)
GLUCOSE BLD-MCNC: 90 MG/DL (ref 65–105)
GLUCOSE SERPL-MCNC: 115 MG/DL (ref 70–99)
HCT VFR BLD AUTO: 31.6 % (ref 36.3–47.1)
HGB BLD-MCNC: 10.7 G/DL (ref 11.9–15.1)
IMMATURE GRANULOCYTES: 0 %
LACTIC ACID, WHOLE BLOOD: 2 MMOL/L (ref 0.7–2.1)
LYMPHOCYTES # BLD: 11 % (ref 24–44)
MAGNESIUM SERPL-MCNC: 2 MG/DL (ref 1.6–2.6)
MCH RBC QN AUTO: 34.3 PG (ref 25.2–33.5)
MCHC RBC AUTO-ENTMCNC: 33.9 G/DL (ref 28.4–34.8)
MCV RBC AUTO: 101.3 FL (ref 82.6–102.9)
MICROORGANISM SPEC CULT: ABNORMAL
MONOCYTES # BLD: 1 % (ref 1–7)
MORPHOLOGY: ABNORMAL
NEGATIVE BASE EXCESS, ART: 6 (ref 0–2)
NRBC AUTOMATED: 0.3 PER 100 WBC
PARTIAL THROMBOPLASTIN TIME: 115.6 SEC (ref 20.5–30.5)
PARTIAL THROMBOPLASTIN TIME: 32.5 SEC (ref 20.5–30.5)
PARTIAL THROMBOPLASTIN TIME: 49.4 SEC (ref 20.5–30.5)
PARTIAL THROMBOPLASTIN TIME: >120 SEC (ref 20.5–30.5)
PDW BLD-RTO: 15.9 % (ref 11.8–14.4)
PLATELET # BLD AUTO: 103 K/UL (ref 138–453)
PMV BLD AUTO: 10.9 FL (ref 8.1–13.5)
POC HCO3: 16.6 MMOL/L (ref 21–28)
POC O2 SATURATION: 99 % (ref 94–98)
POC PCO2: 25.3 MM HG (ref 35–48)
POC PH: 7.42 (ref 7.35–7.45)
POC PO2: 111.5 MM HG (ref 83–108)
POTASSIUM SERPL-SCNC: 3.3 MMOL/L (ref 3.7–5.3)
POTASSIUM SERPL-SCNC: 3.9 MMOL/L (ref 3.7–5.3)
PROT SERPL-MCNC: 5 G/DL (ref 6.4–8.3)
RBC # BLD: 3.12 M/UL (ref 3.95–5.11)
SAMPLE SITE: ABNORMAL
SEG NEUTROPHILS: 88 % (ref 36–66)
SEGMENTED NEUTROPHILS ABSOLUTE COUNT: 6.43 K/UL (ref 1.8–7.7)
SERVICE CMNT-IMP: ABNORMAL
SERVICE CMNT-IMP: ABNORMAL
SODIUM SERPL-SCNC: 145 MMOL/L (ref 135–144)
SODIUM SERPL-SCNC: 146 MMOL/L (ref 135–144)
SPECIMEN DESCRIPTION: ABNORMAL
SPECIMEN DESCRIPTION: ABNORMAL
TROPONIN I SERPL DL<=0.01 NG/ML-MCNC: 37 NG/L (ref 0–14)
WBC # BLD AUTO: 7.3 K/UL (ref 3.5–11.3)

## 2023-03-09 PROCEDURE — 2580000003 HC RX 258

## 2023-03-09 PROCEDURE — 99232 SBSQ HOSP IP/OBS MODERATE 35: CPT | Performed by: INTERNAL MEDICINE

## 2023-03-09 PROCEDURE — 85730 THROMBOPLASTIN TIME PARTIAL: CPT

## 2023-03-09 PROCEDURE — 85025 COMPLETE CBC W/AUTO DIFF WBC: CPT

## 2023-03-09 PROCEDURE — 80051 ELECTROLYTE PANEL: CPT

## 2023-03-09 PROCEDURE — 2580000003 HC RX 258: Performed by: STUDENT IN AN ORGANIZED HEALTH CARE EDUCATION/TRAINING PROGRAM

## 2023-03-09 PROCEDURE — 80048 BASIC METABOLIC PNL TOTAL CA: CPT

## 2023-03-09 PROCEDURE — 37799 UNLISTED PX VASCULAR SURGERY: CPT

## 2023-03-09 PROCEDURE — 84484 ASSAY OF TROPONIN QUANT: CPT

## 2023-03-09 PROCEDURE — 82803 BLOOD GASES ANY COMBINATION: CPT

## 2023-03-09 PROCEDURE — 80076 HEPATIC FUNCTION PANEL: CPT

## 2023-03-09 PROCEDURE — 94003 VENT MGMT INPAT SUBQ DAY: CPT

## 2023-03-09 PROCEDURE — 82947 ASSAY GLUCOSE BLOOD QUANT: CPT

## 2023-03-09 PROCEDURE — 99291 CRITICAL CARE FIRST HOUR: CPT | Performed by: INTERNAL MEDICINE

## 2023-03-09 PROCEDURE — 83605 ASSAY OF LACTIC ACID: CPT

## 2023-03-09 PROCEDURE — 83735 ASSAY OF MAGNESIUM: CPT

## 2023-03-09 PROCEDURE — 6360000002 HC RX W HCPCS: Performed by: PEDIATRICS

## 2023-03-09 PROCEDURE — 6360000002 HC RX W HCPCS

## 2023-03-09 PROCEDURE — APPSS15 APP SPLIT SHARED TIME 0-15 MINUTES: Performed by: NURSE PRACTITIONER

## 2023-03-09 PROCEDURE — 2000000000 HC ICU R&B

## 2023-03-09 PROCEDURE — 94761 N-INVAS EAR/PLS OXIMETRY MLT: CPT

## 2023-03-09 PROCEDURE — 2700000000 HC OXYGEN THERAPY PER DAY

## 2023-03-09 RX ORDER — CALCIUM GLUCONATE 20 MG/ML
2000 INJECTION, SOLUTION INTRAVENOUS ONCE
Status: COMPLETED | OUTPATIENT
Start: 2023-03-09 | End: 2023-03-09

## 2023-03-09 RX ORDER — POTASSIUM CHLORIDE 29.8 MG/ML
20 INJECTION INTRAVENOUS
Status: COMPLETED | OUTPATIENT
Start: 2023-03-09 | End: 2023-03-09

## 2023-03-09 RX ORDER — POTASSIUM CHLORIDE 7.45 MG/ML
10 INJECTION INTRAVENOUS
Status: DISCONTINUED | OUTPATIENT
Start: 2023-03-09 | End: 2023-03-09

## 2023-03-09 RX ADMIN — SODIUM CHLORIDE, POTASSIUM CHLORIDE, SODIUM LACTATE AND CALCIUM CHLORIDE: 600; 310; 30; 20 INJECTION, SOLUTION INTRAVENOUS at 11:06

## 2023-03-09 RX ADMIN — HEPARIN SODIUM 6530 UNITS: 1000 INJECTION INTRAVENOUS; SUBCUTANEOUS at 01:22

## 2023-03-09 RX ADMIN — POTASSIUM CHLORIDE 20 MEQ: 29.8 INJECTION, SOLUTION INTRAVENOUS at 06:29

## 2023-03-09 RX ADMIN — PIPERACILLIN AND TAZOBACTAM 3375 MG: 3; .375 INJECTION, POWDER, LYOPHILIZED, FOR SOLUTION INTRAVENOUS at 09:09

## 2023-03-09 RX ADMIN — PIPERACILLIN AND TAZOBACTAM 3375 MG: 3; .375 INJECTION, POWDER, LYOPHILIZED, FOR SOLUTION INTRAVENOUS at 01:27

## 2023-03-09 RX ADMIN — SODIUM CHLORIDE, PRESERVATIVE FREE 10 ML: 5 INJECTION INTRAVENOUS at 07:59

## 2023-03-09 RX ADMIN — HEPARIN SODIUM 3260 UNITS: 1000 INJECTION INTRAVENOUS; SUBCUTANEOUS at 23:46

## 2023-03-09 RX ADMIN — POTASSIUM CHLORIDE 20 MEQ: 29.8 INJECTION, SOLUTION INTRAVENOUS at 07:49

## 2023-03-09 RX ADMIN — SODIUM CHLORIDE, POTASSIUM CHLORIDE, SODIUM LACTATE AND CALCIUM CHLORIDE: 600; 310; 30; 20 INJECTION, SOLUTION INTRAVENOUS at 20:12

## 2023-03-09 RX ADMIN — SODIUM CHLORIDE: 9 INJECTION, SOLUTION INTRAVENOUS at 06:15

## 2023-03-09 RX ADMIN — POTASSIUM CHLORIDE 10 MEQ: 7.46 INJECTION, SOLUTION INTRAVENOUS at 06:16

## 2023-03-09 RX ADMIN — CALCIUM GLUCONATE 2000 MG: 20 INJECTION, SOLUTION INTRAVENOUS at 06:01

## 2023-03-09 RX ADMIN — HEPARIN SODIUM AND DEXTROSE 11 UNITS/KG/HR: 10000; 5 INJECTION INTRAVENOUS at 20:16

## 2023-03-09 RX ADMIN — SODIUM CHLORIDE, POTASSIUM CHLORIDE, SODIUM LACTATE AND CALCIUM CHLORIDE: 600; 310; 30; 20 INJECTION, SOLUTION INTRAVENOUS at 01:34

## 2023-03-09 RX ADMIN — SODIUM CHLORIDE: 9 INJECTION, SOLUTION INTRAVENOUS at 01:13

## 2023-03-09 RX ADMIN — SODIUM CHLORIDE, PRESERVATIVE FREE 10 ML: 5 INJECTION INTRAVENOUS at 20:04

## 2023-03-09 RX ADMIN — HEPARIN SODIUM AND DEXTROSE 18 UNITS/KG/HR: 10000; 5 INJECTION INTRAVENOUS at 01:22

## 2023-03-09 RX ADMIN — PIPERACILLIN AND TAZOBACTAM 3375 MG: 3; .375 INJECTION, POWDER, LYOPHILIZED, FOR SOLUTION INTRAVENOUS at 17:14

## 2023-03-09 ASSESSMENT — PULMONARY FUNCTION TESTS
PIF_VALUE: 21
PIF_VALUE: 11
PIF_VALUE: 11

## 2023-03-09 ASSESSMENT — PAIN SCALES - GENERAL
PAINLEVEL_OUTOF10: 0
PAINLEVEL_OUTOF10: 0

## 2023-03-09 NOTE — PROGRESS NOTES
Order obtain for extubation. SpO2 of 98 on 30% FiO2. Patient extubated and placed on 2 liters/min via nasal cannula. Post extubation SpO2 is 100% with HR  99 bpm and RR 26 breaths/min. Patient had moderate cough that was productive of tan sputum. Extubation Well tolerated by patient. .   Breath Sounds: clear and diminished    Shahid Mullins RCP   1:00 PM

## 2023-03-09 NOTE — PLAN OF CARE
Please page neurosurgery when cervical xrays are completed   Thank you     Electronically signed by RITA Duran NP on 3/9/2023 at 8:30 AM

## 2023-03-09 NOTE — PLAN OF CARE
Problem: Discharge Planning  Goal: Discharge to home or other facility with appropriate resources  Outcome: Progressing  Flowsheets (Taken 3/8/2023 1530)  Discharge to home or other facility with appropriate resources: Identify barriers to discharge with patient and caregiver     Problem: Respiratory - Adult  Goal: Achieves optimal ventilation and oxygenation  3/8/2023 1902 by Slim Mahajan RN  Outcome: Progressing  3/8/2023 1524 by Starlett Boast, RCP  Outcome: Progressing  3/8/2023 1523 by Starlett Boast, RCP  Outcome: Progressing     Problem: Pain  Goal: Verbalizes/displays adequate comfort level or baseline comfort level  Outcome: Progressing     Problem: Safety - Medical Restraint  Goal: Remains free of injury from restraints (Restraint for Interference with Medical Device)  Description: INTERVENTIONS:  1. Determine that other, less restrictive measures have been tried or would not be effective before applying the restraint  2. Evaluate the patient's condition at the time of restraint application  3. Inform patient/family regarding the reason for restraint  4.  Q2H: Monitor safety, psychosocial status, comfort, nutrition and hydration  Outcome: Progressing  Flowsheets  Taken 3/8/2023 1800  Remains free of injury from restraints (restraint for interference with medical device):   Determine that other, less restrictive measures have been tried or would not be effective before applying the restraint   Evaluate the patient's condition at the time of restraint application   Inform patient/family regarding the reason for restraint   Every 2 hours: Monitor safety, psychosocial status, comfort, nutrition and hydration  Taken 3/8/2023 1600  Remains free of injury from restraints (restraint for interference with medical device):   Determine that other, less restrictive measures have been tried or would not be effective before applying the restraint   Evaluate the patient's condition at the time of restraint application Inform patient/family regarding the reason for restraint   Every 2 hours: Monitor safety, psychosocial status, comfort, nutrition and hydration     Problem: Neurosensory - Adult  Goal: Achieves stable or improved neurological status  Outcome: Progressing  Goal: Achieves maximal functionality and self care  Outcome: Progressing     Problem: Cardiovascular - Adult  Goal: Maintains optimal cardiac output and hemodynamic stability  Outcome: Progressing  Goal: Absence of cardiac dysrhythmias or at baseline  Outcome: Progressing     Problem: Skin/Tissue Integrity - Adult  Goal: Skin integrity remains intact  Outcome: Progressing  Goal: Incisions, wounds, or drain sites healing without S/S of infection  Outcome: Progressing     Problem: Musculoskeletal - Adult  Goal: Return mobility to safest level of function  Outcome: Progressing  Goal: Return ADL status to a safe level of function  Outcome: Progressing     Problem: Genitourinary - Adult  Goal: Absence of urinary retention  Outcome: Progressing

## 2023-03-09 NOTE — PROGRESS NOTES
Neurosurgery MATHEUS/Resident    Daily Progress Note   CC:  Chief Complaint   Patient presents with    Altered Mental Status    Hypotension     3/9/2023  8:17 AM    Chart reviewed. No acute events overnight. No new complaints. Remains intubated after ERCP  Blood cultures pending gram negative rods, urine positive for leukocytes   Low dose levophed   Vitals:    03/09/23 0700 03/09/23 0715 03/09/23 0730 03/09/23 0745   BP:       Pulse: 87 84 91 (!) 106   Resp: 19 16 (!) 31 24   Temp: 97.3 °F (36.3 °C) 97.5 °F (36.4 °C) 97.7 °F (36.5 °C) 97.7 °F (36.5 °C)   TempSrc:    Core   SpO2: 100% 100% 100% 100%   Weight:           PE:   E4 VqT M6  Intubated  PERRL  Following  commands BUE and BLE   Moving all extremities equally     Sensation: intact nodding to numbness to BLE which is not new per patient   Incision: CDI      Lab Results   Component Value Date    WBC 7.3 03/09/2023    HGB 10.7 (L) 03/09/2023    HCT 31.6 (L) 03/09/2023     (L) 03/09/2023     (H) 03/09/2023     (H) 03/09/2023     (H) 03/09/2023    K 3.3 (L) 03/09/2023     (H) 03/09/2023    CREATININE 0.34 (L) 03/09/2023    BUN 11 03/09/2023    CO2 16 (L) 03/09/2023    TSH 3.86 01/18/2023    INR 1.9 03/08/2023    LABA1C 5.3 01/18/2023    .8 (H) 03/08/2023       A/P  75 y.o. female who presents with sepsis     Low suspicion for post op infection  No neurosurgical interventions planned  Neurosurgery will sign off at this time   Will order post op 6 week cervical xrays upright AP and lateral     Please contact neurosurgery with any changes in patients neurologic status.        Trace Conrad CNP  3/9/23  8:17 AM

## 2023-03-09 NOTE — PROGRESS NOTES
Critical Care Team - Daily Progress Note      Date and time: 3/9/2023 6:56 AM  Patient's name:  Baljit Chan  Medical Record Number: [id number]  Patient's account/billing number: [de-identified]  Patient's YOB: 1950  Age: 75 y.o. Date of Admission: 3/8/2023  8:48 AM  Length of stay during current admission: 1      Primary Care Physician: Ariane Carr MD  ICU Attending Physician: Dr. Amita Betancourt Status: Full Code    Reason for ICU admission:   Chief Complaint   Patient presents with    Altered Mental Status    Hypotension         SUBJECTIVE:     HISTORY OF PRESENT ILLNESS:  Baljit Chan is a 75 y.o. presented from extended care facility for altered mental status and hypotension. As per report her systolic BP was in the 26R. She has a recent history of cervical spine fusion on 01/18, follows up with neurosurgery. On arrival to the ER she had a GCS of 11, was febrile, tachycardic and hypotensive. She was also found to be hypoxic was placed on 4 L of oxygen. CT head showed no acute intracranial abnormality, mild cerebral atrophy with white matter microvascular disease. CT cervical spine did not reveal any CSF leak or abscess. CT chest abdomen and pelvis was suggestive of acute PE and subsegmental branches of the posterior basal right lower lobe, cholelithiasis with biliary ductal dilatation. Labs showed creatinine 1.4, lactic acid of 3.2, Pro-[place] of 6.62, , BNP of 4111, alk phos of 300, , , bilirubin 3.3, WBC count 3.6, UA positive for and positive nitrate. Neurosurgery was consulted from the ER, had a low suspicion for postop infection. GI was consulted, patient underwent ERCP with stone removal and stent placement. During the procedure she was hypotensive was started on Levophed. She remained intubated and sedated with propofol. After the procedure she was transferred to ICU. OVERNIGHT EVENTS:         Afebrile  BP 95/52, MAP of 70.   Levophed @ 2mics.   Intubated, off of propofol. On fentanyl 50 q 2-hour as needed. Awake alert following commands. Vent settings at 16/490/5/30 percent. ABG showing pH of 7.42/PCO2 25.3/PO2 111/HCO3 16.6. Urine output 2.175 L over the last 24 hours. On LR at 100 mL/h. On heparin for acute PE. Blood cultures positive for gram-negative rods, E. coli. On Vanco and Zosyn. Labs show sodium 146, potassium 3.3, chloride 117, creatinine 0.34, glucose 115. LFTs trending down, bilirubin 3.5, alk phos 285, , . WBC 7.3, hemoglobin 10.7, platelet 248. INR 1.9, fibrinogen 426, FDP more than 5.     AWAKE & FOLLOWING COMMANDS:  [] No   [x] Yes    CURRENT VENTILATION STATUS:     [x] Ventilator  [] BIPAP  [] Nasal Cannula [] Room Air      IF INTUBATED, ET TUBE MARKING AT LOWER LIP:       cms    SECRETIONS Amount:  [] Small [] Moderate  [] Large  [x] None  Color:     [] White [] Colored  [] Bloody    SEDATION:  RAAS Score:  [] Propofol gtt  [] Versed gtt  [] Ativan gtt   [x] No Sedation    PARALYZED:  [x] No    [] Yes    DIARRHEA:                [x] No                [] Yes  (C. Difficile status: [] positive                                                                                                                       [] negative                                                                                                                     [] pending)    VASOPRESSORS:  [] No    [x] Yes    If yes -   [x] Levophed       [] Dopamine     [] Vasopressin       [] Dobutamine  [] Phenylephrine         [] Epinephrine    CENTRAL LINES:     [] No   [x] Yes   (Date of Insertion:   )           If yes -     [x] Right IJ     [] Left IJ [] Right Femoral [] Left Femoral                   [] Right Subclavian [] Left Subclavian       LEVY'S CATHETER:   [] No   [x] Yes  (Date of Insertion:   )     URINE OUTPUT:            [x] Good   [] Low              [] Anuric      OBJECTIVE:     VITAL SIGNS:  BP (!) 112/58   Pulse (!) 102   Temp 97.5 °F (36.4 °C)   Resp 20   Wt 143 lb 8.3 oz (65.1 kg)   SpO2 100%   BMI 22.48 kg/m²   Tmax over 24 hours:  Temp (24hrs), Av.3 °F (37.9 °C), Min:97.2 °F (36.2 °C), Max:104.5 °F (40.3 °C)      Patient Vitals for the past 8 hrs:   BP Temp Temp src Pulse Resp SpO2 Weight   23 0600 -- -- -- -- -- -- 143 lb 8.3 oz (65.1 kg)   23 0424 -- -- -- (!) 102 20 100 % --   23 0415 -- 97.5 °F (36.4 °C) -- (!) 102 20 100 % --   23 0400 (!) 112/58 97.7 °F (36.5 °C) Bladder (!) 102 20 100 % --   23 0348 -- -- -- (!) 102 20 100 % --   23 0345 -- 97.5 °F (36.4 °C) -- (!) 101 20 100 % --   23 0330 -- 97.3 °F (36.3 °C) -- (!) 104 17 99 % --   23 0315 -- 97.2 °F (36.2 °C) -- (!) 104 23 100 % --   23 0300 -- 97.2 °F (36.2 °C) -- (!) 104 20 100 % --   23 0245 -- 97.2 °F (36.2 °C) -- (!) 106 19 100 % --   23 0230 -- 97.2 °F (36.2 °C) -- (!) 102 20 100 % --   23 0215 -- 97.3 °F (36.3 °C) -- 100 20 100 % --   23 0200 -- 97.3 °F (36.3 °C) -- 95 20 100 % --   23 0145 -- 97.3 °F (36.3 °C) -- (!) 101 20 100 % --   23 0130 -- 97.3 °F (36.3 °C) -- 99 20 100 % --   23 0115 -- 97.5 °F (36.4 °C) -- 99 20 100 % --   23 0100 -- 97.5 °F (36.4 °C) -- 98 20 100 % --   23 0045 -- 97.5 °F (36.4 °C) -- 97 20 100 % --   23 0030 -- 97.5 °F (36.4 °C) -- 99 20 100 % --   23 0015 -- 97.5 °F (36.4 °C) -- 100 20 100 % --   23 0003 -- -- -- (!) 105 24 100 % --   23 0000 (!) 155/74 97.3 °F (36.3 °C) Bladder (!) 103 26 100 % --   23 2345 -- 97.5 °F (36.4 °C) -- 98 20 100 % --   23 2330 -- 97.5 °F (36.4 °C) -- (!) 101 20 100 % --   23 2315 -- 97.5 °F (36.4 °C) -- 97 20 100 % --   23 2300 -- 97.5 °F (36.4 °C) -- (!) 105 20 100 % --         Intake/Output Summary (Last 24 hours) at 3/9/2023 0656  Last data filed at 3/9/2023 0631  Gross per 24 hour   Intake 5214.95 ml   Output 2175 ml   Net 3039.95 ml     Date 03/09/23 0000 - 03/09/23 2359   Shift 2929-4973 3872-2323 [id number] 24 Hour Total   INTAKE   I.V.(mL/kg) 1182. 7(18.2)   1182. 7(18.2)   IV Piggyback(mL/kg) 300.4(4.6)   300.4(4.6)   Shift Total(mL/kg) 1483. 1(22.8)   1483. 1(22.8)   OUTPUT   Urine(mL/kg/hr) 295   295   Shift Total(mL/kg) 295(4.5)   295(4.5)   Weight (kg) 65.1 65.1 65.1 65.1     Wt Readings from Last 3 Encounters:   03/09/23 143 lb 8.3 oz (65.1 kg)   02/01/23 180 lb (81.6 kg)   01/14/23 180 lb (81.6 kg)     Body mass index is 22.48 kg/m². PHYSICAL EXAM:  Constitutional: Appears well, no distress  EENT: PERRLA, EOMI, sclera clear, anicteric, oropharynx clear, no lesions, neck supple with midline trachea. Neck: Supple, symmetrical, trachea midline, no adenopathy, thyroid symmetric, no jvd skin normal  Respiratory: clear to auscultation, no wheezes or rales and unlabored breathing. No intercostal tenderness  Cardiovascular: regular rate and rhythm, normal S1, S2, no murmur noted and 2+ pulses throughout  Abdomen: soft, nontender, nondistended, no masses or organomegaly  NEUROLOGIC: Awake, alert, oriented to name, place and time. Cranial nerves II-XII are grossly intact. Motor is 5 out of 5 bilaterally. Sensory is intact.   Extremities:  peripheral pulses normal, no pedal edema, no clubbing or cyanosis  SKIN: normal coloration and turgor    Any additional physical findings:      MEDICATIONS:  Scheduled Meds:   calcium gluconate  2,000 mg IntraVENous Once    potassium chloride  20 mEq IntraVENous Q1H    acetaminophen  325 mg Oral Once    sodium chloride flush  5-40 mL IntraVENous 2 times per day    piperacillin-tazobactam  3,375 mg IntraVENous Q8H     Continuous Infusions:   norepinephrine Stopped (03/09/23 0630)    sodium chloride Stopped (03/09/23 0629)    midazolam      propofol Stopped (03/08/23 1939)    heparin (PORCINE) Infusion 18 Units/kg/hr (03/09/23 0631)    lactated ringers IV soln 100 mL/hr at 03/09/23 0631     PRN Meds: sodium chloride flush, 5-40 mL, PRN  sodium chloride, , PRN  ondansetron, 4 mg, Q8H PRN   Or  ondansetron, 4 mg, Q6H PRN  polyethylene glycol, 17 g, Daily PRN  acetaminophen, 650 mg, Q6H PRN   Or  acetaminophen, 650 mg, Q6H PRN  fentanNYL, 50 mcg, Q2H PRN  heparin (porcine), 80 Units/kg, PRN  heparin (porcine), 40 Units/kg, PRN        SUPPORT DEVICES: [] Ventilator [] BIPAP  [] Nasal Cannula [] Room Air    VENT SETTINGS (Comprehensive) (if applicable):   PRVC mode, FiO2 30%, PEEP 5, Respiratory Rate 16, Tidal Volume 490  Vent Information  Ventilator ID: [email or web address]  Equipment Changed: HME, Expiratory Filter  Additional Respiratory Assessments  Heart Rate: (!) 102  Resp: 20  SpO2: 100 %  End Tidal CO2: 19 (%)  Position: Semi-Howard's  Humidification Source: HME    ABGs:   Arterial Blood Gas result:  pH 7.425; pCO2 25.3; pO2 111.5; HCO3 16.6;   Lab Results   Component Value Date/Time    FIO2 30.0 03/09/2023 04:24 AM     Lactic Acid: No results found for: LACTA      DATA:  Complete Blood Count:   Recent Labs     03/08/23  1546 03/08/23 2241 03/09/23  0355   WBC 7.8 8.5 7.3   HGB 10.8* 11.1* 10.7*   .7* 101.2 101.3   * 102* 103*   RBC 3.13* 3.28* 3.12*   HCT 33.4* 33.2* 31.6*   MCH 34.5* 33.8* 34.3*   MCHC 32.3 33.4 33.9   RDW 16.0* 15.8* 15.9*   MPV 9.7 9.9 10.9        PT/INR:    Lab Results   Component Value Date/Time    PROTIME 19.7 03/08/2023 03:46 PM    INR 1.9 03/08/2023 03:46 PM     PTT:    Lab Results   Component Value Date/Time    APTT 32.5 03/09/2023 12:16 AM       Basal Metabolic Profile:   Recent Labs     03/08/23  1546 03/08/23 2241 03/09/23  0355    145* 146*   K 2.8* 4.0 3.3*   BUN 11 11 11   CREATININE 0.34* 0.28* 0.34*   * 118* 117*   CO2 13* 16* 16*      Magnesium:   Lab Results   Component Value Date/Time    MG 2.0 03/09/2023 03:55 AM    MG 2.2 03/08/2023 10:41 PM    MG 1.1 03/08/2023 03:46 PM     Phosphorus:   Lab Results   Component Value Date/Time    PHOS 3.4 01/19/2023 10:15 AM     S. Calcium:  Recent Labs     03/09/23  0355   CALCIUM 7.9*     S. Ionized Calcium:No results for input(s): IONCA in the last 72 hours. Urinalysis:   Lab Results   Component Value Date/Time    NITRU POSITIVE 03/08/2023 09:09 AM    COLORU Dark Yellow 03/08/2023 09:09 AM    PHUR 5.5 03/08/2023 09:09 AM    WBCUA 10 TO 20 03/08/2023 09:09 AM    RBCUA 2 TO 5 03/08/2023 09:09 AM    BACTERIA MANY 03/08/2023 09:09 AM    SPECGRAV 1.023 03/08/2023 09:09 AM    LEUKOCYTESUR SMALL 03/08/2023 09:09 AM    UROBILINOGEN ELEVATED 03/08/2023 09:09 AM    BILIRUBINUR LARGE 03/08/2023 09:09 AM    GLUCOSEU NEGATIVE 03/08/2023 09:09 AM    KETUA LARGE 03/08/2023 09:09 AM       CARDIAC ENZYMES: No results for input(s): CKMB, CKMBINDEX, TROPONINI in the last 72 hours. Invalid input(s): CKTOTAL;3  BNP: No results for input(s): BNP in the last 72 hours. LFTS  Recent Labs     03/08/23  0943 03/08/23  1546 03/09/23  0355   ALKPHOS 294* 308* 285*   * 232* 185*   * 463* 271*   BILITOT 3.3* 4.2* 3.5*   BILIDIR  --   --  3.5*   LABALBU 2.4* 2.5* 2.4*       AMYLASE/LIPASE/AMMONIA  Recent Labs     03/08/23  0906 03/08/23  0943   LIPASE  --  382*   AMMONIA 39  --        Last 3 Blood Glucose:   Recent Labs     03/08/23  0943 03/08/23  1546 03/08/23  2241 03/09/23  0355   GLUCOSE 107* 126* 113* 115*      HgBA1c:    Lab Results   Component Value Date/Time    LABA1C 5.3 01/18/2023 06:37 PM         TSH:    Lab Results   Component Value Date/Time    TSH 3.86 01/18/2023 06:37 PM     ANEMIA STUDIES  No results for input(s): LABIRON, TIBC, FERRITIN, ONOTUZBK87, FOLATE, OCCULTBLD in the last 72 hours.             Cultures during this admission:     Blood cultures:                 [] None drawn      [] Negative             [x]  Positive (Details:  )  Urine Culture:                   [] None drawn      [] Negative             []  Positive (Details:  )  Sputum Culture:               [] None drawn       [] Negative             [] Positive (Details:  )   Endotracheal aspirate:     [] None drawn       [] Negative             []  Positive (Details:  )             Chest Xray (3/9/2023):    ASSESSMENT:     Principal Problem:    Septic shock ([facility]  Active Problems:    Ascending cholangitis    Cholelithiasis    Gram negative septicemia ([facility] [address]    Choledocholithiasis    Acute respiratory failure with hypoxia ([facility])    Metabolic encephalopathy    Acute pulmonary embolism ([facility])  Resolved Problems:    * No resolved hospital problems. *        PLAN:         PLAN/MEDICAL DECISION MAKING:    Neurologic: Hx of spine surgery   Neuro intact  Neuro checks per protocol  Off of sedation   CT c-spine negative for any injury or any abscess. Cardiovascular: septic shock  Hemodynamically stable  MAP goal > 65  Off of levophed. Pulmonary: acute subsegmental PE, Acute hypoxic respiratoy failure  Maintain oxygen sats >92%  Pulmonary toilet  Vent Information  Ventilator ID: [email or web address]  Equipment Changed: HME, Expiratory Filter  Intubated with vent settings at 16/490/5/30%  ABG: pH 7.425/ pco2 25.3/ po2 111.5/ hco3 16.6  Continue on IV heparin for acute PE    GI/Nutrition: ascending cholangitis, cholelithiasis, choledocholithiasis. S/p ERCP with stone removal and stent placement. Ulcer Prophylaxis:  not indicated  Diet:Diet NPO  GI recommended surgery consult for cholecystectomy once more stable and a repeat ERCP in 2 months for symptom awake. Renal/Fluid/Electrolyte  IV Fluids: [email or web address] mL/Hr   I/O: In: 5215 [I.V.:2182.9]  Out: 2175 [Urine:2175]  UOP: 2.175 L over the last 24 hours. Monitor electrolytes, replace PRN     ID: Ascending colitis, UTI, bacteremia  WBC:   Lab Results   Component Value Date    WBC 7.3 2023   Blood cultures growing gram-negative rods. Tmax: Temp (24hrs), Av.3 °F (37.9 °C), Min:97.2 °F (36.2 °C), Max:104.5 °F (40.3 °C)  Continue on vancomycin and Zosyn.     Hematology:  Recent Labs     23  1546 23  2241 23  0355 HGB 10.8* 11.1* 10.7*    stable  Endocrine:   glucose controlled - most recent BGL is   Recent Labs     03/08/23  1546 03/08/23  2241 03/09/23  0355   GLUCOSE 126* 113* 115*     DVT Prophylaxis  Heparin  Discharge Needs:  PT, OT, and Case Management      CODE STATUS: Full Code      Dolly Mcdowell MD,              Department of Internal Medicine/ Critical care  [address] [facility] ([place])             3/9/2023, 6:56 AM        Attending Physician Statement  I have discussed the care of Mariajose Do, including pertinent history and exam findings with the resident. I have reviewed the key elements of all parts of the encounter with the resident. I have seen and examined the patient with the resident. I agree with the assessment and plan and status of the problem list as documented. I have seen the patient during my round today, chart reviewed, labs and medications reviewed overnight events noted. Overnight patient continued to improve doing better mentation is better she has been on propofol which was weaned off she has not been on sedation this morning and continue with Levophed but a smaller dose of Levophed this morning as compared to yesterday she responded to fluid blood pressure is improving urine output is 2.2 L last 24 hours she is on lactated Ringer's at 100 mL an hour anion gap and non-anion gap acidosis is better. She is growing E. coli from the blood 2 out of 2 culture will discontinue vancomycin and once results of E. coli cultures available then will adjust antibiotic. Ventilator setting this morning was PRVC/16/490/5/30 percent ABG 7.4 2/25/111/16 her serum bicarbonate is 19. Platelet count is 845 slightly decreased likely secondary to sepsis. Her AST and ALT is improving gradually WBC count 7.3 and bicarbonate 19 BUN is 11 creatinine 0.34.     Patient is tolerating a spontaneous breathing trial this morning with CPAP/pressure support of 6 with mild tachypneic with respiratory rate in the mid 20s to high 20s likely secondary to sepsis and off sedation will extubate. We will continue aspiration precaution keep n.p.o. until tomorrow. Will need to assess speech/swallow evaluation before he started on feeding. Continue with Zosyn  DC vancomycin. Follow platelet count and follow final culture. Continue with IV fluids. Monitor urine output renal function. Continue with heparin drip for pulm embolism      Discussed with nursing staff, treatment and plan discussed. Discussed with respiratory therapist.    Total critical care time caring for this patient with life threatening, unstable organ failure, including direct patient contact, management of life support systems, review of data including imaging and labs, discussions with other team members and physicians at least 40 min so far today, excluding procedures. Please note that this chart was generated using voice recognition Dragon dictation software. Although every effort was made to ensure the accuracy of this automated transcription, some errors in transcription may have occurred.      Isreal Mason MD  3/9/2023 12:31 PM

## 2023-03-09 NOTE — PROGRESS NOTES
[facility] Progress Note    Carlos Noriega is a 75 y.o. female patient. Hospitalization Day:1      Chief consult reason: Cholangitis      Subjective: Patient is intubated but awake. Denies any abdominal pain. Abdominal pain from yesterday has resolved. Objective:   VITALS:  /61   Pulse 99   Temp 98.6 °F (37 °C)   Resp 21   Wt 143 lb 8.3 oz (65.1 kg)   SpO2 99%   BMI 22.48 kg/m²   TEMPERATURE:  Current - Temp: 98.6 °F (37 °C);  Max - Temp  Av.6 °F (37 °C)  Min: 97.2 °F (36.2 °C)  Max: 102.7 °F (39.3 °C)    Physical Assessment:  General appearance:  alert, intubated  Abdomen:  soft, nontender, nondistended, normal bowel sounds, no masses  Extremities:  no edema, no redness, No clubbing  Skin:  warm, dry, no gross lesions or rashes    CURRENT MEDICATIONS:  Scheduled Meds:   acetaminophen  325 mg Oral Once    sodium chloride flush  5-40 mL IntraVENous 2 times per day    piperacillin-tazobactam  3,375 mg IntraVENous Q8H     Continuous Infusions:   norepinephrine Stopped (23 1105)    sodium chloride Stopped (23 0907)    midazolam      propofol Stopped (23 193)    heparin (PORCINE) Infusion 14 Units/kg/hr (23 1107)    lactated ringers IV soln 100 mL/hr at 23 1107     PRN Meds:sodium chloride flush, sodium chloride, ondansetron **OR** ondansetron, polyethylene glycol, acetaminophen **OR** acetaminophen, fentanNYL, heparin (porcine), heparin (porcine)      Data Review:  LABS and IMAGING:     CBC  Recent Labs     23  0906 23  1546 23  2241 23  0355   WBC 3.6 7.8 8.5 7.3   HGB 13.3 10.8* 11.1* 10.7*   HCT 38.8 33.4* 33.2* 31.6*   .6 106.7* 101.2 101.3   MCHC 34.3 32.3 33.4 33.9   RDW 15.9* 16.0* 15.8* 15.9*    101* 102* 103*       Immature PLTs   No results found for: PLTFLUORE    ANEMIA STUDIES  No results for input(s): LABIRON, TIBC, IRON, FERRITIN, [id number], FOLATE, OCCULTBLD in the last 72 hours.    BMP  Recent Labs     03/08/23  1546 03/08/23  2241 03/09/23  0355    145* 146*   K 2.8* 4.0 3.3*   * 118* 117*   CO2 13* 16* 16*   BUN 11 11 11   CREATININE 0.34* 0.28* 0.34*   GLUCOSE 126* 113* 115*   CALCIUM 6.9* 8.1* 7.9*       LFTS  Recent Labs     03/08/23  0943 03/08/23  1546 03/09/23  0355   ALKPHOS 294* 308* 285*   * 232* 185*   * 463* 271*   BILITOT 3.3* 4.2* 3.5*   BILIDIR  --   --  3.5*   LABALBU 2.4* 2.5* 2.4*       AMYLASE/LIPASE/AMMONIA  Recent Labs     03/08/23  0906 03/08/23  0943   LIPASE  --  382*   AMMONIA 39  --        Acute Hepatitis Panel   Lab Results   Component Value Date/Time    HEPBSAG NONREACTIVE 06/18/2019 10:50 AM    HEPCAB NONREACTIVE 06/18/2019 10:50 AM    HEPBIGM NONREACTIVE 06/18/2019 10:50 AM    HEPAIGM NONREACTIVE 06/18/2019 10:50 AM       PT/INR  Recent Labs     03/08/23  1546   PROTIME 19.7*   INR 1.9       Cancer Markers:  CEA:  No results for input(s): CEA in the last 72 hours. Ca 125:  No results for input(s):  in the last 72 hours. Ca 19-9:   No results for input(s):  in the last 72 hours. AFP: No results for input(s): AFP in the last 72 hours. Lactic acid:  Recent Labs     03/08/23  1546 03/08/23  2241 03/09/23  0354   LACTACIDWB 2.5* 1.9 2.0         Radiology Review:    CT HEAD WO CONTRAST    Result Date: 3/8/2023  EXAMINATION: CT OF THE HEAD WITHOUT CONTRAST  3/8/2023 10:38 am TECHNIQUE: CT of the head was performed without the administration of intravenous contrast. Automated exposure control, iterative reconstruction, and/or weight based adjustment of the mA/kV was utilized to reduce the radiation dose to as low as reasonably achievable. COMPARISON: None.  HISTORY: ORDERING SYSTEM PROVIDED HISTORY: Altered, hypotensive TECHNOLOGIST PROVIDED HISTORY: Altered, hypotensive Decision Support Exception - unselect if not a suspected or confirmed emergency medical condition->Emergency Medical Condition (MA) Reason for Exam: febrile and hypotensive FINDINGS: BRAIN/VENTRICLES: There is some mild cerebral atrophy. There are white matter hypodensities in keeping with underlying microvascular disease. Posterior fossa structures are unremarkable. There is no acute intracranial hemorrhage, mass effect or midline shift. No abnormal extra-axial fluid collection. The gray-white differentiation is maintained without evidence of an acute infarct. There is no evidence of hydrocephalus. ORBITS: The visualized portion of the orbits demonstrate no acute abnormality. SINUSES: The visualized paranasal sinuses and mastoid air cells demonstrate no acute abnormality. SOFT TISSUES/SKULL:  No acute abnormality of the visualized skull or soft tissues. No acute intracranial abnormality. Mild cerebral atrophy. White matter microvascular disease. CT CERVICAL SPINE WO CONTRAST    Result Date: 3/8/2023  EXAMINATION: CT OF THE CERVICAL SPINE WITHOUT CONTRAST 3/8/2023 10:38 am TECHNIQUE: CT of the cervical spine was performed without the administration of intravenous contrast. Multiplanar reformatted images are provided for review. Automated exposure control, iterative reconstruction, and/or weight based adjustment of the mA/kV was utilized to reduce the radiation dose to as low as reasonably achievable. COMPARISON: None. HISTORY: ORDERING SYSTEM PROVIDED HISTORY: recent surgery, now febrile, and hypotensive TECHNOLOGIST PROVIDED HISTORY: recent surgery, now febrile, and hypotensive Decision Support Exception - unselect if not a suspected or confirmed emergency medical condition->Emergency Medical Condition (MA) Reason for Exam: febrile and hypotensive FINDINGS: BONES/ALIGNMENT: There is no acute fracture or traumatic malalignment. Lateral mass screws are noted at C3, C4, C5, C6 and C7 bilaterally. There appear to be in good position. The patient has had laminectomies from C3 to C6.  DEGENERATIVE CHANGES: There is some anterior osteophytes noted at C4-C5, C5-C6 and C6-C7 discs. SOFT TISSUES: There is no prevertebral soft tissue swelling. There are no fluid collections noted. Postoperative changes as described above. No evidence for any fluid collections in the area to suggest CSF leak or abscess. CT ABDOMEN PELVIS W IV CONTRAST Additional Contrast? None    Result Date: 3/8/2023  EXAMINATION: CTA OF THE CHEST; CT OF THE ABDOMEN AND PELVIS WITH CONTRAST 3/8/2023 11:31 am TECHNIQUE: CTA of the chest was performed after the administration of intravenous contrast.  Multiplanar reformatted images are provided for review. MIP images are provided for review. Automated exposure control, iterative reconstruction, and/or weight based adjustment of the mA/kV was utilized to reduce the radiation dose to as low as reasonably achievable.; CT of the abdomen and pelvis was performed with the administration of intravenous contrast. Multiplanar reformatted images are provided for review. Automated exposure control, iterative reconstruction, and/or weight based adjustment of the mA/kV was utilized to reduce the radiation dose to as low as reasonably achievable. COMPARISON: 01/14/2023 HISTORY: ORDERING SYSTEM PROVIDED HISTORY: recnt surgery, hypoxia, febrile. UTI TECHNOLOGIST PROVIDED HISTORY: recnt surgery, hypoxia, febrile. UTI Decision Support Exception - unselect if not a suspected or confirmed emergency medical condition->Emergency Medical Condition (MA) Reason for Exam: recnt surgery, hypoxia, febrile. UTI; ORDERING SYSTEM PROVIDED HISTORY: vomiting, elevated lft TECHNOLOGIST PROVIDED HISTORY: vomiting, elevated lft Decision Support Exception - unselect if not a suspected or confirmed emergency medical condition->Emergency Medical Condition (MA) Reason for Exam: vomiting, elevated lft FINDINGS: Chest: Limited by motion artifacts.  Pulmonary Arteries: Small filling defects seen subsegmental branches to posterior basal right lower lobe best appreciated on series 5 (thin sections). Not appreciated on prior. Compatible with acute pulmonary emboli. No right heart strain. Mediastinum: Mild cardiomegaly. Aorta enhances satisfactorily. Coronary artery calcifications. No significant lymphadenopathy. Lungs/pleura: Moderate-sized posterior basal left lower lobe consolidation and a small amount in the inferior lingula favors atelectasis over pneumonia. Much smaller patchy posterior basal right lower lobe infiltrate is also likely atelectasis. Les Pimple No pleural effusion or pneumothorax. No significant lung nodules or masses. Central airways unremarkable. Upper Abdomen: No acute process. Soft Tissues/Bones: No acute bone or soft tissue abnormality. Abdomen/Pelvis: Organs: Mild intrahepatic biliary ductal dilatation. Diffuse dilatation the common bile duct measuring up to 14 mm diameter. This is down to the ampulla. There could be non radiopaque calculus, stricture or periampullary lesion causing the obstruction. Favor choledocholithiasis given interval development since prior. There is distended gallbladder show cholelithiasis without evidence for acute cholecystitis. Liver shows no focal mass. Spleen, pancreas, adrenal glands and kidneys enhance satisfactorily without significant abnormality noting small left renal cyst. GI/Bowel: There is limited evaluation due to absence of oral contrast.  No bowel obstruction. No clear evidence for acute infective process. Pelvis: Suspect some thickening of the endometrium up to 9 mm AP thickness. Moderate inspected for patient's age. Detail limited on CT. Consider pelvic ultrasound. Calcified nodules in the right side of uterus likely calcified fibroids. Catheterized urinary bladder. Peritoneum/Retroperitoneum: No free intraperitoneal fluid or significant lymphadenopathy. Atherosclerotic disease, mild. Bones/Soft Tissues: Diffuse degenerative changes. No aggressive bone lesion. Chest: 1.  Small thrombus load acute pulmonary embolism. A few filling defects in subsegmental branches posterior basal right lower lobe. 2. Moderate-size posterior basal left lower lobe patchy consolidation and much smaller posterior basal right lower lobe patchy consolidation compatible with atelectasis. Abdomen pelvis: 1. Cholelithiasis without evidence for acute cholecystitis. 2. Intra and extrahepatic biliary ductal dilatation. Diffuse dilatation of CBD up to 14 mm diameter down to the ampulla. There could be non radiopaque calculus, stricture or periampullary lesion causing the obstruction. Given that this has developed since the prior study possible non radiopaque distal obstructing choledocholithiasis is most favored. MRCP or ERCP is recommended. 3. Probable endometrial  thickening up to 9 mm thickness. Correlation with non emergency outpatient pelvic ultrasound is recommended. XR CHEST PORTABLE    Result Date: 3/8/2023  EXAMINATION: ONE X-RAY VIEW OF THE CHEST; ONE SUPINE X-RAY VIEW(S) OF THE ABDOMEN 3/8/2023 3:55 pm; 3/8/2023 4:07 pm COMPARISON: 03/08/2023 HISTORY: ORDERING SYSTEM PROVIDED HISTORY:  Resp failure TECHNOLOGIST PROVIDED HISTORY: Resp failure Reason for Exam:  Resp failure line placement port supine at 4:15 pm. ORDERING SYSTEM PROVIDED HISTORY:  Confirmation of course of NG/OG/NE tube and location of tip of tube. TECHNOLOGIST PROVIDED HISTORY: Confirmation of course of NG/OG/NE tube and location of tip of tube. Portable? Yes Reason for Exam:  Check NG placement port supine at 4:15 pm. FINDINGS: Chest Radiograph:  Endotracheal tube tip is approximately 0.9 cm above the lulu; however, the tip is directed somewhat towards the right mainstem bronchus. Patchy airspace opacity in the right upper lung. Left basilar consolidation is again identified. Small left pleural effusion. The right costophrenic angle is sharp. No pneumothorax. No overt pulmonary edema. Stable cardiomediastinal silhouette.   Right internal jugular central venous catheter with the tip projecting over the cavoatrial junction. KUB:  Enteric tube tip and side-port project over the stomach. There are multiple dilated loops of small bowel measuring up to 3.3 cm in maximum diameter. There has been interval placement of a common bile duct stent. Small amount of residual contrast is noted within the biliary tree. No acute osseous abnormality or other pathologic calcifications. 1. Endotracheal tube tip is approximately 0.9 cm from the lulu; however, the tip is somewhat directed towards the right mainstem bronchus. Consider repositioning. 2. Patchy right upper lobe airspace opacity and left basilar consolidation. 3. Multiple air-filled dilated loops of small bowel measuring up to 3.3 cm in maximum diameter. Findings favor ileus over small bowel obstruction. 4. Enteric tube tip and side-port project over the stomach. 5. Right central venous catheter tip projecting over the cavoatrial junction. XR CHEST PORTABLE    Result Date: 3/8/2023  EXAMINATION: ONE XRAY VIEW OF THE CHEST 3/8/2023 9:25 am COMPARISON: None. HISTORY: ORDERING SYSTEM PROVIDED HISTORY: Sepsis TECHNOLOGIST PROVIDED HISTORY: Sepsis FINDINGS: There is a trace left basilar effusion with left basilar infiltrate. There is no pneumothorax. The mediastinal structures are unremarkable. The upper abdomen unremarkable. The extrathoracic soft tissues are unremarkable. Trace left basilar effusion with adjacent infiltrate. CT CHEST PULMONARY EMBOLISM W CONTRAST    Result Date: 3/8/2023  EXAMINATION: CTA OF THE CHEST; CT OF THE ABDOMEN AND PELVIS WITH CONTRAST 3/8/2023 11:31 am TECHNIQUE: CTA of the chest was performed after the administration of intravenous contrast.  Multiplanar reformatted images are provided for review. MIP images are provided for review.  Automated exposure control, iterative reconstruction, and/or weight based adjustment of the mA/kV was utilized to reduce the radiation dose to as low as reasonably achievable.; CT of the abdomen and pelvis was performed with the administration of intravenous contrast. Multiplanar reformatted images are provided for review. Automated exposure control, iterative reconstruction, and/or weight based adjustment of the mA/kV was utilized to reduce the radiation dose to as low as reasonably achievable. COMPARISON: 01/14/2023 HISTORY: ORDERING SYSTEM PROVIDED HISTORY: recnt surgery, hypoxia, febrile. UTI TECHNOLOGIST PROVIDED HISTORY: recnt surgery, hypoxia, febrile. UTI Decision Support Exception - unselect if not a suspected or confirmed emergency medical condition->Emergency Medical Condition (MA) Reason for Exam: recnt surgery, hypoxia, febrile. UTI; ORDERING SYSTEM PROVIDED HISTORY: vomiting, elevated lft TECHNOLOGIST PROVIDED HISTORY: vomiting, elevated lft Decision Support Exception - unselect if not a suspected or confirmed emergency medical condition->Emergency Medical Condition (MA) Reason for Exam: vomiting, elevated lft FINDINGS: Chest: Limited by motion artifacts. Pulmonary Arteries: Small filling defects seen subsegmental branches to posterior basal right lower lobe best appreciated on series 5 (thin sections). Not appreciated on prior. Compatible with acute pulmonary emboli. No right heart strain. Mediastinum: Mild cardiomegaly. Aorta enhances satisfactorily. Coronary artery calcifications. No significant lymphadenopathy. Lungs/pleura: Moderate-sized posterior basal left lower lobe consolidation and a small amount in the inferior lingula favors atelectasis over pneumonia. Much smaller patchy posterior basal right lower lobe infiltrate is also likely atelectasis. Sandra Pennington No pleural effusion or pneumothorax. No significant lung nodules or masses. Central airways unremarkable. Upper Abdomen: No acute process. Soft Tissues/Bones: No acute bone or soft tissue abnormality. Abdomen/Pelvis: Organs: Mild intrahepatic biliary ductal dilatation.   Diffuse dilatation the common bile duct measuring up to 14 mm diameter. This is down to the ampulla. There could be non radiopaque calculus, stricture or periampullary lesion causing the obstruction. Favor choledocholithiasis given interval development since prior. There is distended gallbladder show cholelithiasis without evidence for acute cholecystitis. Liver shows no focal mass. Spleen, pancreas, adrenal glands and kidneys enhance satisfactorily without significant abnormality noting small left renal cyst. GI/Bowel: There is limited evaluation due to absence of oral contrast.  No bowel obstruction. No clear evidence for acute infective process. Pelvis: Suspect some thickening of the endometrium up to 9 mm AP thickness. Moderate inspected for patient's age. Detail limited on CT. Consider pelvic ultrasound. Calcified nodules in the right side of uterus likely calcified fibroids. Catheterized urinary bladder. Peritoneum/Retroperitoneum: No free intraperitoneal fluid or significant lymphadenopathy. Atherosclerotic disease, mild. Bones/Soft Tissues: Diffuse degenerative changes. No aggressive bone lesion. Chest: 1. Small thrombus load acute pulmonary embolism. A few filling defects in subsegmental branches posterior basal right lower lobe. 2. Moderate-size posterior basal left lower lobe patchy consolidation and much smaller posterior basal right lower lobe patchy consolidation compatible with atelectasis. Abdomen pelvis: 1. Cholelithiasis without evidence for acute cholecystitis. 2. Intra and extrahepatic biliary ductal dilatation. Diffuse dilatation of CBD up to 14 mm diameter down to the ampulla. There could be non radiopaque calculus, stricture or periampullary lesion causing the obstruction. Given that this has developed since the prior study possible non radiopaque distal obstructing choledocholithiasis is most favored. MRCP or ERCP is recommended.  3. Probable endometrial  thickening up to 9 mm thickness. Correlation with non emergency outpatient pelvic ultrasound is recommended. XR ABDOMEN FOR NG/OG/NE TUBE PLACEMENT    Result Date: 3/8/2023  EXAMINATION: ONE X-RAY VIEW OF THE CHEST; ONE SUPINE X-RAY VIEW(S) OF THE ABDOMEN 3/8/2023 3:55 pm; 3/8/2023 4:07 pm COMPARISON: 03/08/2023 HISTORY: ORDERING SYSTEM PROVIDED HISTORY:  Resp failure TECHNOLOGIST PROVIDED HISTORY: Resp failure Reason for Exam:  Resp failure line placement port supine at 4:15 pm. ORDERING SYSTEM PROVIDED HISTORY:  Confirmation of course of NG/OG/NE tube and location of tip of tube. TECHNOLOGIST PROVIDED HISTORY: Confirmation of course of NG/OG/NE tube and location of tip of tube. Portable? Yes Reason for Exam:  Check NG placement port supine at 4:15 pm. FINDINGS: Chest Radiograph:  Endotracheal tube tip is approximately 0.9 cm above the lulu; however, the tip is directed somewhat towards the right mainstem bronchus. Patchy airspace opacity in the right upper lung. Left basilar consolidation is again identified. Small left pleural effusion. The right costophrenic angle is sharp. No pneumothorax. No overt pulmonary edema. Stable cardiomediastinal silhouette. Right internal jugular central venous catheter with the tip projecting over the cavoatrial junction. KUB:  Enteric tube tip and side-port project over the stomach. There are multiple dilated loops of small bowel measuring up to 3.3 cm in maximum diameter. There has been interval placement of a common bile duct stent. Small amount of residual contrast is noted within the biliary tree. No acute osseous abnormality or other pathologic calcifications. 1. Endotracheal tube tip is approximately 0.9 cm from the lulu; however, the tip is somewhat directed towards the right mainstem bronchus. Consider repositioning. 2. Patchy right upper lobe airspace opacity and left basilar consolidation.  3. Multiple air-filled dilated loops of small bowel measuring up to 3.3 cm in maximum diameter. Findings favor ileus over small bowel obstruction. 4. Enteric tube tip and side-port project over the stomach. 5. Right central venous catheter tip projecting over the cavoatrial junction. FLUORO FOR SURGICAL PROCEDURES    Result Date: 3/8/2023  Radiology exam is complete. No Radiologist dictation. Please follow up with ordering provider. ENDOSCOPY     Principal Problem:    Septic shock ([address]  Active Problems:    Ascending cholangitis    Cholelithiasis    Gram negative septicemia ([address]    Choledocholithiasis    Acute respiratory failure with hypoxia ([facility])    Metabolic encephalopathy    Acute pulmonary embolism ([facility])  Resolved Problems:    * No resolved hospital problems. *       GI Assessment:  Cholangitis status post ERCP with stent placement  Respiratory failure currently intubated on weaning trial  Acute PE on anticoagulation  Gram-negative bacteremia from cholangitis    Plan of care:  Repeat ERCP in 2 months for stent removal  Surgery consult once patient is more stable for possible cholecystectomy at some point  Okay to start tube feedings or if she is extubated-regular diet from GI standpoint. We will sign off. Please call with any questions    Please feel free to contact me with any questions or concerns. Thank you for allowing me to participate in the care of your patient. Fany Mena MD on 3/9/2023 at 11:23 [address] Gastro[facility]    Please note that this note was generated using a voice recognition dictation software. Although every effort was made to ensure the accuracy of this automated transcription, some errors in transcription may have occurred.

## 2023-03-09 NOTE — PLAN OF CARE
Problem: Pain  Goal: Verbalizes/displays adequate comfort level or baseline comfort level  Outcome: Progressing     Problem: Neurosensory - Adult  Goal: Achieves stable or improved neurological status  Outcome: Progressing     Problem: Cardiovascular - Adult  Goal: Maintains optimal cardiac output and hemodynamic stability  Outcome: Progressing     Problem: Skin/Tissue Integrity - Adult  Goal: Skin integrity remains intact  Outcome: Progressing     Problem: Skin/Tissue Integrity  Goal: Absence of new skin breakdown  Description: 1. Monitor for areas of redness and/or skin breakdown  2. Assess vascular access sites hourly  3. Every 4-6 hours minimum:  Change oxygen saturation probe site  4. Every 4-6 hours:  If on nasal continuous positive airway pressure, respiratory therapy assess nares and determine need for appliance change or resting period. Outcome: Progressing     Problem: Safety - Adult  Goal: Free from fall injury  Outcome: Progressing     Problem: Safety - Medical Restraint  Goal: Remains free of injury from restraints (Restraint for Interference with Medical Device)  Description: INTERVENTIONS:  1. Determine that other, less restrictive measures have been tried or would not be effective before applying the restraint  2. Evaluate the patient's condition at the time of restraint application  3. Inform patient/family regarding the reason for restraint  4.  Q2H: Monitor safety, psychosocial status, comfort, nutrition and hydration  Outcome: Completed  Flowsheets  Taken 3/9/2023 1200 by Gary Kendall RN  Remains free of injury from restraints (restraint for interference with medical device): Every 2 hours: Monitor safety, psychosocial status, comfort, nutrition and hydration  Taken 3/9/2023 1000 by Gary Kendall RN  Remains free of injury from restraints (restraint for interference with medical device): Every 2 hours: Monitor safety, psychosocial status, comfort, nutrition and hydration  Taken 3/9/2023 0800 by Gary Kendall, RN  Remains free of injury from restraints (restraint for interference with medical device): Every 2

## 2023-03-09 NOTE — PLAN OF CARE
Problem: Discharge Planning  Goal: Discharge to home or other facility with appropriate resources  3/8/2023 2229 by Jacqui Castillo RN  Outcome: Progressing  3/8/2023 1902 by Slim Mahajan RN  Outcome: Progressing  Flowsheets (Taken 3/8/2023 1530)  Discharge to home or other facility with appropriate resources: Identify barriers to discharge with patient and caregiver     Problem: Respiratory - Adult  Goal: Achieves optimal ventilation and oxygenation  3/8/2023 2229 by Jacqui Castillo RN  Outcome: Progressing  3/8/2023 1902 by Slim Mahajan RN  Outcome: Progressing  3/8/2023 1524 by Starlett Boast, RCP  Outcome: Progressing  3/8/2023 1523 by Starlett Boast, RCMAGAN  Outcome: Progressing     Problem: Pain  Goal: Verbalizes/displays adequate comfort level or baseline comfort level  3/8/2023 2229 by Jacqui Castillo RN  Outcome: Progressing  3/8/2023 1902 by Slim Mahajan RN  Outcome: Progressing     Problem: Safety - Medical Restraint  Goal: Remains free of injury from restraints (Restraint for Interference with Medical Device)  Description: INTERVENTIONS:  1. Determine that other, less restrictive measures have been tried or would not be effective before applying the restraint  2. Evaluate the patient's condition at the time of restraint application  3. Inform patient/family regarding the reason for restraint  4.  Q2H: Monitor safety, psychosocial status, comfort, nutrition and hydration  3/8/2023 2229 by Jacqui Castillo RN  Outcome: Progressing  Flowsheets  Taken 3/8/2023 2229 by Jacqui Castillo RN  Remains free of injury from restraints (restraint for interference with medical device):   Determine that other, less restrictive measures have been tried or would not be effective before applying the restraint   Evaluate the patient's condition at the time of restraint application   Every 2 hours: Monitor safety, psychosocial status, comfort, nutrition and hydration  Taken 3/8/2023 2000 by Emily Marshall RN  Remains free of injury from restraints (restraint for interference with medical device): Determine that other, less restrictive measures have been tried or would not be effective before applying the restraint  3/8/2023 1902 by Maegan Lee RN  Outcome: Progressing  Flowsheets  Taken 3/8/2023 1800  Remains free of injury from restraints (restraint for interference with medical device):   Determine that other, less restrictive measures have been tried or would not be effective before applying the restraint   Evaluate the patient's condition at the time of restraint application   Inform patient/family regarding the reason for restraint   Every 2 hours: Monitor safety, psychosocial status, comfort, nutrition and hydration  Taken 3/8/2023 1600  Remains free of injury from restraints (restraint for interference with medical device):   Determine that other, less restrictive measures have been tried or would not be effective before applying the restraint   Evaluate the patient's condition at the time of restraint application   Inform patient/family regarding the reason for restraint   Every 2 hours: Monitor safety, psychosocial status, comfort, nutrition and hydration     Problem: Neurosensory - Adult  Goal: Achieves stable or improved neurological status  3/8/2023 2229 by Bessie Phillips RN  Outcome: Progressing  3/8/2023 1902 by Maegan Lee RN  Outcome: Progressing  Goal: Achieves maximal functionality and self care  3/8/2023 2229 by Bessie Phillips RN  Outcome: Progressing  3/8/2023 1902 by Maegan Lee RN  Outcome: Progressing     Problem: Cardiovascular - Adult  Goal: Maintains optimal cardiac output and hemodynamic stability  3/8/2023 2229 by Bessie Phillips RN  Outcome: Progressing  3/8/2023 1902 by Maegan Lee RN  Outcome: Progressing  Goal: Absence of cardiac dysrhythmias or at baseline  3/8/2023 2229 by Bessie Phillips RN  Outcome: Progressing  3/8/2023 1902 by Maegan Lee RN  Outcome: Progressing     Problem: Skin/Tissue Integrity - Adult  Goal: Skin integrity remains intact  3/8/2023 2229 by Juvencio Chávez RN  Outcome: Progressing  3/8/2023 1902 by Krystal Herbert RN  Outcome: Progressing  Goal: Incisions, wounds, or drain sites healing without S/S of infection  3/8/2023 2229 by Juvencio Chávez RN  Outcome: Progressing  3/8/2023 1902 by Krystal Herbert RN  Outcome: Progressing     Problem: Musculoskeletal - Adult  Goal: Return mobility to safest level of function  3/8/2023 2229 by Juvencio Chváez RN  Outcome: Progressing  3/8/2023 1902 by Krystal Herbert RN  Outcome: Progressing  Goal: Return ADL status to a safe level of function  3/8/2023 2229 by Juvencio Chávez RN  Outcome: Progressing  3/8/2023 1902 by Krystal Herbert RN  Outcome: Progressing     Problem: Genitourinary - Adult  Goal: Absence of urinary retention  3/8/2023 2229 by Juvencio Chávez RN  Outcome: Progressing  3/8/2023 1902 by Krystal Herbert RN  Outcome: Progressing     Problem: Skin/Tissue Integrity  Goal: Absence of new skin breakdown  Description: 1. Monitor for areas of redness and/or skin breakdown  2. Assess vascular access sites hourly  3. Every 4-6 hours minimum:  Change oxygen saturation probe site  4. Every 4-6 hours:  If on nasal continuous positive airway pressure, respiratory therapy assess nares and determine need for appliance change or resting period.   Outcome: Progressing       Juvencio Chávez RN

## 2023-03-09 NOTE — PLAN OF CARE
Problem: Respiratory - Adult  Goal: Achieves optimal ventilation and oxygenation  3/9/2023 0840 by Richy Hercules RCP  Outcome: Progressing

## 2023-03-09 NOTE — CARE COORDINATION
Case Management Assessment  Initial Evaluation    Date/Time of Evaluation: 3/9/2023 5:40 PM  Assessment Completed by: José Miguel Garcia RN    If patient is discharged prior to next notation, then this note serves as note for discharge by case management. Patient Name: Brandon Jaramillo                   YOB: 1950  Diagnosis: Choledocholithiasis [K80.50]  Acute UTI [N39.0]  Elevated brain natriuretic peptide (BNP) level [R79.89]  Septic shock ([facility] [A41.9, R65.21]  Altered mental status, unspecified altered mental status type [R41.82]  Other acute pulmonary embolism, unspecified whether acute cor pulmonale present ([facility] [I26.99]                   Date / Time: 3/8/2023  8:48 AM    Patient Admission Status: Inpatient   Readmission Risk (Low < 19, Mod (19-27), High > 27): Readmission Risk Score: 17.6    Current PCP: Fausto Sosa MD  PCP verified by CM? Chart Reviewed: Yes      History Provided by: Spouse, Child/Family (pt's  Reji and pt's daughter Stephanie Hunt)  Patient Orientation: Unable to Assess    Patient Cognition: Other (see comment) (pt extubated today, very drowsy)    Hospitalization in the last 30 days (Readmission):  No    If yes, Readmission Assessment in  Navigator will be completed. Advance Directives:      Code Status: Full Code   Patient's Primary Decision Maker is:        Discharge Planning:    Patient lives with:   Type of Home: [place]  Primary Care Giver:  Other (Comment) (pt came from [address] [place] [address])  Patient Support Systems include: Spouse/Significant Other, Children   Current Financial resources: Medicare (straight Medicare adn [employer] Senior Medicare supplement)  Current community resources:    Current services prior to admission:              Current DME:              Type of Home Care services:       ADLS  Prior functional level: Assistance with the following:, Bathing, Dressing, Toileting, Mobility  Current functional level: Assistance with the following:, Bathing, Dressing, Toileting, Mobility    PT AM-PAC:   /24  OT AM-PAC:   /24    Family can provide assistance at DC: No  Would you like Case Management to discuss the discharge plan with any other family members/significant others, and if so, who? Plans to Return to Present Housing: Unknown at present  Other Identified Issues/Barriers to RETURNING to current housing: pt progress  Potential Assistance needed at discharge:              Potential DME:    Patient expects to discharge to: [facility] (P) 34 for transportation at discharge:      Financial    Payor: Brendan Kraus / Plan: MEDICARE PART A AND B / Product Type: *No Product type* /     Does insurance require precert for SNF: No    Potential assistance Purchasing Medications: No  Meds-to-Beds request: Yes      [id number] - P [phone] - F [phone]  [address] [place] [id number]  Phone: [phone] Fax: [phone]    [address] [place] [address]  [address] [address]  Phone: [phone] Fax: [phone]      Notes:    Factors facilitating achievement of predicted outcomes: Family support    Barriers to discharge: Medical complications    Additional Case Management Notes: Extubated today, XR cervical spine ordered, PT/OT/SLP ordered. Neuro, GI following. Per GI- surgery consult once patient is more stable for possible cholecystectomy at some point  Pt came from [facility]. Family does not want pt to return there. Per family request, referrals faxed in order of choice to:   1) [facility]  2) [facility]  3) [facility] [address] [place] [address]. They are asking for closer facilities for pt's  who now lives in [place].      The Plan for Transition of Care is related to the following treatment goals of Choledocholithiasis [K80.50]  Acute UTI [N39.0]  Elevated brain natriuretic peptide (BNP) level [R79.89]  Septic shock (HCC) [A41.9, R65.21]  Altered mental status, unspecified altered mental status type [R41.82]  Other acute pulmonary embolism, unspecified whether acute cor pulmonale present ([facility] [address] [J47.64]    IF APPLICABLE: The Patient and/or patient representative Smita Knight and her family were provided with a choice of provider and agrees with the discharge plan. Freedom of choice list with basic dialogue that supports the patient's individualized plan of care/goals and shares the quality data associated with the providers was provided to: (P) Patient Representative   Patient Representative Name: (P) pt's daughter Elham     The Patient and/or Patient Representative Agree with the Discharge Plan?  (P) Yes (Referrals made to 3 SNFs, pt's family does not want pt returning to [facility])    Ludwig Ratliff RN  Case Management Department  Ph: [phone] Fax: [phone]

## 2023-03-09 NOTE — PROGRESS NOTES
Physician Progress Note      PATIENT:               Nayla Soliz  CSN #:                  [id number]  :                       1950  ADMIT DATE:       3/8/2023 8:48 AM  DISCH DATE:  RESPONDING  PROVIDER #:        Maura Pallas          QUERY TEXT:    Pt admitted with Septic shock. Pt noted to have Altered mental status   documented. If possible, please document if you are evaluating and / or   treating any of the following: The medical record reflects the following:  Risk Factors: Recent Cervical surgery, Sepsis,Shock  Clinical Indicators: To ED from [facility] with Hypotension/AMS- Septic shock. Hypoxic- intubated on vent . Acute PE per CT abd. Cholelithiasis s/p emergent   ERCP with stent placed. Metabolic acidosis. Per H&P '  she likely has   derangement secondary to severe sepsis.'   Creat 1.4 Lac acid 3.2 pro chandan 6.62    crp 105 bnp 4111 alk phos 300 ast 476 alt 215. Tachy 130s  Treatment: Mechanical vent, IVF boluses, IV Zosyn, Vanco. ICU monitoring    Thank you,  [email or web address]  office hours  m-f 7-3  Options provided:  -- Metabolic encephalopathy  -- Septic encephalopathy  -- Toxic encephalopathy  -- Toxic metabolic encephalopathy  -- Other - I will add my own diagnosis  -- Disagree - Not applicable / Not valid  -- Disagree - Clinically unable to determine / Unknown  -- Refer to Clinical Documentation Reviewer    PROVIDER RESPONSE TEXT:    This patient has septic encephalopathy.     Query created by: Nicole Pelayo on 3/9/2023 7:50 AM      Electronically signed by:  Maura Pallas 3/9/2023 7:56 AM

## 2023-03-10 ENCOUNTER — APPOINTMENT (OUTPATIENT)
Dept: GENERAL RADIOLOGY | Age: 73
DRG: 853 | End: 2023-03-10
Payer: MEDICARE

## 2023-03-10 PROBLEM — R41.82 ALTERED MENTAL STATUS: Status: ACTIVE | Noted: 2023-03-10

## 2023-03-10 PROBLEM — R79.89 ELEVATED BRAIN NATRIURETIC PEPTIDE (BNP) LEVEL: Status: ACTIVE | Noted: 2023-03-10

## 2023-03-10 LAB
ABSOLUTE EOS #: 0.05 K/UL (ref 0–0.4)
ABSOLUTE IMMATURE GRANULOCYTE: 0.05 K/UL (ref 0–0.3)
ABSOLUTE LYMPH #: 0.82 K/UL (ref 1–4.8)
ABSOLUTE MONO #: 0.1 K/UL (ref 0.1–0.8)
ALBUMIN SERPL-MCNC: 2.2 G/DL (ref 3.5–5.2)
ALBUMIN/GLOBULIN RATIO: 0.8 (ref 1–2.5)
ALP SERPL-CCNC: 278 U/L (ref 35–104)
ALT SERPL-CCNC: 111 U/L (ref 5–33)
ANION GAP SERPL CALCULATED.3IONS-SCNC: 16 MMOL/L (ref 9–17)
ANION GAP SERPL CALCULATED.3IONS-SCNC: 9 MMOL/L (ref 9–17)
AST SERPL-CCNC: 96 U/L
BASOPHILS # BLD: 0 % (ref 0–2)
BASOPHILS ABSOLUTE: 0 K/UL (ref 0–0.2)
BILIRUB DIRECT SERPL-MCNC: 1.1 MG/DL
BILIRUB INDIRECT SERPL-MCNC: 0.3 MG/DL (ref 0–1)
BILIRUB SERPL-MCNC: 1.4 MG/DL (ref 0.3–1.2)
BUN SERPL-MCNC: 9 MG/DL (ref 8–23)
CALCIUM SERPL-MCNC: 8.1 MG/DL (ref 8.6–10.4)
CHLORIDE SERPL-SCNC: 115 MMOL/L (ref 98–107)
CHLORIDE SERPL-SCNC: 116 MMOL/L (ref 98–107)
CO2 SERPL-SCNC: 12 MMOL/L (ref 20–31)
CO2 SERPL-SCNC: 19 MMOL/L (ref 20–31)
CREAT SERPL-MCNC: 0.33 MG/DL (ref 0.5–0.9)
EOSINOPHILS RELATIVE PERCENT: 1 % (ref 1–4)
GFR SERPL CREATININE-BSD FRML MDRD: >60 ML/MIN/1.73M2
GLUCOSE BLD-MCNC: 111 MG/DL (ref 65–105)
GLUCOSE BLD-MCNC: 125 MG/DL (ref 65–105)
GLUCOSE BLD-MCNC: 67 MG/DL (ref 65–105)
GLUCOSE SERPL-MCNC: 93 MG/DL (ref 70–99)
HCT VFR BLD AUTO: 29.3 % (ref 36.3–47.1)
HGB BLD-MCNC: 9.8 G/DL (ref 11.9–15.1)
IMMATURE GRANULOCYTES: 1 %
LYMPHOCYTES # BLD: 17 % (ref 24–44)
MAGNESIUM SERPL-MCNC: 1.6 MG/DL (ref 1.6–2.6)
MCH RBC QN AUTO: 34 PG (ref 25.2–33.5)
MCHC RBC AUTO-ENTMCNC: 33.4 G/DL (ref 28.4–34.8)
MCV RBC AUTO: 101.7 FL (ref 82.6–102.9)
MICROORGANISM SPEC CULT: ABNORMAL
MICROORGANISM SPEC CULT: ABNORMAL
MONOCYTES # BLD: 2 % (ref 1–7)
MORPHOLOGY: ABNORMAL
NRBC AUTOMATED: 0 PER 100 WBC
PARTIAL THROMBOPLASTIN TIME: 46.7 SEC (ref 20.5–30.5)
PARTIAL THROMBOPLASTIN TIME: 74.6 SEC (ref 20.5–30.5)
PDW BLD-RTO: 16.1 % (ref 11.8–14.4)
PLATELET # BLD AUTO: 72 K/UL (ref 138–453)
PMV BLD AUTO: 11.5 FL (ref 8.1–13.5)
POTASSIUM SERPL-SCNC: 3.3 MMOL/L (ref 3.7–5.3)
POTASSIUM SERPL-SCNC: 4.2 MMOL/L (ref 3.7–5.3)
PROT SERPL-MCNC: 4.9 G/DL (ref 6.4–8.3)
RBC # BLD: 2.88 M/UL (ref 3.95–5.11)
SEG NEUTROPHILS: 79 % (ref 36–66)
SEGMENTED NEUTROPHILS ABSOLUTE COUNT: 3.78 K/UL (ref 1.8–7.7)
SODIUM SERPL-SCNC: 143 MMOL/L (ref 135–144)
SODIUM SERPL-SCNC: 144 MMOL/L (ref 135–144)
SPECIMEN DESCRIPTION: ABNORMAL
WBC # BLD AUTO: 4.8 K/UL (ref 3.5–11.3)

## 2023-03-10 PROCEDURE — 72040 X-RAY EXAM NECK SPINE 2-3 VW: CPT

## 2023-03-10 PROCEDURE — 6370000000 HC RX 637 (ALT 250 FOR IP)

## 2023-03-10 PROCEDURE — 93970 EXTREMITY STUDY: CPT

## 2023-03-10 PROCEDURE — 83735 ASSAY OF MAGNESIUM: CPT

## 2023-03-10 PROCEDURE — 80076 HEPATIC FUNCTION PANEL: CPT

## 2023-03-10 PROCEDURE — 6360000002 HC RX W HCPCS

## 2023-03-10 PROCEDURE — 2580000003 HC RX 258

## 2023-03-10 PROCEDURE — 2060000000 HC ICU INTERMEDIATE R&B

## 2023-03-10 PROCEDURE — 6370000000 HC RX 637 (ALT 250 FOR IP): Performed by: STUDENT IN AN ORGANIZED HEALTH CARE EDUCATION/TRAINING PROGRAM

## 2023-03-10 PROCEDURE — 2T015 HOSPITALIST 2ND TOUCH: CPT | Performed by: STUDENT IN AN ORGANIZED HEALTH CARE EDUCATION/TRAINING PROGRAM

## 2023-03-10 PROCEDURE — 86022 PLATELET ANTIBODIES: CPT

## 2023-03-10 PROCEDURE — 99291 CRITICAL CARE FIRST HOUR: CPT | Performed by: INTERNAL MEDICINE

## 2023-03-10 PROCEDURE — 6360000002 HC RX W HCPCS: Performed by: STUDENT IN AN ORGANIZED HEALTH CARE EDUCATION/TRAINING PROGRAM

## 2023-03-10 PROCEDURE — 51798 US URINE CAPACITY MEASURE: CPT

## 2023-03-10 PROCEDURE — 80051 ELECTROLYTE PANEL: CPT

## 2023-03-10 PROCEDURE — 2580000003 HC RX 258: Performed by: STUDENT IN AN ORGANIZED HEALTH CARE EDUCATION/TRAINING PROGRAM

## 2023-03-10 PROCEDURE — 85025 COMPLETE CBC W/AUTO DIFF WBC: CPT

## 2023-03-10 PROCEDURE — 82947 ASSAY GLUCOSE BLOOD QUANT: CPT

## 2023-03-10 PROCEDURE — 36415 COLL VENOUS BLD VENIPUNCTURE: CPT

## 2023-03-10 PROCEDURE — 80048 BASIC METABOLIC PNL TOTAL CA: CPT

## 2023-03-10 PROCEDURE — 92610 EVALUATE SWALLOWING FUNCTION: CPT

## 2023-03-10 PROCEDURE — 85730 THROMBOPLASTIN TIME PARTIAL: CPT

## 2023-03-10 RX ORDER — POTASSIUM CHLORIDE 29.8 MG/ML
20 INJECTION INTRAVENOUS
Status: COMPLETED | OUTPATIENT
Start: 2023-03-10 | End: 2023-03-10

## 2023-03-10 RX ORDER — ARGATROBAN 1 MG/ML
.0625-1 INJECTION, SOLUTION INTRAVENOUS CONTINUOUS
Status: DISCONTINUED | OUTPATIENT
Start: 2023-03-11 | End: 2023-03-11

## 2023-03-10 RX ORDER — 0.9 % SODIUM CHLORIDE 0.9 %
500 INTRAVENOUS SOLUTION INTRAVENOUS ONCE
Status: COMPLETED | OUTPATIENT
Start: 2023-03-10 | End: 2023-03-10

## 2023-03-10 RX ORDER — DEXTROSE, SODIUM CHLORIDE, SODIUM LACTATE, POTASSIUM CHLORIDE, AND CALCIUM CHLORIDE 5; .6; .31; .03; .02 G/100ML; G/100ML; G/100ML; G/100ML; G/100ML
INJECTION, SOLUTION INTRAVENOUS CONTINUOUS
Status: DISCONTINUED | OUTPATIENT
Start: 2023-03-10 | End: 2023-03-16 | Stop reason: HOSPADM

## 2023-03-10 RX ORDER — DEXTROSE MONOHYDRATE 100 MG/ML
INJECTION, SOLUTION INTRAVENOUS CONTINUOUS PRN
Status: DISCONTINUED | OUTPATIENT
Start: 2023-03-10 | End: 2023-03-16 | Stop reason: HOSPADM

## 2023-03-10 RX ADMIN — PIPERACILLIN AND TAZOBACTAM 3375 MG: 3; .375 INJECTION, POWDER, LYOPHILIZED, FOR SOLUTION INTRAVENOUS at 10:07

## 2023-03-10 RX ADMIN — SODIUM CHLORIDE: 9 INJECTION, SOLUTION INTRAVENOUS at 05:35

## 2023-03-10 RX ADMIN — SODIUM CHLORIDE, PRESERVATIVE FREE 10 ML: 5 INJECTION INTRAVENOUS at 07:57

## 2023-03-10 RX ADMIN — POTASSIUM CHLORIDE 20 MEQ: 29.8 INJECTION, SOLUTION INTRAVENOUS at 06:52

## 2023-03-10 RX ADMIN — SODIUM CHLORIDE, SODIUM LACTATE, POTASSIUM CHLORIDE, CALCIUM CHLORIDE AND DEXTROSE MONOHYDRATE: 5; 600; 310; 30; 20 INJECTION, SOLUTION INTRAVENOUS at 05:49

## 2023-03-10 RX ADMIN — APIXABAN 5 MG: 5 TABLET, FILM COATED ORAL at 13:52

## 2023-03-10 RX ADMIN — PIPERACILLIN AND TAZOBACTAM 3375 MG: 3; .375 INJECTION, POWDER, LYOPHILIZED, FOR SOLUTION INTRAVENOUS at 01:15

## 2023-03-10 RX ADMIN — CEFTRIAXONE SODIUM 2000 MG: 10 INJECTION, POWDER, FOR SOLUTION INTRAVENOUS at 17:13

## 2023-03-10 RX ADMIN — SODIUM CHLORIDE 500 ML: 9 INJECTION, SOLUTION INTRAVENOUS at 15:41

## 2023-03-10 RX ADMIN — POTASSIUM CHLORIDE 20 MEQ: 29.8 INJECTION, SOLUTION INTRAVENOUS at 08:58

## 2023-03-10 RX ADMIN — POTASSIUM CHLORIDE 20 MEQ: 29.8 INJECTION, SOLUTION INTRAVENOUS at 07:56

## 2023-03-10 RX ADMIN — SODIUM CHLORIDE, SODIUM LACTATE, POTASSIUM CHLORIDE, CALCIUM CHLORIDE AND DEXTROSE MONOHYDRATE: 5; 600; 310; 30; 20 INJECTION, SOLUTION INTRAVENOUS at 16:05

## 2023-03-10 RX ADMIN — ACETAMINOPHEN 650 MG: 325 TABLET ORAL at 12:30

## 2023-03-10 RX ADMIN — SODIUM CHLORIDE, POTASSIUM CHLORIDE, SODIUM LACTATE AND CALCIUM CHLORIDE: 600; 310; 30; 20 INJECTION, SOLUTION INTRAVENOUS at 05:34

## 2023-03-10 ASSESSMENT — PAIN SCALES - GENERAL
PAINLEVEL_OUTOF10: 8
PAINLEVEL_OUTOF10: 7
PAINLEVEL_OUTOF10: 0
PAINLEVEL_OUTOF10: 0
PAINLEVEL_OUTOF10: 8

## 2023-03-10 ASSESSMENT — PAIN DESCRIPTION - LOCATION
LOCATION: NECK
LOCATION: NECK

## 2023-03-10 NOTE — PLAN OF CARE
Problem: Pain  Goal: Verbalizes/displays adequate comfort level or baseline comfort level  3/10/2023 1155 by Maxx Parsons RN  Outcome: Progressing     Problem: Neurosensory - Adult  Goal: Achieves stable or improved neurological status  3/10/2023 1155 by Maxx Parsons RN  Outcome: Progressing     Problem: Skin/Tissue Integrity - Adult  Goal: Skin integrity remains intact  3/10/2023 1155 by Maxx Parsons RN  Outcome: Progressing     Problem: Skin/Tissue Integrity  Goal: Absence of new skin breakdown  Description: 1. Monitor for areas of redness and/or skin breakdown  2. Assess vascular access sites hourly  3. Every 4-6 hours minimum:  Change oxygen saturation probe site  4. Every 4-6 hours:  If on nasal continuous positive airway pressure, respiratory therapy assess nares and determine need for appliance change or resting period.   3/10/2023 1155 by Maxx Parsons RN  Outcome: Progressing     Problem: Safety - Adult  Goal: Free from fall injury  3/10/2023 1155 by Maxx Parsons RN  Outcome: Progressing

## 2023-03-10 NOTE — CARE COORDINATION
Transitional planning. Etelvina Dai with [facility] called, they received referral.  Informed her that they are 3rd choice as pt's  would like pt to be as close to his [facility] appt as possible. Spoke to Nick with [facility], they have referral and are reviewing for acceptance. Informed her that they are 1st choice  Spoke to Malena Dias with [facility] is coming for on-site visit today. Informed her that they are 2nd choice. Will call her if [facility] can not accept. Per Malena Dias with [facility], pt looks appropriate and they should be able to accept her if [facility] can not. [address] Dorado with [facility] called, they can accept pt, call her number when pt is dc'd  ([phone])  Will need HENS, no pre-cert or COVID test needed. [id number] informed pt's  and daughter of acceptance. Informed Etelvina Dai with [facility] that [facility] has accepted pt.

## 2023-03-10 NOTE — PLAN OF CARE
[facility]  Office: [address], DO, Tri Males, DO, Marc Loop, DO, Rashad Ferguson Blood, DO, Brian Baker MD, Lulu Cooper MD, Leoncio Valentin MD, Elsy Diehl MD,  Marielos Ulrich MD, Andrew Rutherford MD, Maryjane Boo, DO, Jose Gutierrez MD,  Salas Fofana MD, Diandra Rendon MD, Claudean Budds, DO, Juan Gayle MD, Jarrett Sykes MD, Bernice Ulloa, DO, Rosalia De La Cruz MD, Mathew Gong MD, Patsy Jeffrey MD, Yariel Ugarte MD, Mitzy Boswell MD, Ratna Rick, DO, Thien Mohr MD, Tiera Lyman MD, Milly De Jesus, CNP,  Maria Esther David, CNP, Christina Villarreal, CNP, Kimberly Matos, CNP,  Kai Coles, [facility], Lorna Bergeron, CNP, Fernand Cockayne, CNP, Mary Sanchez, CNP, Mando Leach, CNP, Armani Daly, CNP, Amira Avila PA-C, Jazmine Aponte, CNS, Aria Greene, CNP, Yolanda Mckeon, CNP         [address]    Second Visit Note  For more detailed information please refer to the progress note of the day      3/10/2023    5:48 PM    Name:   Louie Amor  MRN:     [id number]     Acct:      [de-identified]   Room:   [address] [place] [address] Day:  2  Admit Date:  3/8/2023  8:48 AM    PCP:   Azalea Casas MD  Code Status:  Full Code      Pt vitals were reviewed   New labs were reviewed   Patient was seen    Updated plan :     E. coli bacteremia continue Rocephin  Sending cholangitis appreciate surgery recommendations possible surgical intervention plan for 3/13/2023  Follow-up HIT panel continue argatroban  planned start at 3/11/23 due to last eliquis dose. hold Eliquis at this time  Continue dysphagia diet with thin liquids      Jose Gutierrez MD  3/10/2023  5:48 PM

## 2023-03-10 NOTE — CONSULTS
General Surgery: Consult Note        PATIENT NAME: Brandon Jaramillo   YOB: 1950    ADMISSION DATE: 3/8/2023  8:48 AM     Admitting Provider: Dr. Kleber Duarte Physician: Dr. Narciso Hou: 3/10/2023    Consult Regarding:  ascending cholangitis    HISTORY OF PRESENT ILLNESS:  The patient is a 75 y.o. female who was admitted on 3/8/2023 for altered mental status, hypotension requiring pressor support and concern for ascending cholangitis. Patient subsequently emergently underwent ERCP with stone removal, sphincterotomy and stent placement. She was admitted to the ICU and was extubated yesterday. CT abdomen pelvis on admission demonstrated cholelithiasis, gallbladder distention with biliary ductal dilatation. Patient additionally was started on a heparin drip due to pulmonary embolism in the subsegmental branches of the right lower lobe. Bilirubin has trended down from 4.2 to 1.4 with direct bilirubin 1.1 from 3.5. No leukocytosis with WBC 4.8, hemoglobin 9.8 -down from 13.3 on admission. Patient's platelet on admission was 197 which has since trended down to 72. Due to concern for HIT, patient's heparin was switched to Eliquis today. Positive UA on admission. Was on vancomycin and Zosyn; switched to Rocephin today. General surgery was therefore consulted for possible cholecystectomy. Patient is alert and oriented to name, place and year but not to situation and is currently not the best historian. Patient denies having symptoms of biliary colic in the past - denies nausea or vomiting or abdominal pain with particular foods. She was just started on a diet today. Bowel movement once yesterday. Patient has been afebrile, vitals normal and stable and has been off of pressors.         Past Medical History:        Diagnosis Date    Psoriasis        Past Surgical History:        Procedure Laterality Date    CERVICAL FUSION      POSTERIOR CERVICAL DECOMPRESSION AND FUSION C3-7 CERVICAL FUSION N/A 2023    C 3-7 POSTERIOR CERVICAL DECOMPRESSION AND FUSION performed by Pat Drew DO at [place] [address]    ERCP  2023    ERCP STONE REMOVAL, ERCP SPHINCTER/PAPILLOTOMY,    ERCP STENT INSERTION    ERCP  3/8/2023    ERCP STONE REMOVAL performed by Gentry Herrera MD at [place] [address]    ERCP  3/8/2023    ERCP SPHINCTER/PAPILLOTOMY performed by Gentry Herrera MD at [place] [address]    ERCP  3/8/2023    ERCP STENT INSERTION performed by Gentry Herrera MD at [place] [address]       Medications Prior to Admission:   Medications Prior to Admission: methocarbamol (ROBAXIN) 750 MG tablet, Take 750 mg by mouth daily  acetaminophen (TYLENOL) 500 MG tablet, Take 1,000 mg by mouth every 6 hours as needed for Pain  ipratropium-albuterol (DUONEB) 0.5-2.5 (3) MG/3ML SOLN nebulizer solution,   gabapentin (NEURONTIN) 300 MG capsule, Take 1 capsule by mouth in the morning and 1 capsule at noon and 1 capsule in the evening. Do all this for 7 days. triamcinolone (KENALOG) 0.1 % cream, Apply to active psoriasis twice daily (not face, armpit or groin)  fluocinonide (LIDEX) 0.05 % external solution, Apply to scalp psoriasis daily for rash    Allergies:  Patient has no known allergies.     Social History:   Social History     Socioeconomic History    Marital status: Single     Spouse name: Not on file    Number of children: Not on file    Years of education: Not on file    Highest education level: Not on file   Occupational History    Not on file   Tobacco Use    Smoking status: Former     Types: Cigarettes     Quit date: 2015     Years since quittin.1    Smokeless tobacco: Never   Vaping Use    Vaping Use: Never used   Substance and Sexual Activity    Alcohol use: Never    Drug use: Never    Sexual activity: Not on file   Other Topics Concern    Not on file   Social History Narrative    ** Merged History Encounter **          Social Determinants of Health     Financial Resource Strain: Not on file   Food Insecurity: Not on file   Transportation Needs: Not on file   Physical Activity: Not on file   Stress: Not on file   Social Connections: Not on file   Intimate Partner Violence: Not on file   Housing Stability: Not on file       Family History:   History reviewed. No pertinent family history. REVIEW OF SYSTEMS:    CONSTITUTIONAL: Denies recent weight loss, fatigue, fevers, chills. HEENT: Denies rhinorrhea, dysphagia, odynphagia. CARDIOVASCULAR: Denies history of MI, recent chest pain. RESPIRATORY: Denies recent history of shortness of breath or history of PE. GASTROINTESTINAL: 2 abdominal pain, denies nausea or vomiting  GENITOURINARY: Denies increased frequency or dysuria. HEMATOLOGIC/LYMPHATIC: Denies history of anemia; + DVT  ENDOCRINE: Denies history of thyroid problems or diabetes. MSK: Denies rashes or lesions  NEURO: Denies history of CVA, TIA. PHYSICAL EXAM:    VITALS:  BP 90/60   Pulse 78   Temp 97.6 °F (36.4 °C) (Oral)   Resp 18   Wt 155 lb 10.3 oz (70.6 kg)   SpO2 99%   BMI 24.38 kg/m²   INTAKE/OUTPUT:     Intake/Output Summary (Last 24 hours) at 3/10/2023 1424  Last data filed at 3/10/2023 1350  Gross per 24 hour   Intake 2998.82 ml   Output 557 ml   Net 2441.82 ml       CONSTITUTIONAL:  awake, alert, not distressed and normal weight  HEENT: Normocephalic/atraumatic, without obvious abnormality. NECK:  Supple, symmetrical, trachea midline   CARDIOVASCULAR: Regular rate and rhythm   LUNGS: normal effort with symmetric rise and fall of chest wall  ABDOMEN: Soft, nondistended, tender to palpation in epigastric and right upper quadrant without guarding or rebound tenderness. MUSCULOSKELETAL: Muscle strength intact in all extremities bilaterally. NEUROLOGIC: CN II- XII intact. Gross motor intact without focal weakness. SKIN: No cyanosis, rashes, or edema noted.    Orientation:   oriented to person, place, and time      Hematology:  Recent Labs     03/08/23  0943 03/08/23  1546 03/08/23  2241 03/09/23  0355 03/10/23  0542   WBC  --  7.8 8.5 7.3 4.8   RBC  --  3.13* 3.28* 3.12* 2.88*   HGB  --  10.8* 11.1* 10.7* 9.8*   HCT  --  33.4* 33.2* 31.6* 29.3*   MCV  --  106.7* 101.2 101.3 101.7   MCH  --  34.5* 33.8* 34.3* 34.0*   MCHC  --  32.3 33.4 33.9 33.4   RDW  --  16.0* 15.8* 15.9* 16.1*   PLT  --  101* 102* 103* 72*   MPV  --  9.7 9.9 10.9 11.5   .8*  --   --   --   --    INR  --  1.9  --   --   --      Chemistry:  Recent Labs     03/08/23  0943 03/08/23  0943 03/08/23  1546 03/08/23  1936 03/08/23  1950/23  2241 03/09/23  0016 03/09/23  0354 03/09/23  0355 03/09/23  1129 03/10/23  0542 03/10/23  1159     --  141  --   --  145*  --   --  146* 145* 144 143   K 4.2  --  2.8*  --   --  4.0  --   --  3.3* 3.9 3.3* 4.2   *  --  112*  --   --  118*  --   --  117* 117* 116* 115*   CO2 14*  --  13*  --   --  16*  --   --  16* 19* 12* 19*   GLUCOSE 107*  --  126*  --   --  113*  --   --  115*  --  93  --    BUN 13  --  11  --   --  11  --   --  11  --  9  --    CREATININE 0.44*  --  0.34*  --   --  0.28*  --   --  0.34*  --  0.33*  --    MG  --    < > 1.1*  --   --  2.2  --   --  2.0  --  1.6  --    ANIONGAP 20*  --  16  --   --  11  --   --  13 9 16 9   LABGLOM >60  --  >60  --   --  >60  --   --  >60  --  >60  --    CALCIUM 7.3*  --  6.9*  --   --  8.1*  --   --  7.9*  --  8.1*  --    CAION  --   --   --   --  0.97* 1.15  --   --   --   --   --   --    PROBNP 4,111*  --   --   --   --   --   --   --   --   --   --   --    TROPHS 31*  --  44* 47*  --   --  37*  --   --   --   --   --    LACTACIDWB  --   --  2.5*  --   --  1.9  --  2.0  --   --   --   --     < > = values in this interval not displayed.      Recent Labs     03/08/23  0906 03/08/23  0943 03/08/23  0943 03/08/23  1546 03/08/23  1620 03/08/23  1949/23  0355 03/09/23  0424 03/09/23  1528 03/09/23  2015 03/10/23  0537 03/10/23  0542 03/10/23  0840   PROT  --  5.3*   < > 5.1*  --   --  5.0*  --   --   --   --  4.9*  -- LABALBU  --  2.4*   < > 2.5*  --   --  2.4*  --   --   --   --  2.2*  --    AST  --  476*   < > 463*  --   --  271*  --   --   --   --  96*  --    ALT  --  215*   < > 232*  --   --  185*  --   --   --   --  111*  --    ALKPHOS  --  294*   < > 308*  --   --  285*  --   --   --   --  278*  --    BILITOT  --  3.3*   < > 4.2*  --   --  3.5*  --   --   --   --  1.4*  --    BILIDIR  --   --   --   --   --   --  3.5*  --   --   --   --  1.1*  --    AMMONIA 39  --   --   --   --   --   --   --   --   --   --   --   --    LIPASE  --  382*  --   --   --   --   --   --   --   --   --   --   --    POCGLU  --   --   --   --    < > 106*  --  115* 90 81 67  --  111*    < > = values in this interval not displayed. Pertinent Radiology:   CT HEAD WO CONTRAST    Result Date: 3/8/2023  EXAMINATION: CT OF THE HEAD WITHOUT CONTRAST  3/8/2023 10:38 am TECHNIQUE: CT of the head was performed without the administration of intravenous contrast. Automated exposure control, iterative reconstruction, and/or weight based adjustment of the mA/kV was utilized to reduce the radiation dose to as low as reasonably achievable. COMPARISON: None. HISTORY: ORDERING SYSTEM PROVIDED HISTORY: Altered, hypotensive TECHNOLOGIST PROVIDED HISTORY: Altered, hypotensive Decision Support Exception - unselect if not a suspected or confirmed emergency medical condition->Emergency Medical Condition (MA) Reason for Exam: febrile and hypotensive FINDINGS: BRAIN/VENTRICLES: There is some mild cerebral atrophy. There are white matter hypodensities in keeping with underlying microvascular disease. Posterior fossa structures are unremarkable. There is no acute intracranial hemorrhage, mass effect or midline shift. No abnormal extra-axial fluid collection. The gray-white differentiation is maintained without evidence of an acute infarct. There is no evidence of hydrocephalus. ORBITS: The visualized portion of the orbits demonstrate no acute abnormality. SINUSES: The visualized paranasal sinuses and mastoid air cells demonstrate no acute abnormality. SOFT TISSUES/SKULL:  No acute abnormality of the visualized skull or soft tissues. No acute intracranial abnormality. Mild cerebral atrophy. White matter microvascular disease. CT CERVICAL SPINE WO CONTRAST    Result Date: 3/8/2023  EXAMINATION: CT OF THE CERVICAL SPINE WITHOUT CONTRAST 3/8/2023 10:38 am TECHNIQUE: CT of the cervical spine was performed without the administration of intravenous contrast. Multiplanar reformatted images are provided for review. Automated exposure control, iterative reconstruction, and/or weight based adjustment of the mA/kV was utilized to reduce the radiation dose to as low as reasonably achievable. COMPARISON: None. HISTORY: ORDERING SYSTEM PROVIDED HISTORY: recent surgery, now febrile, and hypotensive TECHNOLOGIST PROVIDED HISTORY: recent surgery, now febrile, and hypotensive Decision Support Exception - unselect if not a suspected or confirmed emergency medical condition->Emergency Medical Condition (MA) Reason for Exam: febrile and hypotensive FINDINGS: BONES/ALIGNMENT: There is no acute fracture or traumatic malalignment. Lateral mass screws are noted at C3, C4, C5, C6 and C7 bilaterally. There appear to be in good position. The patient has had laminectomies from C3 to C6. DEGENERATIVE CHANGES: There is some anterior osteophytes noted at C4-C5, C5-C6 and C6-C7 discs. SOFT TISSUES: There is no prevertebral soft tissue swelling. There are no fluid collections noted. Postoperative changes as described above. No evidence for any fluid collections in the area to suggest CSF leak or abscess.      CT ABDOMEN PELVIS W IV CONTRAST Additional Contrast? None    Result Date: 3/8/2023  EXAMINATION: CTA OF THE CHEST; CT OF THE ABDOMEN AND PELVIS WITH CONTRAST 3/8/2023 11:31 am TECHNIQUE: CTA of the chest was performed after the administration of intravenous contrast.  Multiplanar reformatted images are provided for review. MIP images are provided for review. Automated exposure control, iterative reconstruction, and/or weight based adjustment of the mA/kV was utilized to reduce the radiation dose to as low as reasonably achievable.; CT of the abdomen and pelvis was performed with the administration of intravenous contrast. Multiplanar reformatted images are provided for review. Automated exposure control, iterative reconstruction, and/or weight based adjustment of the mA/kV was utilized to reduce the radiation dose to as low as reasonably achievable. COMPARISON: 01/14/2023 HISTORY: ORDERING SYSTEM PROVIDED HISTORY: recnt surgery, hypoxia, febrile. UTI TECHNOLOGIST PROVIDED HISTORY: recnt surgery, hypoxia, febrile. UTI Decision Support Exception - unselect if not a suspected or confirmed emergency medical condition->Emergency Medical Condition (MA) Reason for Exam: recnt surgery, hypoxia, febrile. UTI; ORDERING SYSTEM PROVIDED HISTORY: vomiting, elevated lft TECHNOLOGIST PROVIDED HISTORY: vomiting, elevated lft Decision Support Exception - unselect if not a suspected or confirmed emergency medical condition->Emergency Medical Condition (MA) Reason for Exam: vomiting, elevated lft FINDINGS: Chest: Limited by motion artifacts. Pulmonary Arteries: Small filling defects seen subsegmental branches to posterior basal right lower lobe best appreciated on series 5 (thin sections). Not appreciated on prior. Compatible with acute pulmonary emboli. No right heart strain. Mediastinum: Mild cardiomegaly. Aorta enhances satisfactorily. Coronary artery calcifications. No significant lymphadenopathy. Lungs/pleura: Moderate-sized posterior basal left lower lobe consolidation and a small amount in the inferior lingula favors atelectasis over pneumonia. Much smaller patchy posterior basal right lower lobe infiltrate is also likely atelectasis. Bunny Grijalva No pleural effusion or pneumothorax.  No significant lung nodules or masses. Central airways unremarkable. Upper Abdomen: No acute process. Soft Tissues/Bones: No acute bone or soft tissue abnormality. Abdomen/Pelvis: Organs: Mild intrahepatic biliary ductal dilatation. Diffuse dilatation the common bile duct measuring up to 14 mm diameter. This is down to the ampulla. There could be non radiopaque calculus, stricture or periampullary lesion causing the obstruction. Favor choledocholithiasis given interval development since prior. There is distended gallbladder show cholelithiasis without evidence for acute cholecystitis. Liver shows no focal mass. Spleen, pancreas, adrenal glands and kidneys enhance satisfactorily without significant abnormality noting small left renal cyst. GI/Bowel: There is limited evaluation due to absence of oral contrast.  No bowel obstruction. No clear evidence for acute infective process. Pelvis: Suspect some thickening of the endometrium up to 9 mm AP thickness. Moderate inspected for patient's age. Detail limited on CT. Consider pelvic ultrasound. Calcified nodules in the right side of uterus likely calcified fibroids. Catheterized urinary bladder. Peritoneum/Retroperitoneum: No free intraperitoneal fluid or significant lymphadenopathy. Atherosclerotic disease, mild. Bones/Soft Tissues: Diffuse degenerative changes. No aggressive bone lesion. Chest: 1. Small thrombus load acute pulmonary embolism. A few filling defects in subsegmental branches posterior basal right lower lobe. 2. Moderate-size posterior basal left lower lobe patchy consolidation and much smaller posterior basal right lower lobe patchy consolidation compatible with atelectasis. Abdomen pelvis: 1. Cholelithiasis without evidence for acute cholecystitis. 2. Intra and extrahepatic biliary ductal dilatation. Diffuse dilatation of CBD up to 14 mm diameter down to the ampulla.   There could be non radiopaque calculus, stricture or periampullary lesion causing the obstruction. Given that this has developed since the prior study possible non radiopaque distal obstructing choledocholithiasis is most favored. MRCP or ERCP is recommended. 3. Probable endometrial  thickening up to 9 mm thickness. Correlation with non emergency outpatient pelvic ultrasound is recommended. XR CHEST PORTABLE    Result Date: 3/10/2023  EXAMINATION: ONE X-RAY VIEW OF THE CHEST; ONE SUPINE X-RAY VIEW(S) OF THE ABDOMEN 3/8/2023 3:55 pm; 3/8/2023 4:07 pm COMPARISON: 03/08/2023 HISTORY: ORDERING SYSTEM PROVIDED HISTORY:  Resp failure TECHNOLOGIST PROVIDED HISTORY: Resp failure Reason for Exam:  Resp failure line placement port supine at 4:15 pm. ORDERING SYSTEM PROVIDED HISTORY:  Confirmation of course of NG/OG/NE tube and location of tip of tube. TECHNOLOGIST PROVIDED HISTORY: Confirmation of course of NG/OG/NE tube and location of tip of tube. Portable? Yes Reason for Exam:  Check NG placement port supine at 4:15 pm. FINDINGS: Chest Radiograph:  Endotracheal tube tip is approximately 0.9 cm above the lulu; however, the tip is directed somewhat towards the right mainstem bronchus. Patchy airspace opacity in the right upper lung. Left basilar consolidation is again identified. Small left pleural effusion. The right costophrenic angle is sharp. No pneumothorax. No overt pulmonary edema. Stable cardiomediastinal silhouette. Right internal jugular central venous catheter with the tip projecting over the cavoatrial junction. KUB:  Enteric tube tip and side-port project over the stomach. There are multiple dilated loops of small bowel measuring up to 3.3 cm in maximum diameter. There has been interval placement of a common bile duct stent. Small amount of residual contrast is noted within the biliary tree. No acute osseous abnormality or other pathologic calcifications.      1. Endotracheal tube tip is approximately 0.9 cm from the lulu; however, the tip is somewhat directed towards the right mainstem bronchus. Consider repositioning. 2. Patchy right upper lobe airspace opacity and left basilar consolidation. 3. Multiple air-filled dilated loops of small bowel measuring up to 3.3 cm in maximum diameter. Findings favor ileus over small bowel obstruction. 4. Enteric tube tip and side-port project over the stomach. 5. Right central venous catheter tip projecting over the cavoatrial junction. XR CHEST PORTABLE    Result Date: 3/8/2023  EXAMINATION: ONE XRAY VIEW OF THE CHEST 3/8/2023 9:25 am COMPARISON: None. HISTORY: ORDERING SYSTEM PROVIDED HISTORY: Sepsis TECHNOLOGIST PROVIDED HISTORY: Sepsis FINDINGS: There is a trace left basilar effusion with left basilar infiltrate. There is no pneumothorax. The mediastinal structures are unremarkable. The upper abdomen unremarkable. The extrathoracic soft tissues are unremarkable. Trace left basilar effusion with adjacent infiltrate. CT CHEST PULMONARY EMBOLISM W CONTRAST    Result Date: 3/8/2023  EXAMINATION: CTA OF THE CHEST; CT OF THE ABDOMEN AND PELVIS WITH CONTRAST 3/8/2023 11:31 am TECHNIQUE: CTA of the chest was performed after the administration of intravenous contrast.  Multiplanar reformatted images are provided for review. MIP images are provided for review. Automated exposure control, iterative reconstruction, and/or weight based adjustment of the mA/kV was utilized to reduce the radiation dose to as low as reasonably achievable.; CT of the abdomen and pelvis was performed with the administration of intravenous contrast. Multiplanar reformatted images are provided for review. Automated exposure control, iterative reconstruction, and/or weight based adjustment of the mA/kV was utilized to reduce the radiation dose to as low as reasonably achievable. COMPARISON: 01/14/2023 HISTORY: ORDERING SYSTEM PROVIDED HISTORY: recnt surgery, hypoxia, febrile. UTI TECHNOLOGIST PROVIDED HISTORY: recnt surgery, hypoxia, febrile.  UTI Decision Support Exception - unselect if not a suspected or confirmed emergency medical condition->Emergency Medical Condition (MA) Reason for Exam: recnt surgery, hypoxia, febrile. UTI; ORDERING SYSTEM PROVIDED HISTORY: vomiting, elevated lft TECHNOLOGIST PROVIDED HISTORY: vomiting, elevated lft Decision Support Exception - unselect if not a suspected or confirmed emergency medical condition->Emergency Medical Condition (MA) Reason for Exam: vomiting, elevated lft FINDINGS: Chest: Limited by motion artifacts. Pulmonary Arteries: Small filling defects seen subsegmental branches to posterior basal right lower lobe best appreciated on series 5 (thin sections). Not appreciated on prior. Compatible with acute pulmonary emboli. No right heart strain. Mediastinum: Mild cardiomegaly. Aorta enhances satisfactorily. Coronary artery calcifications. No significant lymphadenopathy. Lungs/pleura: Moderate-sized posterior basal left lower lobe consolidation and a small amount in the inferior lingula favors atelectasis over pneumonia. Much smaller patchy posterior basal right lower lobe infiltrate is also likely atelectasis. Samantha Bottcher No pleural effusion or pneumothorax. No significant lung nodules or masses. Central airways unremarkable. Upper Abdomen: No acute process. Soft Tissues/Bones: No acute bone or soft tissue abnormality. Abdomen/Pelvis: Organs: Mild intrahepatic biliary ductal dilatation. Diffuse dilatation the common bile duct measuring up to 14 mm diameter. This is down to the ampulla. There could be non radiopaque calculus, stricture or periampullary lesion causing the obstruction. Favor choledocholithiasis given interval development since prior. There is distended gallbladder show cholelithiasis without evidence for acute cholecystitis. Liver shows no focal mass. Spleen, pancreas, adrenal glands and kidneys enhance satisfactorily without significant abnormality noting small left renal cyst. GI/Bowel:  There is limited evaluation due to absence of oral contrast.  No bowel obstruction. No clear evidence for acute infective process. Pelvis: Suspect some thickening of the endometrium up to 9 mm AP thickness. Moderate inspected for patient's age. Detail limited on CT. Consider pelvic ultrasound. Calcified nodules in the right side of uterus likely calcified fibroids. Catheterized urinary bladder. Peritoneum/Retroperitoneum: No free intraperitoneal fluid or significant lymphadenopathy. Atherosclerotic disease, mild. Bones/Soft Tissues: Diffuse degenerative changes. No aggressive bone lesion. Chest: 1. Small thrombus load acute pulmonary embolism. A few filling defects in subsegmental branches posterior basal right lower lobe. 2. Moderate-size posterior basal left lower lobe patchy consolidation and much smaller posterior basal right lower lobe patchy consolidation compatible with atelectasis. Abdomen pelvis: 1. Cholelithiasis without evidence for acute cholecystitis. 2. Intra and extrahepatic biliary ductal dilatation. Diffuse dilatation of CBD up to 14 mm diameter down to the ampulla. There could be non radiopaque calculus, stricture or periampullary lesion causing the obstruction. Given that this has developed since the prior study possible non radiopaque distal obstructing choledocholithiasis is most favored. MRCP or ERCP is recommended. 3. Probable endometrial  thickening up to 9 mm thickness. Correlation with non emergency outpatient pelvic ultrasound is recommended.      XR ABDOMEN FOR NG/OG/NE TUBE PLACEMENT    Result Date: 3/10/2023  EXAMINATION: ONE X-RAY VIEW OF THE CHEST; ONE SUPINE X-RAY VIEW(S) OF THE ABDOMEN 3/8/2023 3:55 pm; 3/8/2023 4:07 pm COMPARISON: 03/08/2023 HISTORY: ORDERING SYSTEM PROVIDED HISTORY:  Resp failure TECHNOLOGIST PROVIDED HISTORY: Resp failure Reason for Exam:  Resp failure line placement port supine at 4:15 pm. ORDERING SYSTEM PROVIDED HISTORY:  Confirmation of course of NG/OG/NE tube and location of tip of tube. TECHNOLOGIST PROVIDED HISTORY: Confirmation of course of NG/OG/NE tube and location of tip of tube. Portable? Yes Reason for Exam:  Check NG placement port supine at 4:15 pm. FINDINGS: Chest Radiograph:  Endotracheal tube tip is approximately 0.9 cm above the lulu; however, the tip is directed somewhat towards the right mainstem bronchus. Patchy airspace opacity in the right upper lung. Left basilar consolidation is again identified. Small left pleural effusion. The right costophrenic angle is sharp. No pneumothorax. No overt pulmonary edema. Stable cardiomediastinal silhouette. Right internal jugular central venous catheter with the tip projecting over the cavoatrial junction. KUB:  Enteric tube tip and side-port project over the stomach. There are multiple dilated loops of small bowel measuring up to 3.3 cm in maximum diameter. There has been interval placement of a common bile duct stent. Small amount of residual contrast is noted within the biliary tree. No acute osseous abnormality or other pathologic calcifications. 1. Endotracheal tube tip is approximately 0.9 cm from the lulu; however, the tip is somewhat directed towards the right mainstem bronchus. Consider repositioning. 2. Patchy right upper lobe airspace opacity and left basilar consolidation. 3. Multiple air-filled dilated loops of small bowel measuring up to 3.3 cm in maximum diameter. Findings favor ileus over small bowel obstruction. 4. Enteric tube tip and side-port project over the stomach. 5. Right central venous catheter tip projecting over the cavoatrial junction. FLUORO FOR SURGICAL PROCEDURES    Result Date: 3/8/2023  Radiology exam is complete. No Radiologist dictation. Please follow up with ordering provider.          ASSESSMENT:  Active Hospital Problems    Diagnosis Date Noted    Septic shock ([address] [A41.9, R65.21] 03/08/2023     Priority: Medium    Ascending cholangitis [K83.09] 03/08/2023     Priority: Medium    Cholelithiasis [K80.20] 03/08/2023     Priority: Medium    Gram negative septicemia ([facility] [A41.50] 03/08/2023     Priority: Medium    Choledocholithiasis [K80.50] 03/08/2023     Priority: Medium    Acute respiratory failure with hypoxia ([facility] [address] [J96.01] 03/08/2023     Priority: Medium    Metabolic encephalopathy [P66.45] 03/08/2023     Priority: Medium    Acute pulmonary embolism ([facility] [address] [I26.99] 03/08/2023     Priority: Medium       76-year-old female with ascending cholangitis secondary to gallstones    Plan:  Continue medical mgmt and supportive care per primary  Will plan for laparoscopic cholecystectomy, possible open on Monday - 2PM.  Will additionally need to discuss with patient's daughter and  per patient's request.  Discussed with critical care regarding holding NOAC and resuming heparin or argatroban if concern for HIT. Okay to resume low-fat diet  NPO at midnight on 3/13  Will continue to monitor and follow    Electronically signed by Pola Euceda DO  on 3/10/2023 at 2:24 PM   I attest that I was present with the resident during the patient's evaluation and agree with the description of findings and plan as dictated above.   Alistair Rivera MD

## 2023-03-10 NOTE — PROGRESS NOTES
Critical Care Team - Daily Progress Note      Date and time: 3/10/2023 6:59 AM  Patient's name:  Josefina Gracia  Medical Record Number: [id number]  Patient's account/billing number: [de-identified]  Patient's YOB: 1950  Age: 75 y.o. Date of Admission: 3/8/2023  8:48 AM  Length of stay during current admission: 2      Primary Care Physician: Jaxon Caba MD  ICU Attending Physician: Dr. Alberto Frazier Status: Full Code    Reason for ICU admission:   Chief Complaint   Patient presents with    Altered Mental Status    Hypotension         SUBJECTIVE:   HISTORY OF PRESENT ILLNESS:  Josefina Gracia is a 75 y.o. presented from extended care facility for altered mental status and hypotension. As per report her systolic BP was in the 82W. She has a recent history of cervical spine fusion on 01/18, follows up with neurosurgery. On arrival to the ER she had a GCS of 11, was febrile, tachycardic and hypotensive. She was also found to be hypoxic was placed on 4 L of oxygen. CT head showed no acute intracranial abnormality, mild cerebral atrophy with white matter microvascular disease. CT cervical spine did not reveal any CSF leak or abscess. CT chest abdomen and pelvis was suggestive of acute PE and subsegmental branches of the posterior basal right lower lobe, cholelithiasis with biliary ductal dilatation. Labs showed creatinine 1.4, lactic acid of 3.2, [place] of 6.62, , BNP of 4111, alk phos of 300, , , bilirubin 3.3, WBC count 3.6, UA positive for and positive nitrate. Neurosurgery was consulted from the ER, had a low suspicion for postop infection. GI was consulted, patient underwent ERCP with stone removal and stent placement. During the procedure she was hypotensive was started on Levophed. She remained intubated and sedated with propofol.   After the procedure she was transferred to ICU.     3/9: extubated, d/c vanco    OVERNIGHT EVENTS:         No acute events overnight. Vital signs stable with blood pressures 131/64, off of Levophed. Maintaining saturation on room air. Denies any complaints. No fever, chills, cough, shortness of breath, abdominal pain. Weakness on the left side. Urine output 770 mL over the last 24 hours. On D5 LR @ 100. Cultures growing pan sensitive E coli, can switch to ceftriaxone. On heparin for acute PE. Labs show sodium 144, potassium 3.3, chloride 116, bicarb 12, creatinine 0.33. LFTs trending down, bilirubin 1.4, alk phos 278, , AST 96.   WBC 4.8, hemoglobin 9.8, platelets 72    AWAKE & FOLLOWING COMMANDS:  [] No   [x] Yes    CURRENT VENTILATION STATUS:     [] Ventilator  [] BIPAP  [] Nasal Cannula [x] Room Air      IF INTUBATED, ET TUBE MARKING AT LOWER LIP:       cms    SECRETIONS Amount:  [] Small [] Moderate  [] Large  [x] None  Color:     [] White [] Colored  [] Bloody    SEDATION:  RAAS Score:  [] Propofol gtt  [] Versed gtt  [] Ativan gtt   [x] No Sedation    PARALYZED:  [x] No    [] Yes    DIARRHEA:                [x] No                [] Yes  (C. Difficile status: [] positive                                                                                                                       [] negative                                                                                                                     [] pending)    VASOPRESSORS:  [x] No    [] Yes    If yes -   [] Levophed       [] Dopamine     [] Vasopressin       [] Dobutamine  [] Phenylephrine         [] Epinephrine    CENTRAL LINES:     [] No   [x] Yes   (Date of Insertion:   )           If yes -     [x] Right IJ     [] Left IJ [] Right Femoral [] Left Femoral                   [] Right Subclavian [] Left Subclavian       LEVY'S CATHETER:   [] No   [x] Yes  (Date of Insertion:   )     URINE OUTPUT:            [] Good   [x] Low              [] Anuric      OBJECTIVE:     VITAL SIGNS:  BP (!) 91/45   Pulse 87   Temp 99.5 °F (37.5 °C)   Resp 20 Wt 155 lb 10.3 oz (70.6 kg)   SpO2 96%   BMI 24.38 kg/m²   Tmax over 24 hours:  Temp (24hrs), Av.5 °F (37.5 °C), Min:97.3 °F (36.3 °C), Max:100.4 °F (38 °C)      Patient Vitals for the past 8 hrs:   BP Temp Temp src Pulse Resp SpO2 Weight   03/10/23 0600 -- 99.5 °F (37.5 °C) -- 87 -- 96 % --   03/10/23 0559 -- -- -- -- -- -- 155 lb 10.3 oz (70.6 kg)   03/10/23 0545 -- 99.5 °F (37.5 °C) -- 87 -- 97 % --   03/10/23 0530 -- 99.5 °F (37.5 °C) -- 93 -- 98 % --   03/10/23 0515 -- 99.5 °F (37.5 °C) -- 87 -- 98 % --   03/10/23 0500 -- 99.5 °F (37.5 °C) -- 77 -- 98 % --   03/10/23 0445 -- 99.7 °F (37.6 °C) -- 81 -- 97 % --   03/10/23 0430 -- 99.7 °F (37.6 °C) -- 75 -- 98 % --   03/10/23 0415 -- 99.9 °F (37.7 °C) -- 87 -- 99 % --   03/10/23 0400 (!) 91/45 100 °F (37.8 °C) Bladder 82 20 98 % --   03/10/23 0345 -- 100 °F (37.8 °C) -- 84 -- 96 % --   03/10/23 0330 -- 100 °F (37.8 °C) -- 85 -- -- --   03/10/23 0315 -- 100 °F (37.8 °C) -- 85 -- 96 % --   03/10/23 0300 -- 100.2 °F (37.9 °C) -- 84 -- 98 % --   03/10/23 0245 -- 100.2 °F (37.9 °C) -- 88 -- 98 % --   03/10/23 0230 -- 100.2 °F (37.9 °C) -- 88 -- 97 % --   03/10/23 0215 -- 100.4 °F (38 °C) -- 86 -- 98 % --   03/10/23 0200 -- 100.4 °F (38 °C) -- 88 -- 97 % --   03/10/23 0145 -- 100.4 °F (38 °C) -- 87 -- 98 % --   03/10/23 0130 -- 100.4 °F (38 °C) -- 86 -- 98 % --   03/10/23 0115 -- 100.4 °F (38 °C) -- 88 -- 98 % --   03/10/23 0100 -- 100.4 °F (38 °C) -- 86 -- 98 % --   03/10/23 0045 -- 100.4 °F (38 °C) -- 86 -- 98 % --   03/10/23 0030 -- 100.2 °F (37.9 °C) -- 86 -- 98 % --   03/10/23 0015 -- 100.2 °F (37.9 °C) -- 85 -- 98 % --   03/10/23 0000 (!) 116/49 100.2 °F (37.9 °C) Bladder 89 28 98 % --   23 2300 -- 100.2 °F (37.9 °C) -- 85 25 99 % --         Intake/Output Summary (Last 24 hours) at 3/10/2023 0659  Last data filed at 3/10/2023 0656  Gross per 24 hour   Intake 3023.86 ml   Output 870 ml   Net 2153.86 ml     Date 03/10/23 0000 - 03/10/23 0239   Shift 4950-2496 6032-6335 4105-8108 24 Hour Total   INTAKE   I.V.(mL/kg) 1209. 7(17.1)   1209. 7(17.1)   IV Piggyback(mL/kg) 68.2(1)   68.2(1)   Shift Total(mL/kg) 6234(66.1)   1385(50.7)   OUTPUT   Urine(mL/kg/hr) 223   223   Shift Total(mL/kg) 223(3.2)   223(3.2)   Weight (kg) 70.6 70.6 70.6 70.6     Wt Readings from Last 3 Encounters:   03/10/23 155 lb 10.3 oz (70.6 kg)   02/01/23 180 lb (81.6 kg)   01/14/23 180 lb (81.6 kg)     Body mass index is 24.38 kg/m². PHYSICAL EXAM:  Constitutional: Appears well, no distress  EENT: PERRLA, EOMI, sclera clear, anicteric, oropharynx clear, no lesions, neck supple with midline trachea. Neck: Supple, symmetrical, trachea midline, no adenopathy, thyroid symmetric, no jvd skin normal  Respiratory: clear to auscultation, no wheezes or rales and unlabored breathing. No intercostal tenderness  Cardiovascular: regular rate and rhythm, normal S1, S2, no murmur noted and 2+ pulses throughout  Abdomen: soft, nontender, nondistended, no masses or organomegaly  NEUROLOGIC: Awake, alert, oriented to name, place and time. Cranial nerves II-XII are grossly intact. Motor is 5 out of 5 bilaterally. Sensory is intact.   Extremities:  peripheral pulses normal, no pedal edema, no clubbing or cyanosis  SKIN: normal coloration and turgor    Any additional physical findings:      MEDICATIONS:  Scheduled Meds:   potassium chloride  20 mEq IntraVENous Q1H    acetaminophen  325 mg Oral Once    sodium chloride flush  5-40 mL IntraVENous 2 times per day    piperacillin-tazobactam  3,375 mg IntraVENous Q8H     Continuous Infusions:   dextrose 5% in lactated ringers 100 mL/hr at 03/10/23 0656    dextrose      norepinephrine Stopped (03/09/23 1105)    sodium chloride Stopped (03/10/23 0652)    midazolam      propofol Stopped (03/08/23 1939)    heparin (PORCINE) Infusion 13 Units/kg/hr (03/10/23 0656)     PRN Meds:   glucose, 4 tablet, PRN  dextrose bolus, 125 mL, PRN   Or  dextrose bolus, 250 mL, PRN  glucagon (rDNA), 1 mg, PRN  dextrose, , Continuous PRN  sodium chloride flush, 5-40 mL, PRN  sodium chloride, , PRN  ondansetron, 4 mg, Q8H PRN   Or  ondansetron, 4 mg, Q6H PRN  polyethylene glycol, 17 g, Daily PRN  acetaminophen, 650 mg, Q6H PRN   Or  acetaminophen, 650 mg, Q6H PRN  fentanNYL, 50 mcg, Q2H PRN  heparin (porcine), 80 Units/kg, PRN  heparin (porcine), 40 Units/kg, PRN        SUPPORT DEVICES: [] Ventilator [] BIPAP  [] Nasal Cannula [] Room Air          DATA:  Complete Blood Count:   Recent Labs     03/08/23 2241 03/09/23  0355 03/10/23  0542   WBC 8.5 7.3 4.8   HGB 11.1* 10.7* 9.8*   .2 101.3 101.7   * 103* 72*   RBC 3.28* 3.12* 2.88*   HCT 33.2* 31.6* 29.3*   MCH 33.8* 34.3* 34.0*   MCHC 33.4 33.9 33.4   RDW 15.8* 15.9* 16.1*   MPV 9.9 10.9 11.5        PT/INR:    Lab Results   Component Value Date/Time    PROTIME 19.7 03/08/2023 03:46 PM    INR 1.9 03/08/2023 03:46 PM     PTT:    Lab Results   Component Value Date/Time    APTT 74.6 03/10/2023 05:42 AM       Basal Metabolic Profile:   Recent Labs     03/08/23 2241 03/09/23 0355 03/09/23  1129 03/10/23  0542   * 146* 145* 144   K 4.0 3.3* 3.9 3.3*   BUN 11 11  --  9   CREATININE 0.28* 0.34*  --  0.33*   * 117* 117* 116*   CO2 16* 16* 19* 12*      Magnesium:   Lab Results   Component Value Date/Time    MG 2.0 03/09/2023 03:55 AM    MG 2.2 03/08/2023 10:41 PM    MG 1.1 03/08/2023 03:46 PM     Phosphorus:   Lab Results   Component Value Date/Time    PHOS 3.4 01/19/2023 10:15 AM     S. Calcium:  Recent Labs     03/10/23  0542   CALCIUM 8.1*     S. Ionized Calcium:No results for input(s): IONCA in the last 72 hours.       Urinalysis:   Lab Results   Component Value Date/Time    NITRU POSITIVE 03/08/2023 09:09 AM    COLORU Dark Yellow 03/08/2023 09:09 AM    PHUR 5.5 03/08/2023 09:09 AM    WBCUA 10 TO 20 03/08/2023 09:09 AM    RBCUA 2 TO 5 03/08/2023 09:09 AM    BACTERIA MANY 03/08/2023 09:09 AM    SPECGRAV 1.023 03/08/2023 09:09 AM    LEUKOCYTESUR SMALL 03/08/2023 09:09 AM    UROBILINOGEN ELEVATED 03/08/2023 09:09 AM    BILIRUBINUR LARGE 03/08/2023 09:09 AM    GLUCOSEU NEGATIVE 03/08/2023 09:09 AM    KETUA LARGE 03/08/2023 09:09 AM       CARDIAC ENZYMES: No results for input(s): CKMB, CKMBINDEX, TROPONINI in the last 72 hours. Invalid input(s): CKTOTAL;3  BNP: No results for input(s): BNP in the last 72 hours. LFTS  Recent Labs     03/08/23  0943 03/08/23  1546 03/09/23  0355 03/10/23  0542   ALKPHOS 294* 308* 285* 278*   * 232* 185* 111*   * 463* 271* 96*   BILITOT 3.3* 4.2* 3.5* 1.4*   BILIDIR  --   --  3.5* 1.1*   LABALBU 2.4* 2.5* 2.4* 2.2*       AMYLASE/LIPASE/AMMONIA  Recent Labs     03/08/23  0906 03/08/23  0943   LIPASE  --  382*   AMMONIA 39  --        Last 3 Blood Glucose:   Recent Labs     03/08/23  0943 03/08/23  1546 03/08/23  2241 03/09/23  0355 03/10/23  0542   GLUCOSE 107* 126* 113* 115* 93      HgBA1c:    Lab Results   Component Value Date/Time    LABA1C 5.3 01/18/2023 06:37 PM         TSH:    Lab Results   Component Value Date/Time    TSH 3.86 01/18/2023 06:37 PM     ANEMIA STUDIES  No results for input(s): LABIRON, TIBC, FERRITIN, [id number], FOLATE, OCCULTBLD in the last 72 hours.             Cultures during this admission:     Blood cultures:                 [] None drawn      [] Negative             []  Positive (Details:  )  Urine Culture:                   [] None drawn      [] Negative             []  Positive (Details:  )  Sputum Culture:               [] None drawn       [] Negative             []  Positive (Details:  )   Endotracheal aspirate:     [] None drawn       [] Negative             []  Positive (Details:  )             Chest Xray (3/10/2023):    ASSESSMENT:     Principal Problem:    Septic shock ([address]  Active Problems:    Ascending cholangitis    Cholelithiasis    Gram negative septicemia ([address]    Choledocholithiasis    Acute respiratory failure with hypoxia ([address]    Metabolic encephalopathy    Acute pulmonary embolism ([facility] [address]  Resolved Problems:    * No resolved hospital problems. *        PLAN:       PLAN/MEDICAL DECISION MAKING:  Neurologic: History of central cord syndrome s/p cervical spine fusion. Neuro intact  Neuro checks per protocol  Weakness on the left side. Alert oriented follows commands. Cardiovascular: Septic shock  Hemodynamically stable  MAP goal > 65  Maintaining blood pressure off of Levophed. Pulmonary: Septic shock requiring intubation, acute subsegmental PE, acute hypoxic respiratory failure  Maintain oxygen sats >92%  Pulmonary toilet  S/p extubation. Currently maintaining saturation on room air. On heparin for acute PE    GI/Nutrition: Ascending cholangitis, cholelithiasis, choledocholithiasis  Ulcer Prophylaxis:  not indicated  Diet:Diet NPO  Speech evaluation for aspiration. Will start on diet after speech evaluation. GI recommending surgery consult for cholecystectomy and a repeat ERCP in 2 months for stent removal.    Renal/Fluid/Electrolyte  IV Fluids: [email or web address] mL/Hr   I/O: In: 2920.2 [I.V.:2642.1]  Out: 870 [Urine:770]  UOP: 770 mL over the last 24 hours. Monitor electrolytes, replace PRN     ID: E. coli bacteremia, ascending cholangitis, E. coli UTI. WBC:   Lab Results   Component Value Date    WBC 4.8 03/10/2023   Currently on Zosyn  Tmax: Temp (24hrs), Av.5 °F (37.5 °C), Min:97.5 °F (36.4 °C), Max:100.4 °F (38 °C)  Cultures have been positive for pansensitive E. coli. Likely switch the antibiotics to ceftriaxone today. Hematology:  Recent Labs     23  0355 03/10/23  0542   HGB 11.1* 10.7* 9.8*    stable  Platelet count 72 today.   Endocrine:   glucose controlled - most recent BGL is   Recent Labs     23  0355 03/10/23  0542   GLUCOSE 113* 115* 93     DVT Prophylaxis  Heparin  Discharge Needs:  PT, OT, and Case Management      CODE STATUS: Full Code        Sonia Null MD, M.D. Department of Internal Medicine/ Critical care  \[facility]\"")             3/10/2023, 6:59 AM        Attending Physician Statement  I have discussed the care of Isra Ayala, including pertinent history and exam findings with the resident. I have reviewed the key elements of all parts of the encounter with the resident. I have seen and examined the patient with the resident. I agree with the assessment and plan and status of the problem list as documented. I have seen the patient during the rounds this morning, chart reviewed, labs and medications reviewed overnight events noted. Patient is doing much better she was extubated yesterday she is on room air and maintaining saturation now weaned off from nasal cannula. Her blood pressure has been better and she is off Levophed drip overnight and maintaining hemodynamics. Her blood culture had grown E. coli pansensitive we will discontinue Zosyn and start patient on Rocephin 2 g IV once daily. Urine output reported to be 770 ml in last 24 hours. Her labs shows bicarbonate is 12 chloride 116 potassium is 3.3 she is getting potassium replacement she does have nongap metabolic acidosis she is not tachypneic we will repeat BMP later today and follow-up bicarbonate and potassium as she is getting potassium replacement. IV fluid to continue with D5 half saline but decrease IV fluid and is started on oral diet she had swallow evaluation done no signs of aspiration noted. Patient platelet count continues to decrease to 70s which is likely related to sepsis less likely to be heparin-induced thrombocytopenia will check heparin platelet antibody will hold heparin for now and start patient on Eliquis. Central line in arterial line  Surgery evaluation as recommended by GI.   If surgery will plan cholecystectomy then Eliquis can be on hold and patient can be started on argatroban drip pending heparin platelet antibody and if platelet recovering and heparin platelet antibody negative then she can be switched to heparin drip. We will transfer patient to stepdown unit and will follow as pulmonary for a small isolated pulm embolism        Discussed with nursing staff, treatment and plan discussed. Discussed with respiratory therapist.    Total critical care time caring for this patient with life threatening, unstable organ failure, including direct patient contact, management of life support systems, review of data including imaging and labs, discussions with other team members and physicians at least 35 min so far today, excluding procedures. Please note that this chart was generated using voice recognition Dragon dictation software. Although every effort was made to ensure the accuracy of this automated transcription, some errors in transcription may have occurred.      Karan Michel MD  3/10/2023 12:42 PM

## 2023-03-10 NOTE — PLAN OF CARE
Problem: Discharge Planning  Goal: Discharge to home or other facility with appropriate resources  Outcome: Progressing     Problem: Pain  Goal: Verbalizes/displays adequate comfort level or baseline comfort level  Outcome: Progressing     Problem: Neurosensory - Adult  Goal: Achieves stable or improved neurological status  Outcome: Progressing  Goal: Achieves maximal functionality and self care  Outcome: Progressing     Problem: Cardiovascular - Adult  Goal: Maintains optimal cardiac output and hemodynamic stability  Outcome: Progressing  Goal: Absence of cardiac dysrhythmias or at baseline  Outcome: Progressing     Problem: Skin/Tissue Integrity - Adult  Goal: Skin integrity remains intact  Outcome: Progressing  Flowsheets (Taken 3/10/2023 0421)  Skin Integrity Remains Intact: Monitor for areas of redness and/or skin breakdown  Goal: Incisions, wounds, or drain sites healing without S/S of infection  Outcome: Progressing     Problem: Musculoskeletal - Adult  Goal: Return mobility to safest level of function  Outcome: Progressing  Goal: Return ADL status to a safe level of function  Outcome: Progressing     Problem: Genitourinary - Adult  Goal: Absence of urinary retention  Outcome: Progressing     Problem: Skin/Tissue Integrity  Goal: Absence of new skin breakdown  Description: 1. Monitor for areas of redness and/or skin breakdown  2. Assess vascular access sites hourly  3. Every 4-6 hours minimum:  Change oxygen saturation probe site  4. Every 4-6 hours:  If on nasal continuous positive airway pressure, respiratory therapy assess nares and determine need for appliance change or resting period.   Outcome: Progressing     Problem: Safety - Adult  Goal: Free from fall injury  Outcome: Progressing  Flowsheets (Taken 3/10/2023 0421)  Free From Fall Injury: Instruct family/caregiver on patient safety

## 2023-03-10 NOTE — ACP (ADVANCE CARE PLANNING)
Advance Care Planning     Advance Care Planning Activator (Inpatient)  Conversation Note      Date of ACP Conversation: 3/10/2023     Conversation Conducted with:  Healthcare Decision Maker: Next of Kin by law (only applies in absence of above) (name) Jaquan Marrufo    ACP Activator: Leo Houston RN        Health Care Decision Maker:     Current Designated Health Care Decision Maker:     Click here to complete Healthcare Decision Makers including section of the Healthcare Decision Maker Relationship (ie \"Primary\")      Care Preferences    Ventilation: \"If you were in your present state of health and suddenly became very ill and were unable to breathe on your own, what would your preference be about the use of a ventilator (breathing machine) if it were available to you? \"      Would the patient desire the use of ventilator (breathing machine)?: yes    \"If your health worsens and it becomes clear that your chance of recovery is unlikely, what would your preference be about the use of a ventilator (breathing machine) if it were available to you? \"     Would the patient desire the use of ventilator (breathing machine)?: No      Resuscitation  \"CPR works best to restart the heart when there is a sudden event, like a heart attack, in someone who is otherwise healthy. Unfortunately, CPR does not typically restart the heart for people who have serious health conditions or who are very sick. \"    \"In the event your heart stopped as a result of an underlying serious health condition, would you want attempts to be made to restart your heart (answer \"yes\" for attempt to resuscitate) or would you prefer a natural death (answer \"no\" for do not attempt to resuscitate)? \" yes       [x] Yes   [] No   Educated Patient / Wilkeson Carlos regarding differences between Advance Directives and portable DNR orders.     Length of ACP Conversation in minutes:      Conversation Outcomes:  ACP discussion completed    Follow-up plan:    [] Schedule follow-up conversation to continue planning  [] Referred individual to Provider for additional questions/concerns   [x] Advised patient/agent/surrogate to review completed ACP document and update if needed with changes in condition, patient preferences or care setting    [] This note routed to one or more involved healthcare providers

## 2023-03-10 NOTE — PROGRESS NOTES
Speech Language Pathology  Facility/Department: [facility]   CLINICAL BEDSIDE SWALLOW EVALUATION    NAME: Clotilda Hashimoto  : 1950  MRN: [id number]    ADMISSION DATE: 3/8/2023  ADMITTING DIAGNOSIS: has Mild ankle sprain, initial encounter; Mild sprain of right ankle, initial encounter; Stenosis of cervical spine with myelopathy ([address] Central cord syndrome [facility]); Septic shock ([address] Ascending cholangitis; Cholelithiasis; Gram negative septicemia ([address] Choledocholithiasis; Acute respiratory failure with hypoxia ([address] Metabolic encephalopathy; and Acute pulmonary embolism ([address] on their problem list.    Date of Eval: 3/10/2023  Evaluating Therapist: BRODY Knight    Current Diet level: NPO         Primary Complaint  Clotilda Hashimoto is a 75 y.o. presented from extended care facility for altered mental status and hypotension. As per report her systolic BP was in the 53W. She has a recent history of cervical spine fusion on , follows up with neurosurgery. On arrival to the ER she had a GCS of 11, was febrile, tachycardic and hypotensive. She was also found to be hypoxic was placed on 4 L of oxygen. CT head showed no acute intracranial abnormality, mild cerebral atrophy with white matter microvascular disease. CT cervical spine did not reveal any CSF leak or abscess. CT chest abdomen and pelvis was suggestive of acute PE and subsegmental branches of the posterior basal right lower lobe, cholelithiasis with biliary ductal dilatation. Labs showed creatinine 1.4, lactic acid of 3.2, Pro-Neville of 6.62, , BNP of 4111, alk phos of 300, , , bilirubin 3.3, WBC count 3.6, UA positive for and positive nitrate. Neurosurgery was consulted from the ER, had a low suspicion for postop infection. GI was consulted, patient underwent ERCP with stone removal and stent placement. During the procedure she was hypotensive was started on Levophed.   She remained intubated and sedated with propofol. After the procedure she was transferred to ICU. Pain:  Pain Assessment  Pain Assessment: 0-10  Pain Level: 0    Reason for Referral  Char Stanton was referred for a bedside swallow evaluation to assess the efficiency of her swallow function, identify signs and symptoms of aspiration and make recommendations regarding safe dietary consistencies, effective compensatory strategies, and safe eating environment. RN reports pt stated she at times experiences difficulty with solid foods since cervical fusion surgery in January. Impression  Patient presents with probable safe swallow for Dysphagia soft and bite/sized (Dysphagia III) diet with thin liquids as evidenced by no overt s/s of aspiration noted with consistencies tested. Pt is edentulous, no dentures found in room. PT reports no discomfort or concerns during bedside assessment. Pt is a poor historian regarding swallow hx. Pt with prolonged mastication followed by expectoration of regular solid trial. Recommend small sips and bites, only feed when alert and awake and upright at 90 degrees for all PO intake. Recommend close monitoring for overt/clinical s/s of aspiration and D/C PO intake and complete Modified Barium Swallow Study should they occur. Results and recommendations reported to RN. Pt also reported nausea, RN notified. Dysphagia Diagnosis: Mild oral stage dysphagia  Dysphagia Outcome Severity Scale: Level 5: Mild dysphagia- Distant supervision. May need one diet consistency restricted     Treatment Plan  Requires SLP Intervention: Yes   Frequency: 3-5x/week  D/C Recommendations: Ongoing speech therapy is recommended during this hospitalization       Recommended Diet and Intervention   Solids: Dysphagia soft and bite/sized (Dysphagia III)  Liquids:  Thin     Recommended Form of Meds: PO     Therapeutic Interventions: Diet tolerance monitoring;Patient/Family education    Compensatory Swallowing Strategies Upright at 90 degrees, eat/drink slowly, slow rate, small sips and bites    Treatment/Goals  Short-term Goals  Goal 1: Pt will tolerate recommended diet without overt s/s of aspiration. General  Chart Reviewed: Yes  Behavior/Cognition: Alert; Cooperative  Dentition: Edentulous  Patient Positioning: Upright in bed  Consistencies Administered: All           Vision/Hearing  Hearing  Hearing: Within functional limits    Oral Motor Deficits   WFL    Oral Phase Dysfunction  Oral Phase  Oral Phase: Exceptions  Oral Phase  Oral Phase - Comment: Puree, soft solid, nectar, thin by straw: WFL. Prolonged mastication with regular solid, pt expectorated bolus eventually.      Indicators of Pharyngeal Phase Dysfunction   Pharyngeal Phase   Pharyngeal Phase: Puree, soft solid, regular solid nectar, thin liquids no overt s/s of aspiration    Prognosis  Individuals consulted  Consulted and agree with results and recommendations: Patient;RN    Education  Patient Education: yes  Patient Education Response: Verbalizes understanding             Therapy Time   [id number]            BRODY Castillo  3/10/2023 1:05 PM

## 2023-03-10 NOTE — PROGRESS NOTES
Critical care team - Resident sign-out to medicine service      Date and time: 3/10/2023 3:32 PM  Patient's name:  Cristofer Lara Record Number: [id number]  Patient's account/billing number: [de-identified]  Patient's YOB: 1950  Age: 75 y.o. Date of Admission: 3/8/2023  8:48 AM  Length of stay during current admission: 2    Primary Care Physician: Alfredo Godinez MD    Code Status: Full Code    Mode of physician to physician communication:        [] Via telephone   [] In person     Date and time of sign-out: 3/10/2023 3:32 PM    Accepting Internal Medicine resident:     Accepting Medicine team: Intermed    Accepting team's attending: Dr. Tae Root    Patient's current ICU Bed:  3054     Patient's assigned bed on floor:  107        [] Med-Surg Monitored [x] Step-down       [] Psychiatry ICU       [] Psych floor     Reason for ICU admission:     Septic shock  Ascending cholangitis  Bacteremia  UTI    ICU course summary:     69-year-old female presented from [facility] care facility with altered mental status and hypotension. She had a recent history of cervical spine fusion on 01/18, follows up with neurosurgery. On arrival to the ER she was febrile, GCS was 11, was tachycardic, hypotensive. He was also hypoxic and was placed on 4 L via nasal cannula. CT chest abdomen pelvis suggestive of cholelithiasis with biliary ductal dilatation, acute subsegmental PE. Labs showed lactic acid of 3.0, Pro-Neville 6.62, LFTs showed obstructive pattern, UA was positive for leukocyte esterase and nitrate. GI was consulted for possible ascending cholangitis. Patient underwent ERCP with stone removal and stent placement. With plan to repeat ERCP after 2 months for stent removal.  During the procedure she remained hypotensive, was tachypneic. She remained intubated and sedated and she was referred to MICU. In the MICU she was started on Levophed. She was also started on heparin for PE.   Remained intubated and sedated with propofol. Her blood cultures grew E. coli and urine cultures also grew E. coli. She was started on Vanco and Zosyn and was switched to ceftriaxone on 03/10. She was weaned off and got extubated on 03/09. She was weaned off of pressors on 03/10. Speech evaluated her recommended soft and dysphagia diet. During the ICU stay, she developed thrombocytopenia with platelets 72. Decision was made to switch to Eliquis and follow-up HIT panel. General surgery was consulted for possible cholecystectomy. Recommended patient to be switched to heparin versus argatroban. Procedures during patient's ICU stay:     1. Intubation  2. Central line placement  3. Arterial line placement    Current Vitals:     /69   Pulse 72   Temp 97.6 °F (36.4 °C) (Oral)   Resp 20   Wt 155 lb 10.3 oz (70.6 kg)   SpO2 100%   BMI 24.38 kg/m²       Cultures:     Blood cultures:                 [] None drawn      [] Negative             []  Positive (Details:  )  Urine Culture:                   [] None drawn      [] Negative             []  Positive (Details:  )  Sputum Culture:               [] None drawn       [] Negative             []  Positive (Details:  )   Endotracheal aspirate:     [] None drawn       [] Negative             []  Positive (Details:  )       Consults:     1. Gastroenterology  2. General surgery  3.   Neurosurgery    Assessment:     Patient Active Problem List    Diagnosis Date Noted    Septic shock ([address] 03/08/2023    Ascending cholangitis 03/08/2023    Cholelithiasis 03/08/2023    Gram negative septicemia ([address] 03/08/2023    Choledocholithiasis 03/08/2023    Acute respiratory failure with hypoxia ([address] 41/38/6299    Metabolic encephalopathy 16/50/3660    Acute pulmonary embolism ([address] 03/08/2023    Stenosis of cervical spine with myelopathy ([address] 01/18/2023    Central cord syndrome ([address] 01/18/2023    Mild sprain of right ankle, initial encounter 01/17/2023    Mild ankle sprain, initial encounter 01/14/2023       Additional assessment:        Recommended Follow-up:     Follow-up general surgery recommendations for possible cholecystectomy on 03/13. Follow-up on HIT panel if negative switch the argatroban infusion to heparin. GI follow-up after 2 months for repeat ERCP for stent removal.  Follow-up neurosurgery recommendations  Follow-up OT/PT recommendations. Above mentioned assessment and plan was discussed by me with the admitting medicine resident. The medicine team assigned to the patient by medicine admitting resident will be following up the patient from now onwards on the floor.      Madelyn Stevenson MD. PGY- 1  Internal Medicine Resident  [place], [place]  3/10/2023, 3:44 PM

## 2023-03-11 LAB
ABSOLUTE EOS #: 0.07 K/UL (ref 0–0.4)
ABSOLUTE IMMATURE GRANULOCYTE: 0.07 K/UL (ref 0–0.3)
ABSOLUTE LYMPH #: 1.39 K/UL (ref 1–4.8)
ABSOLUTE MONO #: 0.15 K/UL (ref 0.1–0.8)
ABSOLUTE RETIC #: 0.02 M/UL (ref 0.03–0.08)
ALBUMIN SERPL-MCNC: 2 G/DL (ref 3.5–5.2)
ALBUMIN/GLOBULIN RATIO: 0.6 (ref 1–2.5)
ALP SERPL-CCNC: 353 U/L (ref 35–104)
ALT SERPL-CCNC: 69 U/L (ref 5–33)
ANION GAP SERPL CALCULATED.3IONS-SCNC: 10 MMOL/L (ref 9–17)
AST SERPL-CCNC: 34 U/L
BASOPHILS # BLD: 0 % (ref 0–2)
BASOPHILS ABSOLUTE: 0 K/UL (ref 0–0.2)
BILIRUB DIRECT SERPL-MCNC: 0.5 MG/DL
BILIRUB DIRECT SERPL-MCNC: 0.5 MG/DL
BILIRUB INDIRECT SERPL-MCNC: 0.2 MG/DL (ref 0–1)
BILIRUB SERPL-MCNC: 0.7 MG/DL (ref 0.3–1.2)
BILIRUB SERPL-MCNC: 0.7 MG/DL (ref 0.3–1.2)
BUN SERPL-MCNC: 6 MG/DL (ref 8–23)
CALCIUM SERPL-MCNC: 8 MG/DL (ref 8.6–10.4)
CHLORIDE SERPL-SCNC: 113 MMOL/L (ref 98–107)
CO2 SERPL-SCNC: 20 MMOL/L (ref 20–31)
CREAT SERPL-MCNC: 0.28 MG/DL (ref 0.5–0.9)
EOSINOPHILS RELATIVE PERCENT: 1 % (ref 1–4)
FIBRINOGEN: 386 MG/DL (ref 140–420)
GFR SERPL CREATININE-BSD FRML MDRD: >60 ML/MIN/1.73M2
GLUCOSE BLD-MCNC: 100 MG/DL (ref 65–105)
GLUCOSE BLD-MCNC: 120 MG/DL (ref 65–105)
GLUCOSE BLD-MCNC: 123 MG/DL (ref 65–105)
GLUCOSE BLD-MCNC: 133 MG/DL (ref 65–105)
GLUCOSE BLD-MCNC: 156 MG/DL (ref 65–105)
GLUCOSE SERPL-MCNC: 125 MG/DL (ref 70–99)
HAPTOGLOB SERPL-MCNC: 206 MG/DL (ref 30–200)
HCT VFR BLD AUTO: 30.8 % (ref 36.3–47.1)
HEPARIN INDUCED PLATELET ANTIBODY: 0.17 O.D. (ref 0–0.4)
HGB BLD-MCNC: 10.5 G/DL (ref 11.9–15.1)
IMMATURE GRANULOCYTES: 1 %
IMMATURE RETIC FRACT: 11.2 % (ref 2.7–18.3)
INR PPP: 1.5
LDLC SERPL-MCNC: 220 U/L (ref 135–214)
LYMPHOCYTES # BLD: 19 % (ref 24–44)
MCH RBC QN AUTO: 35.4 PG (ref 25.2–33.5)
MCHC RBC AUTO-ENTMCNC: 34.1 G/DL (ref 28.4–34.8)
MCV RBC AUTO: 103.7 FL (ref 82.6–102.9)
MONOCYTES # BLD: 2 % (ref 1–7)
MORPHOLOGY: ABNORMAL
NRBC AUTOMATED: 0.3 PER 100 WBC
PARTIAL THROMBOPLASTIN TIME: 36.9 SEC (ref 20.5–30.5)
PARTIAL THROMBOPLASTIN TIME: 40.6 SEC (ref 20.5–30.5)
PDW BLD-RTO: 16.4 % (ref 11.8–14.4)
PLATELET # BLD AUTO: 69 K/UL (ref 138–453)
PMV BLD AUTO: 12.4 FL (ref 8.1–13.5)
POTASSIUM SERPL-SCNC: 3.6 MMOL/L (ref 3.7–5.3)
PROT SERPL-MCNC: 5.1 G/DL (ref 6.4–8.3)
PROTHROMBIN TIME: 15.7 SEC (ref 9.1–12.3)
RBC # BLD: 2.97 M/UL (ref 3.95–5.11)
RETIC HEMOGLOBIN: 35 PG (ref 28.2–35.7)
RETICS/RBC NFR AUTO: 0.7 % (ref 0.5–1.9)
SEG NEUTROPHILS: 77 % (ref 36–66)
SEGMENTED NEUTROPHILS ABSOLUTE COUNT: 5.62 K/UL (ref 1.8–7.7)
SODIUM SERPL-SCNC: 143 MMOL/L (ref 135–144)
WBC # BLD AUTO: 7.3 K/UL (ref 3.5–11.3)

## 2023-03-11 PROCEDURE — 82248 BILIRUBIN DIRECT: CPT

## 2023-03-11 PROCEDURE — 85730 THROMBOPLASTIN TIME PARTIAL: CPT

## 2023-03-11 PROCEDURE — 6360000002 HC RX W HCPCS

## 2023-03-11 PROCEDURE — 85610 PROTHROMBIN TIME: CPT

## 2023-03-11 PROCEDURE — 85025 COMPLETE CBC W/AUTO DIFF WBC: CPT

## 2023-03-11 PROCEDURE — 6370000000 HC RX 637 (ALT 250 FOR IP): Performed by: INTERNAL MEDICINE

## 2023-03-11 PROCEDURE — 80048 BASIC METABOLIC PNL TOTAL CA: CPT

## 2023-03-11 PROCEDURE — 83010 ASSAY OF HAPTOGLOBIN QUANT: CPT

## 2023-03-11 PROCEDURE — 82247 BILIRUBIN TOTAL: CPT

## 2023-03-11 PROCEDURE — 99232 SBSQ HOSP IP/OBS MODERATE 35: CPT | Performed by: INTERNAL MEDICINE

## 2023-03-11 PROCEDURE — 2580000003 HC RX 258

## 2023-03-11 PROCEDURE — 80076 HEPATIC FUNCTION PANEL: CPT

## 2023-03-11 PROCEDURE — 99233 SBSQ HOSP IP/OBS HIGH 50: CPT | Performed by: INTERNAL MEDICINE

## 2023-03-11 PROCEDURE — 36415 COLL VENOUS BLD VENIPUNCTURE: CPT

## 2023-03-11 PROCEDURE — 85384 FIBRINOGEN ACTIVITY: CPT

## 2023-03-11 PROCEDURE — 83615 LACTATE (LD) (LDH) ENZYME: CPT

## 2023-03-11 PROCEDURE — 85045 AUTOMATED RETICULOCYTE COUNT: CPT

## 2023-03-11 PROCEDURE — 2060000000 HC ICU INTERMEDIATE R&B

## 2023-03-11 PROCEDURE — 87040 BLOOD CULTURE FOR BACTERIA: CPT

## 2023-03-11 PROCEDURE — 6360000002 HC RX W HCPCS: Performed by: INTERNAL MEDICINE

## 2023-03-11 PROCEDURE — 82607 VITAMIN B-12: CPT

## 2023-03-11 PROCEDURE — 82746 ASSAY OF FOLIC ACID SERUM: CPT

## 2023-03-11 RX ORDER — TRAZODONE HYDROCHLORIDE 50 MG/1
50 TABLET ORAL EVERY EVENING
Status: DISCONTINUED | OUTPATIENT
Start: 2023-03-11 | End: 2023-03-13

## 2023-03-11 RX ORDER — SODIUM CHLORIDE 9 MG/ML
25 INJECTION, SOLUTION INTRAVENOUS PRN
Status: DISCONTINUED | OUTPATIENT
Start: 2023-03-11 | End: 2023-03-16 | Stop reason: HOSPADM

## 2023-03-11 RX ORDER — QUETIAPINE FUMARATE 25 MG/1
12.5 TABLET, FILM COATED ORAL EVERY EVENING
Status: DISCONTINUED | OUTPATIENT
Start: 2023-03-11 | End: 2023-03-11

## 2023-03-11 RX ORDER — SODIUM CHLORIDE 0.9 % (FLUSH) 0.9 %
5-40 SYRINGE (ML) INJECTION EVERY 12 HOURS SCHEDULED
Status: DISCONTINUED | OUTPATIENT
Start: 2023-03-11 | End: 2023-03-16 | Stop reason: HOSPADM

## 2023-03-11 RX ORDER — SODIUM CHLORIDE 0.9 % (FLUSH) 0.9 %
5-40 SYRINGE (ML) INJECTION PRN
Status: DISCONTINUED | OUTPATIENT
Start: 2023-03-11 | End: 2023-03-16 | Stop reason: HOSPADM

## 2023-03-11 RX ORDER — ENOXAPARIN SODIUM 100 MG/ML
1 INJECTION SUBCUTANEOUS 2 TIMES DAILY
Status: DISCONTINUED | OUTPATIENT
Start: 2023-03-11 | End: 2023-03-16 | Stop reason: HOSPADM

## 2023-03-11 RX ORDER — LIDOCAINE HYDROCHLORIDE 10 MG/ML
5 INJECTION, SOLUTION INFILTRATION; PERINEURAL ONCE
Status: DISCONTINUED | OUTPATIENT
Start: 2023-03-11 | End: 2023-03-15

## 2023-03-11 RX ADMIN — ARGATROBAN 0.5 MCG/KG/MIN: 50 INJECTION INTRAVENOUS at 01:51

## 2023-03-11 RX ADMIN — TRAZODONE HYDROCHLORIDE 50 MG: 50 TABLET ORAL at 19:53

## 2023-03-11 RX ADMIN — CEFTRIAXONE SODIUM 2000 MG: 10 INJECTION, POWDER, FOR SOLUTION INTRAVENOUS at 17:51

## 2023-03-11 RX ADMIN — ENOXAPARIN SODIUM 70 MG: 100 INJECTION SUBCUTANEOUS at 16:26

## 2023-03-11 NOTE — PROGRESS NOTES
[facility]  Office: [address], DO, Sondra Vizcaino, DO, Joaquim Remy, DO, Marilee Figueroa Blood, DO, Anabella Dhillon MD, Anuel Cardona MD, Lidia Canas MD, Shyam Ratliff MD,  Donavan Jefferson MD, Kojo Coombs MD, Leonela Ramos DO, Jyotsna Fernandez MD,  Sheldon Gutierrez MD, Cecille Moreno MD, Maia Mendoza, DO, Mary Beth Queen MD, Radha Mesa MD, Roddy Fraser, DO, Leon Arechiga MD, Zully Cardoso MD, Oj Jamison MD, Manuel Mederos MD, David Tucker MD, Sarah Minor DO, Matthew Aceves MD, Betty Gallegos MD, Kal Cortez, CNP,  Ayaan Muhammad, CNP, Celina Cartwright, CNP, Emigdio Powell, CNP,  [facility], [facility], Marco Sterling, CNP, Libra Roberson, CNP, Carlton Saunders, CNP, Roscoe Elias, CNP, Sariah Cartwright, CNP, Adriana Rodrigez PA-C, Esthela Aparicio, CNS, Abilio Kim, CNP, Filippo Dave, CNP         Marisela Mclean [address]    Progress Note    3/11/2023    2:52 PM    Name:   Cj Thakkar  MRN:     [id number]     Acct:      [de-identified]   Room:   [address] [place] [address] Day:  3  Admit Date:  3/8/2023  8:48 AM    PCP:   Jerry Guevara MD  Code Status:  Full Code    Subjective:     C/C:   Chief Complaint   Patient presents with    Altered Mental Status    Hypotension     Interval History Status: improved. Patient seen and examined, no issues overnight, per nursing somewhat confused today. Awaiting laparoscopic cholecystectomy on Monday    Brief History:     79-year-old female presents to the emergency department for altered mental status and hypotension. Patient initially was sent to ICU for septic shock and acute respiratory failure. Patient was intubated and remained in the ICU for subsequent days. Eventually she recovered after undergoing ERCP for ascending cholangitis/choledocholithiasis. Patient was seen by general surgery, gastroenterology.   Patient has since also been placed on argatroban for concern for HIT after noticing thrombocytopenia. Patient improved and HIT was negative, now transitioned to Lovenox    Review of Systems:     Constitutional:  negative for chills, fevers, sweats  Respiratory:  negative for cough, dyspnea on exertion, shortness of breath, wheezing  Cardiovascular:  negative for chest pain, chest pressure/discomfort, lower extremity edema, palpitations  Gastrointestinal:  negative for abdominal pain, constipation, diarrhea, nausea, vomiting  Neurological:  negative for dizziness, headache    Medications: Allergies:  No Known Allergies    Current Meds:   Scheduled Meds:    lidocaine 1 % injection  5 mL IntraDERmal Once    sodium chloride flush  5-40 mL IntraVENous 2 times per day    enoxaparin  1 mg/kg SubCUTAneous BID    traZODone  50 mg Oral QPM    [Held by provider] apixaban  5 mg Oral BID    cefTRIAXone (ROCEPHIN) IV  2,000 mg IntraVENous Q24H    sodium chloride flush  5-40 mL IntraVENous 2 times per day     Continuous Infusions:    sodium chloride      dextrose 5% in lactated ringers 50 mL/hr at 03/10/23 1757    dextrose      sodium chloride Stopped (03/10/23 1542)     PRN Meds: sodium chloride flush, sodium chloride, glucose, dextrose bolus **OR** dextrose bolus, glucagon (rDNA), dextrose, sodium chloride flush, sodium chloride, ondansetron **OR** ondansetron, polyethylene glycol, acetaminophen **OR** acetaminophen    Data:     Past Medical History:   has a past medical history of Psoriasis. Social History:   reports that she quit smoking about 8 years ago. Her smoking use included cigarettes. She has never used smokeless tobacco. She reports that she does not drink alcohol and does not use drugs. Family History: History reviewed. No pertinent family history.     Vitals:  /71   Pulse 86   Temp 97.6 °F (36.4 °C) (Oral)   Resp 22   Wt 155 lb 10.3 oz (70.6 kg)   SpO2 100%   BMI 24.38 kg/m²   Temp (24hrs), Av.6 °F (36.4 °C), Min:97.3 °F (36.3 °C), Max:98 °F (36.7 °C)    Recent Labs     03/10/23  2349 03/11/23  0350 03/11/23  0814 03/11/23  1114   POCGLU 100 123* 120* 156*       I/O (24Hr): Intake/Output Summary (Last 24 hours) at 3/11/2023 1452  Last data filed at 3/11/2023 1224  Gross per 24 hour   Intake 414.07 ml   Output 350 ml   Net 64.07 ml       Labs:  Hematology:  Recent Labs     03/08/23  1546 03/08/23  2241 03/09/23  0355 03/10/23  0542 03/11/23  0638   WBC 7.8   < > 7.3 4.8 7.3   RBC 3.13*   < > 3.12* 2.88* 2.97*   HGB 10.8*   < > 10.7* 9.8* 10.5*   HCT 33.4*   < > 31.6* 29.3* 30.8*   .7*   < > 101.3 101.7 103.7*   MCH 34.5*   < > 34.3* 34.0* 35.4*   MCHC 32.3   < > 33.9 33.4 34.1   RDW 16.0*   < > 15.9* 16.1* 16.4*   *   < > 103* 72* 69*   MPV 9.7   < > 10.9 11.5 12.4   INR 1.9  --   --   --   --     < > = values in this interval not displayed. Chemistry:  Recent Labs     03/08/23  1546 03/08/23  1936 03/08/23  1950/23  2241 03/09/23  0016 03/09/23  0354 03/09/23  0355 03/09/23  1129 03/10/23  0542 03/10/23  1159 03/11/23  0638     --   --  145*  --   --  146*   < > 144 143 143   K 2.8*  --   --  4.0  --   --  3.3*   < > 3.3* 4.2 3.6*   *  --   --  118*  --   --  117*   < > 116* 115* 113*   CO2 13*  --   --  16*  --   --  16*   < > 12* 19* 20   GLUCOSE 126*  --   --  113*  --   --  115*  --  93  --  125*   BUN 11  --   --  11  --   --  11  --  9  --  6*   CREATININE 0.34*  --   --  0.28*  --   --  0.34*  --  0.33*  --  0.28*   MG 1.1*  --   --  2.2  --   --  2.0  --  1.6  --   --    ANIONGAP 16  --   --  11  --   --  13   < > 16 9 10   LABGLOM >60  --   --  >60  --   --  >60  --  >60  --  >60   CALCIUM 6.9*  --   --  8.1*  --   --  7.9*  --  8.1*  --  8.0*   CAION  --   --  0.97* 1.15  --   --   --   --   --   --   --    TROPHS 44* 47*  --   --  37*  --   --   --   --   --   --    LACTACIDWB 2.5*  --   --  1.9  --  2.0  --   --   --   --   --     < > = values in this interval not displayed.      Recent Labs     03/09/23  0359 03/09/23  0424 03/10/23  0542 03/10/23  0840 03/10/23  1948 03/10/23  2349 03/11/23  0350 03/11/23  0638 03/11/23  0814 03/11/23  1114   PROT 5.0*  --  4.9*  --   --   --   --  5.1*  --   --    LABALBU 2.4*  --  2.2*  --   --   --   --  2.0*  --   --    *  --  96*  --   --   --   --  34*  --   --    *  --  111*  --   --   --   --  69*  --   --    ALKPHOS 285*  --  278*  --   --   --   --  353*  --   --    BILITOT 3.5*  --  1.4*  --   --   --   --  0.7  --   --    BILIDIR 3.5*  --  1.1*  --   --   --   --  0.5*  --   --    POCGLU  --    < >  --  111* 125* 100 123*  --  120* 156*    < > = values in this interval not displayed. ABG:  Lab Results   Component Value Date/Time    POCPH 7.425 03/09/2023 04:24 AM    POCPCO2 25.3 03/09/2023 04:24 AM    POCPO2 111.5 03/09/2023 04:24 AM    POCHCO3 16.6 03/09/2023 04:24 AM    NBEA 6 03/09/2023 04:24 AM    CHHD9LCB 99 03/09/2023 04:24 AM    FIO2 30.0 03/09/2023 04:24 AM     Lab Results   Component Value Date/Time    SPECIAL RH 2ML 03/11/2023 01:15 PM     Lab Results   Component Value Date/Time    CULTURE NO GROWTH <24 HRS 03/11/2023 01:15 PM       Radiology:  XR CERVICAL SPINE (2-3 VIEWS)    Result Date: 3/10/2023  Stable cervical fusion     CT HEAD WO CONTRAST    Result Date: 3/8/2023  No acute intracranial abnormality. Mild cerebral atrophy. White matter microvascular disease. CT CERVICAL SPINE WO CONTRAST    Result Date: 3/8/2023  Postoperative changes as described above. No evidence for any fluid collections in the area to suggest CSF leak or abscess. CT ABDOMEN PELVIS W IV CONTRAST Additional Contrast? None    Result Date: 3/8/2023  Chest: 1. Small thrombus load acute pulmonary embolism. A few filling defects in subsegmental branches posterior basal right lower lobe. 2. Moderate-size posterior basal left lower lobe patchy consolidation and much smaller posterior basal right lower lobe patchy consolidation compatible with atelectasis.  Abdomen pelvis: 1. Cholelithiasis without evidence for acute cholecystitis. 2. Intra and extrahepatic biliary ductal dilatation. Diffuse dilatation of CBD up to 14 mm diameter down to the ampulla. There could be non radiopaque calculus, stricture or periampullary lesion causing the obstruction. Given that this has developed since the prior study possible non radiopaque distal obstructing choledocholithiasis is most favored. MRCP or ERCP is recommended. 3. Probable endometrial  thickening up to 9 mm thickness. Correlation with non emergency outpatient pelvic ultrasound is recommended. XR CHEST PORTABLE    Result Date: 3/10/2023  1. Endotracheal tube tip is approximately 0.9 cm from the lulu; however, the tip is somewhat directed towards the right mainstem bronchus. Consider repositioning. 2. Patchy right upper lobe airspace opacity and left basilar consolidation. 3. Multiple air-filled dilated loops of small bowel measuring up to 3.3 cm in maximum diameter. Findings favor ileus over small bowel obstruction. 4. Enteric tube tip and side-port project over the stomach. 5. Right central venous catheter tip projecting over the cavoatrial junction. XR CHEST PORTABLE    Result Date: 3/8/2023  Trace left basilar effusion with adjacent infiltrate. CT CHEST PULMONARY EMBOLISM W CONTRAST    Result Date: 3/8/2023  Chest: 1. Small thrombus load acute pulmonary embolism. A few filling defects in subsegmental branches posterior basal right lower lobe. 2. Moderate-size posterior basal left lower lobe patchy consolidation and much smaller posterior basal right lower lobe patchy consolidation compatible with atelectasis. Abdomen pelvis: 1. Cholelithiasis without evidence for acute cholecystitis. 2. Intra and extrahepatic biliary ductal dilatation. Diffuse dilatation of CBD up to 14 mm diameter down to the ampulla. There could be non radiopaque calculus, stricture or periampullary lesion causing the obstruction.   Given that this has developed since the prior study possible non radiopaque distal obstructing choledocholithiasis is most favored. MRCP or ERCP is recommended. 3. Probable endometrial  thickening up to 9 mm thickness. Correlation with non emergency outpatient pelvic ultrasound is recommended. XR ABDOMEN FOR NG/OG/NE TUBE PLACEMENT    Result Date: 3/10/2023  1. Endotracheal tube tip is approximately 0.9 cm from the lulu; however, the tip is somewhat directed towards the right mainstem bronchus. Consider repositioning. 2. Patchy right upper lobe airspace opacity and left basilar consolidation. 3. Multiple air-filled dilated loops of small bowel measuring up to 3.3 cm in maximum diameter. Findings favor ileus over small bowel obstruction. 4. Enteric tube tip and side-port project over the stomach. 5. Right central venous catheter tip projecting over the cavoatrial junction.        Physical Examination:        General appearance:  alert, cooperative and no distress  Mental Status:  oriented to person, place and time and normal affect  Lungs:  clear to auscultation bilaterally, normal effort  Heart:  regular rate and rhythm, no murmur  Abdomen:  soft, nontender, nondistended, normal bowel sounds, no masses, hepatomegaly, splenomegaly  Extremities:  no edema, redness, tenderness in the calves  Skin:  no gross lesions, rashes, induration    Assessment:        Hospital Problems             Last Modified POA    * (Principal) Septic shock ([address] 3/8/2023 Yes    Ascending cholangitis 3/8/2023 Yes    Cholelithiasis 3/8/2023 Yes    Gram negative septicemia ([address] 3/8/2023 Yes    Choledocholithiasis 3/8/2023 Yes    Acute respiratory failure with hypoxia ([address] 4/1/6272 Yes    Metabolic encephalopathy 4/1/9353 Yes    Acute pulmonary embolism ([address] 3/8/2023 Yes    Altered mental status 3/10/2023 Yes    Elevated brain natriuretic peptide (BNP) level 3/10/2023 Yes       Plan:        Septic Shock   Patient has been transferred out of ICU, initially on vasopressors for septic shock. Currently on Rocephin for gram-negative sepsis  Ascending Cholangitis   Patient underwent ERCP with gastroenterology, they are recommending close follow-up and repeat ERCP  Patient is planned for laparoscopic cholecystectomy on Monday  N.p.o. Sunday night  Continue Rocephin for now  Gram-negative septicemia/E. Coli  Initial blood cultures are positive for E. coli, urine culture has Aerococcous Viridans  Discussed with infectious disease, for blood draws will defer PICC line, use midline  Acute respiratory failure with hypoxia  Patient was initially on vasopressors and intubated and sedated. Now extubated and out of ICU  Status post fall with subsequent laminectomy from previous hospitalization  Patient was seen by neurosurgery during that time, underwent extensive cervical laminectomy and was in rehab when she was readmitted  Acute pulmonary embolism  Multiple subsegmental pulmonary embolism, there was concern for HIT, patient HIT is negative, platelets are above 11,945, will start Lovenox  Thrombocytopenia  Likely due to gram-negative sepsis. INR was 1.9. HIT is negative.   We will check hemolysis work-up for completeness  Choledocholithiasis  Status post ERCP  Elevated BNP  Toxic metabolic encephalopathy  Impoved    Radha Townsend DO  3/11/2023  2:52 PM

## 2023-03-11 NOTE — PROGRESS NOTES
Physical Therapy        Physical Therapy Cancel Note      DATE: 3/11/2023    NAME: Kristie Hogan  MRN: [id number]   : 1950      Patient not seen this date for Physical Therapy due to:    Surgery/Procedure: plan for  laparoscopic cholecystectomy on Monday       Electronically signed by Chris Tavarez on 3/11/2023 at 2:12 PM

## 2023-03-11 NOTE — PROGRESS NOTES
General Surgery:  Daily Progress Note                PATIENT NAME: Reji Cassidy     TODAY'S DATE: 3/11/2023, 6:08 AM    SUBJECTIVE:     Patient seen and examined at the bedside. No acute overnight events. Afebrile. Vitals within normal limits and stable. Pt has no complaints at this time. Transferred out of ICU yesterday. Tolerating easy to chew diet. Voiding spontaneously. Eliquis held, on argatroban gtt. Planning for laparoscopic cholecystectomy Monday 3/13. OBJECTIVE:   VITALS:  /70   Pulse 80   Temp 98 °F (36.7 °C) (Oral)   Resp 28   Wt 155 lb 10.3 oz (70.6 kg)   SpO2 99%   BMI 24.38 kg/m²      INTAKE/OUTPUT:      Intake/Output Summary (Last 24 hours) at 3/11/2023 0567  Last data filed at 3/10/2023 1606  Gross per 24 hour   Intake 1563.48 ml   Output 223 ml   Net 1340.48 ml       PHYSICAL EXAM:  General Appearance: awake, alert, oriented, in no acute distress  HEENT:  Normocephalic, atraumatic, mucus membranes moist   Heart: Heart regular rate and rhythm  Lungs: Normal respiratory effort, no accessory muscle use, no wheezing or stridor. Abdomen:Soft, nondistended, nontender   Extremities: No cyanosis, pitting edema, rashes noted. Skin: Skin color, texture, turgor normal. No rashes or lesions.       Data:  CBC with Differential:    Lab Results   Component Value Date/Time    WBC 7.3 03/11/2023 06:38 AM    RBC 2.97 03/11/2023 06:38 AM    HGB 10.5 03/11/2023 06:38 AM    HCT 30.8 03/11/2023 06:38 AM    PLT 69 03/11/2023 06:38 AM    .7 03/11/2023 06:38 AM    MCH 35.4 03/11/2023 06:38 AM    MCHC 34.1 03/11/2023 06:38 AM    RDW 16.4 03/11/2023 06:38 AM    NRBC 3 03/08/2023 03:46 PM    LYMPHOPCT PENDING 03/11/2023 06:38 AM    MONOPCT PENDING 03/11/2023 06:38 AM    BASOPCT PENDING 03/11/2023 06:38 AM    MONOSABS PENDING 03/11/2023 06:38 AM    LYMPHSABS PENDING 03/11/2023 06:38 AM    EOSABS PENDING 03/11/2023 06:38 AM    BASOSABS PENDING 03/11/2023 06:38 AM    DIFFTYPE NOT REPORTED 07/15/2019 09:03 AM     BMP:    Lab Results   Component Value Date/Time     03/11/2023 06:38 AM    K 3.6 03/11/2023 06:38 AM     03/11/2023 06:38 AM    CO2 20 03/11/2023 06:38 AM    BUN 6 03/11/2023 06:38 AM    LABALBU 2.0 03/11/2023 06:38 AM    CREATININE 0.28 03/11/2023 06:38 AM    CALCIUM 8.0 03/11/2023 06:38 AM    GFRAA >60 07/15/2019 09:03 AM    LABGLOM >60 03/11/2023 06:38 AM    GLUCOSE 125 03/11/2023 06:38 AM     Hepatic Function Panel:    Lab Results   Component Value Date/Time    ALKPHOS 353 03/11/2023 06:38 AM    ALT 69 03/11/2023 06:38 AM    AST 34 03/11/2023 06:38 AM    PROT 5.1 03/11/2023 06:38 AM    BILITOT 0.7 03/11/2023 06:38 AM    BILIDIR 0.5 03/11/2023 06:38 AM    IBILI 0.2 03/11/2023 06:38 AM    LABALBU 2.0 03/11/2023 06:38 AM       Radiology Review:    XR CERVICAL SPINE (2-3 VIEWS)    Result Date: 3/10/2023  Stable cervical fusion       ASSESSMENT:  Active Hospital Problems    Diagnosis Date Noted    Altered mental status [R41.82] 03/10/2023     Priority: Medium    Elevated brain natriuretic peptide (BNP) level [R79.89] 03/10/2023     Priority: Medium    Septic shock (HCC) [A41.9, R65.21] 03/08/2023     Priority: Medium    Ascending cholangitis [K83.09] 03/08/2023     Priority: Medium    Cholelithiasis [K80.20] 03/08/2023     Priority: Medium    Gram negative septicemia (HCC) [A41.50] 03/08/2023     Priority: Medium    Choledocholithiasis [K80.50] 03/08/2023     Priority: Medium    Acute respiratory failure with hypoxia (HCC) [J96.01] 03/08/2023     Priority: Medium    Metabolic encephalopathy [G93.41] 03/08/2023     Priority: Medium    Acute pulmonary embolism (HCC) [I26.99] 03/08/2023     Priority: Medium       73-year-old female with ascending cholangitis secondary to gallstones, s/p ERCP    Plan:  Continue medical mgmt and supportive care per primary  Will plan for laparoscopic cholecystectomy, possible open on Monday - 2PM.  Will additionally need to discuss with patient's  daughter and  per patient's request.  Continue argatroban gtt for anticoagulation, will plan to hold prior to surgery  Okay to resume low-fat diet  NPO at midnight on 3/13  Will continue to monitor and follow    Electronically signed by Tom Love DO  on 3/11/2023 at 6:08 AM

## 2023-03-11 NOTE — PROGRESS NOTES
PULMONARY & CRITICAL CARE MEDICINE PROGRESS NOTE     Patient:  Eduardo Taylor  MRN: [id number]  Admit date: 3/8/2023  Primary Care Physician: Ebonie Alonso MD  Consulting Physician: Olga Lidia Cassidy DO  CODE Status: Full Code  LOS: 3     SUBJECTIVE     CHIEF COMPLAINT/REASON FOR INITIAL CONSULT:Altered Mental Status and Hypotension    BRIEF HOSPITAL COURSE:  The patient is a 75 y.o. female who presented from extended care facility for altered mental status and hypotension. As per report her systolic BP was in the 40O. She has a recent history of cervical spine fusion on 01/18, follows up with neurosurgery. On arrival to the ER she had a GCS of 11, was febrile, tachycardic and hypotensive. She was also found to be hypoxic was placed on 4 L of oxygen. CT head showed no acute intracranial abnormality, mild cerebral atrophy with white matter microvascular disease. CT cervical spine did not reveal any CSF leak or abscess. CT chest abdomen and pelvis was suggestive of acute PE and subsegmental branches of the posterior basal right lower lobe, cholelithiasis with biliary ductal dilatation. Labs showed creatinine 1.4, lactic acid of 3.2, [place] of 6.62, , BNP of 4111, alk phos of 300, , , bilirubin 3.3, WBC count 3.6, UA positive for and positive nitrate. Neurosurgery was consulted from the ER, had a low suspicion for postop infection. GI was consulted, patient underwent ERCP with stone removal and stent placement. During the procedure she was hypotensive was started on Levophed. She remained intubated and sedated with propofol. After the procedure she was transferred to ICU. In the MICU she was started on Levophed. She was also started on heparin for PE. Remained intubated and sedated with propofol. Her blood cultures grew E. coli and urine cultures also grew E. coli. She was started on Vanco and Zosyn and was switched to ceftriaxone on 03/10.   She was weaned off and got extubated on . She was weaned off of pressors on 03/10. Speech evaluated her recommended soft and dysphagia diet. During the ICU stay, she developed thrombocytopenia with platelets 72. Decision was made to switch to Eliquis and follow-up HIT panel. General surgery was consulted for possible cholecystectomy. Recommended patient to be switched to heparin versus argatroban. INTERVAL HISTORY:  23  Patient is currently on supplemental oxygen with nasal cannula  Tmax 98, white count is 7.3  Patient remains on argatroban infusion  Plan for laparoscopic cholecystectomy on Monday    REVIEW OF SYSTEMS:  Review of Systems   Constitutional:  Positive for fatigue. Negative for fever. HENT:  Negative for voice change. Eyes:  Negative for visual disturbance. Respiratory:  Positive for shortness of breath. Gastrointestinal:  Positive for abdominal pain. Negative for diarrhea and vomiting. Genitourinary:  Negative for dysuria and urgency. Musculoskeletal:  Negative for joint swelling. Allergic/Immunologic: Negative for environmental allergies and immunocompromised state. Neurological:  Positive for weakness. Hematological:  Negative for adenopathy. Does not bruise/bleed easily. Psychiatric/Behavioral:  Negative for behavioral problems. OBJECTIVE     VITAL SIGNS:   LAST:  /60   Pulse 77   Temp 97.5 °F (36.4 °C) (Oral)   Resp 19   Wt 155 lb 10.3 oz (70.6 kg)   SpO2 100%   BMI 24.38 kg/m²   8-24 HR RANGE:  TEMP Temp  Av.6 °F (36.4 °C)  Min: 97.3 °F (36.3 °C)  Max: 98 °F (79.7 °C)   BP Systolic (87HML), CAROLE:681 , Min:90 , HTM:168      Diastolic (19OHD), UAL:50, Min:60, Max:115     PULSE Pulse  Av.3  Min: 72  Max: 86   RR Resp  Av.5  Min: 19  Max: 28   O2 SAT SpO2  Av.5 %  Min: 99 %  Max: 100 %   OXYGEN DELIVERY No data recorded         PHYSICAL EXAM:  Physical Exam  Constitutional:       General: She is awake. She is not in acute distress.      Appearance: She is ill-appearing. Interventions: Nasal cannula in place. HENT:      Head: Normocephalic and atraumatic. Mouth/Throat:      Mouth: Mucous membranes are moist.   Eyes:      Conjunctiva/sclera: Conjunctivae normal.   Cardiovascular:      Rate and Rhythm: Normal rate and regular rhythm. Heart sounds: No murmur heard. Pulmonary:      Effort: No accessory muscle usage or respiratory distress. Breath sounds: Decreased breath sounds present. Abdominal:      General: There is no distension. Palpations: Abdomen is soft. There is no mass. Tenderness: There is abdominal tenderness in the right upper quadrant. Musculoskeletal:      Cervical back: Neck supple. Right lower leg: No edema. Left lower leg: No edema. Skin:     Coloration: Skin is not pale. Neurological:      General: No focal deficit present. Mental Status: She is alert. DATA REVIEW     CURRENT MEDICATIONS:  Scheduled Meds:   lidocaine 1 % injection  5 mL IntraDERmal Once    sodium chloride flush  5-40 mL IntraVENous 2 times per day    [Held by provider] apixaban  5 mg Oral BID    cefTRIAXone (ROCEPHIN) IV  2,000 mg IntraVENous Q24H    sodium chloride flush  5-40 mL IntraVENous 2 times per day     Continuous Infusions:   sodium chloride      dextrose 5% in lactated ringers 50 mL/hr at 03/10/23 1757    dextrose      argatroban infusion 0.5 mcg/kg/min (03/11/23 0151)    sodium chloride Stopped (03/10/23 1542)       INPUT/OUTPUT:  In: 1333.2 [P.O.:120;  I.V.:933.4]  Out: 573 [Urine:573]  Date 03/11/23 0000 - 03/11/23 2359   Shift 6063-1296 2024-4628 0916-4948 24 Hour Total   INTAKE   P.O.(mL/kg/hr)  120  120   Shift Total(mL/kg)  120(1.7)  120(1.7)   OUTPUT   Urine(mL/kg/hr) 350(0.6)   350   Shift Total(mL/kg) 350(5)   350(5)   Weight (kg) 70.6 70.6 70.6 70.6        LABORATORY RESULTS:  BLOOD GASES:   Recent Labs     03/08/23  1620 03/09/23  0424   POCPH 7.396 7.425   POCPCO2 24.6* 25.3*   POCPO2 120.8* 111.5*   POCHCO3 15.1* 16.6*   XPSE7UIL 99* 99*     COMPLETE BLOOD COUNTS:   Recent Labs     03/08/23  1546 03/08/23  2241 03/09/23  0355 03/10/23  0542 03/11/23  0638   WBC 7.8 8.5 7.3 4.8 7.3   HGB 10.8* 11.1* 10.7* 9.8* 10.5*   HCT 33.4* 33.2* 31.6* 29.3* 30.8*   .7* 101.2 101.3 101.7 103.7*   * 102* 103* 72* 69*   LYMPHOPCT 6*  --  11* 17* 19*   RBC 3.13* 3.28* 3.12* 2.88* 2.97*   MCH 34.5* 33.8* 34.3* 34.0* 35.4*   MCHC 32.3 33.4 33.9 33.4 34.1   RDW 16.0* 15.8* 15.9* 16.1* 16.4*     C-REACTIVE PROTEIN:   No results for input(s): CRP in the last 72 hours. LACTATE DEHYDROGENASE:   No results for input(s): LDH in the last 72 hours. BASIC METABOLIC PROFILE:   Recent Labs     03/08/23  1546 03/08/23  2241 03/09/23 0355 03/09/23  1129 03/10/23  0542 03/10/23  1159 03/11/23  0638    145* 146* 145* 144 143 143   K 2.8* 4.0 3.3* 3.9 3.3* 4.2 3.6*   * 118* 117* 117* 116* 115* 113*   CO2 13* 16* 16* 19* 12* 19* 20   BUN 11 11 11  --  9  --  6*   CREATININE 0.34* 0.28* 0.34*  --  0.33*  --  0.28*   GLUCOSE 126* 113* 115*  --  93  --  125*     LIVER FUNCTION TESTS:   Recent Labs     03/08/23  1546 03/09/23  0355 03/10/23  0542 03/11/23  0638   PROT 5.1* 5.0* 4.9* 5.1*   LABALBU 2.5* 2.4* 2.2* 2.0*   * 185* 111* 69*   * 271* 96* 34*   ALKPHOS 308* 285* 278* 353*   BILITOT 4.2* 3.5* 1.4* 0.7     COAGULATION PROFILE:   Recent Labs     03/08/23  1546 03/09/23  0016 03/09/23  1531 03/09/23  2304 03/10/23  0542 03/10/23  1159 03/11/23  0638   INR 1.9  --   --   --   --   --   --    PROTIME 19.7*  --   --   --   --   --   --    APTT  --    < > 115.6* 49.4* 74.6* 46.7* 36.9*    < > = values in this interval not displayed. D-DIMER:  No results for input(s): DDIMER in the last 72 hours. LACTIC ACID:  No results for input(s): LACTA in the last 72 hours. CARDIAC ENZYMES:  No results for input(s): CKTOTAL, CKMB, CKMBINDEX, TROPONINI in the last 72 hours.     Invalid input(s): TROPONIN, HSTROP  BRAIN NATRIURETIC PEPTIDE/PRO-BRAIN NATRURETIC PEPTIDE:   No results for input(s): BNP, PROBNP in the last 72 hours. TRIGLYCERIDES:  No results for input(s): TRIG in the last 72 hours. MICROBIOLOGY RESULTS:  URINE CULTURE: No components found for: CURINE  BLOOD CULTURE: No components found for: CBLOOD, CFUNGUSBL  SPUTUM CULTURE: No components found for: CSPUTUM    No results for input(s): SPUTUM, SPECDESC, SPECIAL, CULTURE, STATUS, ORG, CDIFFTOXPCR, MPNEUM, MPNEUG, CAMPYLOBPCR, SALMONELLAPC, SHIGAPCR, SHIGELLAPCR, LACTOQL in the last 72 hours. Invalid input(s): CURINE, CBLOOD, CFUNGUSBL     PATHOLOGY RESULTS:    RADIOLOGY REPORTS:  XR CERVICAL SPINE (2-3 VIEWS)   Final Result   Stable cervical fusion         XR CHEST PORTABLE   Final Result   1. Endotracheal tube tip is approximately 0.9 cm from the lulu; however,   the tip is somewhat directed towards the right mainstem bronchus. Consider   repositioning. 2. Patchy right upper lobe airspace opacity and left basilar consolidation. 3. Multiple air-filled dilated loops of small bowel measuring up to 3.3 cm in   maximum diameter. Findings favor ileus over small bowel obstruction. 4. Enteric tube tip and side-port project over the stomach. 5. Right central venous catheter tip projecting over the cavoatrial junction. XR ABDOMEN FOR NG/OG/NE TUBE PLACEMENT   Final Result   1. Endotracheal tube tip is approximately 0.9 cm from the lulu; however,   the tip is somewhat directed towards the right mainstem bronchus. Consider   repositioning. 2. Patchy right upper lobe airspace opacity and left basilar consolidation. 3. Multiple air-filled dilated loops of small bowel measuring up to 3.3 cm in   maximum diameter. Findings favor ileus over small bowel obstruction. 4. Enteric tube tip and side-port project over the stomach. 5. Right central venous catheter tip projecting over the cavoatrial junction.          FLUORO FOR SURGICAL PROCEDURES   Final Result      CT CHEST PULMONARY EMBOLISM W CONTRAST   Final Result   Chest:      1. Small thrombus load acute pulmonary embolism. A few filling defects in   subsegmental branches posterior basal right lower lobe. 2. Moderate-size posterior basal left lower lobe patchy consolidation and   much smaller posterior basal right lower lobe patchy consolidation compatible   with atelectasis. Abdomen pelvis:      1. Cholelithiasis without evidence for acute cholecystitis. 2. Intra and extrahepatic biliary ductal dilatation. Diffuse dilatation of   CBD up to 14 mm diameter down to the ampulla. There could be non radiopaque   calculus, stricture or periampullary lesion causing the obstruction. Given   that this has developed since the prior study possible non radiopaque distal   obstructing choledocholithiasis is most favored. MRCP or ERCP is recommended. 3. Probable endometrial  thickening up to 9 mm thickness. Correlation with   non emergency outpatient pelvic ultrasound is recommended. CT ABDOMEN PELVIS W IV CONTRAST Additional Contrast? None   Final Result   Chest:      1. Small thrombus load acute pulmonary embolism. A few filling defects in   subsegmental branches posterior basal right lower lobe. 2. Moderate-size posterior basal left lower lobe patchy consolidation and   much smaller posterior basal right lower lobe patchy consolidation compatible   with atelectasis. Abdomen pelvis:      1. Cholelithiasis without evidence for acute cholecystitis. 2. Intra and extrahepatic biliary ductal dilatation. Diffuse dilatation of   CBD up to 14 mm diameter down to the ampulla. There could be non radiopaque   calculus, stricture or periampullary lesion causing the obstruction. Given   that this has developed since the prior study possible non radiopaque distal   obstructing choledocholithiasis is most favored. MRCP or ERCP is recommended.    3. Probable endometrial  thickening up to 9 mm thickness. Correlation with   non emergency outpatient pelvic ultrasound is recommended. CT HEAD WO CONTRAST   Final Result   No acute intracranial abnormality. Mild cerebral atrophy. White matter microvascular disease. CT CERVICAL SPINE WO CONTRAST   Final Result   Postoperative changes as described above. No evidence for any fluid   collections in the area to suggest CSF leak or abscess. XR CHEST PORTABLE   Final Result   Trace left basilar effusion with adjacent infiltrate. VL DUP LOWER EXTREMITY VENOUS BILATERAL    (Results Pending)        ECHOCARDIOGRAM:   No results found for this or any previous visit. ASSESSMENT AND PLAN     PROBLEM LIST:    Patient Active Problem List   Diagnosis    Mild ankle sprain, initial encounter    Mild sprain of right ankle, initial encounter    Stenosis of cervical spine with myelopathy (HCC)    Central cord syndrome (HCC)    Septic shock (HCC)    Ascending cholangitis    Cholelithiasis    Gram negative septicemia ([address]    Choledocholithiasis    Acute respiratory failure with hypoxia (HCC)    Metabolic encephalopathy    Acute pulmonary embolism (HCC)    Altered mental status    Elevated brain natriuretic peptide (BNP) level       ASSESSMENT:    Acute hypoxic respiratory failure  Subsegmental PE  Ascending cholangitis  Choledocholithiasis  E. coli bacteremia  Sepsis  Septic shock, resolved  Acute metabolic encephalopathy, improved  Hypoalbuminemia  Transaminitis, improving  Thrombocytopenia, HIT panel negative    PLAN:    I personally interviewed/examined the patient; reviewed interval history, interpreted all available radiographic and laboratory data at the time of service.     Patient is hemodynamically stable  Currently saturating well on supplemental oxygen with nasal cannula   Continue supplemental oxygen to keep oxygen saturation >90%  Patient was on argatroban infusion for suspected HIT, HIT panel is negative, okay to switch to heparin  Encourage incentive spirometry/Acapella  Maintain bronchopulmonary hygiene  Continue aspiration precautions  Continue bronchodilators  Antimicrobials reviewed; continue Rocephin  Follow-up culture results  Monitor I/O, electrolytes with a goal of even/negative fluid balance  DVT and stress ulcer prophylaxis  Physical/occupational therapy  Plan for cholecystectomy on Monday      It was my pleasure to evaluate Roldan Lloyd today. I would like to thank you for allowing me to participate in the care of this patient. Please feel free to call with any further questions or concerns. We will continue to follow. Paul Hughes MD  Pulmonary and Critical Care Medicine           3/11/2023, 10:48 AM    This note is created with the assistance of a speech recognition program.  While intending to generate a document that actually reflects the content of the visit, the document can still have some errors including those of syntax and sound-alike substitutions which may escape proof reading. It such instances, actual meaning can be extrapolated by contextual diversion.

## 2023-03-11 NOTE — PLAN OF CARE
Problem: Discharge Planning  Goal: Discharge to home or other facility with appropriate resources  Outcome: Progressing     Problem: Pain  Goal: Verbalizes/displays adequate comfort level or baseline comfort level  3/10/2023 2148 by Mague Ramos RN  Outcome: Progressing  3/10/2023 1155 by Corwin Cedeno RN  Outcome: Progressing     Problem: Neurosensory - Adult  Goal: Achieves stable or improved neurological status  3/10/2023 2148 by Mague Ramos RN  Outcome: Progressing  3/10/2023 1155 by Corwin Cedeno RN  Outcome: Progressing  Goal: Achieves maximal functionality and self care  Outcome: Progressing     Problem: Cardiovascular - Adult  Goal: Maintains optimal cardiac output and hemodynamic stability  Outcome: Progressing  Goal: Absence of cardiac dysrhythmias or at baseline  Outcome: Progressing     Problem: Skin/Tissue Integrity - Adult  Goal: Skin integrity remains intact  3/10/2023 2148 by Mague Ramos RN  Outcome: Progressing  3/10/2023 1155 by Corwin Cedeno RN  Outcome: Progressing  Goal: Incisions, wounds, or drain sites healing without S/S of infection  Outcome: Progressing     Problem: Musculoskeletal - Adult  Goal: Return mobility to safest level of function  Outcome: Progressing  Goal: Return ADL status to a safe level of function  Outcome: Progressing     Problem: Genitourinary - Adult  Goal: Absence of urinary retention  Outcome: Progressing     Problem: Skin/Tissue Integrity  Goal: Absence of new skin breakdown  Description: 1. Monitor for areas of redness and/or skin breakdown  2. Assess vascular access sites hourly  3. Every 4-6 hours minimum:  Change oxygen saturation probe site  4. Every 4-6 hours:  If on nasal continuous positive airway pressure, respiratory therapy assess nares and determine need for appliance change or resting period.   3/10/2023 2148 by Mague Ramos RN  Outcome: Progressing  3/10/2023 1155 by Corwin Cedeno RN  Outcome: Progressing Problem: Safety - Adult  Goal: Free from fall injury  3/10/2023 2148 by Milagros Nichols RN  Outcome: Progressing  3/10/2023 1155 by Leena Reyes RN  Outcome: Progressing

## 2023-03-12 ENCOUNTER — ANESTHESIA EVENT (OUTPATIENT)
Dept: OPERATING ROOM | Age: 73
DRG: 853 | End: 2023-03-12
Payer: MEDICARE

## 2023-03-12 LAB
ABSOLUTE EOS #: 0 K/UL (ref 0–0.4)
ABSOLUTE IMMATURE GRANULOCYTE: 0 K/UL (ref 0–0.3)
ABSOLUTE LYMPH #: 0.98 K/UL (ref 1–4.8)
ABSOLUTE MONO #: 0.08 K/UL (ref 0.1–0.8)
ANION GAP SERPL CALCULATED.3IONS-SCNC: 8 MMOL/L (ref 9–17)
BASOPHILS # BLD: 0 % (ref 0–2)
BASOPHILS ABSOLUTE: 0 K/UL (ref 0–0.2)
BUN SERPL-MCNC: 3 MG/DL (ref 8–23)
CALCIUM SERPL-MCNC: 7.4 MG/DL (ref 8.6–10.4)
CHLORIDE SERPL-SCNC: 110 MMOL/L (ref 98–107)
CO2 SERPL-SCNC: 22 MMOL/L (ref 20–31)
CREAT SERPL-MCNC: 0.2 MG/DL (ref 0.5–0.9)
EOSINOPHILS RELATIVE PERCENT: 0 % (ref 1–4)
FOLATE SERPL-MCNC: ABNORMAL NG/ML
GFR SERPL CREATININE-BSD FRML MDRD: >60 ML/MIN/1.73M2
GLUCOSE BLD-MCNC: 105 MG/DL (ref 65–105)
GLUCOSE BLD-MCNC: 96 MG/DL (ref 65–105)
GLUCOSE BLD-MCNC: 99 MG/DL (ref 65–105)
GLUCOSE SERPL-MCNC: 127 MG/DL (ref 70–99)
HCT VFR BLD AUTO: 31.6 % (ref 36.3–47.1)
HGB BLD-MCNC: 9.9 G/DL (ref 11.9–15.1)
IMMATURE GRANULOCYTES: 0 %
LYMPHOCYTES # BLD: 25 % (ref 24–44)
MAGNESIUM SERPL-MCNC: 1.4 MG/DL (ref 1.6–2.6)
MCH RBC QN AUTO: 33.4 PG (ref 25.2–33.5)
MCHC RBC AUTO-ENTMCNC: 31.3 G/DL (ref 28.4–34.8)
MCV RBC AUTO: 106.8 FL (ref 82.6–102.9)
MONOCYTES # BLD: 2 % (ref 1–7)
MORPHOLOGY: ABNORMAL
NRBC AUTOMATED: 0 PER 100 WBC
PDW BLD-RTO: 16.2 % (ref 11.8–14.4)
PLATELET # BLD AUTO: 82 K/UL (ref 138–453)
PMV BLD AUTO: 12.3 FL (ref 8.1–13.5)
POTASSIUM SERPL-SCNC: 2.8 MMOL/L (ref 3.7–5.3)
RBC # BLD: 2.96 M/UL (ref 3.95–5.11)
SEG NEUTROPHILS: 73 % (ref 36–66)
SEGMENTED NEUTROPHILS ABSOLUTE COUNT: 2.84 K/UL (ref 1.8–7.7)
SODIUM SERPL-SCNC: 140 MMOL/L (ref 135–144)
VIT B12 SERPL-MCNC: <150 PG/ML (ref 232–1245)
WBC # BLD AUTO: 3.9 K/UL (ref 3.5–11.3)

## 2023-03-12 PROCEDURE — 6360000002 HC RX W HCPCS: Performed by: INTERNAL MEDICINE

## 2023-03-12 PROCEDURE — 6360000002 HC RX W HCPCS: Performed by: NURSE PRACTITIONER

## 2023-03-12 PROCEDURE — 2580000003 HC RX 258: Performed by: INTERNAL MEDICINE

## 2023-03-12 PROCEDURE — 2060000000 HC ICU INTERMEDIATE R&B

## 2023-03-12 PROCEDURE — 36415 COLL VENOUS BLD VENIPUNCTURE: CPT

## 2023-03-12 PROCEDURE — 85025 COMPLETE CBC W/AUTO DIFF WBC: CPT

## 2023-03-12 PROCEDURE — 83735 ASSAY OF MAGNESIUM: CPT

## 2023-03-12 PROCEDURE — 6370000000 HC RX 637 (ALT 250 FOR IP): Performed by: INTERNAL MEDICINE

## 2023-03-12 PROCEDURE — 82947 ASSAY GLUCOSE BLOOD QUANT: CPT

## 2023-03-12 PROCEDURE — 92526 ORAL FUNCTION THERAPY: CPT

## 2023-03-12 PROCEDURE — 99222 1ST HOSP IP/OBS MODERATE 55: CPT | Performed by: INTERNAL MEDICINE

## 2023-03-12 PROCEDURE — 80048 BASIC METABOLIC PNL TOTAL CA: CPT

## 2023-03-12 PROCEDURE — 99232 SBSQ HOSP IP/OBS MODERATE 35: CPT | Performed by: INTERNAL MEDICINE

## 2023-03-12 PROCEDURE — 2580000003 HC RX 258: Performed by: STUDENT IN AN ORGANIZED HEALTH CARE EDUCATION/TRAINING PROGRAM

## 2023-03-12 PROCEDURE — 99291 CRITICAL CARE FIRST HOUR: CPT | Performed by: INTERNAL MEDICINE

## 2023-03-12 RX ORDER — POTASSIUM CHLORIDE 20 MEQ/1
40 TABLET, EXTENDED RELEASE ORAL PRN
Status: DISCONTINUED | OUTPATIENT
Start: 2023-03-12 | End: 2023-03-16 | Stop reason: HOSPADM

## 2023-03-12 RX ORDER — POTASSIUM CHLORIDE 20 MEQ/1
20 TABLET, EXTENDED RELEASE ORAL 2 TIMES DAILY WITH MEALS
Status: DISCONTINUED | OUTPATIENT
Start: 2023-03-12 | End: 2023-03-14

## 2023-03-12 RX ORDER — POTASSIUM CHLORIDE 7.45 MG/ML
10 INJECTION INTRAVENOUS PRN
Status: DISCONTINUED | OUTPATIENT
Start: 2023-03-12 | End: 2023-03-16 | Stop reason: HOSPADM

## 2023-03-12 RX ORDER — MAGNESIUM SULFATE HEPTAHYDRATE 40 MG/ML
4000 INJECTION, SOLUTION INTRAVENOUS ONCE
Status: COMPLETED | OUTPATIENT
Start: 2023-03-12 | End: 2023-03-13

## 2023-03-12 RX ORDER — POTASSIUM CHLORIDE 7.45 MG/ML
10 INJECTION INTRAVENOUS
Status: COMPLETED | OUTPATIENT
Start: 2023-03-12 | End: 2023-03-12

## 2023-03-12 RX ADMIN — SODIUM CHLORIDE, SODIUM LACTATE, POTASSIUM CHLORIDE, CALCIUM CHLORIDE AND DEXTROSE MONOHYDRATE: 5; 600; 310; 30; 20 INJECTION, SOLUTION INTRAVENOUS at 23:12

## 2023-03-12 RX ADMIN — POTASSIUM CHLORIDE 10 MEQ: 10 INJECTION, SOLUTION INTRAVENOUS at 13:00

## 2023-03-12 RX ADMIN — POTASSIUM CHLORIDE 20 MEQ: 1500 TABLET, EXTENDED RELEASE ORAL at 10:30

## 2023-03-12 RX ADMIN — POTASSIUM CHLORIDE 10 MEQ: 10 INJECTION, SOLUTION INTRAVENOUS at 10:00

## 2023-03-12 RX ADMIN — PIPERACILLIN AND TAZOBACTAM 3375 MG: 3; .375 INJECTION, POWDER, FOR SOLUTION INTRAVENOUS at 20:07

## 2023-03-12 RX ADMIN — POTASSIUM CHLORIDE 10 MEQ: 10 INJECTION, SOLUTION INTRAVENOUS at 08:14

## 2023-03-12 RX ADMIN — PIPERACILLIN AND TAZOBACTAM 3375 MG: 3; .375 INJECTION, POWDER, FOR SOLUTION INTRAVENOUS at 11:51

## 2023-03-12 RX ADMIN — POTASSIUM CHLORIDE 10 MEQ: 10 INJECTION, SOLUTION INTRAVENOUS at 12:00

## 2023-03-12 RX ADMIN — TRAZODONE HYDROCHLORIDE 50 MG: 50 TABLET ORAL at 18:41

## 2023-03-12 RX ADMIN — POTASSIUM CHLORIDE 10 MEQ: 10 INJECTION, SOLUTION INTRAVENOUS at 18:00

## 2023-03-12 RX ADMIN — ENOXAPARIN SODIUM 70 MG: 100 INJECTION SUBCUTANEOUS at 08:08

## 2023-03-12 RX ADMIN — POTASSIUM CHLORIDE 10 MEQ: 10 INJECTION, SOLUTION INTRAVENOUS at 05:45

## 2023-03-12 RX ADMIN — MAGNESIUM SULFATE HEPTAHYDRATE 4000 MG: 40 INJECTION, SOLUTION INTRAVENOUS at 23:18

## 2023-03-12 RX ADMIN — POTASSIUM CHLORIDE 10 MEQ: 10 INJECTION, SOLUTION INTRAVENOUS at 06:48

## 2023-03-12 RX ADMIN — POTASSIUM CHLORIDE 20 MEQ: 1500 TABLET, EXTENDED RELEASE ORAL at 16:37

## 2023-03-12 ASSESSMENT — ENCOUNTER SYMPTOMS
SHORTNESS OF BREATH: 1
DIARRHEA: 0
APNEA: 0
EYE DISCHARGE: 0
VOMITING: 0
COLOR CHANGE: 0
ABDOMINAL PAIN: 1
VOICE CHANGE: 0

## 2023-03-12 NOTE — PROGRESS NOTES
[facility]  Office: [address], DO, Patti Beangisselle, DO, Karen Paige, DO, Sourav Vásquez Blood, DO, Shabbir Jacobs MD, Alejandra Davis MD, Av Worthy MD, Aniyah Alonzo MD,  Rocky Zepeda MD, Mary Anne Ferraro MD, Umm Phillips, DO, Poppy Cordova MD,  Dee Dee Luna MD, Ashlee Heller MD, Mark Ramos DO, Lesli Iverson MD, Amanda Ram MD, Aayush Bravo DO, Henri Sin MD, Dharmesh Carr MD, Carley Nunez MD, Maxi Stewart MD, Darshan Gutierrez MD, Kj Garnica DO, Berhane Kim MD, Koffi Wylie MD, Kar Cristobal, [facility],  Aris Patton, CNP, Indira Michaud, CNP, Perley Gilford, CNP,  Aaliyah Garcia, [facility], Rocio Jaramillo, CNP, Esthela Rouse, CNP, Stephanie Mercado, CNP, Charlotte Crowe, CNP, Lawyer Taylor, CNP, Helena Bermudez PA-C, Boyd Garcia, CNS, Cletis Hashimoto, CNP, Clara Gaspar, [address] [place] [address]    Progress Note    3/12/2023    10:00 AM    Name:   Rashad Carter  MRN:     [id number]     Acct:      [de-identified]   Room:   [address] [place] [address] Day:  4  Admit Date:  3/8/2023  8:48 AM    PCP:   Eduardo Gracia MD  Code Status:  Full Code    Subjective:     C/C:   Chief Complaint   Patient presents with    Altered Mental Status    Hypotension     Interval History Status: improved. Patient awake and alert today, no issues overnight, surgery tomorrow    Brief History:     69-year-old female presents to the emergency department for altered mental status and hypotension. Patient initially was sent to ICU for septic shock and acute respiratory failure. Patient was intubated and remained in the ICU for subsequent days. Eventually she recovered after undergoing ERCP for ascending cholangitis/choledocholithiasis. Patient was seen by general surgery, gastroenterology. Patient has since also been placed on argatroban for concern for HIT after noticing thrombocytopenia.   Patient improved and HIT was negative, now transitioned to Lovenox    Review of Systems:     Constitutional:  negative for chills, fevers, sweats  Respiratory:  negative for cough, dyspnea on exertion, shortness of breath, wheezing  Cardiovascular:  negative for chest pain, chest pressure/discomfort, lower extremity edema, palpitations  Gastrointestinal:  negative for abdominal pain, constipation, diarrhea, nausea, vomiting  Neurological:  negative for dizziness, headache    Medications: Allergies:  No Known Allergies    Current Meds:   Scheduled Meds:    lidocaine 1 % injection  5 mL IntraDERmal Once    sodium chloride flush  5-40 mL IntraVENous 2 times per day    enoxaparin  1 mg/kg SubCUTAneous BID    traZODone  50 mg Oral QPM    [Held by provider] apixaban  5 mg Oral BID    cefTRIAXone (ROCEPHIN) IV  2,000 mg IntraVENous Q24H    sodium chloride flush  5-40 mL IntraVENous 2 times per day     Continuous Infusions:    sodium chloride      dextrose 5% in lactated ringers 50 mL/hr at 23 1633    dextrose      sodium chloride Stopped (03/10/23 1542)     PRN Meds: potassium chloride **OR** potassium alternative oral replacement **OR** potassium chloride, sodium chloride flush, sodium chloride, glucose, dextrose bolus **OR** dextrose bolus, glucagon (rDNA), dextrose, sodium chloride flush, sodium chloride, ondansetron **OR** ondansetron, polyethylene glycol, acetaminophen **OR** acetaminophen    Data:     Past Medical History:   has a past medical history of Psoriasis. Social History:   reports that she quit smoking about 8 years ago. Her smoking use included cigarettes. She has never used smokeless tobacco. She reports that she does not drink alcohol and does not use drugs. Family History: History reviewed. No pertinent family history.     Vitals:  BP (!) 140/126   Pulse 69   Temp 98 °F (36.7 °C) (Oral)   Resp 18   Wt 155 lb 10.3 oz (70.6 kg)   SpO2 99%   BMI 24.38 kg/m²   Temp (24hrs), Av °F (36.7 °C), Min:97.6 °F (36.4 °C), Max:98.5 °F (36.9 °C)    Recent Labs     03/11/23  0814 03/11/23  1114 03/11/23  1631 03/12/23  0807   POCGLU 120* 156* 133* 105         I/O (24Hr): Intake/Output Summary (Last 24 hours) at 3/12/2023 1000  Last data filed at 3/12/2023 0800  Gross per 24 hour   Intake 1710.07 ml   Output 1000 ml   Net 710.07 ml         Labs:  Hematology:  Recent Labs     03/10/23  0542 03/11/23  0638 03/11/23  1500 03/12/23  0402   WBC 4.8 7.3  --  3.9   RBC 2.88* 2.97*  --  2.96*   HGB 9.8* 10.5*  --  9.9*   HCT 29.3* 30.8*  --  31.6*   .7 103.7*  --  106.8*   MCH 34.0* 35.4*  --  33.4   MCHC 33.4 34.1  --  31.3   RDW 16.1* 16.4*  --  16.2*   PLT 72* 69*  --  82*   MPV 11.5 12.4  --  12.3   INR  --   --  1.5  --        Chemistry:  Recent Labs     03/10/23  0542 03/10/23  1159 03/11/23  0638 03/12/23  0402    143 143 140   K 3.3* 4.2 3.6* 2.8*   * 115* 113* 110*   CO2 12* 19* 20 22   GLUCOSE 93  --  125* 127*   BUN 9  --  6* 3*   CREATININE 0.33*  --  0.28* 0.20*   MG 1.6  --   --  1.4*   ANIONGAP 16 9 10 8*   LABGLOM >60  --  >60 >60   CALCIUM 8.1*  --  8.0* 7.4*       Recent Labs     03/10/23  0542 03/10/23  0840 03/10/23  2349 03/11/23  0350 03/11/23  0638 03/11/23  0814 03/11/23  1114 03/11/23  1500 03/11/23  1631 03/12/23  0807   PROT 4.9*  --   --   --  5.1*  --   --   --   --   --    LABALBU 2.2*  --   --   --  2.0*  --   --   --   --   --    AST 96*  --   --   --  34*  --   --   --   --   --    *  --   --   --  69*  --   --   --   --   --    LDH  --   --   --   --   --   --   --  220*  --   --    ALKPHOS 278*  --   --   --  353*  --   --   --   --   --    BILITOT 1.4*  --   --   --  0.7  --   --  0.7  --   --    BILIDIR 1.1*  --   --   --  0.5*  --   --  0.5*  --   --    POCGLU  --    < > 100 123*  --  120* 156*  --  133* 105    < > = values in this interval not displayed.        ABG:  Lab Results   Component Value Date/Time    POCPH 7.425 03/09/2023 04:24 AM    POCPCO2 25.3 03/09/2023 04:24 AM    POCPO2 111.5 03/09/2023 04:24 AM    POCHCO3 16.6 03/09/2023 04:24 AM    NBEA 6 03/09/2023 04:24 AM    MRTI4MXE 99 03/09/2023 04:24 AM    FIO2 30.0 03/09/2023 04:24 AM     Lab Results   Component Value Date/Time    SPECIAL RH 2ML 03/11/2023 01:15 PM     Lab Results   Component Value Date/Time    CULTURE NO GROWTH 12 HOURS 03/11/2023 01:15 PM       Radiology:  XR CERVICAL SPINE (2-3 VIEWS)    Result Date: 3/10/2023  Stable cervical fusion     CT HEAD WO CONTRAST    Result Date: 3/8/2023  No acute intracranial abnormality. Mild cerebral atrophy. White matter microvascular disease. CT CERVICAL SPINE WO CONTRAST    Result Date: 3/8/2023  Postoperative changes as described above. No evidence for any fluid collections in the area to suggest CSF leak or abscess. CT ABDOMEN PELVIS W IV CONTRAST Additional Contrast? None    Result Date: 3/8/2023  Chest: 1. Small thrombus load acute pulmonary embolism. A few filling defects in subsegmental branches posterior basal right lower lobe. 2. Moderate-size posterior basal left lower lobe patchy consolidation and much smaller posterior basal right lower lobe patchy consolidation compatible with atelectasis. Abdomen pelvis: 1. Cholelithiasis without evidence for acute cholecystitis. 2. Intra and extrahepatic biliary ductal dilatation. Diffuse dilatation of CBD up to 14 mm diameter down to the ampulla. There could be non radiopaque calculus, stricture or periampullary lesion causing the obstruction. Given that this has developed since the prior study possible non radiopaque distal obstructing choledocholithiasis is most favored. MRCP or ERCP is recommended. 3. Probable endometrial  thickening up to 9 mm thickness. Correlation with non emergency outpatient pelvic ultrasound is recommended. XR CHEST PORTABLE    Result Date: 3/10/2023  1.  Endotracheal tube tip is approximately 0.9 cm from the lulu; however, the tip is somewhat directed towards the right mainstem bronchus. Consider repositioning. 2. Patchy right upper lobe airspace opacity and left basilar consolidation. 3. Multiple air-filled dilated loops of small bowel measuring up to 3.3 cm in maximum diameter. Findings favor ileus over small bowel obstruction. 4. Enteric tube tip and side-port project over the stomach. 5. Right central venous catheter tip projecting over the cavoatrial junction. XR CHEST PORTABLE    Result Date: 3/8/2023  Trace left basilar effusion with adjacent infiltrate. CT CHEST PULMONARY EMBOLISM W CONTRAST    Result Date: 3/8/2023  Chest: 1. Small thrombus load acute pulmonary embolism. A few filling defects in subsegmental branches posterior basal right lower lobe. 2. Moderate-size posterior basal left lower lobe patchy consolidation and much smaller posterior basal right lower lobe patchy consolidation compatible with atelectasis. Abdomen pelvis: 1. Cholelithiasis without evidence for acute cholecystitis. 2. Intra and extrahepatic biliary ductal dilatation. Diffuse dilatation of CBD up to 14 mm diameter down to the ampulla. There could be non radiopaque calculus, stricture or periampullary lesion causing the obstruction. Given that this has developed since the prior study possible non radiopaque distal obstructing choledocholithiasis is most favored. MRCP or ERCP is recommended. 3. Probable endometrial  thickening up to 9 mm thickness. Correlation with non emergency outpatient pelvic ultrasound is recommended. XR ABDOMEN FOR NG/OG/NE TUBE PLACEMENT    Result Date: 3/10/2023  1. Endotracheal tube tip is approximately 0.9 cm from the lulu; however, the tip is somewhat directed towards the right mainstem bronchus. Consider repositioning. 2. Patchy right upper lobe airspace opacity and left basilar consolidation. 3. Multiple air-filled dilated loops of small bowel measuring up to 3.3 cm in maximum diameter. Findings favor ileus over small bowel obstruction.  4. Enteric tube tip and side-port project over the stomach. 5. Right central venous catheter tip projecting over the cavoatrial junction. Physical Examination:        General appearance:  alert, cooperative and no distress  Mental Status:  oriented to person, place and time and normal affect  Lungs:  clear to auscultation bilaterally, normal effort  Heart:  regular rate and rhythm, no murmur  Abdomen:  soft, nontender, nondistended, normal bowel sounds, no masses, hepatomegaly, splenomegaly  Extremities:  no edema, redness, tenderness in the calves  Skin:  no gross lesions, rashes, induration    Assessment:        Hospital Problems             Last Modified POA    * (Principal) Septic shock ([address] 3/8/2023 Yes    Ascending cholangitis 3/8/2023 Yes    Cholelithiasis 3/8/2023 Yes    Gram negative septicemia ([address] 3/8/2023 Yes    Choledocholithiasis 3/8/2023 Yes    Acute respiratory failure with hypoxia ([address] 0/8/3802 Yes    Metabolic encephalopathy 9/2/7297 Yes    Acute pulmonary embolism ([address] 3/8/2023 Yes    Altered mental status 3/10/2023 Yes    Elevated brain natriuretic peptide (BNP) level 3/10/2023 Yes     Plan:        Septic Shock   Patient has been transferred out of ICU, initially on vasopressors for septic shock. Currently on Rocephin for gram-negative sepsis  Will consult ID for abx recommendations  Ascending Cholangitis   Patient underwent ERCP with gastroenterology, they are recommending close follow-up and repeat ERCP  Patient is planned for laparoscopic cholecystectomy on Monday  N.p.o. Sunday night  Continue Rocephin for now  Gram-negative septicemia/E. Coli  Initial blood cultures are positive for E. coli, urine culture has Aerococcous Viridans  Discussed with infectious disease, for blood draws will defer PICC line, use midline  Acute respiratory failure with hypoxia  Patient was initially on vasopressors and intubated and sedated.   Now extubated and out of ICU  Status post fall with subsequent laminectomy from previous hospitalization  Patient was seen by neurosurgery during that time, underwent extensive cervical laminectomy and was in rehab when she was readmitted  Acute pulmonary embolism  Continue Lovenox  Thrombocytopenia  No signs of hemolysis, INR improving, likely secondary to sepsis  Choledocholithiasis  Status post ERCP  Elevated BNP  Toxic metabolic encephalopathy  Impoved    Pamela Rebolledo,   3/12/2023  10:00 AM

## 2023-03-12 NOTE — PROGRESS NOTES
[facility]  Occupational Therapy Not Seen Note    DATE: 3/12/2023    NAME: Char Stanton  MRN: [id number]   : 1950      Patient not seen this date for Occupational Therapy due to:    Surgery/Procedure: Per chart,  plan for laparoscopic cholecystectomy Monday 3/13.      Next Scheduled Treatment: post-op 2023    Electronically signed by DYLAN Collins on 3/12/2023 at 9:13 AM

## 2023-03-12 NOTE — CARE COORDINATION
Transitional planning:  Met with Dr. Alicia Lopez and updated. Pt scheduled for lap/maria teresa Monday. No discharge today.

## 2023-03-12 NOTE — PROGRESS NOTES
General Surgery:  Daily Progress Note                PATIENT NAME: Rebecca Verduzco     TODAY'S DATE: 3/12/2023, 4:52 AM    SUBJECTIVE:     Patient seen and examined at the bedside. No acute overnight events. Afebrile. Vitals within normal limits and stable. Pt has no complaints at this time. Poor PO intake reported over last 24 hours. Planning for laparoscopic cholecystectomy Monday 3/13. OBJECTIVE:   VITALS:  BP (!) 119/58   Pulse 62   Temp 98.5 °F (36.9 °C) (Oral)   Resp 19   Wt 155 lb 10.3 oz (70.6 kg)   SpO2 98%   BMI 24.38 kg/m²      INTAKE/OUTPUT:      Intake/Output Summary (Last 24 hours) at 3/12/2023 7900  Last data filed at 3/11/2023 1633  Gross per 24 hour   Intake 1770.07 ml   Output 850 ml   Net 920.07 ml         PHYSICAL EXAM:  General Appearance: awake, alert, oriented, in no acute distress  HEENT:  Normocephalic, atraumatic, mucus membranes moist   Heart: Heart regular rate and rhythm  Lungs: Normal respiratory effort, no accessory muscle use, no wheezing or stridor. Abdomen:Soft, nondistended, nontender   Extremities: No cyanosis, pitting edema, rashes noted. Skin: Skin color, texture, turgor normal. No rashes or lesions.       Data:  CBC with Differential:    Lab Results   Component Value Date/Time    WBC 3.9 03/12/2023 04:02 AM    RBC 2.96 03/12/2023 04:02 AM    HGB 9.9 03/12/2023 04:02 AM    HCT 31.6 03/12/2023 04:02 AM    PLT 82 03/12/2023 04:02 AM    .8 03/12/2023 04:02 AM    MCH 33.4 03/12/2023 04:02 AM    MCHC 31.3 03/12/2023 04:02 AM    RDW 16.2 03/12/2023 04:02 AM    NRBC 3 03/08/2023 03:46 PM    LYMPHOPCT PENDING 03/12/2023 04:02 AM    MONOPCT PENDING 03/12/2023 04:02 AM    BASOPCT PENDING 03/12/2023 04:02 AM    MONOSABS PENDING 03/12/2023 04:02 AM    LYMPHSABS PENDING 03/12/2023 04:02 AM    EOSABS PENDING 03/12/2023 04:02 AM    BASOSABS PENDING 03/12/2023 04:02 AM    DIFFTYPE NOT REPORTED 07/15/2019 09:03 AM     BMP:    Lab Results   Component Value Date/Time    NA 143 03/11/2023 06:38 AM    K 3.6 03/11/2023 06:38 AM     03/11/2023 06:38 AM    CO2 20 03/11/2023 06:38 AM    BUN 6 03/11/2023 06:38 AM    LABALBU 2.0 03/11/2023 06:38 AM    CREATININE 0.28 03/11/2023 06:38 AM    CALCIUM 8.0 03/11/2023 06:38 AM    GFRAA >60 07/15/2019 09:03 AM    LABGLOM >60 03/11/2023 06:38 AM    GLUCOSE 125 03/11/2023 06:38 AM     Hepatic Function Panel:    Lab Results   Component Value Date/Time    ALKPHOS 353 03/11/2023 06:38 AM    ALT 69 03/11/2023 06:38 AM    AST 34 03/11/2023 06:38 AM    PROT 5.1 03/11/2023 06:38 AM    BILITOT 0.7 03/11/2023 03:00 PM    BILIDIR 0.5 03/11/2023 03:00 PM    IBILI 0.2 03/11/2023 06:38 AM    LABALBU 2.0 03/11/2023 06:38 AM       Radiology Review:    XR CERVICAL SPINE (2-3 VIEWS)    Result Date: 3/10/2023  Stable cervical fusion       ASSESSMENT:  Active Hospital Problems    Diagnosis Date Noted    Altered mental status [R41.82] 03/10/2023     Priority: Medium    Elevated brain natriuretic peptide (BNP) level [R79.89] 03/10/2023     Priority: Medium    Septic shock ([facility] [A41.9, R65.21] 03/08/2023     Priority: Medium    Ascending cholangitis [K83.09] 03/08/2023     Priority: Medium    Cholelithiasis [K80.20] 03/08/2023     Priority: Medium    Gram negative septicemia ([address] [A41.50] 03/08/2023     Priority: Medium    Choledocholithiasis [K80.50] 03/08/2023     Priority: Medium    Acute respiratory failure with hypoxia ([address] [J96.01] 03/08/2023     Priority: Medium    Metabolic encephalopathy [Y08.22] 03/08/2023     Priority: Medium    Acute pulmonary embolism ([address] [I26.99] 03/08/2023     Priority: Medium       59-year-old female with ascending cholangitis secondary to gallstones, s/p ERCP    Plan:  Continue medical mgmt and supportive care per primary  Will plan for laparoscopic cholecystectomy, possible open on Monday - 2PM.  Will additionally need to discuss with patient's daughter and  per patient's request.  Continue argatroban gtt for anticoagulation, will plan to hold prior to surgery  Okay for low-fat diet  NPO at midnight on 3/13  Will continue to monitor and follow    Electronically signed by Edmar Berntsein DO  on 3/12/2023 at 4:52 AM

## 2023-03-12 NOTE — PROGRESS NOTES
Speech Language Pathology  Speech Language Pathology  [facility]    Dysphagia Treatment Note    Date: 3/12/2023  Patients Name: Baljit Chan  MRN: [id number]  Diagnosis:   Patient Active Problem List   Diagnosis Code    Mild ankle sprain, initial encounter S93.409A    Mild sprain of right ankle, initial encounter S93.401A    Stenosis of cervical spine with myelopathy ([address] M48.02, G99.2    Central cord syndrome ([address] S14.129A    Septic shock ([address] A41.9, R65.21    Ascending cholangitis K83.09    Cholelithiasis K80.20    Gram negative septicemia ([address] A41.50    Choledocholithiasis K80.50    Acute respiratory failure with hypoxia (HCC) L99.34    Metabolic encephalopathy A12.10    Acute pulmonary embolism (HCC) I26.99    Altered mental status R41.82    Elevated brain natriuretic peptide (BNP) level R79.89       Pain: 0/10    Dysphagia Treatment  Treatment time:5893-5303    Subjective: [x] Alert [x] Cooperative     [] Confused     [] Agitated    [] Lethargic    Objective/Assessment:    Pt. Seen for O/M treatment program for dysphagia. Pt completed trials of thin by and Dysphagia soft and bite/sized (Dysphagia III) for diet tolerance monitoring. Pt reported she has unable to complete mastication without her dentures and they are not with her during this hospitalization. Discussed diet level of Dysphagia Minced and Moist (Dysphagia II) and pt felt that may be easier for her at this time. Also discussed pt's support level for feeding and she indicated how difficult it is for her to self-feed however she would prefer to try that way. Education on diet level changes provided. Recommend diet level Dysphagia Minced and Moist (Dysphagia II) to assist with pt's completion of mastication process. Plan:  [x] Continue ST services    [] Discharge from ST:        Discharge recommendations: []  Further therapy recommended at discharge. The patient should be able to tolerate at least 3 hours of therapy per day over 5 days or 15 hours over 7 days. [x] Further therapy recommended at discharge. [] No therapy recommended at discharge. Treatment completed by: Devin Christine M.A.  CCC-SLP

## 2023-03-12 NOTE — PLAN OF CARE
Problem: Discharge Planning  Goal: Discharge to home or other facility with appropriate resources  Outcome: Progressing     Problem: Pain  Goal: Verbalizes/displays adequate comfort level or baseline comfort level  Outcome: Progressing     Problem: Neurosensory - Adult  Goal: Achieves stable or improved neurological status  Outcome: Progressing  Goal: Achieves maximal functionality and self care  Outcome: Progressing     Problem: Cardiovascular - Adult  Goal: Maintains optimal cardiac output and hemodynamic stability  Outcome: Progressing  Goal: Absence of cardiac dysrhythmias or at baseline  Outcome: Progressing     Problem: Skin/Tissue Integrity - Adult  Goal: Skin integrity remains intact  Outcome: Progressing  Flowsheets (Taken 3/11/2023 0900 by Yuridia Gonzalez RN)  Skin Integrity Remains Intact:   Monitor for areas of redness and/or skin breakdown   Assess vascular access sites hourly  Goal: Incisions, wounds, or drain sites healing without S/S of infection  Outcome: Progressing     Problem: Musculoskeletal - Adult  Goal: Return mobility to safest level of function  Outcome: Progressing  Goal: Return ADL status to a safe level of function  Outcome: Progressing     Problem: Genitourinary - Adult  Goal: Absence of urinary retention  Outcome: Progressing     Problem: Skin/Tissue Integrity  Goal: Absence of new skin breakdown  Description: 1. Monitor for areas of redness and/or skin breakdown  2. Assess vascular access sites hourly  3. Every 4-6 hours minimum:  Change oxygen saturation probe site  4. Every 4-6 hours:  If on nasal continuous positive airway pressure, respiratory therapy assess nares and determine need for appliance change or resting period.   Outcome: Progressing     Problem: Safety - Adult  Goal: Free from fall injury  Outcome: Progressing

## 2023-03-12 NOTE — ANESTHESIA PRE PROCEDURE
Department of Anesthesiology  Preprocedure Note       Name:  Merlinda Shall   Age:  75 y.o.  :  1950                                          MRN:  [id number]         Date:  3/12/2023      Surgeon: Gayathri Barrett):  Anusha Moreno DO    Procedure: Procedure(s):  LAPAROSCOPIC CHOLECYSTECTOMY,  POSSIBLE OPEN    Medications prior to admission:   Prior to Admission medications    Medication Sig Start Date End Date Taking? Authorizing Provider   ipratropium-albuterol (DUONEB) 0.5-2.5 (3) MG/3ML SOLN nebulizer solution  23   Historical Provider, MD   methocarbamol (ROBAXIN) 750 MG tablet Take 750 mg by mouth daily    Historical Provider, MD   acetaminophen (TYLENOL) 500 MG tablet Take 1,000 mg by mouth every 6 hours as needed for Pain    Historical Provider, MD   gabapentin (NEURONTIN) 300 MG capsule Take 1 capsule by mouth in the morning and 1 capsule at noon and 1 capsule in the evening. Do all this for 7 days. 23  Edu Wilson,    triamcinolone (KENALOG) 0.1 % cream Apply to active psoriasis twice daily (not face, armpit or groin) 19   Ramona Nicolas MD   fluocinonide (LIDEX) 0.05 % external solution Apply to scalp psoriasis daily for rash 19   Ramona Nicolas MD       Current medications:    No current facility-administered medications for this visit. No current outpatient medications on file.      Facility-Administered Medications Ordered in Other Visits   Medication Dose Route Frequency Provider Last Rate Last Admin    potassium chloride (KLOR-CON M) extended release tablet 40 mEq  40 mEq Oral PRN Lanette Carlos Eduardo, APRN - CNP        Or    potassium bicarb-citric acid (EFFER-K) effervescent tablet 40 mEq  40 mEq Oral PRN Lanette Carlos Eduardo, APRN - CNP        Or    potassium chloride 10 mEq/100 mL IVPB (Peripheral Line)  10 mEq IntraVENous PRN Lanette Carlos Eduardo, APRN -  mL/hr at 23 0814 10 mEq at 23 0814    lidocaine 1 % injection 5 mL  5 mL IntraDERmal Once Faby Box DO        sodium chloride flush 0.9 % injection 5-40 mL  5-40 mL IntraVENous 2 times per day Marly Locust, DO        sodium chloride flush 0.9 % injection 5-40 mL  5-40 mL IntraVENous PRN Marly Locust, DO        0.9 % sodium chloride infusion  25 mL IntraVENous PRN Marly Locust, DO        enoxaparin (LOVENOX) injection 70 mg  1 mg/kg SubCUTAneous BID Marly Locust, DO   70 mg at 03/12/23 0808    traZODone (DESYREL) tablet 50 mg  50 mg Oral QPM Marly Locust, DO   50 mg at 03/11/23 1953    dextrose 5 % in lactated ringers infusion   IntraVENous Continuous Melene Boast, MD 50 mL/hr at 03/11/23 1633 Rate Verify at 03/11/23 1633    glucose chewable tablet 16 g  4 tablet Oral PRN Cathlene Picket, DO        dextrose bolus 10% 125 mL  125 mL IntraVENous PRN Cathlene Picket, DO        Or    dextrose bolus 10% 250 mL  250 mL IntraVENous PRN Cathlene Picket, DO        glucagon (rDNA) injection 1 mg  1 mg SubCUTAneous PRN Cathlene Picket, DO        dextrose 10 % infusion   IntraVENous Continuous PRN Cathlene Picket, DO        [Held by provider] apixaban Tracyora Hamman) tablet 5 mg  5 mg Oral BID Celia Onofre MD   5 mg at 03/10/23 1352    cefTRIAXone (ROCEPHIN) 2,000 mg in sterile water 20 mL IV syringe  2,000 mg IntraVENous Q24H Celia Onofre MD   2,000 mg at 03/11/23 1751    sodium chloride flush 0.9 % injection 5-40 mL  5-40 mL IntraVENous 2 times per day Cathlene Picket, DO   10 mL at 03/10/23 0757    sodium chloride flush 0.9 % injection 5-40 mL  5-40 mL IntraVENous PRN Cathlene Picket, DO        0.9 % sodium chloride infusion   IntraVENous PRN Cathlene Picket, DO   Stopped at 03/10/23 1542    ondansetron (ZOFRAN-ODT) disintegrating tablet 4 mg  4 mg Oral Q8H PRN Cathlene Picket, DO        Or    ondansetron [facility]) injection 4 mg  4 mg IntraVENous Q6H PRN Cathlene Picket, DO        polyethylene glycol (GLYCOLAX) packet 17 g  17 g Oral Daily PRN Jonase Picket, DO        acetaminophen (TYLENOL) tablet 650 mg  650 mg Oral Q6H PRN Sommer Canada DO   650 mg at 03/10/23 1230    Or    acetaminophen (TYLENOL) suppository 650 mg  650 mg Rectal Q6H PRN Sommer Canada DO           Allergies:  No Known Allergies    Problem List:    Patient Active Problem List   Diagnosis Code    Mild ankle sprain, initial encounter S93.409A    Mild sprain of right ankle, initial encounter S93.401A    Stenosis of cervical spine with myelopathy (HCC) M48.02, G99.2    Central cord syndrome ([address] S14.129A    Septic shock ([address] A41.9, R65.21    Ascending cholangitis K83.09    Cholelithiasis K80.20    Gram negative septicemia (HCC) A41.50    Choledocholithiasis K80.50    Acute respiratory failure with hypoxia (HCC) L69.09    Metabolic encephalopathy R04.36    Acute pulmonary embolism (HCC) I26.99    Altered mental status R41.82    Elevated brain natriuretic peptide (BNP) level R79.89       Past Medical History:        Diagnosis Date    Psoriasis        Past Surgical History:        Procedure Laterality Date    CERVICAL FUSION      POSTERIOR CERVICAL DECOMPRESSION AND FUSION C3-7    CERVICAL FUSION N/A 2023    C 3-7 POSTERIOR CERVICAL DECOMPRESSION AND FUSION performed by Carlee Adamson DO at [address] [place] [address] ERCP  2023    ERCP STONE REMOVAL, ERCP SPHINCTER/PAPILLOTOMY,    ERCP STENT INSERTION    ERCP  3/8/2023    ERCP STONE REMOVAL performed by Guadalupe Marquez MD at [address] [place] [address] ERCP  3/8/2023    ERCP SPHINCTER/PAPILLOTOMY performed by Guadalupe Marquez MD at [address] [place] [address] ERCP  3/8/2023    ERCP STENT INSERTION performed by Guadalupe Marquez MD at [address] [place] [address] History:    Social History     Tobacco Use    Smoking status: Former     Types: Cigarettes     Quit date: 2015     Years since quittin.1    Smokeless tobacco: Never   Substance Use Topics    Alcohol use: Never                                Counseling given: Not Answered      Vital Signs (Current): There were no vitals filed for this visit. BP Readings from Last 3 Encounters:   03/12/23 (!) 119/58   02/01/23 122/79   01/20/23 128/81       NPO Status:                                                                                 BMI:   Wt Readings from Last 3 Encounters:   03/10/23 155 lb 10.3 oz (70.6 kg)   02/01/23 180 lb (81.6 kg)   01/14/23 180 lb (81.6 kg)     There is no height or weight on file to calculate BMI.    CBC:   Lab Results   Component Value Date/Time    WBC 3.9 03/12/2023 04:02 AM    RBC 2.96 03/12/2023 04:02 AM    HGB 9.9 03/12/2023 04:02 AM    HCT 31.6 03/12/2023 04:02 AM    .8 03/12/2023 04:02 AM    RDW 16.2 03/12/2023 04:02 AM    PLT 82 03/12/2023 04:02 AM       CMP:   Lab Results   Component Value Date/Time     03/12/2023 04:02 AM    K 2.8 03/12/2023 04:02 AM     03/12/2023 04:02 AM    CO2 22 03/12/2023 04:02 AM    BUN 3 03/12/2023 04:02 AM    CREATININE 0.20 03/12/2023 04:02 AM    GFRAA >60 07/15/2019 09:03 AM    LABGLOM >60 03/12/2023 04:02 AM    GLUCOSE 127 03/12/2023 04:02 AM    PROT 5.1 03/11/2023 06:38 AM    CALCIUM 7.4 03/12/2023 04:02 AM    BILITOT 0.7 03/11/2023 03:00 PM    ALKPHOS 353 03/11/2023 06:38 AM    AST 34 03/11/2023 06:38 AM    ALT 69 03/11/2023 06:38 AM       POC Tests:   Recent Labs     03/12/23  0807   POCGLU 105       Coags:   Lab Results   Component Value Date/Time    PROTIME 15.7 03/11/2023 03:00 PM    INR 1.5 03/11/2023 03:00 PM    APTT 40.6 03/11/2023 03:00 PM       HCG (If Applicable): No results found for: PREGTESTUR, PREGSERUM, HCG, HCGQUANT     ABGs: No results found for: PHART, PO2ART, VZH2FSI, HSY5TYN, BEART, H4PSHLRW     Type & Screen (If Applicable):  No results found for: LABABO, LABRH    Drug/Infectious Status (If Applicable):  Lab Results   Component Value Date/Time    HEPCAB NONREACTIVE 06/18/2019 10:50 AM       COVID-19 Screening (If Applicable):   Lab Results   Component Value Date/Time    COVID19 Not Detected 03/08/2023 09:10 AM           Anesthesia Evaluation  Patient summary reviewed no history of anesthetic complications:   Airway: Mallampati: II     Neck ROM: full     Dental:    (+) edentulous      Pulmonary:Negative Pulmonary ROS and normal exam                              ROS comment: Was on vent in ICU, on admission 3/8  PE   Cardiovascular:Negative CV ROS              Rate: abnormal                 ROS comment: Global left ventricular systolic function is normal. Estimated LVEF 55-60%. Mild left ventricular hypertrophy. Grade I (mild) left ventricular diastolic dysfunction. Normal right ventricular size and function. No significant valvular regurgitation or stenosis seen. PE comment: Tachycardic, on pressors. Neuro/Psych:   (+) neuromuscular disease:,              ROS comment: S/p cervical fusion. Altered mental status GI/Hepatic/Renal:            ROS comment: Presented septic. .   Endo/Other: Negative Endo/Other ROS                     ROS comment: placed on argatroban for concern for HIT after noticing thrombocytopenia. Patient improved and HIT was negative, now transitioned to Lovenox Abdominal:             Vascular:   + PE. Other Findings:             Anesthesia Plan      general     ASA 3     (K+ 2.8 replacement in  Current orders)  Induction: intravenous.                             RITA Hallman - CRNA   3/12/2023

## 2023-03-12 NOTE — PROGRESS NOTES
Critical Care Team - Daily Progress Note      Date and time: 3/12/2023 9:27 AM  Patient's name:  Elmer Ledezma  Medical Record Number: [id number]  Patient's account/billing number: [de-identified]  Patient's YOB: 1950  Age: 75 y.o. Date of Admission: 3/8/2023  8:48 AM  Length of stay during current admission: 4      Primary Care Physician: Dwayne Dean MD  ICU Attending Physician: Dr. Kranthi Malone Status: Full Code    Reason for ICU admission:   Chief Complaint   Patient presents with    Altered Mental Status    Hypotension         SUBJECTIVE:   HISTORY OF PRESENT ILLNESS:  Elmer Ledezma is a 75 y.o. presented from extended care facility for altered mental status and hypotension. As per report her systolic BP was in the 25K. She has a recent history of cervical spine fusion on 01/18, follows up with neurosurgery. On arrival to the ER she had a GCS of 11, was febrile, tachycardic and hypotensive. She was also found to be hypoxic was placed on 4 L of oxygen. CT head showed no acute intracranial abnormality, mild cerebral atrophy with white matter microvascular disease. CT cervical spine did not reveal any CSF leak or abscess. CT chest abdomen and pelvis was suggestive of acute PE and subsegmental branches of the posterior basal right lower lobe, cholelithiasis with biliary ductal dilatation. Labs showed creatinine 1.4, lactic acid of 3.2, [place] of 6.62, , BNP of 4111, alk phos of 300, , , bilirubin 3.3, WBC count 3.6, UA positive for and positive nitrate. Neurosurgery was consulted from the ER, had a low suspicion for postop infection. GI was consulted, patient underwent ERCP with stone removal and stent placement. During the procedure she was hypotensive was started on Levophed. She remained intubated and sedated with propofol. After the procedure she was transferred to ICU.     3/9: extubated, d/c vanco    OVERNIGHT EVENTS: No significant overnight events. Patient hemodynamically stable no fever pulmonary status is stable no tachypnea gastric changes GERD patient does have pedal edema she has a history of coronary artery disease and recurrent heart failure. Patient have had a venous Doppler done which revealed clots in the right leg. He has a pulmonary embolism which has been documented radiologically. Labs are otherwise stable. Chest x-ray was reviewed from 2 days back she had a pulmonary congestion some (left-sided pleural effusion. Borderline heart. Patient is not having any tachypnea today and denies any respiratory distress she is symptoms significantly better than yesterday. She is on supplemental oxygen saturation is 96%.   AWAKE & FOLLOWING COMMANDS:  [] No   [x] Yes    CURRENT VENTILATION STATUS:     [] Ventilator  [] BIPAP  [] Nasal Cannula [x] Room Air      IF INTUBATED, ET TUBE MARKING AT LOWER LIP:       cms    SECRETIONS Amount:  [] Small [] Moderate  [] Large  [x] None  Color:     [] White [] Colored  [] Bloody    SEDATION:  RAAS Score:  [] Propofol gtt  [] Versed gtt  [] Ativan gtt   [x] No Sedation    PARALYZED:  [x] No    [] Yes    DIARRHEA:                [x] No                [] Yes  (C. Difficile status: [] positive                                                                                                                       [] negative                                                                                                                     [] pending)    VASOPRESSORS:  [x] No    [] Yes    If yes -   [] Levophed       [] Dopamine     [] Vasopressin       [] Dobutamine  [] Phenylephrine         [] Epinephrine    CENTRAL LINES:     [] No   [x] Yes   (Date of Insertion:   )           If yes -     [x] Right IJ     [] Left IJ [] Right Femoral [] Left Femoral                   [] Right Subclavian [] Left Subclavian       LEVY'S CATHETER:   [] No   [x] Yes  (Date of Insertion:   )     URINE OUTPUT:            [] Good   [x] Low [] Anuric      OBJECTIVE:     VITAL SIGNS:  BP (!) 140/126   Pulse 69   Temp 98 °F (36.7 °C) (Oral)   Resp 18   Wt 155 lb 10.3 oz (70.6 kg)   SpO2 99%   BMI 24.38 kg/m²   Tmax over 24 hours:  Temp (24hrs), Av °F (36.7 °C), Min:97.6 °F (36.4 °C), Max:98.5 °F (36.9 °C)      Patient Vitals for the past 8 hrs:   BP Temp Temp src Pulse Resp SpO2   23 0800 (!) 140/126 98 °F (36.7 °C) Oral 69 18 99 %   23 0353 (!) 119/58 98.5 °F (36.9 °C) Oral 62 19 98 %           Intake/Output Summary (Last 24 hours) at 3/12/2023 0927  Last data filed at 3/12/2023 0545  Gross per 24 hour   Intake 1770.07 ml   Output 1000 ml   Net 770.07 ml       Date 23 0000 - 23 2359   Shift 7035-9615 6969-0595 6726-8646 24 Hour Total   INTAKE   Shift Total(mL/kg)       OUTPUT   Urine(mL/kg/hr) 500(0.9)   500   Shift Total(mL/kg) 500(7.1)   500(7.1)   Weight (kg) 70.6 70.6 70.6 70.6       Wt Readings from Last 3 Encounters:   03/10/23 155 lb 10.3 oz (70.6 kg)   23 180 lb (81.6 kg)   23 180 lb (81.6 kg)     Body mass index is 24.38 kg/m². PHYSICAL EXAM:  Constitutional: Appears well, no distress  EENT: PERRLA, EOMI, sclera clear, anicteric, oropharynx clear, no lesions, neck supple with midline trachea. Neck: Supple, symmetrical, trachea midline, no adenopathy, thyroid symmetric, no jvd skin normal  Respiratory: clear to auscultation, no wheezes or rales and unlabored breathing. No intercostal tenderness  Cardiovascular: regular rate and rhythm, normal S1, S2, no murmur noted and 2+ pulses throughout  Abdomen: soft, nontender, nondistended, no masses or organomegaly  NEUROLOGIC: Awake, alert, oriented to name, place and time. Cranial nerves II-XII are grossly intact. Motor is 5 out of 5 bilaterally. Sensory is intact.   Extremities:  peripheral pulses normal, no pedal edema, no clubbing or cyanosis  SKIN: normal coloration and turgor    Any additional physical findings:      MEDICATIONS:  Scheduled Meds:   lidocaine 1 % injection  5 mL IntraDERmal Once    sodium chloride flush  5-40 mL IntraVENous 2 times per day    enoxaparin  1 mg/kg SubCUTAneous BID    traZODone  50 mg Oral QPM    [Held by provider] apixaban  5 mg Oral BID    cefTRIAXone (ROCEPHIN) IV  2,000 mg IntraVENous Q24H    sodium chloride flush  5-40 mL IntraVENous 2 times per day     Continuous Infusions:   sodium chloride      dextrose 5% in lactated ringers 50 mL/hr at 03/11/23 1633    dextrose      sodium chloride Stopped (03/10/23 1542)     PRN Meds:   potassium chloride, 40 mEq, PRN   Or  potassium alternative oral replacement, 40 mEq, PRN   Or  potassium chloride, 10 mEq, PRN  sodium chloride flush, 5-40 mL, PRN  sodium chloride, 25 mL, PRN  glucose, 4 tablet, PRN  dextrose bolus, 125 mL, PRN   Or  dextrose bolus, 250 mL, PRN  glucagon (rDNA), 1 mg, PRN  dextrose, , Continuous PRN  sodium chloride flush, 5-40 mL, PRN  sodium chloride, , PRN  ondansetron, 4 mg, Q8H PRN   Or  ondansetron, 4 mg, Q6H PRN  polyethylene glycol, 17 g, Daily PRN  acetaminophen, 650 mg, Q6H PRN   Or  acetaminophen, 650 mg, Q6H PRN      SUPPORT DEVICES: [] Ventilator [] BIPAP  [] Nasal Cannula [] Room Air          DATA:  Complete Blood Count:   Recent Labs     03/10/23  0542 03/11/23  0638 03/12/23  0402   WBC 4.8 7.3 3.9   HGB 9.8* 10.5* 9.9*   .7 103.7* 106.8*   PLT 72* 69* 82*   RBC 2.88* 2.97* 2.96*   HCT 29.3* 30.8* 31.6*   MCH 34.0* 35.4* 33.4   MCHC 33.4 34.1 31.3   RDW 16.1* 16.4* 16.2*   MPV 11.5 12.4 12.3          PT/INR:    Lab Results   Component Value Date/Time    PROTIME 15.7 03/11/2023 03:00 PM    INR 1.5 03/11/2023 03:00 PM     PTT:    Lab Results   Component Value Date/Time    APTT 40.6 03/11/2023 03:00 PM       Basal Metabolic Profile:   Recent Labs     03/10/23  0542 03/10/23  1159 03/11/23  0638 03/12/23  0402    143 143 140   K 3.3* 4.2 3.6* 2.8*   BUN 9  --  6* 3*   CREATININE 0.33*  --  0.28* 0.20*   * 115* 113* 110*   CO2 12* 19* 20 22        Magnesium:   Lab Results   Component Value Date/Time    MG 1.4 03/12/2023 04:02 AM    MG 1.6 03/10/2023 05:42 AM    MG 2.0 03/09/2023 03:55 AM     Phosphorus:   Lab Results   Component Value Date/Time    PHOS 3.4 01/19/2023 10:15 AM     S. Calcium:  Recent Labs     03/12/23  0402   CALCIUM 7.4*       S. Ionized Calcium:No results for input(s): IONCA in the last 72 hours. Urinalysis:   Lab Results   Component Value Date/Time    NITRU POSITIVE 03/08/2023 09:09 AM    COLORU Dark Yellow 03/08/2023 09:09 AM    PHUR 5.5 03/08/2023 09:09 AM    WBCUA 10 TO 20 03/08/2023 09:09 AM    RBCUA 2 TO 5 03/08/2023 09:09 AM    BACTERIA MANY 03/08/2023 09:09 AM    SPECGRAV 1.023 03/08/2023 09:09 AM    LEUKOCYTESUR SMALL 03/08/2023 09:09 AM    UROBILINOGEN ELEVATED 03/08/2023 09:09 AM    BILIRUBINUR LARGE 03/08/2023 09:09 AM    GLUCOSEU NEGATIVE 03/08/2023 09:09 AM    KETUA LARGE 03/08/2023 09:09 AM       CARDIAC ENZYMES: No results for input(s): CKMB, CKMBINDEX, TROPONINI in the last 72 hours. Invalid input(s): CKTOTAL;3  BNP: No results for input(s): BNP in the last 72 hours. LFTS  Recent Labs     03/10/23  0542 03/11/23  0638 03/11/23  1500   ALKPHOS 278* 353*  --    * 69*  --    AST 96* 34*  --    BILITOT 1.4* 0.7 0.7   BILIDIR 1.1* 0.5* 0.5*   LABALBU 2.2* 2.0*  --          AMYLASE/LIPASE/AMMONIA  No results for input(s): AMYLASE, LIPASE, AMMONIA in the last 72 hours. Last 3 Blood Glucose:   Recent Labs     03/10/23  0542 03/11/23  0638 03/12/23  0402   GLUCOSE 93 125* 127*        HgBA1c:    Lab Results   Component Value Date/Time    LABA1C 5.3 01/18/2023 06:37 PM         TSH:    Lab Results   Component Value Date/Time    TSH 3.86 01/18/2023 06:37 PM     ANEMIA STUDIES  No results for input(s): LABIRON, TIBC, FERRITIN, DKWKOGRF31, FOLATE, OCCULTBLD in the last 72 hours.             Cultures during this admission:     Blood cultures:                 [] None drawn      [] Negative             []  Positive (Details:  )  Urine Culture:                   [] None drawn      [] Negative             []  Positive (Details:  )  Sputum Culture:               [] None drawn       [] Negative             []  Positive (Details:  )   Endotracheal aspirate:     [] None drawn       [] Negative             []  Positive (Details:  )             Chest Xray (3/12/2023):    ASSESSMENT:     Principal Problem:    Septic shock ([address]  Active Problems:    Ascending cholangitis    Cholelithiasis    Gram negative septicemia ([address]    Choledocholithiasis    Acute respiratory failure with hypoxia (HCC)    Metabolic encephalopathy    Acute pulmonary embolism (HCC)    Altered mental status    Elevated brain natriuretic peptide (BNP) level  Resolved Problems:    * No resolved hospital problems. *        PLAN:       PLAN/MEDICAL DECISION MAKING:  Neurologic: History of central cord syndrome s/p cervical spine fusion. Neuro intact  Neuro checks per protocol  Weakness on the left side. Alert oriented follows commands. Cardiovascular: Septic shock  Hemodynamically stable  MAP goal > 65  Maintaining blood pressure off of Levophed. Pulmonary: Septic shock requiring intubation, acute subsegmental PE, acute hypoxic respiratory failure  Maintain oxygen sats >92%  Pulmonary toilet  S/p extubation. Currently maintaining saturation on room air. On heparin for acute PE    GI/Nutrition: Ascending cholangitis, cholelithiasis, choledocholithiasis  Ulcer Prophylaxis:  not indicated  Diet:ADULT DIET; Easy to Chew  Diet NPO Exceptions are: Sips of Water with Meds  Speech evaluation for aspiration. Will start on diet after speech evaluation. GI recommending surgery consult for cholecystectomy and a repeat ERCP in 2 months for stent removal.    Renal/Fluid/Electrolyte  IV Fluids: D5 [email or web address] mL/Hr   I/O: In: 1770.1 [P.O.:220; I.V.:1295]  Out: 1000 [Urine:1000]  UOP: 770 mL over the last 24 hours.   Monitor electrolytes, replace PRN     ID: E. coli bacteremia, ascending cholangitis, E. coli UTI. WBC:   Lab Results   Component Value Date    WBC 3.9 2023     Currently on Zosyn  Tmax: Temp (24hrs), Av °F (36.7 °C), Min:97.6 °F (36.4 °C), Max:98.5 °F (36.9 °C)  Cultures have been positive for pansensitive E. coli. Likely switch the antibiotics to ceftriaxone today. Hematology:  Recent Labs     03/10/23  0542 23  0638 23  0402   HGB 9.8* 10.5* 9.9*      stable  Platelet count 72 today. Endocrine:   glucose controlled - most recent BGL is   Recent Labs     03/10/23  0542 2338 23  0402   GLUCOSE 93 125* 127*       DVT Prophylaxis  Heparin  Discharge Needs:  PT, OT, and Case Management      CODE STATUS: Full Code  Patient is clinically improved. Sepsis seems to be improving we will continue present bronchodilator therapy. Cardiovascular status is stable. Patient has evidence of deep vein thrombosis and pulmonary embolism which is stable hemodynamically. She denies any chest pain. Did eat her breakfast.  No clinical DVT. No excessive cough or sputum production or tachypnea. Gastric changes GERD. Blood glucose is controlled the change of insulin doses noted. Hemoglobin has been stable. WBC is stable treated with a E. coli sepsis. He probably has a combination of septic shock as well as cardiovascular decompensation. We will continue present treatment dictated dictated by Dr. Kit Escalona MD, M.D.              Department of Internal Medicine/ Critical care  [facility])             3/12/2023, 9:27 AM  CC time 30 minutes

## 2023-03-13 ENCOUNTER — ANESTHESIA (OUTPATIENT)
Dept: OPERATING ROOM | Age: 73
DRG: 853 | End: 2023-03-13
Payer: MEDICARE

## 2023-03-13 ENCOUNTER — TELEPHONE (OUTPATIENT)
Dept: NEUROSURGERY | Age: 73
End: 2023-03-13

## 2023-03-13 PROBLEM — K81.9 CHOLECYSTITIS: Status: ACTIVE | Noted: 2023-03-13

## 2023-03-13 PROBLEM — A41.51 E. COLI SEPTICEMIA (HCC): Status: ACTIVE | Noted: 2023-03-13

## 2023-03-13 LAB
ABSOLUTE EOS #: 0 K/UL (ref 0–0.4)
ABSOLUTE IMMATURE GRANULOCYTE: 0 K/UL (ref 0–0.3)
ABSOLUTE LYMPH #: 0.99 K/UL (ref 1–4.8)
ABSOLUTE MONO #: 0.07 K/UL (ref 0.1–0.8)
ANION GAP SERPL CALCULATED.3IONS-SCNC: 9 MMOL/L (ref 9–17)
BASOPHILS # BLD: 0 % (ref 0–2)
BASOPHILS ABSOLUTE: 0 K/UL (ref 0–0.2)
BUN SERPL-MCNC: 3 MG/DL (ref 8–23)
CALCIUM SERPL-MCNC: 7.4 MG/DL (ref 8.6–10.4)
CHLORIDE SERPL-SCNC: 107 MMOL/L (ref 98–107)
CO2 SERPL-SCNC: 23 MMOL/L (ref 20–31)
CREAT SERPL-MCNC: 0.23 MG/DL (ref 0.5–0.9)
EKG ATRIAL RATE: 134 BPM
EKG P AXIS: 57 DEGREES
EKG P-R INTERVAL: 126 MS
EKG Q-T INTERVAL: 296 MS
EKG QRS DURATION: 74 MS
EKG QTC CALCULATION (BAZETT): 442 MS
EKG R AXIS: 21 DEGREES
EKG T AXIS: 94 DEGREES
EKG VENTRICULAR RATE: 134 BPM
EOSINOPHILS RELATIVE PERCENT: 0 % (ref 1–4)
GFR SERPL CREATININE-BSD FRML MDRD: >60 ML/MIN/1.73M2
GLUCOSE BLD-MCNC: 117 MG/DL (ref 65–105)
GLUCOSE BLD-MCNC: 139 MG/DL (ref 65–105)
GLUCOSE BLD-MCNC: 169 MG/DL (ref 65–105)
GLUCOSE BLD-MCNC: 206 MG/DL (ref 65–105)
GLUCOSE SERPL-MCNC: 120 MG/DL (ref 70–99)
HCT VFR BLD AUTO: 33.9 % (ref 36.3–47.1)
HGB BLD-MCNC: 10.4 G/DL (ref 11.9–15.1)
HOMOCYSTEINE: 91.6 UMOL/L
IMMATURE GRANULOCYTES: 0 %
LYMPHOCYTES # BLD: 29 % (ref 24–44)
MCH RBC QN AUTO: 34.1 PG (ref 25.2–33.5)
MCHC RBC AUTO-ENTMCNC: 30.7 G/DL (ref 28.4–34.8)
MCV RBC AUTO: 111.1 FL (ref 82.6–102.9)
MONOCYTES # BLD: 2 % (ref 1–7)
MORPHOLOGY: ABNORMAL
NRBC AUTOMATED: 0 PER 100 WBC
PDW BLD-RTO: 16.5 % (ref 11.8–14.4)
PLATELET # BLD AUTO: 93 K/UL (ref 138–453)
PMV BLD AUTO: 11.4 FL (ref 8.1–13.5)
POTASSIUM SERPL-SCNC: 3.8 MMOL/L (ref 3.7–5.3)
RBC # BLD: 3.05 M/UL (ref 3.95–5.11)
SEG NEUTROPHILS: 69 % (ref 36–66)
SEGMENTED NEUTROPHILS ABSOLUTE COUNT: 2.34 K/UL (ref 1.8–7.7)
SODIUM SERPL-SCNC: 139 MMOL/L (ref 135–144)
WBC # BLD AUTO: 3.4 K/UL (ref 3.5–11.3)

## 2023-03-13 PROCEDURE — 6370000000 HC RX 637 (ALT 250 FOR IP)

## 2023-03-13 PROCEDURE — 3700000001 HC ADD 15 MINUTES (ANESTHESIA): Performed by: SURGERY

## 2023-03-13 PROCEDURE — 6360000002 HC RX W HCPCS: Performed by: INTERNAL MEDICINE

## 2023-03-13 PROCEDURE — 6360000002 HC RX W HCPCS

## 2023-03-13 PROCEDURE — 83090 ASSAY OF HOMOCYSTEINE: CPT

## 2023-03-13 PROCEDURE — 2500000003 HC RX 250 WO HCPCS

## 2023-03-13 PROCEDURE — 80048 BASIC METABOLIC PNL TOTAL CA: CPT

## 2023-03-13 PROCEDURE — 85025 COMPLETE CBC W/AUTO DIFF WBC: CPT

## 2023-03-13 PROCEDURE — 3600000014 HC SURGERY LEVEL 4 ADDTL 15MIN: Performed by: SURGERY

## 2023-03-13 PROCEDURE — 36415 COLL VENOUS BLD VENIPUNCTURE: CPT

## 2023-03-13 PROCEDURE — 99232 SBSQ HOSP IP/OBS MODERATE 35: CPT | Performed by: INTERNAL MEDICINE

## 2023-03-13 PROCEDURE — 7100000000 HC PACU RECOVERY - FIRST 15 MIN: Performed by: SURGERY

## 2023-03-13 PROCEDURE — 6370000000 HC RX 637 (ALT 250 FOR IP): Performed by: INTERNAL MEDICINE

## 2023-03-13 PROCEDURE — 2060000000 HC ICU INTERMEDIATE R&B

## 2023-03-13 PROCEDURE — 2580000003 HC RX 258: Performed by: INTERNAL MEDICINE

## 2023-03-13 PROCEDURE — 88304 TISSUE EXAM BY PATHOLOGIST: CPT

## 2023-03-13 PROCEDURE — 2500000003 HC RX 250 WO HCPCS: Performed by: SURGERY

## 2023-03-13 PROCEDURE — 0FT44ZZ RESECTION OF GALLBLADDER, PERCUTANEOUS ENDOSCOPIC APPROACH: ICD-10-PCS | Performed by: SURGERY

## 2023-03-13 PROCEDURE — 7100000001 HC PACU RECOVERY - ADDTL 15 MIN: Performed by: SURGERY

## 2023-03-13 PROCEDURE — 2580000003 HC RX 258

## 2023-03-13 PROCEDURE — 3600000004 HC SURGERY LEVEL 4 BASE: Performed by: SURGERY

## 2023-03-13 PROCEDURE — 2720000010 HC SURG SUPPLY STERILE: Performed by: SURGERY

## 2023-03-13 PROCEDURE — 82947 ASSAY GLUCOSE BLOOD QUANT: CPT

## 2023-03-13 PROCEDURE — 3700000000 HC ANESTHESIA ATTENDED CARE: Performed by: SURGERY

## 2023-03-13 PROCEDURE — 2709999900 HC NON-CHARGEABLE SUPPLY: Performed by: SURGERY

## 2023-03-13 PROCEDURE — 83921 ORGANIC ACID SINGLE QUANT: CPT

## 2023-03-13 RX ORDER — SODIUM CHLORIDE 9 MG/ML
INJECTION, SOLUTION INTRAVENOUS PRN
Status: DISCONTINUED | OUTPATIENT
Start: 2023-03-13 | End: 2023-03-13 | Stop reason: HOSPADM

## 2023-03-13 RX ORDER — PROPOFOL 10 MG/ML
INJECTION, EMULSION INTRAVENOUS PRN
Status: DISCONTINUED | OUTPATIENT
Start: 2023-03-13 | End: 2023-03-13 | Stop reason: SDUPTHER

## 2023-03-13 RX ORDER — BUPIVACAINE HYDROCHLORIDE AND EPINEPHRINE 5; 5 MG/ML; UG/ML
INJECTION, SOLUTION EPIDURAL; INTRACAUDAL; PERINEURAL PRN
Status: DISCONTINUED | OUTPATIENT
Start: 2023-03-13 | End: 2023-03-13 | Stop reason: HOSPADM

## 2023-03-13 RX ORDER — ROCURONIUM BROMIDE 10 MG/ML
INJECTION, SOLUTION INTRAVENOUS PRN
Status: DISCONTINUED | OUTPATIENT
Start: 2023-03-13 | End: 2023-03-13 | Stop reason: SDUPTHER

## 2023-03-13 RX ORDER — ACETAMINOPHEN 500 MG
1000 TABLET ORAL EVERY 8 HOURS SCHEDULED
Status: DISCONTINUED | OUTPATIENT
Start: 2023-03-13 | End: 2023-03-16 | Stop reason: HOSPADM

## 2023-03-13 RX ORDER — SODIUM CHLORIDE, SODIUM LACTATE, POTASSIUM CHLORIDE, CALCIUM CHLORIDE 600; 310; 30; 20 MG/100ML; MG/100ML; MG/100ML; MG/100ML
INJECTION, SOLUTION INTRAVENOUS CONTINUOUS PRN
Status: DISCONTINUED | OUTPATIENT
Start: 2023-03-13 | End: 2023-03-13 | Stop reason: SDUPTHER

## 2023-03-13 RX ORDER — GABAPENTIN 100 MG/1
200 CAPSULE ORAL 3 TIMES DAILY
Status: DISCONTINUED | OUTPATIENT
Start: 2023-03-13 | End: 2023-03-16 | Stop reason: HOSPADM

## 2023-03-13 RX ORDER — FENTANYL CITRATE 50 UG/ML
50 INJECTION, SOLUTION INTRAMUSCULAR; INTRAVENOUS EVERY 5 MIN PRN
Status: DISCONTINUED | OUTPATIENT
Start: 2023-03-13 | End: 2023-03-13 | Stop reason: HOSPADM

## 2023-03-13 RX ORDER — FOLIC ACID 1 MG/1
1 TABLET ORAL DAILY
Status: DISCONTINUED | OUTPATIENT
Start: 2023-03-13 | End: 2023-03-16 | Stop reason: HOSPADM

## 2023-03-13 RX ORDER — ONDANSETRON 2 MG/ML
INJECTION INTRAMUSCULAR; INTRAVENOUS PRN
Status: DISCONTINUED | OUTPATIENT
Start: 2023-03-13 | End: 2023-03-13 | Stop reason: SDUPTHER

## 2023-03-13 RX ORDER — FENTANYL CITRATE 50 UG/ML
25 INJECTION, SOLUTION INTRAMUSCULAR; INTRAVENOUS
Status: DISCONTINUED | OUTPATIENT
Start: 2023-03-13 | End: 2023-03-14

## 2023-03-13 RX ORDER — DEXAMETHASONE SODIUM PHOSPHATE 10 MG/ML
INJECTION INTRAMUSCULAR; INTRAVENOUS PRN
Status: DISCONTINUED | OUTPATIENT
Start: 2023-03-13 | End: 2023-03-13 | Stop reason: SDUPTHER

## 2023-03-13 RX ORDER — FENTANYL CITRATE 50 UG/ML
25 INJECTION, SOLUTION INTRAMUSCULAR; INTRAVENOUS EVERY 5 MIN PRN
Status: DISCONTINUED | OUTPATIENT
Start: 2023-03-13 | End: 2023-03-13 | Stop reason: HOSPADM

## 2023-03-13 RX ORDER — OXYCODONE HYDROCHLORIDE 5 MG/1
5 TABLET ORAL EVERY 6 HOURS PRN
Status: DISCONTINUED | OUTPATIENT
Start: 2023-03-13 | End: 2023-03-14

## 2023-03-13 RX ORDER — TRAZODONE HYDROCHLORIDE 50 MG/1
50 TABLET ORAL NIGHTLY
Status: DISCONTINUED | OUTPATIENT
Start: 2023-03-13 | End: 2023-03-16 | Stop reason: HOSPADM

## 2023-03-13 RX ORDER — LIDOCAINE HYDROCHLORIDE 10 MG/ML
INJECTION, SOLUTION EPIDURAL; INFILTRATION; INTRACAUDAL; PERINEURAL PRN
Status: DISCONTINUED | OUTPATIENT
Start: 2023-03-13 | End: 2023-03-13 | Stop reason: SDUPTHER

## 2023-03-13 RX ORDER — SODIUM CHLORIDE 0.9 % (FLUSH) 0.9 %
5-40 SYRINGE (ML) INJECTION EVERY 12 HOURS SCHEDULED
Status: DISCONTINUED | OUTPATIENT
Start: 2023-03-13 | End: 2023-03-13 | Stop reason: HOSPADM

## 2023-03-13 RX ORDER — SODIUM CHLORIDE 0.9 % (FLUSH) 0.9 %
5-40 SYRINGE (ML) INJECTION PRN
Status: DISCONTINUED | OUTPATIENT
Start: 2023-03-13 | End: 2023-03-13 | Stop reason: HOSPADM

## 2023-03-13 RX ORDER — FENTANYL CITRATE 50 UG/ML
INJECTION, SOLUTION INTRAMUSCULAR; INTRAVENOUS PRN
Status: DISCONTINUED | OUTPATIENT
Start: 2023-03-13 | End: 2023-03-13 | Stop reason: SDUPTHER

## 2023-03-13 RX ORDER — CYANOCOBALAMIN 1000 UG/ML
1000 INJECTION, SOLUTION INTRAMUSCULAR; SUBCUTANEOUS WEEKLY
Status: DISCONTINUED | OUTPATIENT
Start: 2023-03-13 | End: 2023-03-14

## 2023-03-13 RX ADMIN — FENTANYL CITRATE 50 MCG: 50 INJECTION, SOLUTION INTRAMUSCULAR; INTRAVENOUS at 14:28

## 2023-03-13 RX ADMIN — CYANOCOBALAMIN 1000 MCG: 1000 INJECTION, SOLUTION INTRAMUSCULAR at 11:58

## 2023-03-13 RX ADMIN — PIPERACILLIN AND TAZOBACTAM 3375 MG: 3; .375 INJECTION, POWDER, FOR SOLUTION INTRAVENOUS at 19:38

## 2023-03-13 RX ADMIN — FENTANYL CITRATE 50 MCG: 50 INJECTION, SOLUTION INTRAMUSCULAR; INTRAVENOUS at 14:38

## 2023-03-13 RX ADMIN — PHENYLEPHRINE HYDROCHLORIDE 100 MCG: 10 INJECTION INTRAVENOUS at 14:55

## 2023-03-13 RX ADMIN — ONDANSETRON 4 MG: 2 INJECTION INTRAMUSCULAR; INTRAVENOUS at 14:59

## 2023-03-13 RX ADMIN — DEXAMETHASONE SODIUM PHOSPHATE 4 MG: 10 INJECTION INTRAMUSCULAR; INTRAVENOUS at 14:24

## 2023-03-13 RX ADMIN — SODIUM CHLORIDE, POTASSIUM CHLORIDE, SODIUM LACTATE AND CALCIUM CHLORIDE: 600; 310; 30; 20 INJECTION, SOLUTION INTRAVENOUS at 14:05

## 2023-03-13 RX ADMIN — PHENYLEPHRINE HYDROCHLORIDE 100 MCG: 10 INJECTION INTRAVENOUS at 15:04

## 2023-03-13 RX ADMIN — POTASSIUM CHLORIDE 20 MEQ: 1500 TABLET, EXTENDED RELEASE ORAL at 09:17

## 2023-03-13 RX ADMIN — POTASSIUM CHLORIDE 20 MEQ: 1500 TABLET, EXTENDED RELEASE ORAL at 20:20

## 2023-03-13 RX ADMIN — OXYCODONE HYDROCHLORIDE 5 MG: 5 TABLET ORAL at 18:49

## 2023-03-13 RX ADMIN — LIDOCAINE HYDROCHLORIDE 50 MG: 10 INJECTION, SOLUTION EPIDURAL; INFILTRATION; INTRACAUDAL; PERINEURAL at 14:11

## 2023-03-13 RX ADMIN — ROCURONIUM BROMIDE 50 MG: 10 INJECTION, SOLUTION INTRAVENOUS at 14:11

## 2023-03-13 RX ADMIN — SODIUM CHLORIDE, PRESERVATIVE FREE 10 ML: 5 INJECTION INTRAVENOUS at 09:09

## 2023-03-13 RX ADMIN — PROPOFOL 100 MG: 10 INJECTION, EMULSION INTRAVENOUS at 14:11

## 2023-03-13 RX ADMIN — SUGAMMADEX 200 MG: 100 INJECTION, SOLUTION INTRAVENOUS at 15:13

## 2023-03-13 RX ADMIN — FENTANYL CITRATE 50 MCG: 50 INJECTION, SOLUTION INTRAMUSCULAR; INTRAVENOUS at 14:11

## 2023-03-13 RX ADMIN — PHENYLEPHRINE HYDROCHLORIDE 100 MCG: 10 INJECTION INTRAVENOUS at 14:24

## 2023-03-13 RX ADMIN — FENTANYL CITRATE 50 MCG: 50 INJECTION, SOLUTION INTRAMUSCULAR; INTRAVENOUS at 14:34

## 2023-03-13 RX ADMIN — PIPERACILLIN AND TAZOBACTAM 3375 MG: 3; .375 INJECTION, POWDER, FOR SOLUTION INTRAVENOUS at 04:41

## 2023-03-13 RX ADMIN — ROCURONIUM BROMIDE 10 MG: 10 INJECTION, SOLUTION INTRAVENOUS at 14:45

## 2023-03-13 RX ADMIN — ACETAMINOPHEN 1000 MG: 500 TABLET ORAL at 20:20

## 2023-03-13 RX ADMIN — TRAZODONE HYDROCHLORIDE 50 MG: 50 TABLET ORAL at 20:20

## 2023-03-13 RX ADMIN — PHENYLEPHRINE HYDROCHLORIDE 100 MCG: 10 INJECTION INTRAVENOUS at 14:43

## 2023-03-13 RX ADMIN — PHENYLEPHRINE HYDROCHLORIDE 100 MCG: 10 INJECTION INTRAVENOUS at 14:49

## 2023-03-13 RX ADMIN — FENTANYL CITRATE 25 MCG: 50 INJECTION, SOLUTION INTRAMUSCULAR; INTRAVENOUS at 17:12

## 2023-03-13 RX ADMIN — GABAPENTIN 200 MG: 100 CAPSULE ORAL at 20:20

## 2023-03-13 RX ADMIN — PIPERACILLIN AND TAZOBACTAM 3375 MG: 3; .375 INJECTION, POWDER, FOR SOLUTION INTRAVENOUS at 11:54

## 2023-03-13 RX ADMIN — SODIUM CHLORIDE, POTASSIUM CHLORIDE, SODIUM LACTATE AND CALCIUM CHLORIDE: 600; 310; 30; 20 INJECTION, SOLUTION INTRAVENOUS at 15:05

## 2023-03-13 RX ADMIN — PHENYLEPHRINE HYDROCHLORIDE 100 MCG: 10 INJECTION INTRAVENOUS at 15:10

## 2023-03-13 ASSESSMENT — PAIN DESCRIPTION - FREQUENCY: FREQUENCY: CONTINUOUS

## 2023-03-13 ASSESSMENT — ENCOUNTER SYMPTOMS
APNEA: 0
ABDOMINAL PAIN: 1
COLOR CHANGE: 0
SHORTNESS OF BREATH: 0
EYE DISCHARGE: 0

## 2023-03-13 ASSESSMENT — PAIN SCALES - GENERAL
PAINLEVEL_OUTOF10: 9
PAINLEVEL_OUTOF10: 10
PAINLEVEL_OUTOF10: 10

## 2023-03-13 ASSESSMENT — PAIN DESCRIPTION - ONSET: ONSET: ON-GOING

## 2023-03-13 ASSESSMENT — PAIN DESCRIPTION - LOCATION
LOCATION: ABDOMEN

## 2023-03-13 ASSESSMENT — PAIN DESCRIPTION - DESCRIPTORS
DESCRIPTORS: ACHING
DESCRIPTORS: ACHING

## 2023-03-13 ASSESSMENT — PAIN DESCRIPTION - PAIN TYPE: TYPE: SURGICAL PAIN

## 2023-03-13 ASSESSMENT — PAIN - FUNCTIONAL ASSESSMENT
PAIN_FUNCTIONAL_ASSESSMENT: PREVENTS OR INTERFERES SOME ACTIVE ACTIVITIES AND ADLS

## 2023-03-13 NOTE — CONSULTS
[facility] -   Infectious diseases evaluation  admission date 3/8/2023    reason for consultation:   AB management    Impression :   Current:    Altered mentation  Hypotension   Septic shock  E. coli septicemia  Acute Resp failure intubated   Obstructive Ascending cholangitis - post ERCP 3/8  Cholecystitis  UTI complicated   HIT -     Other:  History of cervical laminectomy in the past  Discussion / summary of stay / plan of care     Recommendations   Ascending cholangitis post ERCP  Plan for cholecystectomy on 3/13  E. coli septicemia  Likely related to the cholangitis  UTI/pyelonephritis, with E. coli and Aerococcus,  Difficult to rule out pyelonephritis since E. coli was found in the urine and the blood as well  Antibiotics  Zosyn x 3 days then ceftriaxone x 1 days and back to zosyn 3/12 aftr a low grade fever  Agree w zosyn -till GB resected and fluid cx taken  Will then downsize AB  Due to bacteremia and cholangitis, would need 14 days AB in total   Infection Control Recommendations   Roosevelt Precautions  Contact Isolation   Airborne isolation  Droplet Isolation  Ca discontinuation  Central line discontinuation    Antimicrobial Stewardship Recommendations   Simplification of therapy  Targeted therapy  IV to oral conversion  PK dosing  Restricted antimicrobial use  Discontinuation of therapy    History of Present Illness:   Initial history:  Kristie Hogan is a 73 y.o.-year-old female admitted with altered mentation, septic with respiratory failure and Intubated, blood cultures grew E. coli as well as the urine that grew E. coli and Aerococcus.  In the meantime CT scan of the abdomen showed an obstructive ascending cholangitis and had a ERCP.  Ultimately she was extubated and is planned for a cholecystectomy laparoscopically on 3/13  She is treated with ceftriaxone for the E. coli that is felt to be from the common bile duct  Of note is the E. coli was also  growing in the urine, and a CT of the abdomen did not suggest any pyelonephritis or any ureteral obstruction. Hence difficult to tell if there was a pyelonephritis also inflamed to this bacteremia. Infectious disease consulted for antibiotic management    Interval changes  3/12/2023   Patient Vitals for the past 8 hrs:   BP Temp Temp src Pulse Resp SpO2   03/12/23 1932 127/78 97.2 °F (36.2 °C) Temporal 81 23 97 %   03/12/23 1600 112/60 99.1 °F (37.3 °C) Oral 66 21 96 %       Summary of relevant labs:  Labs:  Creatinine0.20 Low    WBC3.9     Micro:    Imaging:  CTAP  3/8  . Small thrombus load acute pulmonary embolism. A few filling defects in subsegmental branches posterior basal right lower lobe. 2. Moderate-size posterior basal left lower lobe patchy consolidation and much smaller posterior basal right lower lobe patchy consolidation compatible with atelectasis. Abdomen pelvis:   1. Cholelithiasis without evidence for acute cholecystitis. 2. Intra and extrahepatic biliary ductal dilatation. Diffuse dilatation of CBD up to 14 mm diameter down to the ampulla. There could be non radiopaque calculus, stricture or periampullary lesion causing the obstruction. Given that this has developed since the prior study possible non radiopaque distal obstructing choledocholithiasis is most favored. MRCP or ERCP is recommended. 3. Probable endometrial  thickening up to 9 mm thickness. Correlation with non emergency outpatient pelvic ultrasound is recommended. I have personally reviewed the past medical history, past surgical history, medications, social history, and family history, and I haveupdated the database accordingly. Allergies:   Patient has no known allergies. Review of Systems:     Review of Systems   Constitutional:  Positive for activity change. HENT:  Negative for congestion. Eyes:  Negative for discharge. Respiratory:  Negative for apnea. Cardiovascular:  Negative for chest pain. Gastrointestinal:  Positive for abdominal pain. Endocrine: Negative for cold intolerance. Genitourinary:  Negative for dysuria. Musculoskeletal:  Negative for arthralgias. Skin:  Negative for color change. Allergic/Immunologic: Negative for immunocompromised state. Neurological:  Negative for dizziness. Psychiatric/Behavioral:  Negative for agitation. Physical Examination :       Physical Exam  Constitutional:       Appearance: Normal appearance. She is not ill-appearing. HENT:      Head: Normocephalic and atraumatic. Nose: Nose normal.      Mouth/Throat:      Mouth: Mucous membranes are moist.   Eyes:      General: No scleral icterus. Conjunctiva/sclera: Conjunctivae normal.   Cardiovascular:      Rate and Rhythm: Normal rate and regular rhythm. Heart sounds: Normal heart sounds. No murmur heard. Pulmonary:      Breath sounds: Normal breath sounds. No stridor. Abdominal:      Palpations: There is no mass. Hernia: No hernia is present. Genitourinary:     Comments: No trejo  Musculoskeletal:         General: No swelling or deformity. Cervical back: Neck supple. No rigidity. Skin:     General: Skin is dry. Coloration: Skin is not jaundiced. Neurological:      General: No focal deficit present. Mental Status: She is alert and oriented to person, place, and time. Psychiatric:         Mood and Affect: Mood normal.         Thought Content:  Thought content normal.       Past Medical History:     Past Medical History:   Diagnosis Date    Psoriasis        Past Surgical  History:     Past Surgical History:   Procedure Laterality Date    CERVICAL FUSION      POSTERIOR CERVICAL DECOMPRESSION AND FUSION C3-7    CERVICAL FUSION N/A 01/18/2023    C 3-7 POSTERIOR CERVICAL DECOMPRESSION AND FUSION performed by Irina Starks DO at [facility] 668    ERCP  03/08/2023    ERCP STONE REMOVAL, ERCP SPHINCTER/PAPILLOTOMY,    ERCP STENT INSERTION    ERCP  3/8/2023    ERCP STONE REMOVAL performed by Gentry Herrera MD at [place] [address]    ERCP  3/8/2023    ERCP SPHINCTER/PAPILLOTOMY performed by Gentry Herrera MD at [place] [address]    ERCP  3/8/2023    ERCP STENT INSERTION performed by Gentry Herrera MD at [place] [address]       Medications:      potassium chloride  20 mEq Oral BID WC    magnesium sulfate  4,000 mg IntraVENous Once    piperacillin-tazobactam  3,375 mg IntraVENous Q8H    lidocaine 1 % injection  5 mL IntraDERmal Once    sodium chloride flush  5-40 mL IntraVENous 2 times per day    [Held by provider] enoxaparin  1 mg/kg SubCUTAneous BID    traZODone  50 mg Oral QPM    [Held by provider] apixaban  5 mg Oral BID    sodium chloride flush  5-40 mL IntraVENous 2 times per day       Social History:     Social History     Socioeconomic History    Marital status: Single     Spouse name: Not on file    Number of children: Not on file    Years of education: Not on file    Highest education level: Not on file   Occupational History    Not on file   Tobacco Use    Smoking status: Former     Types: Cigarettes     Quit date: 2015     Years since quittin.1    Smokeless tobacco: Never   Vaping Use    Vaping Use: Never used   Substance and Sexual Activity    Alcohol use: Never    Drug use: Never    Sexual activity: Not on file   Other Topics Concern    Not on file   Social History Narrative    ** Merged History Encounter **          Social Determinants of Health     Financial Resource Strain: Not on file   Food Insecurity: Not on file   Transportation Needs: Not on file   Physical Activity: Not on file   Stress: Not on file   Social Connections: Not on file   Intimate Partner Violence: Not on file   Housing Stability: Not on file       Family History:   History reviewed. No pertinent family history.    Medical Decision Making:   I have independently reviewed/ordered the following labs:    CBC with Differential:   Recent Labs     23  0638 23  0402   WBC 7.3 3.9   HGB 10.5* 9.9*   HCT 30.8* 31.6* PLT 69* 82*   LYMPHOPCT 19* 25   MONOPCT 2 2     BMP:  Recent Labs     03/10/23  0542 03/10/23  1159 03/11/23  0638 03/12/23  0402      < > 143 140   K 3.3*   < > 3.6* 2.8*   *   < > 113* 110*   CO2 12*   < > 20 22   BUN 9  --  6* 3*   CREATININE 0.33*  --  0.28* 0.20*   MG 1.6  --   --  1.4*    < > = values in this interval not displayed. Hepatic Function Panel:   Recent Labs     03/10/23  0542 03/11/23  0638 03/11/23  1500   PROT 4.9* 5.1*  --    LABALBU 2.2* 2.0*  --    BILIDIR 1.1* 0.5* 0.5*   IBILI 0.3 0.2  --    BILITOT 1.4* 0.7 0.7   ALKPHOS 278* 353*  --    * 69*  --    AST 96* 34*  --      No results for input(s): RPR in the last 72 hours. No results for input(s): HIV in the last 72 hours. No results for input(s): BC in the last 72 hours. Lab Results   Component Value Date/Time    CREATININE 0.20 03/12/2023 04:02 AM    GLUCOSE 127 03/12/2023 04:02 AM       Detailed results: Thank you for allowing us to participate in the care of this patient. Please call with questions. This note is created with the assistance of a speech recognition program.  While intending to generate adocument that actually reflects the content of the visit, the document can still have some errors including those of syntax and sound a like substitutions which may escape proof reading. It such instances, actual meaningcan be extrapolated by contextual diversion.     Yoanna Rivera MD  Office: [phone]  Perfect serve / office [phone]

## 2023-03-13 NOTE — OP NOTE
Operative Note      Patient: Eduardo Taylor  YOB: 1950  MRN: [id number]    Date of Procedure: 3/13/2023    Pre-Op Diagnosis: CHOLANGITIS    Post-Op Diagnosis: Same       Procedure(s):  LAPAROSCOPIC CHOLECYSTECTOMY,  POSSIBLE OPEN    Surgeon(s):  Darcy Link DO    Assistant:   Resident: Luigi Rivera DO; Arvind Hermosillo DO    Anesthesia: General, 0.5% Marcaine with epinephrine local infiltration    Estimated Blood Loss (mL): 15    Complications: None    Specimens:   ID Type Source Tests Collected by Time Destination   A : gallbladder Tissue Gallbladder SURGICAL PATHOLOGY Darcy Link DO 3/13/2023 1347        Implants:  * No implants in log *      Drains:   External Urinary Catheter (Active)   Site Assessment Clean,dry & intact 03/13/23 1200   Placement Replaced 03/13/23 0800   Catheter Care Catheter/Wick replaced;Suction Canister/Tubing changed 03/13/23 0800   Perineal Care Yes 03/13/23 0800   Suction 40 mmgHg continuous 03/13/23 1200   Urine Color Yellow 03/13/23 1200   Urine Appearance Clear 03/13/23 1200   Output (mL) 400 mL 03/13/23 0800       [REMOVED] NG/OG/NJ/NE Tube Orogastric 16 fr Center mouth (Removed)   Surrounding Skin Clean, dry & intact 03/09/23 0845   Securement device Tape 03/09/23 0845   Status Suction-low intermittent 03/09/23 0845   Placement Verified External Catheter Length 03/09/23 0845   NG/OG/NJ/NE External Measurement (cm) 61 cm 03/09/23 0845   Drainage Appearance Bile 03/09/23 1246   Output (mL) 100 ml 03/09/23 1246       [REMOVED] Urinary Catheter 03/08/23 Ca-Temperature (Removed)   Catheter Indications Need for fluid volume management of the critically ill patient in a critical care setting 03/10/23 0400   Site Assessment No urethral drainage 03/10/23 0400   Urine Color Edwina 03/10/23 0400   Urine Appearance Clear 03/10/23 0400   Collection Container Standard 03/10/23 0400   Securement Method Leg strap 03/10/23 0400   Catheter Care  Soap and water 03/10/23 0000   Catheter Best Practices  Drainage tube clipped to bed;Catheter secured to thigh;Tamper seal intact;Bag below bladder;Bag not on floor;Lack of dependent loop in tubing;Drainage bag less than half full 03/10/23 0400   Status Draining 03/10/23 0400   Output (mL) 43 mL 03/10/23 0736   Discontinuation Reason Per nurse-driven protocol 03/10/23 0736       Findings: cholecystitis, cholelithiasis    History: Patient is a 73 year old female who presented with hypotension and ascending cholangitis and underwent ERCP with removal of stone, sphincterotomy and stent placement with clinical recovery. Decision was made for laparoscopic cholecystectomy after patient was adequately resuscitated and off pressors. This procedure, including risks, benefits, alternatives and complications have been fully reviewed with the patient and informed consent was obtained.  Risks discussed include infection, bleeding, injury to surrounding structures, need for more procedures, chronic pain, scarring, risks of anesthesia, including myocardial infarction, stroke, fatal arrhythmias and other cardiopulmonary complications.  Patient understands and all questions were answered appropriately.     Detailed description of procedure: Patient was brought into the operating room and placed in a supine position on the OR table. General endotracheal anesthesia was then administered and tolerated well. Patient was already received zosyn for antibiotics. A time out was performed to confirm the patient, procedure, allergies and all other concerns. After the induction, the abdomen was prepped in the usual sterile fashion. The patient was positioned in the supine position with the left arm comfortably tucked, along with some reverse Trendelenburg.    Small incision was made infraumbilical region for 5 mm port.  Veress needle was introduced and pneumoperitoneum was then created with CO2 at 15 mmHg. 5mm port was placed under Optiview technique.  No bowel  injuries/bleeding was noted once the camera was inserted. Additional trocars were introduced under direct vision, 12 mm port in subxiphoid followed by two 5mm ports in right subcostal regions. All skin incisions were infiltrated with a local anesthetic agent before making the incision and placing the trocars. The gallbladder was identified, the fundus grasped and retracted cephalad. Adhesions were lysed bluntly and with the electrocautery where indicated, taking care not to injure any adjacent organs or viscus. The infundibulum was grasped and retracted laterally, exposing the peritoneum overlying the triangle of Calot. This was then divided and exposed in a blunt fashion. The cystic duct was clearly identified and bluntly dissected circumferentially. The junctions of the gallbladder, cystic duct and common bile duct were clearly identified prior to the division of any linear structure and critical view was obtained. The cystic duct was then doubly ligated with surgical clips and was divided using laparoscopic scissors. The cystic artery was identified, dissected free, ligated with one clip and divided proximal to clip using electrocautery. The gallbladder was dissected from the liver bed in retrograde fashion with the electrocautery. The gallbladder was removed. The liver bed was irrigated and inspected. Hemostasis was achieved with the electrocautery. Surgicell powder was placed into the liver bed. 0 Vicryl was used to close the fascia on 11 mm port site using Granee needle technique under direct vision. Wounds were anesthetized using half percent Marcaine with epinephrine. Pneumoperitoneum was completely reduced after viewing removal of the trocars under direct vision. Skin was closed using 4-0 Monocryl. Steri-strips were applied. Instrument, sponge, and needle counts were correct at closure and at the conclusion of the case.  Patient was extubated with difficulty and transferred to PACU in stable condition    Dr. Tana Cisneros was present for the entirety of case. Electronically signed by Shin Nicole DO on 3/13/2023 at 3:18 PM  I attest I was present throughout the operation and agree with the description of findings and procedure as dictated above.   Vibha Wallace MD

## 2023-03-13 NOTE — PROGRESS NOTES
POST-OPERATIVE PROGRESS NOTE    Patient: Anuja Close    DATE: 3/13/2023    Surgery: Laparoscopic cholecystectomy    Subjective:   Pt states that she is doing well. Patient's pain is well controlled on the current pain regimen. Tolerating clinical diet. Denies any nausea or vomiting. Patient's only complaint is soreness associated with the incisions, which is expected. Voiding spontaneously. Objective:  Vital signs and Nurse's note reviewed  Post-op vital signs: stable    BP (!) 143/73   Pulse 68   Temp 97.5 °F (36.4 °C) (Oral)   Resp 18   Wt 155 lb 10.3 oz (70.6 kg)   SpO2 99%   BMI 24.38 kg/m²    Gen:  A&Ox3, NAD  CV: RRR, no m/h/t  Resp: LCTAB, no wheeze or rhonchi  Abd:  Soft, minimally tender to palpation around incisions, nd, wounds clean, dry and intact; no erythema, bleeding, or drainage  Ext:  Warm, no cyanosis or edema    Assessment:   POD # 0 S/P laparoscopic cholecystectomy  Recovering well post-op     Plan:    Continue current care  Pain and nausea control as needed. Diet: Clear liquid diet, advance as tolerated  Increase activity as tolerated. Wound care: Leave Steri-Strips in place until they fall off.     Electronically signed by Primo Chinchilla DO  on 3/13/2023 at 7:54 PM

## 2023-03-13 NOTE — PROGRESS NOTES
[facility]  Office: [address], DO, Isaura Lulu, DO, Precious Aaron, DO, Marina Antonio Hooper, DO, Shama Vaz MD, Thomas Calvillo MD, Brett Yan MD, Philippe Gaston MD,  Keyona Bonilla MD, Toney Kerns MD, Terry Thompson, DO, Mook Rosenberg MD,  Davide Chin MD, Jessica Spence MD, Inocente Soulier, DO, Shawn Birmingham MD, Cheyenne Zuleta MD, Taryn Azevedo DO, Tereso Sullivan MD, Malick Caballero MD, Alvarez Corona MD, Mina Wong MD, Lopez Dominguez MD, Mohit Benson, DO, Salima Ocasio MD, Abelardo Basurto MD, Chao Emerson CNP,  John Saxena CNP, Jt Colmenares, CNP, Berlin Bello, CNP,  Edith Trevino, [facility], Raine Perrin, CNP, Anibal Alvarado, CNP, Tyler Peters, CNP, Ascencion Martinez, CNP, Jeff Diaz, CNP, Cesar Keys PA-C, Etta Woodard, [facility], Efren Graff, CNP, [facility], [address] [place] [address]    Progress Note    3/13/2023    10:12 AM    Name:   Evelyn Juarez  MRN:     [id number]     Acct:      [de-identified]   Room:   [address] [place] [address] Day:  5  Admit Date:  3/8/2023  8:48 AM    PCP:   Evan Weber MD  Code Status:  Full Code    Subjective:     C/C:   Chief Complaint   Patient presents with    Altered Mental Status    Hypotension     Interval History Status: improved. Patient awake and alert today, no issues overnight, surgery tomorrow    Brief History:     65-year-old female presents to the emergency department for altered mental status and hypotension. Patient initially was sent to ICU for septic shock and acute respiratory failure. Patient was intubated and remained in the ICU for subsequent days. Eventually she recovered after undergoing ERCP for ascending cholangitis/choledocholithiasis. Patient was seen by general surgery, gastroenterology. Patient has since also been placed on argatroban for concern for HIT after noticing thrombocytopenia.   Patient improved and HIT was negative, now transitioned to Lovenox    Review of Systems:     Constitutional:  negative for chills, fevers, sweats  Respiratory:  negative for cough, dyspnea on exertion, shortness of breath, wheezing  Cardiovascular:  negative for chest pain, chest pressure/discomfort, lower extremity edema, palpitations  Gastrointestinal:  negative for abdominal pain, constipation, diarrhea, nausea, vomiting  Neurological:  negative for dizziness, headache    Medications: Allergies:  No Known Allergies    Current Meds:   Scheduled Meds:    cyanocobalamin  1,000 mcg IntraMUSCular Weekly    potassium chloride  20 mEq Oral BID WC    piperacillin-tazobactam  3,375 mg IntraVENous Q8H    lidocaine 1 % injection  5 mL IntraDERmal Once    sodium chloride flush  5-40 mL IntraVENous 2 times per day    [Held by provider] enoxaparin  1 mg/kg SubCUTAneous BID    traZODone  50 mg Oral QPM    [Held by provider] apixaban  5 mg Oral BID    sodium chloride flush  5-40 mL IntraVENous 2 times per day     Continuous Infusions:    sodium chloride      dextrose 5% in lactated ringers 50 mL/hr at 03/12/23 2312    dextrose      sodium chloride Stopped (03/10/23 1542)     PRN Meds: potassium chloride **OR** potassium alternative oral replacement **OR** potassium chloride, sodium chloride flush, sodium chloride, glucose, dextrose bolus **OR** dextrose bolus, glucagon (rDNA), dextrose, sodium chloride flush, sodium chloride, ondansetron **OR** ondansetron, polyethylene glycol, acetaminophen **OR** acetaminophen    Data:     Past Medical History:   has a past medical history of Psoriasis. Social History:   reports that she quit smoking about 8 years ago. Her smoking use included cigarettes. She has never used smokeless tobacco. She reports that she does not drink alcohol and does not use drugs. Family History: History reviewed. No pertinent family history.     Vitals:  BP (!) 140/69   Pulse 62   Temp 98.2 °F (36.8 °C) (Oral)   Resp 20 Wt 155 lb 10.3 oz (70.6 kg)   SpO2 96%   BMI 24.38 kg/m²   Temp (24hrs), Av.9 °F (36.6 °C), Min:97.2 °F (36.2 °C), Max:99.1 °F (37.3 °C)    Recent Labs     23  0807 23  1607 23  0734   POCGLU 105 99 96 117*         I/O (24Hr): Intake/Output Summary (Last 24 hours) at 3/13/2023 1012  Last data filed at 3/12/2023 1638  Gross per 24 hour   Intake --   Output 800 ml   Net -800 ml         Labs:  Hematology:  Recent Labs     23  0623  1500 23  04023  0452   WBC 7.3  --  3.9 3.4*   RBC 2.97*  --  2.96* 3.05*   HGB 10.5*  --  9.9* 10.4*   HCT 30.8*  --  31.6* 33.9*   .7*  --  106.8* 111.1*   MCH 35.4*  --  33.4 34.1*   MCHC 34.1  --  31.3 30.7   RDW 16.4*  --  16.2* 16.5*   PLT 69*  --  82* 93*   MPV 12.4  --  12.3 11.4   INR  --  1.5  --   --        Chemistry:  Recent Labs     23  0623  0452    140 139   K 3.6* 2.8* 3.8   * 110* 107   CO2 20 22 23   GLUCOSE 125* 127* 120*   BUN 6* 3* 3*   CREATININE 0.28* 0.20* 0.23*   MG  --  1.4*  --    ANIONGAP 10 8* 9   LABGLOM >60 >60 >60   CALCIUM 8.0* 7.4* 7.4*       Recent Labs     23  0638 23  0814 23  1114 23  1500 23  1631 23  0807 23  1607 23  0734   PROT 5.1*  --   --   --   --   --   --   --   --    LABALBU 2.0*  --   --   --   --   --   --   --   --    AST 34*  --   --   --   --   --   --   --   --    ALT 69*  --   --   --   --   --   --   --   --    LDH  --   --   --  220*  --   --   --   --   --    ALKPHOS 353*  --   --   --   --   --   --   --   --    BILITOT 0.7  --   --  0.7  --   --   --   --   --    BILIDIR 0.5*  --   --  0.5*  --   --   --   --   --    POCGLU  --    < > 156*  --  133* 105 99 96 117*    < > = values in this interval not displayed.        ABG:  Lab Results   Component Value Date/Time    POCPH 7.425 2023 04:24 AM    POCPCO2 25.3 2023 04:24 AM    POCPO2 111.5 03/09/2023 04:24 AM    POCHCO3 16.6 03/09/2023 04:24 AM    NBEA 6 03/09/2023 04:24 AM    ZASA8EON 99 03/09/2023 04:24 AM    FIO2 30.0 03/09/2023 04:24 AM     Lab Results   Component Value Date/Time    SPECIAL RH 2ML 03/11/2023 01:15 PM     Lab Results   Component Value Date/Time    CULTURE NO GROWTH 1 DAY 03/11/2023 01:15 PM       Radiology:  XR CERVICAL SPINE (2-3 VIEWS)    Result Date: 3/10/2023  Stable cervical fusion     CT HEAD WO CONTRAST    Result Date: 3/8/2023  No acute intracranial abnormality. Mild cerebral atrophy. White matter microvascular disease. CT CERVICAL SPINE WO CONTRAST    Result Date: 3/8/2023  Postoperative changes as described above. No evidence for any fluid collections in the area to suggest CSF leak or abscess. CT ABDOMEN PELVIS W IV CONTRAST Additional Contrast? None    Result Date: 3/8/2023  Chest: 1. Small thrombus load acute pulmonary embolism. A few filling defects in subsegmental branches posterior basal right lower lobe. 2. Moderate-size posterior basal left lower lobe patchy consolidation and much smaller posterior basal right lower lobe patchy consolidation compatible with atelectasis. Abdomen pelvis: 1. Cholelithiasis without evidence for acute cholecystitis. 2. Intra and extrahepatic biliary ductal dilatation. Diffuse dilatation of CBD up to 14 mm diameter down to the ampulla. There could be non radiopaque calculus, stricture or periampullary lesion causing the obstruction. Given that this has developed since the prior study possible non radiopaque distal obstructing choledocholithiasis is most favored. MRCP or ERCP is recommended. 3. Probable endometrial  thickening up to 9 mm thickness. Correlation with non emergency outpatient pelvic ultrasound is recommended. XR CHEST PORTABLE    Result Date: 3/10/2023  1. Endotracheal tube tip is approximately 0.9 cm from the lulu; however, the tip is somewhat directed towards the right mainstem bronchus.   Consider repositioning. 2. Patchy right upper lobe airspace opacity and left basilar consolidation. 3. Multiple air-filled dilated loops of small bowel measuring up to 3.3 cm in maximum diameter. Findings favor ileus over small bowel obstruction. 4. Enteric tube tip and side-port project over the stomach. 5. Right central venous catheter tip projecting over the cavoatrial junction. XR CHEST PORTABLE    Result Date: 3/8/2023  Trace left basilar effusion with adjacent infiltrate. CT CHEST PULMONARY EMBOLISM W CONTRAST    Result Date: 3/8/2023  Chest: 1. Small thrombus load acute pulmonary embolism. A few filling defects in subsegmental branches posterior basal right lower lobe. 2. Moderate-size posterior basal left lower lobe patchy consolidation and much smaller posterior basal right lower lobe patchy consolidation compatible with atelectasis. Abdomen pelvis: 1. Cholelithiasis without evidence for acute cholecystitis. 2. Intra and extrahepatic biliary ductal dilatation. Diffuse dilatation of CBD up to 14 mm diameter down to the ampulla. There could be non radiopaque calculus, stricture or periampullary lesion causing the obstruction. Given that this has developed since the prior study possible non radiopaque distal obstructing choledocholithiasis is most favored. MRCP or ERCP is recommended. 3. Probable endometrial  thickening up to 9 mm thickness. Correlation with non emergency outpatient pelvic ultrasound is recommended. XR ABDOMEN FOR NG/OG/NE TUBE PLACEMENT    Result Date: 3/10/2023  1. Endotracheal tube tip is approximately 0.9 cm from the lulu; however, the tip is somewhat directed towards the right mainstem bronchus. Consider repositioning. 2. Patchy right upper lobe airspace opacity and left basilar consolidation. 3. Multiple air-filled dilated loops of small bowel measuring up to 3.3 cm in maximum diameter. Findings favor ileus over small bowel obstruction.  4. Enteric tube tip and side-port project over the stomach. 5. Right central venous catheter tip projecting over the cavoatrial junction. Physical Examination:        General appearance:  alert, cooperative and no distress  Mental Status:  oriented to person, place and time and normal affect  Lungs:  clear to auscultation bilaterally, normal effort  Heart:  regular rate and rhythm, no murmur  Abdomen:  soft, nontender, nondistended, normal bowel sounds, no masses, hepatomegaly, splenomegaly  Extremities:  no edema, redness, tenderness in the calves  Skin:  no gross lesions, rashes, induration    Assessment:        Hospital Problems             Last Modified POA    * (Principal) Septic shock ([address] 3/8/2023 Yes    Ascending cholangitis 3/8/2023 Yes    Cholelithiasis 3/8/2023 Yes    Gram negative septicemia ([address] 3/8/2023 Yes    Choledocholithiasis 3/8/2023 Yes    Acute respiratory failure with hypoxia ([address] 7/9/0708 Yes    Metabolic encephalopathy 0/2/4969 Yes    Acute pulmonary embolism ([address] 3/8/2023 Yes    Altered mental status 3/10/2023 Yes    Elevated brain natriuretic peptide (BNP) level 3/10/2023 Yes    E. coli septicemia ([address] 3/13/2023 Yes    Cholecystitis 3/13/2023 Yes     Plan:        Septic Shock   Patient has been transferred out of ICU, initially on vasopressors for septic shock. Currently on Rocephin for gram-negative sepsis  Will consult ID for abx recommendations  Ascending Cholangitis   Patient underwent ERCP with gastroenterology, they are recommending close follow-up and repeat ERCP  Patient is planned for laparoscopic cholecystectomy on today  Continue Rocephin for now  Gram-negative septicemia/E.  Coli  Initial blood cultures are positive for E. coli, urine culture has Aerococcous Viridans  Discussed with infectious disease, for blood draws will defer PICC line, use midline  Need IV antibiotics for total of 14 days   Macrocytic Anemia  Undetectable G73, normal folic acid last time   Suspicious for malabsorption, check MMA, homocysteine level. May require outpatient workup with hematology  Start b12 IM weekly 1000. Acute respiratory failure with hypoxia  Patient was initially on vasopressors and intubated and sedated.   Now extubated and out of ICU  Status post fall with subsequent laminectomy from previous hospitalization  Patient was seen by neurosurgery during that time, underwent extensive cervical laminectomy and was in rehab when she was readmitted  Acute pulmonary embolism  Continue Lovenox  Thrombocytopenia  No signs of hemolysis, INR improving, likely secondary to sepsis  Choledocholithiasis  Status post ERCP  Elevated BNP  Toxic metabolic encephalopathy  Impoved    Maxcine Layer, DO  3/13/2023  10:12 AM

## 2023-03-13 NOTE — PROGRESS NOTES
SPIRITUAL CARE DEPARTMENT - [facility]  PROGRESS NOTE    Shift date: 03/12/2023  Shift day: Sunday   Shift # 2    Room # 0107/0107-01   Name: Char Stanton                [facility]: [address] of [facility]: unknown    Referral: Routine Visit    Admit Date & Time: 3/8/2023  8:48 AM    Assessment:  Char Stanton is a 75 y.o. female in the hospital because infection control. Upon entering the room writer observes patient resting in bed; mobility seemed somewhat hampered perhaps, but visiting was alert and confident. Patient reports living all her life in [place]. Her  is [place]. A daughter has moved back to be near them. Patient speaks confidently of a sustaining alisa. Intervention:  Writer introduced self and title as spiritual care provider Writer offered space for patient  to express feelings, needs, and concerns and provided a ministry presence. Outcome:   found this to be a satisfied and strong person. Prayer was offered and accepted. Patient took obvious pleasure in the prayer. Plan:  Chaplains will remain available to offer spiritual and emotional support as needed. Electronically signed by Stuart Carpenter on 3/12/2023 at 8:25 PM.  [facility]  [phone]             03/12/23 2000   Encounter Summary   Service Provided For: Patient   [facility]   Support System Spouse; Children;Family members   Last Encounter  03/12/23   Complexity of Encounter Moderate   Begin Time 2000   End Time  2011   Total Time Calculated 11 min   Encounter    Type Initial Screen/Assessment   Spiritual/Emotional needs   Type Spiritual Support   Assessment/Intervention/Outcome   Assessment Calm;Coping; Hopeful; Other (Comment)  (conveys a self reliance and confidence; a strong alisa)   Intervention Active listening;Discussed belief system/Roman [employer] practices/alisa;Discussed relationship with God;Explored/Affirmed feelings, thoughts, concerns;Sustaining Presence/Ministry of presence;Prayer (assurance of)/[place]   Outcome Acceptance; Connection/Belonging;Encouraged;Expressed Gratitude

## 2023-03-13 NOTE — PLAN OF CARE
Problem: Discharge Planning  Goal: Discharge to home or other facility with appropriate resources  Outcome: Progressing     Problem: Pain  Goal: Verbalizes/displays adequate comfort level or baseline comfort level  Outcome: Progressing     Problem: Neurosensory - Adult  Goal: Achieves stable or improved neurological status  Outcome: Progressing  Goal: Achieves maximal functionality and self care  Outcome: Progressing     Problem: Cardiovascular - Adult  Goal: Maintains optimal cardiac output and hemodynamic stability  Outcome: Progressing  Goal: Absence of cardiac dysrhythmias or at baseline  Outcome: Progressing     Problem: Skin/Tissue Integrity - Adult  Goal: Skin integrity remains intact  Outcome: Progressing  Goal: Incisions, wounds, or drain sites healing without S/S of infection  Outcome: Progressing     Problem: Musculoskeletal - Adult  Goal: Return mobility to safest level of function  Outcome: Progressing  Goal: Return ADL status to a safe level of function  Outcome: Progressing     Problem: Genitourinary - Adult  Goal: Absence of urinary retention  Outcome: Progressing     Problem: Skin/Tissue Integrity  Goal: Absence of new skin breakdown  Description: 1. Monitor for areas of redness and/or skin breakdown  2. Assess vascular access sites hourly  3. Every 4-6 hours minimum:  Change oxygen saturation probe site  4. Every 4-6 hours:  If on nasal continuous positive airway pressure, respiratory therapy assess nares and determine need for appliance change or resting period.   Outcome: Progressing     Problem: Safety - Adult  Goal: Free from fall injury  Outcome: Progressing

## 2023-03-13 NOTE — PROGRESS NOTES
Physical Therapy        Physical Therapy Cancel Note      DATE: 3/13/2023    NAME: Reno Castañeda  MRN: [id number]   : 1950      Patient not seen this date for Physical Therapy due to:    Surgery/Procedure: OR for  \"LAPAROSCOPIC CHOLECYSTECTOMY\"  Will check back post-op.       Electronically signed by Robina Coker PT on 3/13/2023 at 3:26 PM

## 2023-03-13 NOTE — ANESTHESIA POSTPROCEDURE EVALUATION
Department of Anesthesiology  Postprocedure Note    Patient: Eduardo Taylor  MRN: [id number]  [place]: 1950  Date of evaluation: 3/13/2023      Procedure Summary     Date: 03/13/23 Room / Location: [address]    Anesthesia Start: 3685 Anesthesia Stop: 7337    Procedure: LAPAROSCOPIC CHOLECYSTECTOMY,  POSSIBLE OPEN (Abdomen) Diagnosis:       Cholangitis      (CHOLANGITIS)    Surgeons: Darcy Link DO Responsible Provider: Jaiden Villalta MD    Anesthesia Type: general ASA Status: 3          Anesthesia Type: No value filed.     Rachel Phase I: Rachel Score: 8    Rachel Phase II:        Anesthesia Post Evaluation    Patient location during evaluation: PACU  Patient participation: complete - patient participated  Level of consciousness: awake and alert  Pain score: 2  Airway patency: patent  Nausea & Vomiting: no nausea and no vomiting  Complications: no  Cardiovascular status: hemodynamically stable  Respiratory status: acceptable  Hydration status: euvolemic

## 2023-03-13 NOTE — PROGRESS NOTES
General Surgery:  Daily Progress Note                PATIENT NAME: Fatoumata Camp     TODAY'S DATE: 3/13/2023, 7:06 AM    SUBJECTIVE:     Patient seen and examined at the bedside. No acute overnight events. Afebrile. Vitals normal and stable. Overall states that she is doing okay. OBJECTIVE:   VITALS:  /64   Pulse 55   Temp 97.5 °F (36.4 °C) (Axillary)   Resp 16   Wt 155 lb 10.3 oz (70.6 kg)   SpO2 96%   BMI 24.38 kg/m²      INTAKE/OUTPUT:      Intake/Output Summary (Last 24 hours) at 3/13/2023 0559  Last data filed at 3/12/2023 1638  Gross per 24 hour   Intake 60 ml   Output 800 ml   Net -740 ml       PHYSICAL EXAM:  General Appearance: awake, alert, oriented, in no acute distress  HEENT:  Normocephalic, atraumatic, mucus membranes moist   Heart: Heart regular rate and rhythm  Lungs: Normal respiratory effort, no accessory muscle use, no wheezing or stridor. Abdomen:Soft, nondistended, nontender to palpation  Extremities: No cyanosis, pitting edema, rashes noted. Skin: Skin color, texture, turgor normal. No rashes or lesions.       Data:  CBC with Differential:    Lab Results   Component Value Date/Time    WBC 3.4 03/13/2023 04:52 AM    RBC 3.05 03/13/2023 04:52 AM    HGB 10.4 03/13/2023 04:52 AM    HCT 33.9 03/13/2023 04:52 AM    PLT 93 03/13/2023 04:52 AM    .1 03/13/2023 04:52 AM    MCH 34.1 03/13/2023 04:52 AM    MCHC 30.7 03/13/2023 04:52 AM    RDW 16.5 03/13/2023 04:52 AM    NRBC 3 03/08/2023 03:46 PM    LYMPHOPCT 29 03/13/2023 04:52 AM    MONOPCT 2 03/13/2023 04:52 AM    BASOPCT 0 03/13/2023 04:52 AM    MONOSABS 0.07 03/13/2023 04:52 AM    LYMPHSABS 0.99 03/13/2023 04:52 AM    EOSABS 0.00 03/13/2023 04:52 AM    BASOSABS 0.00 03/13/2023 04:52 AM    DIFFTYPE NOT REPORTED 07/15/2019 09:03 AM     BMP:    Lab Results   Component Value Date/Time     03/13/2023 04:52 AM    K 3.8 03/13/2023 04:52 AM     03/13/2023 04:52 AM    CO2 23 03/13/2023 04:52 AM    BUN 3 03/13/2023 04:52 AM    LABALBU 2.0 03/11/2023 06:38 AM    CREATININE 0.23 03/13/2023 04:52 AM    CALCIUM 7.4 03/13/2023 04:52 AM    GFRAA >60 07/15/2019 09:03 AM    LABGLOM >60 03/13/2023 04:52 AM    GLUCOSE 120 03/13/2023 04:52 AM     Hepatic Function Panel:    Lab Results   Component Value Date/Time    ALKPHOS 353 03/11/2023 06:38 AM    ALT 69 03/11/2023 06:38 AM    AST 34 03/11/2023 06:38 AM    PROT 5.1 03/11/2023 06:38 AM    BILITOT 0.7 03/11/2023 03:00 PM    BILIDIR 0.5 03/11/2023 03:00 PM    IBILI 0.2 03/11/2023 06:38 AM    LABALBU 2.0 03/11/2023 06:38 AM       Radiology Review:    No new imagingto review    ASSESSMENT:  Active Hospital Problems    Diagnosis Date Noted    E. coli septicemia ([facility] [A41.51] 03/13/2023     Priority: Medium    Cholecystitis [K81.9] 03/13/2023     Priority: Medium    Altered mental status [R41.82] 03/10/2023     Priority: Medium    Elevated brain natriuretic peptide (BNP) level [R79.89] 03/10/2023     Priority: Medium    Septic shock ([facility] [A41.9, R65.21] 03/08/2023     Priority: Medium    Ascending cholangitis [K83.09] 03/08/2023     Priority: Medium    Cholelithiasis [K80.20] 03/08/2023     Priority: Medium    Gram negative septicemia ([facility] [A41.50] 03/08/2023     Priority: Medium    Choledocholithiasis [K80.50] 03/08/2023     Priority: Medium    Acute respiratory failure with hypoxia ([facility] [J96.01] 03/08/2023     Priority: Medium    Metabolic encephalopathy [T72.95] 03/08/2023     Priority: Medium    Acute pulmonary embolism ([facility] [I26.99] 03/08/2023     Priority: Medium       78-year-old female with ascending cholangitis secondary to gallstones, s/p ERCP    Plan:  Continue medical mgmt and supportive care per primary  [facility]  [facility]  Continue abx  Postop orders to follow    Electronically signed by Latanya Chew DO  on 3/13/2023 at 7:06 AM

## 2023-03-13 NOTE — PROGRESS NOTES
[facility] -   Infectious diseases evaluation  admission date 3/8/2023    reason for consultation:   AB management    Impression :   Current:    Altered mentation  Hypotension   Septic shock  E. coli septicemia  Acute Resp failure intubated   Obstructive Ascending cholangitis - post ERCP 3/8  Cholecystitis  UTI complicated   HIT -     Other:  History of cervical laminectomy in the past  Discussion / summary of stay / plan of care     Recommendations   Ascending cholangitis post ERCP  Plan for cholecystectomy on 3/13  E. coli septicemia  Likely related to the cholangitis  UTI/pyelonephritis, with E. coli and Aerococcus,  Difficult to rule out pyelonephritis since E. coli was found in the urine and the blood as well  Antibiotics  Zosyn x 3 days then ceftriaxone x 1 days and back to zosyn 3/12 aftr a low grade fever  Agree w zosyn -till GB resected and fluid cx taken  Will then downsize AB  Due to bacteremia and cholangitis, would need 14 days AB in total   Infection Control Recommendations   [place] Precautions  Contact Isolation   Airborne isolation  Droplet Isolation  Ca discontinuation  Central line discontinuation    Antimicrobial Stewardship Recommendations   Simplification of therapy  Targeted therapy  IV to oral conversion  PK dosing  Restricted antimicrobial use  Discontinuation of therapy    History of Present Illness:   Initial history:  Violette Degroot is a 75y.o.-year-old female admitted with altered mentation, septic with respiratory failure and Intubated, blood cultures grew E. coli as well as the urine that grew E. coli and Aerococcus. In the meantime CT scan of the abdomen showed an obstructive ascending cholangitis and had a ERCP.   Ultimately she was extubated and is planned for a cholecystectomy laparoscopically on 3/13  She is treated with ceftriaxone for the E. coli that is felt to be from the common bile duct  Of note is the E. coli was also growing in the urine, and a CT of the abdomen did not suggest any pyelonephritis or any ureteral obstruction. Hence difficult to tell if there was a pyelonephritis also inflamed to this bacteremia. Infectious disease consulted for antibiotic management    Interval changes  3/13/2023   Patient Vitals for the past 8 hrs:   BP Temp Temp src Pulse Resp SpO2   03/13/23 0802 (!) 140/69 98.2 °F (36.8 °C) Oral 62 20 96 %   03/13/23 0409 104/64 97.5 °F (36.4 °C) Axillary 55 16 96 %     3/13  Afebrile. Lap cholecystectomy today, will wait on fluid cx. Summary of relevant labs:  Labs:  Creatinine0.20 Low - 0.23  WBC3.9 - 3.4    Micro:  BC 3/11 NGTD  BC 3/8 e coli  Urine cx 3/8 e coli, aerococcus    Imaging:  CTAP  3/8  . Small thrombus load acute pulmonary embolism. A few filling defects in subsegmental branches posterior basal right lower lobe. 2. Moderate-size posterior basal left lower lobe patchy consolidation and much smaller posterior basal right lower lobe patchy consolidation compatible with atelectasis. Abdomen pelvis:   1. Cholelithiasis without evidence for acute cholecystitis. 2. Intra and extrahepatic biliary ductal dilatation. Diffuse dilatation of CBD up to 14 mm diameter down to the ampulla. There could be non radiopaque calculus, stricture or periampullary lesion causing the obstruction. Given that this has developed since the prior study possible non radiopaque distal obstructing choledocholithiasis is most favored. MRCP or ERCP is recommended. 3. Probable endometrial  thickening up to 9 mm thickness. Correlation with non emergency outpatient pelvic ultrasound is recommended. I have personally reviewed the past medical history, past surgical history, medications, social history, and family history, and I haveupdated the database accordingly. Allergies:   Patient has no known allergies. Review of Systems:     Review of Systems   Constitutional:  Positive for activity change.  Negative for chills and fever. HENT:  Negative for congestion. Eyes:  Negative for discharge. Respiratory:  Negative for apnea and shortness of breath. Cardiovascular:  Negative for chest pain. Gastrointestinal:  Positive for abdominal pain. Endocrine: Negative for cold intolerance. Genitourinary:  Negative for dysuria. Musculoskeletal:  Negative for arthralgias. Skin:  Negative for color change. Allergic/Immunologic: Negative for immunocompromised state. Neurological:  Negative for dizziness and headaches. Psychiatric/Behavioral:  Negative for agitation. Physical Examination :       Physical Exam  Constitutional:       Appearance: Normal appearance. She is not ill-appearing. HENT:      Head: Normocephalic and atraumatic. Nose: Nose normal.      Mouth/Throat:      Mouth: Mucous membranes are moist.   Eyes:      General: No scleral icterus. Conjunctiva/sclera: Conjunctivae normal.   Cardiovascular:      Rate and Rhythm: Normal rate and regular rhythm. Heart sounds: Normal heart sounds. No murmur heard. Pulmonary:      Effort: No respiratory distress. Breath sounds: Normal breath sounds. No stridor. No wheezing. Abdominal:      Palpations: There is no mass. Hernia: No hernia is present. Genitourinary:     Comments: No trejo  Musculoskeletal:         General: No swelling or deformity. Cervical back: Neck supple. No rigidity. Right lower leg: No edema. Left lower leg: No edema. Skin:     General: Skin is dry. Coloration: Skin is not jaundiced. Findings: No rash. Neurological:      General: No focal deficit present. Mental Status: She is alert and oriented to person, place, and time. Psychiatric:         Mood and Affect: Mood normal.         Thought Content:  Thought content normal.       Past Medical History:     Past Medical History:   Diagnosis Date    Psoriasis        Past Surgical  History:     Past Surgical History:   Procedure Laterality Date    CERVICAL FUSION      POSTERIOR CERVICAL DECOMPRESSION AND FUSION C3-7    CERVICAL FUSION N/A 2023    C 3-7 POSTERIOR CERVICAL DECOMPRESSION AND FUSION performed by Nataliya Sahu DO at [place] [address]    ERCP  2023    ERCP STONE REMOVAL, ERCP SPHINCTER/PAPILLOTOMY,    ERCP STENT INSERTION    ERCP  3/8/2023    ERCP STONE REMOVAL performed by Nolvia Lutz MD at [place] [address]    ERCP  3/8/2023    ERCP SPHINCTER/PAPILLOTOMY performed by Nolvia Lutz MD at [place] [address]    ERCP  3/8/2023    ERCP STENT INSERTION performed by Nolvia Lutz MD at [place] [address]       Medications:      potassium chloride  20 mEq Oral BID WC    piperacillin-tazobactam  3,375 mg IntraVENous Q8H    lidocaine 1 % injection  5 mL IntraDERmal Once    sodium chloride flush  5-40 mL IntraVENous 2 times per day    [Held by provider] enoxaparin  1 mg/kg SubCUTAneous BID    traZODone  50 mg Oral QPM    [Held by provider] apixaban  5 mg Oral BID    sodium chloride flush  5-40 mL IntraVENous 2 times per day       Social History:     Social History     Socioeconomic History    Marital status: Single     Spouse name: Not on file    Number of children: Not on file    Years of education: Not on file    Highest education level: Not on file   Occupational History    Not on file   Tobacco Use    Smoking status: Former     Types: Cigarettes     Quit date: 2015     Years since quittin.1    Smokeless tobacco: Never   Vaping Use    Vaping Use: Never used   Substance and Sexual Activity    Alcohol use: Never    Drug use: Never    Sexual activity: Not on file   Other Topics Concern    Not on file   Social History Narrative    ** Merged History Encounter **          Social Determinants of Health     Financial Resource Strain: Not on file   Food Insecurity: Not on file   Transportation Needs: Not on file   Physical Activity: Not on file   Stress: Not on file   Social Connections: Not on file   Intimate Partner Violence: Not on file   Housing Stability: Not on file       Family History:   History reviewed. No pertinent family history. Medical Decision Making:   I have independently reviewed/ordered the following labs:    CBC with Differential:   Recent Labs     03/12/23  0402 03/13/23  0452   WBC 3.9 3.4*   HGB 9.9* 10.4*   HCT 31.6* 33.9*   PLT 82* 93*   LYMPHOPCT 25 29   MONOPCT 2 2       BMP:  Recent Labs     03/12/23  0402 03/13/23  0452    139   K 2.8* 3.8   * 107   CO2 22 23   BUN 3* 3*   CREATININE 0.20* 0.23*   MG 1.4*  --        Hepatic Function Panel:   Recent Labs     03/11/23  0638 03/11/23  1500   PROT 5.1*  --    LABALBU 2.0*  --    BILIDIR 0.5* 0.5*   IBILI 0.2  --    BILITOT 0.7 0.7   ALKPHOS 353*  --    ALT 69*  --    AST 34*  --        No results for input(s): RPR in the last 72 hours. No results for input(s): HIV in the last 72 hours. No results for input(s): BC in the last 72 hours. Lab Results   Component Value Date/Time    CREATININE 0.23 03/13/2023 04:52 AM    GLUCOSE 120 03/13/2023 04:52 AM       Detailed results: Thank you for allowing us to participate in the care of this patient. Please call with questions. This note is created with the assistance of a speech recognition program.  While intending to generate adocument that actually reflects the content of the visit, the document can still have some errors including those of syntax and sound a like substitutions which may escape proof reading. It such instances, actual meaningcan be extrapolated by contextual diversion. Sarah Puentes  Office: [phone]  Perfect serve / office [phone]        I have discussed the care of the patient, including pertinent history and exam findings,  with the resident. I have seen and examined the patient and the key elements of all parts of the encounter have been performed by me. I agree with the assessment, plan and orders as documented by the resident.     Amielee Barrientos, Infectious Diseases

## 2023-03-13 NOTE — BRIEF OP NOTE
Brief Postoperative Note      Patient: Char Stanton  YOB: 1950  MRN: [id number]    Date of Procedure: 3/13/2023    Pre-Op Diagnosis: CHOLANGITIS    Post-Op Diagnosis: Same       Procedure(s):  LAPAROSCOPIC CHOLECYSTECTOMY,  POSSIBLE OPEN    Surgeon(s):  Georgina May DO    Assistant:  Resident: Bharath Agudelo DO; Ying Manzanares DO    Anesthesia: General, 0.5% Marcaine with epinephrine local infiltration    Estimated Blood Loss (mL): less than 50     Complications: None    Specimens:   ID Type Source Tests Collected by Time Destination   A : gallbladder Tissue Gallbladder SURGICAL PATHOLOGY Georgina May DO 3/13/2023 1347        Implants:  * No implants in log *      Drains:   External Urinary Catheter (Active)   Site Assessment Clean,dry & intact 03/13/23 1200   Placement Replaced 03/13/23 0800   Catheter Care Catheter/Wick replaced;Suction Canister/Tubing changed 03/13/23 0800   Perineal Care Yes 03/13/23 0800   Suction 40 mmgHg continuous 03/13/23 1200   Urine Color Yellow 03/13/23 1200   Urine Appearance Clear 03/13/23 1200   Output (mL) 400 mL 03/13/23 0800       [REMOVED] NG/OG/NJ/NE Tube Orogastric 16 fr Center mouth (Removed)   Surrounding Skin Clean, dry & intact 03/09/23 0845   Securement device Tape 03/09/23 0845   Status Suction-low intermittent 03/09/23 0845   Placement Verified External Catheter Length 03/09/23 0845   NG/OG/NJ/NE External Measurement (cm) 61 cm 03/09/23 0845   Drainage Appearance Bile 03/09/23 1246   Output (mL) 100 ml 03/09/23 1246       [REMOVED] Urinary Catheter 03/08/23 Ca-Temperature (Removed)   Catheter Indications Need for fluid volume management of the critically ill patient in a critical care setting 03/10/23 0400   Site Assessment No urethral drainage 03/10/23 0400   Urine Color Edwina 03/10/23 0400   Urine Appearance Clear 03/10/23 0400   Collection Container Standard 03/10/23 0400   Securement Method Leg strap 03/10/23 0400   Catheter Care  Soap and water 03/10/23 0000   Catheter Best Practices  Drainage tube clipped to bed;Catheter secured to thigh; Tamper seal intact; Bag below bladder;Bag not on floor; Lack of dependent loop in tubing;Drainage bag less than half full 03/10/23 0400   Status Draining 03/10/23 0400   Output (mL) 43 mL 03/10/23 0736   Discontinuation Reason Per nurse-driven protocol 08/73/22 0736       Findings: Acute and chronic inflammatory changes within gallbladder tissue with cholelithiasis, distended common bile duct visualized. Electronically signed by Jorge Lane DO on 3/13/2023 at 3:24 PM  I attest that I was present with the resident during the patient's operation and agree with the description of findings and procedure as dictated above.   Virgil Douglas MD

## 2023-03-13 NOTE — TELEPHONE ENCOUNTER
Patient family called to report that the patient is in the hospital and her post op is 3.15.23. Patient family would like to know if the provider will stop in to see the patient.

## 2023-03-14 PROBLEM — N39.0 ACUTE UTI: Status: ACTIVE | Noted: 2023-03-14

## 2023-03-14 PROBLEM — D61.818 PANCYTOPENIA (HCC): Status: ACTIVE | Noted: 2023-03-14

## 2023-03-14 LAB
ABSOLUTE EOS #: 0 K/UL (ref 0–0.4)
ABSOLUTE IMMATURE GRANULOCYTE: 0.04 K/UL (ref 0–0.3)
ABSOLUTE LYMPH #: 0.61 K/UL (ref 1–4.8)
ABSOLUTE MONO #: 0.08 K/UL (ref 0.1–0.8)
ABSOLUTE RETIC #: 0.01 M/UL (ref 0.03–0.08)
ANION GAP SERPL CALCULATED.3IONS-SCNC: 6 MMOL/L (ref 9–17)
BASOPHILS # BLD: 0 % (ref 0–2)
BASOPHILS ABSOLUTE: 0 K/UL (ref 0–0.2)
BUN SERPL-MCNC: 3 MG/DL (ref 8–23)
CALCIUM SERPL-MCNC: 6.9 MG/DL (ref 8.6–10.4)
CHLORIDE SERPL-SCNC: 105 MMOL/L (ref 98–107)
CO2 SERPL-SCNC: 26 MMOL/L (ref 20–31)
CREAT SERPL-MCNC: 0.2 MG/DL (ref 0.5–0.9)
EOSINOPHILS RELATIVE PERCENT: 0 % (ref 1–4)
FERRITIN SERPL-MCNC: 312 NG/ML (ref 13–150)
GFR SERPL CREATININE-BSD FRML MDRD: >60 ML/MIN/1.73M2
GLUCOSE BLD-MCNC: 125 MG/DL (ref 65–105)
GLUCOSE BLD-MCNC: 134 MG/DL (ref 65–105)
GLUCOSE BLD-MCNC: 148 MG/DL (ref 65–105)
GLUCOSE BLD-MCNC: 184 MG/DL (ref 65–105)
GLUCOSE BLD-MCNC: 189 MG/DL (ref 65–105)
GLUCOSE SERPL-MCNC: 137 MG/DL (ref 70–99)
HCT VFR BLD AUTO: 30.7 % (ref 36.3–47.1)
HGB BLD-MCNC: 9.8 G/DL (ref 11.9–15.1)
IMMATURE GRANULOCYTES: 2 %
IMMATURE RETIC FRACT: 6.1 % (ref 2.7–18.3)
IRON SATURATION: 34 % (ref 20–55)
IRON SERPL-MCNC: 43 UG/DL (ref 37–145)
LYMPHOCYTES # BLD: 32 % (ref 24–44)
MCH RBC QN AUTO: 33.8 PG (ref 25.2–33.5)
MCHC RBC AUTO-ENTMCNC: 31.9 G/DL (ref 28.4–34.8)
MCV RBC AUTO: 105.9 FL (ref 82.6–102.9)
METHYLMALONIC ACID: 3.98 UMOL/L (ref 0–0.4)
MONOCYTES # BLD: 4 % (ref 1–7)
MORPHOLOGY: ABNORMAL
MORPHOLOGY: ABNORMAL
NRBC AUTOMATED: 0 PER 100 WBC
PDW BLD-RTO: 16.1 % (ref 11.8–14.4)
PLATELET # BLD AUTO: 120 K/UL (ref 138–453)
PMV BLD AUTO: 10.7 FL (ref 8.1–13.5)
POTASSIUM SERPL-SCNC: 3.7 MMOL/L (ref 3.7–5.3)
RBC # BLD: 2.9 M/UL (ref 3.95–5.11)
RETIC HEMOGLOBIN: 39.2 PG (ref 28.2–35.7)
RETICS/RBC NFR AUTO: 0.5 % (ref 0.5–1.9)
SEG NEUTROPHILS: 62 % (ref 36–66)
SEGMENTED NEUTROPHILS ABSOLUTE COUNT: 1.17 K/UL (ref 1.8–7.7)
SODIUM SERPL-SCNC: 137 MMOL/L (ref 135–144)
TIBC SERPL-MCNC: 128 UG/DL (ref 250–450)
UNSATURATED IRON BINDING CAPACITY: 85 UG/DL (ref 112–347)
WBC # BLD AUTO: 1.9 K/UL (ref 3.5–11.3)

## 2023-03-14 PROCEDURE — 99232 SBSQ HOSP IP/OBS MODERATE 35: CPT | Performed by: STUDENT IN AN ORGANIZED HEALTH CARE EDUCATION/TRAINING PROGRAM

## 2023-03-14 PROCEDURE — 85045 AUTOMATED RETICULOCYTE COUNT: CPT

## 2023-03-14 PROCEDURE — 6370000000 HC RX 637 (ALT 250 FOR IP): Performed by: STUDENT IN AN ORGANIZED HEALTH CARE EDUCATION/TRAINING PROGRAM

## 2023-03-14 PROCEDURE — 85025 COMPLETE CBC W/AUTO DIFF WBC: CPT

## 2023-03-14 PROCEDURE — 6360000002 HC RX W HCPCS

## 2023-03-14 PROCEDURE — 82728 ASSAY OF FERRITIN: CPT

## 2023-03-14 PROCEDURE — 88185 FLOWCYTOMETRY/TC ADD-ON: CPT

## 2023-03-14 PROCEDURE — 94761 N-INVAS EAR/PLS OXIMETRY MLT: CPT

## 2023-03-14 PROCEDURE — 99223 1ST HOSP IP/OBS HIGH 75: CPT | Performed by: INTERNAL MEDICINE

## 2023-03-14 PROCEDURE — 6370000000 HC RX 637 (ALT 250 FOR IP): Performed by: INTERNAL MEDICINE

## 2023-03-14 PROCEDURE — 2060000000 HC ICU INTERMEDIATE R&B

## 2023-03-14 PROCEDURE — 2580000003 HC RX 258

## 2023-03-14 PROCEDURE — 84155 ASSAY OF PROTEIN SERUM: CPT

## 2023-03-14 PROCEDURE — 6370000000 HC RX 637 (ALT 250 FOR IP)

## 2023-03-14 PROCEDURE — 83550 IRON BINDING TEST: CPT

## 2023-03-14 PROCEDURE — 97163 PT EVAL HIGH COMPLEX 45 MIN: CPT

## 2023-03-14 PROCEDURE — 88184 FLOWCYTOMETRY/ TC 1 MARKER: CPT

## 2023-03-14 PROCEDURE — 97110 THERAPEUTIC EXERCISES: CPT

## 2023-03-14 PROCEDURE — 2700000000 HC OXYGEN THERAPY PER DAY

## 2023-03-14 PROCEDURE — 83540 ASSAY OF IRON: CPT

## 2023-03-14 PROCEDURE — 84165 PROTEIN E-PHORESIS SERUM: CPT

## 2023-03-14 PROCEDURE — 80048 BASIC METABOLIC PNL TOTAL CA: CPT

## 2023-03-14 PROCEDURE — 99232 SBSQ HOSP IP/OBS MODERATE 35: CPT | Performed by: INTERNAL MEDICINE

## 2023-03-14 PROCEDURE — 82947 ASSAY GLUCOSE BLOOD QUANT: CPT

## 2023-03-14 PROCEDURE — 92526 ORAL FUNCTION THERAPY: CPT

## 2023-03-14 PROCEDURE — 83521 IG LIGHT CHAINS FREE EACH: CPT

## 2023-03-14 PROCEDURE — 36415 COLL VENOUS BLD VENIPUNCTURE: CPT

## 2023-03-14 PROCEDURE — 97530 THERAPEUTIC ACTIVITIES: CPT

## 2023-03-14 PROCEDURE — 86334 IMMUNOFIX E-PHORESIS SERUM: CPT

## 2023-03-14 RX ORDER — MIDODRINE HYDROCHLORIDE 2.5 MG/1
2.5 TABLET ORAL
Status: DISCONTINUED | OUTPATIENT
Start: 2023-03-14 | End: 2023-03-16 | Stop reason: HOSPADM

## 2023-03-14 RX ORDER — CYANOCOBALAMIN 1000 UG/ML
1000 INJECTION, SOLUTION INTRAMUSCULAR; SUBCUTANEOUS DAILY
Status: DISCONTINUED | OUTPATIENT
Start: 2023-03-15 | End: 2023-03-16 | Stop reason: HOSPADM

## 2023-03-14 RX ORDER — OXYCODONE HYDROCHLORIDE 5 MG/1
2.5 TABLET ORAL EVERY 12 HOURS PRN
Status: DISCONTINUED | OUTPATIENT
Start: 2023-03-14 | End: 2023-03-16 | Stop reason: HOSPADM

## 2023-03-14 RX ORDER — 0.9 % SODIUM CHLORIDE 0.9 %
500 INTRAVENOUS SOLUTION INTRAVENOUS ONCE
Status: CANCELLED | OUTPATIENT
Start: 2023-03-14 | End: 2023-03-14

## 2023-03-14 RX ADMIN — MIDODRINE HYDROCHLORIDE 2.5 MG: 2.5 TABLET ORAL at 15:11

## 2023-03-14 RX ADMIN — PIPERACILLIN AND TAZOBACTAM 3375 MG: 3; .375 INJECTION, POWDER, FOR SOLUTION INTRAVENOUS at 18:19

## 2023-03-14 RX ADMIN — GABAPENTIN 200 MG: 100 CAPSULE ORAL at 21:17

## 2023-03-14 RX ADMIN — OXYCODONE HYDROCHLORIDE 2.5 MG: 5 TABLET ORAL at 07:41

## 2023-03-14 RX ADMIN — ACETAMINOPHEN 1000 MG: 500 TABLET ORAL at 05:19

## 2023-03-14 RX ADMIN — POTASSIUM CHLORIDE 20 MEQ: 1500 TABLET, EXTENDED RELEASE ORAL at 07:47

## 2023-03-14 RX ADMIN — PIPERACILLIN AND TAZOBACTAM 3375 MG: 3; .375 INJECTION, POWDER, FOR SOLUTION INTRAVENOUS at 11:38

## 2023-03-14 RX ADMIN — PIPERACILLIN AND TAZOBACTAM 3375 MG: 3; .375 INJECTION, POWDER, FOR SOLUTION INTRAVENOUS at 02:28

## 2023-03-14 RX ADMIN — SODIUM CHLORIDE, SODIUM LACTATE, POTASSIUM CHLORIDE, CALCIUM CHLORIDE AND DEXTROSE MONOHYDRATE: 5; 600; 310; 30; 20 INJECTION, SOLUTION INTRAVENOUS at 18:18

## 2023-03-14 RX ADMIN — FOLIC ACID 1 MG: 1 TABLET ORAL at 07:47

## 2023-03-14 RX ADMIN — TRAZODONE HYDROCHLORIDE 50 MG: 50 TABLET ORAL at 21:17

## 2023-03-14 RX ADMIN — GABAPENTIN 200 MG: 100 CAPSULE ORAL at 11:40

## 2023-03-14 ASSESSMENT — PAIN SCALES - WONG BAKER
WONGBAKER_NUMERICALRESPONSE: 4
WONGBAKER_NUMERICALRESPONSE: 2

## 2023-03-14 ASSESSMENT — PAIN SCALES - GENERAL
PAINLEVEL_OUTOF10: 3
PAINLEVEL_OUTOF10: 5
PAINLEVEL_OUTOF10: 2
PAINLEVEL_OUTOF10: 8
PAINLEVEL_OUTOF10: 5

## 2023-03-14 ASSESSMENT — ENCOUNTER SYMPTOMS
COLOR CHANGE: 0
ABDOMINAL PAIN: 1
APNEA: 0
SHORTNESS OF BREATH: 0
CHEST TIGHTNESS: 0
EYE DISCHARGE: 0

## 2023-03-14 ASSESSMENT — PAIN DESCRIPTION - LOCATION: LOCATION: ABDOMEN

## 2023-03-14 ASSESSMENT — PAIN - FUNCTIONAL ASSESSMENT
PAIN_FUNCTIONAL_ASSESSMENT: PREVENTS OR INTERFERES WITH MANY ACTIVE NOT PASSIVE ACTIVITIES
PAIN_FUNCTIONAL_ASSESSMENT: PREVENTS OR INTERFERES SOME ACTIVE ACTIVITIES AND ADLS

## 2023-03-14 ASSESSMENT — PAIN DESCRIPTION - DESCRIPTORS: DESCRIPTORS: ACHING

## 2023-03-14 NOTE — PROGRESS NOTES
[facility] -   Infectious diseases evaluation  admission date 3/8/2023    reason for consultation:   AB management    Impression :   Current:    Altered mentation  Hypotension   Septic shock  E. coli septicemia  Acute Resp failure intubated   Obstructive Ascending cholangitis - post ERCP 3/8  Cholecystitis  UTI complicated   HIT -     Other:  History of cervical laminectomy in the past  Discussion / summary of stay / plan of care     Recommendations   Ascending cholangitis post ERCP  Plan for cholecystectomy on 3/13  E. coli septicemia  Likely related to the cholangitis  UTI/pyelonephritis, with E. coli and Aerococcus,  Difficult to rule out pyelonephritis since E. coli was found in the urine and the blood as well  Antibiotics  Zosyn x 3 days then ceftriaxone x 1 days and back to zosyn 3/12- till 3/14 then switch to ceftriaxone till 3/21  (Due to bacteremia and cholangitis, would need 14 days AB in total till 3/21)  Infection Control Recommendations   [place] Precautions      Antimicrobial Stewardship Recommendations   Simplification of therapy  Targeted therapy      History of Present Illness:   Initial history:  Heike Marie is a 75y.o.-year-old female admitted with altered mentation, septic with respiratory failure and Intubated, blood cultures grew E. coli as well as the urine that grew E. coli and Aerococcus. In the meantime CT scan of the abdomen showed an obstructive ascending cholangitis and had a ERCP. Ultimately she was extubated and is planned for a cholecystectomy laparoscopically on 3/13  She is treated with ceftriaxone for the E. coli that is felt to be from the common bile duct  Of note is the E. coli was also growing in the urine, and a CT of the abdomen did not suggest any pyelonephritis or any ureteral obstruction. Hence difficult to tell if there was a pyelonephritis also inflamed to this bacteremia.     Infectious disease consulted for antibiotic management    Interval changes  3/14/2023   Patient Vitals for the past 8 hrs:   BP Temp Temp src Pulse Resp SpO2   03/14/23 0741 -- -- -- -- 18 --   03/14/23 0400 (!) 108/53 98.1 °F (36.7 °C) Oral 62 15 98 %   03/13/23 2359 137/84 98.1 °F (36.7 °C) Oral 64 20 100 %     3/13  Afebrile. Lap cholecystectomy today, will wait on fluid cx.     3/14  Afebrile, vitals stable. [facility] 3/11 neg. POD-1 lap maria teresa, awaiting fluid cx - pt remains on Zosyn. Pt doing well, pain controlled. Summary of relevant labs:  Labs:  Creatinine0.20 Low - 0.23 - 0.20  WBC3.9 - 3.4 - 1.9    Micro:  BC 3/11 NGTD  BC 3/8 e coli  Urine cx 3/8 e coli, aerococcus    Imaging:  CTAP  3/8  . Small thrombus load acute pulmonary embolism. A few filling defects in subsegmental branches posterior basal right lower lobe. 2. Moderate-size posterior basal left lower lobe patchy consolidation and much smaller posterior basal right lower lobe patchy consolidation compatible with atelectasis. Abdomen pelvis:   1. Cholelithiasis without evidence for acute cholecystitis. 2. Intra and extrahepatic biliary ductal dilatation. Diffuse dilatation of CBD up to 14 mm diameter down to the ampulla. There could be non radiopaque calculus, stricture or periampullary lesion causing the obstruction. Given that this has developed since the prior study possible non radiopaque distal obstructing choledocholithiasis is most favored. MRCP or ERCP is recommended. 3. Probable endometrial  thickening up to 9 mm thickness. Correlation with non emergency outpatient pelvic ultrasound is recommended. I have personally reviewed the past medical history, past surgical history, medications, social history, and family history, and I haveupdated the database accordingly. Allergies:   Patient has no known allergies. Review of Systems:     Review of Systems   Constitutional:  Positive for activity change. Negative for chills and fever.    HENT:  Negative for congestion. Eyes:  Negative for discharge. Respiratory:  Negative for apnea, chest tightness and shortness of breath. Cardiovascular:  Negative for chest pain. Gastrointestinal:  Positive for abdominal pain. Endocrine: Negative for cold intolerance. Genitourinary:  Negative for dysuria and flank pain. Musculoskeletal:  Negative for arthralgias. Skin:  Negative for color change. Allergic/Immunologic: Negative for immunocompromised state. Neurological:  Negative for dizziness and headaches. Psychiatric/Behavioral:  Negative for agitation and confusion. Physical Examination :       Physical Exam  Constitutional:       General: She is not in acute distress. Appearance: Normal appearance. She is not ill-appearing. HENT:      Head: Normocephalic and atraumatic. Nose: Nose normal.      Mouth/Throat:      Mouth: Mucous membranes are moist.   Eyes:      General: No scleral icterus. Conjunctiva/sclera: Conjunctivae normal.   Cardiovascular:      Rate and Rhythm: Normal rate and regular rhythm. Heart sounds: Normal heart sounds. No murmur heard. Pulmonary:      Effort: Pulmonary effort is normal. No respiratory distress. Breath sounds: Normal breath sounds. No stridor. No wheezing. Abdominal:      General: There is no distension. Palpations: Abdomen is soft. There is no mass. Hernia: No hernia is present. Genitourinary:     Comments: No trejo  Musculoskeletal:         General: No swelling or deformity. Cervical back: Neck supple. No rigidity. Right lower leg: No edema. Left lower leg: No edema. Skin:     General: Skin is dry. Coloration: Skin is not jaundiced. Findings: No rash. Neurological:      General: No focal deficit present. Mental Status: She is alert and oriented to person, place, and time. Psychiatric:         Mood and Affect: Mood normal.         Thought Content:  Thought content normal.       Past Medical History:     Past Medical History:   Diagnosis Date    Psoriasis        Past Surgical  History:     Past Surgical History:   Procedure Laterality Date    CERVICAL FUSION N/A 2023    C 3-7 POSTERIOR CERVICAL DECOMPRESSION AND FUSION performed by Madhu Phillips DO at [facility]  2023    ERCP  2023    ERCP STONE REMOVAL performed by Shirin Herrmann MD at [place] [address]    ERCP  2023    ERCP SPHINCTER/PAPILLOTOMY performed by Shirin Herrmann MD at [place] [address]    ERCP  2023    ERCP STENT INSERTION performed by Shirin Herrmann MD at [place] [address]       Medications:      cyanocobalamin  1,000 mcg IntraMUSCular Weekly    folic acid  1 mg Oral Daily    acetaminophen  1,000 mg Oral 3 times per day    gabapentin  200 mg Oral TID    traZODone  50 mg Oral Nightly    potassium chloride  20 mEq Oral BID WC    piperacillin-tazobactam  3,375 mg IntraVENous Q8H    lidocaine 1 % injection  5 mL IntraDERmal Once    sodium chloride flush  5-40 mL IntraVENous 2 times per day    [Held by provider] enoxaparin  1 mg/kg SubCUTAneous BID    [Held by provider] apixaban  5 mg Oral BID    sodium chloride flush  5-40 mL IntraVENous 2 times per day       Social History:     Social History     Socioeconomic History    Marital status: Single     Spouse name: Not on file    Number of children: Not on file    Years of education: Not on file    Highest education level: Not on file   Occupational History    Not on file   Tobacco Use    Smoking status: Former     Types: Cigarettes     Quit date: 2015     Years since quittin.1    Smokeless tobacco: Never   Vaping Use    Vaping Use: Never used   Substance and Sexual Activity    Alcohol use: Never    Drug use: Never    Sexual activity: Not on file   Other Topics Concern    Not on file   Social History Narrative    ** Merged History Encounter **          Social Determinants of Health     Financial Resource Strain: Not on file   Food Insecurity: Not on file Transportation Needs: Not on file   Physical Activity: Not on file   Stress: Not on file   Social Connections: Not on file   Intimate Partner Violence: Not on file   Housing Stability: Not on file       Family History:   History reviewed. No pertinent family history. Medical Decision Making:   I have independently reviewed/ordered the following labs:    CBC with Differential:   Recent Labs     03/13/23  0452 03/14/23  0337   WBC 3.4* 1.9*   HGB 10.4* 9.8*   HCT 33.9* 30.7*   PLT 93* 120*   LYMPHOPCT 29 32   MONOPCT 2 4       BMP:  Recent Labs     03/12/23  0402 03/13/23  0452 03/14/23  0337    139 137   K 2.8* 3.8 3.7   * 107 105   CO2 22 23 26   BUN 3* 3* 3*   CREATININE 0.20* 0.23* 0.20*   MG 1.4*  --   --        Hepatic Function Panel:   Recent Labs     03/11/23  1500   BILIDIR 0.5*   BILITOT 0.7       No results for input(s): RPR in the last 72 hours. No results for input(s): HIV in the last 72 hours. No results for input(s): BC in the last 72 hours. Lab Results   Component Value Date/Time    CREATININE 0.20 03/14/2023 03:37 AM    GLUCOSE 137 03/14/2023 03:37 AM       Detailed results: Thank you for allowing us to participate in the care of this patient. Please call with questions. This note is created with the assistance of a speech recognition program.  While intending to generate adocument that actually reflects the content of the visit, the document can still have some errors including those of syntax and sound a like substitutions which may escape proof reading. It such instances, actual meaningcan be extrapolated by contextual diversion. Yimi Boswell  Office: [phone]  Perfect serve / office [phone]        I have discussed the care of the patient, including pertinent history and exam findings,  with the resident. I have seen and examined the patient and the key elements of all parts of the encounter have been performed by me.   I agree with the assessment, plan and orders as documented by the resident.     Amie Barrientos, Infectious Diseases

## 2023-03-14 NOTE — PROGRESS NOTES
[facility]  Office: [phone]  Kwabena Jacob DO, Nilesh Cleveland DO, Tushar Garland DO, David Hooper DO, Jenny Loza MD, Lupe Almeida MD, Neyda Cullen MD, Tory Cash MD,  Mike Wilson MD, Ofelia Lemus MD, Mic Oreilly DO, Marquise Terry MD,  Rosemarie Dailey DO, Kirill Peter MD, Davi Valencia MD, Ry Jacob DO, Sandra Garvey MD, João Kaiser MD, Byron Grewal DO, Aleena Steven MD, Kristin Escoto MD, Flory Turner MD, Evelyn Kwan MD, Argenis Cameron MD, Jarred Randolph DO, Charity Wiggins MD, Fransisco Green MD, Shirley Waterhouse, CNP,  Snadrita Sood, CNP, Edwina Butt, CNP, Nickolas Koroma, CNP,  Jocelin Prakash, RISA, Fawn Perry, CNP, Kylee Alvares, CNP, Saba Cerda, CNP, Jana Vega, CNP, Lindsay Moralez, CNP, dEward Carvajal PA-C, Becca Alvarez, CNS, Enedelia Malone, CNP, Lolis Juarez, CNP         [facility]   IN-PATIENT SERVICE   [facility]    Progress Note    3/14/2023    7:52 AM    Name:   Kristie Hogan  MRN:     [id number]     Acct:      [id number]   Room:   0107/0107-01   Day:  6  Admit Date:  3/8/2023  8:48 AM    PCP:   Ruddy Rice MD  Code Status:  Full Code    Subjective:     C/C:   Chief Complaint   Patient presents with    Altered Mental Status    Hypotension     Interval History Status: improved.     Patient awake and alert.  Answering appropriately.   Complains of right lower abdominal pain.  Otherwise no acute issues overnight.     Brief History:     73-year-old female presents to the emergency department for altered mental status and hypotension.  Patient initially was sent to ICU for septic shock and acute respiratory failure.  Patient was intubated and remained in the ICU for subsequent days.  Eventually she recovered after undergoing ERCP for ascending cholangitis/choledocholithiasis.  Patient was seen by general surgery, gastroenterology.  Patient has since also been placed on argatroban for concern  for HIT after noticing thrombocytopenia. Patient improved and HIT was negative, now transitioned to Lovenox. Review of Systems:     Constitutional:  negative for chills, fevers, sweats  Respiratory:  negative for cough, dyspnea on exertion, shortness of breath, wheezing  Cardiovascular:  negative for chest pain, chest pressure/discomfort, lower extremity edema, palpitations  Gastrointestinal:  positive for RLQ abdominal pain, Neg for constipation, diarrhea, nausea, vomiting  Neurological:  negative for dizziness, headache    Medications: Allergies:  No Known Allergies    Current Meds:   Scheduled Meds:    cyanocobalamin  1,000 mcg IntraMUSCular Weekly    folic acid  1 mg Oral Daily    acetaminophen  1,000 mg Oral 3 times per day    gabapentin  200 mg Oral TID    traZODone  50 mg Oral Nightly    potassium chloride  20 mEq Oral BID WC    piperacillin-tazobactam  3,375 mg IntraVENous Q8H    lidocaine 1 % injection  5 mL IntraDERmal Once    sodium chloride flush  5-40 mL IntraVENous 2 times per day    [Held by provider] enoxaparin  1 mg/kg SubCUTAneous BID    [Held by provider] apixaban  5 mg Oral BID    sodium chloride flush  5-40 mL IntraVENous 2 times per day     Continuous Infusions:    sodium chloride      dextrose 5% in lactated ringers 50 mL/hr at 03/14/23 0400    dextrose      sodium chloride Stopped (03/10/23 1542)     PRN Meds: oxyCODONE, potassium chloride **OR** potassium alternative oral replacement **OR** potassium chloride, sodium chloride flush, sodium chloride, glucose, dextrose bolus **OR** dextrose bolus, glucagon (rDNA), dextrose, sodium chloride flush, sodium chloride, ondansetron **OR** ondansetron, polyethylene glycol    Data:     Past Medical History:   has a past medical history of Psoriasis. Social History:   reports that she quit smoking about 8 years ago. Her smoking use included cigarettes.  She has never used smokeless tobacco. She reports that she does not drink alcohol and does not use drugs. Family History: History reviewed. No pertinent family history. Vitals:  BP (!) 108/53   Pulse 62   Temp 98.1 °F (36.7 °C) (Oral)   Resp 18   Wt 155 lb 10.3 oz (70.6 kg)   SpO2 98%   BMI 24.38 kg/m²   Temp (24hrs), Av.9 °F (36.6 °C), Min:97.5 °F (36.4 °C), Max:98.2 °F (36.8 °C)    Recent Labs     23  1616 23  1939 23  0026 23  0341   POCGLU 206* 169* 148* 134*         I/O (24Hr): Intake/Output Summary (Last 24 hours) at 3/14/2023 0752  Last data filed at 3/14/2023 0400  Gross per 24 hour   Intake 3992.31 ml   Output 2060 ml   Net 1932.31 ml         Labs:  Hematology:  Recent Labs     23  1500 23  04023   WBC  --  3.9 3.4* 1.9*   RBC  --  2.96* 3.05* 2.90*   HGB  --  9.9* 10.4* 9.8*   HCT  --  31.6* 33.9* 30.7*   MCV  --  106.8* 111.1* 105.9*   MCH  --  33.4 34.1* 33.8*   MCHC  --  31.3 30.7 31.9   RDW  --  16.2* 16.5* 16.1*   PLT  --  82* 93* 120*   MPV  --  12.3 11.4 10.7   INR 1.5  --   --   --        Chemistry:  Recent Labs     23  0402 23    139 137   K 2.8* 3.8 3.7   * 107 105   CO2 22 23 26   GLUCOSE 127* 120* 137*   BUN 3* 3* 3*   CREATININE 0.20* 0.23* 0.20*   MG 1.4*  --   --    ANIONGAP 8* 9 6*   LABGLOM >60 >60 >60   CALCIUM 7.4* 7.4* 6.9*       Recent Labs     23  1500 23  1631 23  0734 23  1139 23  1616 23  1939 23  0026 23  0341   *  --   --   --   --   --   --   --    BILITOT 0.7  --   --   --   --   --   --   --    BILIDIR 0.5*  --   --   --   --   --   --   --    POCGLU  --    < > 117* 139* 206* 169* 148* 134*    < > = values in this interval not displayed.        ABG:  Lab Results   Component Value Date/Time    POCPH 7.425 2023 04:24 AM    POCPCO2 25.3 2023 04:24 AM    POCPO2 111.5 2023 04:24 AM    POCHCO3 16.6 2023 04:24 AM    NBEA 6 2023 04:24 AM    PEPG1PLB 99 2023 04:24 AM    FIO2 30.0 03/09/2023 04:24 AM     Lab Results   Component Value Date/Time    SPECIAL RH 2ML 03/11/2023 01:15 PM     Lab Results   Component Value Date/Time    CULTURE NO GROWTH 2 DAYS 03/11/2023 01:15 PM       Radiology:  XR CERVICAL SPINE (2-3 VIEWS)    Result Date: 3/10/2023  Stable cervical fusion     CT HEAD WO CONTRAST    Result Date: 3/8/2023  No acute intracranial abnormality. Mild cerebral atrophy. White matter microvascular disease. CT CERVICAL SPINE WO CONTRAST    Result Date: 3/8/2023  Postoperative changes as described above. No evidence for any fluid collections in the area to suggest CSF leak or abscess. CT ABDOMEN PELVIS W IV CONTRAST Additional Contrast? None    Result Date: 3/8/2023  Chest: 1. Small thrombus load acute pulmonary embolism. A few filling defects in subsegmental branches posterior basal right lower lobe. 2. Moderate-size posterior basal left lower lobe patchy consolidation and much smaller posterior basal right lower lobe patchy consolidation compatible with atelectasis. Abdomen pelvis: 1. Cholelithiasis without evidence for acute cholecystitis. 2. Intra and extrahepatic biliary ductal dilatation. Diffuse dilatation of CBD up to 14 mm diameter down to the ampulla. There could be non radiopaque calculus, stricture or periampullary lesion causing the obstruction. Given that this has developed since the prior study possible non radiopaque distal obstructing choledocholithiasis is most favored. MRCP or ERCP is recommended. 3. Probable endometrial  thickening up to 9 mm thickness. Correlation with non emergency outpatient pelvic ultrasound is recommended. XR CHEST PORTABLE    Result Date: 3/10/2023  1. Endotracheal tube tip is approximately 0.9 cm from the lulu; however, the tip is somewhat directed towards the right mainstem bronchus. Consider repositioning. 2. Patchy right upper lobe airspace opacity and left basilar consolidation.  3. Multiple air-filled dilated loops of small bowel measuring up to 3.3 cm in maximum diameter. Findings favor ileus over small bowel obstruction. 4. Enteric tube tip and side-port project over the stomach. 5. Right central venous catheter tip projecting over the cavoatrial junction. XR CHEST PORTABLE    Result Date: 3/8/2023  Trace left basilar effusion with adjacent infiltrate. CT CHEST PULMONARY EMBOLISM W CONTRAST    Result Date: 3/8/2023  Chest: 1. Small thrombus load acute pulmonary embolism. A few filling defects in subsegmental branches posterior basal right lower lobe. 2. Moderate-size posterior basal left lower lobe patchy consolidation and much smaller posterior basal right lower lobe patchy consolidation compatible with atelectasis. Abdomen pelvis: 1. Cholelithiasis without evidence for acute cholecystitis. 2. Intra and extrahepatic biliary ductal dilatation. Diffuse dilatation of CBD up to 14 mm diameter down to the ampulla. There could be non radiopaque calculus, stricture or periampullary lesion causing the obstruction. Given that this has developed since the prior study possible non radiopaque distal obstructing choledocholithiasis is most favored. MRCP or ERCP is recommended. 3. Probable endometrial  thickening up to 9 mm thickness. Correlation with non emergency outpatient pelvic ultrasound is recommended. XR ABDOMEN FOR NG/OG/NE TUBE PLACEMENT    Result Date: 3/10/2023  1. Endotracheal tube tip is approximately 0.9 cm from the lulu; however, the tip is somewhat directed towards the right mainstem bronchus. Consider repositioning. 2. Patchy right upper lobe airspace opacity and left basilar consolidation. 3. Multiple air-filled dilated loops of small bowel measuring up to 3.3 cm in maximum diameter. Findings favor ileus over small bowel obstruction. 4. Enteric tube tip and side-port project over the stomach. 5. Right central venous catheter tip projecting over the cavoatrial junction. Physical Examination:        General appearance:  Awake, alert, cooperative and no distress  Mental Status:   Awake, alert, answering appropriately. Lungs:  clear to auscultation bilaterally, normal effort  Heart:  regular rate and rhythm, no murmur  Abdomen: Mild right lower quadrant tenderness present. Nondistented. Normal bowel sounds. Extremities:  no edema, redness, tenderness in the calves  Skin:  no gross lesions, rashes, induration    Assessment:        Hospital Problems             Last Modified POA    * (Principal) Septic shock ([address] 3/8/2023 Yes    Ascending cholangitis 3/8/2023 Yes    Cholelithiasis 3/8/2023 Yes    Gram negative septicemia ([address] 3/8/2023 Yes    Choledocholithiasis 3/8/2023 Yes    Acute respiratory failure with hypoxia ([address] 3/8/2914 Yes    Metabolic encephalopathy 4/7/1797 Yes    Acute pulmonary embolism ([address] 3/8/2023 Yes    Altered mental status 3/10/2023 Yes    Elevated brain natriuretic peptide (BNP) level 3/10/2023 Yes    E. coli septicemia ([address] 3/13/2023 Yes    Cholecystitis 3/13/2023 Yes   Plan:        -Septic Shock-blood pressure in low normal range. Off vasopressors. Continued on Zosyn. Appreciate ID recommendations. -E. coli septicemia likely secondary to cholangitis. -Continued on Zosyn. Appreciate ID recommendations. -Ascending Cholangitis s/p ERCP by GI. S/p lap cholecystectomy 3/13. Advance to low-fat diet. Tylenol, Neurontin and Deysi for pain control. Okay to resume AC per Gen surg. Appreciate general surgery recommendations. --Toxic metabolic encephalopathy-improved    -Acute pulmonary embolism-resume Lovenox.    -Thrombocytopenia-HIT rule out. Will resume Lovenox once seen by heme-onc  -Pancytopenia likely from sepsis-awaited heme-onc recommendations. -Macrocytic anemia likely from B12 deficiency. Continued on vitamin B12 supplements.   Awaited heme-onc recommendations.    -Status post fall with subsequent laminectomy from previous hospitalization-stable  PT OT eval  Discharge planning-once acute issues resolved.       Noa Owusu MD  3/14/2023  7:52 AM

## 2023-03-14 NOTE — CONSULTS
Today's Date: 3/14/2023  Patient Name: Louie Amor  Date of admission: 3/8/2023  8:48 AM  Patient's age: 75 y.o., 1950  Admission Dx: Choledocholithiasis [K80.50]  Acute UTI [N39.0]  Elevated brain natriuretic peptide (BNP) level [R79.89]  Septic shock (HCC) [A41.9, R65.21]  Altered mental status, unspecified altered mental status type [R41.82]  Other acute pulmonary embolism, unspecified whether acute cor pulmonale present ([facility] [address] [I26.99]    Reason for Consult: Pancytopenia  Requesting Physician: Luciano Kirkland MD    Chief Complaint: Altered mentation    History Obtained From:  patient, electronic medical record    History of Present Illness: The patient is a 75 y.o. female who is admitted to the hospital for septic shock with cholangitis and cholelithiasis s/p laparoscopic cholecystectomy 3/13/2023. Patient is a poor historian and does not recall her past history. Chart review shows history of psoriasis on weekly methotrexate in 8266 and daily folic acid. Patient initially presented with altered mentation, and hypotension was admitted to the ICU for septic shock and acute respiratory failure, required intubation and pressors. Found to have cholangitis with cholelithiasis underwent laparoscopic cholecystectomy. Blood and urine cultures positive for E. coli. Patient also had thrombocytopenia with concern for HIT and placed on argatroban. HIT antiplatelet antibody normal.  platelet count has improved and patient transition to Lovenox. Thrombocytopenia has improved from a low of  today. Patient noted to be anemic with hemoglobin stable around 10. Patient has had a stable decline in white cell count currently at 1.9. [address] 1170, lymphocytes 610. Patient is currently hemodynamically stable, /53, pulse 67, requiring 1 L nasal cannula oxygen. Patient denies any family history of abnormal bleeding or clotting. Denies history of cancer.   Denies easy bruising, petechia, cough, hematemesis, melena, recurrent infections. Past Medical History:   has a past medical history of Psoriasis. Past Surgical History:   has a past surgical history that includes cervical fusion (N/A, 01/18/2023); ERCP (03/08/2023); ERCP (03/08/2023); ERCP (03/08/2023); and Cholecystectomy (03/13/2023). Medications:    Prior to Admission medications    Medication Sig Start Date End Date Taking? Authorizing Provider   methocarbamol (ROBAXIN) 750 MG tablet Take 750 mg by mouth daily   Yes Historical Provider, MD   acetaminophen (TYLENOL) 500 MG tablet Take 1,000 mg by mouth every 6 hours as needed for Pain   Yes Historical Provider, MD   ipratropium-albuterol (DUONEB) 0.5-2.5 (3) MG/3ML SOLN nebulizer solution  2/23/23   Historical Provider, MD   gabapentin (NEURONTIN) 300 MG capsule Take 1 capsule by mouth in the morning and 1 capsule at noon and 1 capsule in the evening. Do all this for 7 days.  1/20/23 1/27/23  Edu Adamson, DO   triamcinolone (KENALOG) 0.1 % cream Apply to active psoriasis twice daily (not face, armpit or groin) 7/31/19   Gloria Day MD   fluocinonide (LIDEX) 0.05 % external solution Apply to scalp psoriasis daily for rash 7/31/19   Gloria Day MD     Current Facility-Administered Medications   Medication Dose Route Frequency Provider Last Rate Last Admin    oxyCODONE (ROXICODONE) immediate release tablet 2.5 mg  2.5 mg Oral Q12H PRN Rondell Lesches, DO   2.5 mg at 03/14/23 0741    cyanocobalamin injection 1,000 mcg  1,000 mcg IntraMUSCular Weekly Lyubov Hudson DO   1,000 mcg at 11/01/[birth year] 7740    folic acid (FOLVITE) tablet 1 mg  1 mg Oral Daily Lyubov Hudson DO   1 mg at 03/14/23 0747    acetaminophen (TYLENOL) tablet 1,000 mg  1,000 mg Oral 3 times per day Lyubov Hudson DO   1,000 mg at 03/14/23 0519    gabapentin (NEURONTIN) capsule 200 mg  200 mg Oral TID Rudean Tristan, DO   200 mg at 03/13/23 2020    traZODone (DESYREL) tablet 50 mg  50 mg Oral Nightly [place] Alejandra, DO   50 mg at 03/13/23 2020    potassium chloride (KLOR-CON M) extended release tablet 40 mEq  40 mEq Oral PRN Kiley Bread, DO        Or    potassium bicarb-citric acid (EFFER-K) effervescent tablet 40 mEq  40 mEq Oral PRN Kiley Bread, DO        Or    potassium chloride 10 mEq/100 mL IVPB (Peripheral Line)  10 mEq IntraVENous PRN Kiley Bread,  mL/hr at 03/12/23 0814 10 mEq at 03/12/23 0814    potassium chloride (KLOR-CON M) extended release tablet 20 mEq  20 mEq Oral BID WC Kiley Bread, DO   20 mEq at 03/14/23 0747    piperacillin-tazobactam (ZOSYN) 3,375 mg in sodium chloride 0.9 % 50 mL IVPB (mini-bag)  3,375 mg IntraVENous Q8H Kiley Bread, DO   Stopped at 03/14/23 0650    lidocaine 1 % injection 5 mL  5 mL IntraDERmal Once Kiley Bread, DO        sodium chloride flush 0.9 % injection 5-40 mL  5-40 mL IntraVENous 2 times per day Kiley Bread, DO   10 mL at 03/13/23 0909    sodium chloride flush 0.9 % injection 5-40 mL  5-40 mL IntraVENous PRN Kiley Bread, DO        0.9 % sodium chloride infusion  25 mL IntraVENous PRN Kiley Bread, DO        [Held by provider] enoxaparin (LOVENOX) injection 70 mg  1 mg/kg SubCUTAneous BID Kiley Bread, DO   70 mg at 03/12/23 0808    dextrose 5 % in lactated ringers infusion   IntraVENous Continuous Kiley Bread, DO 50 mL/hr at 03/14/23 0400 Rate Verify at 03/14/23 0400    glucose chewable tablet 16 g  4 tablet Oral PRN Kiley Bread, DO        dextrose bolus 10% 125 mL  125 mL IntraVENous PRN Kiley Bread, DO        Or    dextrose bolus 10% 250 mL  250 mL IntraVENous PRN Kiley Bread, DO        glucagon (rDNA) injection 1 mg  1 mg SubCUTAneous PRN Kiley Bread, DO        dextrose 10 % infusion   IntraVENous Continuous PRN Kiley Bread, DO        [Held by provider] apixaban Catia Dutton) tablet 5 mg  5 mg Oral BID Kiley Bread, DO   5 mg at 03/10/23 1352    sodium chloride flush 0.9 % injection 5-40 mL  5-40 mL IntraVENous 2 times per day Pau Siu Bernstein, DO   10 mL at 03/10/23 0757    sodium chloride flush 0.9 % injection 5-40 mL  5-40 mL IntraVENous PRN Elin Zamora, DO        0.9 % sodium chloride infusion   IntraVENous PRN Elin Zamora DO   Stopped at 03/10/23 1542    ondansetron (ZOFRAN-ODT) disintegrating tablet 4 mg  4 mg Oral Q8H PRN Elin Zamora DO        Or    ondansetron [facility]) injection 4 mg  4 mg IntraVENous Q6H PRN Elin Zamora DO        polyethylene glycol (GLYCOLAX) packet 17 g  17 g Oral Daily PRN Elin Zamora DO           Allergies:  Patient has no known allergies. Social History:   reports that she quit smoking about 8 years ago. Her smoking use included cigarettes. She has never used smokeless tobacco. She reports that she does not drink alcohol and does not use drugs. Family History: family history is not on file. Review of Symptoms:    Constitutional: No fever or chills. No night sweats, no weight loss   Eyes: No eye discharge, double vision, or eye pain   HEENT: negative for sore mouth, sore throat, hoarseness and voice change   Respiratory: negative for cough , sputum, dyspnea, wheezing, hemoptysis, chest pain   Cardiovascular: negative for chest pain, dyspnea, palpitations, orthopnea, PND   Gastrointestinal: negative for nausea, vomiting, diarrhea, constipation, abdominal pain, Dysphagia, hematemesis and hematochezia   Genitourinary: negative for frequency, dysuria, nocturia, urinary incontinence, and hematuria   Integument: negative for rash, skin lesions, bruises.    Hematologic/Lymphatic: negative for easy bruising, bleeding, lymphadenopathy, or petechiae   Endocrine: negative for heat or cold intolerance,weight changes, change in bowel habits and hair loss   Musculoskeletal: negative for myalgias, arthralgias, pain, joint swelling,and bone pain   Neurological: negative for headaches, dizziness, seizures, weakness, numbness    PHYSICAL EXAM:      BP (!) 108/53   Pulse 67   Temp 98.1 °F (36.7 °C) (Oral) Resp 22   Wt 155 lb 10.3 oz (70.6 kg)   SpO2 95%   BMI 24.38 kg/m²    Temp (24hrs), Av.8 °F (36.6 °C), Min:97.5 °F (36.4 °C), Max:98.1 °F (36.7 °C)      General appearance - well appearing, no in pain or distress .  Psoriatic scales on forehead  Mental status - alert and cooperative   Eyes - pupils equal and reactive, extraocular eye movements intact.  Conjunctivae pale  Ears - bilateral TM's and external ear canals normal   Mouth - mucous membranes moist, pharynx normal without lesions   Neck - supple, no significant adenopathy   Lymphatics - no palpable lymphadenopathy, no hepatosplenomegaly   Chest - clear to auscultation, no wheezes, rales or rhonchi, symmetric air entry   Heart - normal rate, regular rhythm, normal S1, S2, no murmurs  Abdomen - soft, nontender, nondistended, no masses or organomegaly   Neurological - alert, oriented, normal speech, no focal findings or movement disorder noted   Musculoskeletal - no joint tenderness, deformity or swelling   Extremities - peripheral pulses normal, no pedal edema, no clubbing or cyanosis   Skin - normal coloration and turgor, no rashes, no suspicious skin lesions noted , psoriatic scaly lesions on bilateral lower extremities    Labs:   Complete Blood Count:   Recent Labs     23  0402 23  0452 23  0337   WBC 3.9 3.4* 1.9*   HGB 9.9* 10.4* 9.8*   .8* 111.1* 105.9*   PLT 82* 93* 120*   RBC 2.96* 3.05* 2.90*   HCT 31.6* 33.9* 30.7*   MCH 33.4 34.1* 33.8*   MCHC 31.3 30.7 31.9   RDW 16.2* 16.5* 16.1*   MPV 12.3 11.4 10.7      PT/INR:    Lab Results   Component Value Date/Time    PROTIME 15.7 2023 03:00 PM    INR 1.5 2023 03:00 PM     PTT:    Lab Results   Component Value Date/Time    APTT 40.6 2023 03:00 PM       Basal Metabolic Profile:   Recent Labs     23  0402 23  0452 23  0337    139 137   K 2.8* 3.8 3.7   BUN 3* 3* 3*   CREATININE 0.20* 0.23* 0.20*   * 107 105   CO2 22 23 26     LFTs  Recent Labs     03/11/23  1500   BILITOT 0.7   BILIDIR 0.5*       Imaging:  XR CERVICAL SPINE (2-3 VIEWS)    Result Date: 3/10/2023  Stable cervical fusion     CT HEAD WO CONTRAST    Result Date: 3/8/2023  No acute intracranial abnormality. Mild cerebral atrophy. White matter microvascular disease. CT CERVICAL SPINE WO CONTRAST    Result Date: 3/8/2023  Postoperative changes as described above. No evidence for any fluid collections in the area to suggest CSF leak or abscess. CT ABDOMEN PELVIS W IV CONTRAST Additional Contrast? None    Result Date: 3/8/2023  Chest: 1. Small thrombus load acute pulmonary embolism. A few filling defects in subsegmental branches posterior basal right lower lobe. 2. Moderate-size posterior basal left lower lobe patchy consolidation and much smaller posterior basal right lower lobe patchy consolidation compatible with atelectasis. Abdomen pelvis: 1. Cholelithiasis without evidence for acute cholecystitis. 2. Intra and extrahepatic biliary ductal dilatation. Diffuse dilatation of CBD up to 14 mm diameter down to the ampulla. There could be non radiopaque calculus, stricture or periampullary lesion causing the obstruction. Given that this has developed since the prior study possible non radiopaque distal obstructing choledocholithiasis is most favored. MRCP or ERCP is recommended. 3. Probable endometrial  thickening up to 9 mm thickness. Correlation with non emergency outpatient pelvic ultrasound is recommended. XR CHEST PORTABLE    Result Date: 3/10/2023  1. Endotracheal tube tip is approximately 0.9 cm from the lulu; however, the tip is somewhat directed towards the right mainstem bronchus. Consider repositioning. 2. Patchy right upper lobe airspace opacity and left basilar consolidation. 3. Multiple air-filled dilated loops of small bowel measuring up to 3.3 cm in maximum diameter. Findings favor ileus over small bowel obstruction.  4. Enteric tube tip and side-port project over the stomach. 5. Right central venous catheter tip projecting over the cavoatrial junction. XR CHEST PORTABLE    Result Date: 3/8/2023  Trace left basilar effusion with adjacent infiltrate. CT CHEST PULMONARY EMBOLISM W CONTRAST    Result Date: 3/8/2023  Chest: 1. Small thrombus load acute pulmonary embolism. A few filling defects in subsegmental branches posterior basal right lower lobe. 2. Moderate-size posterior basal left lower lobe patchy consolidation and much smaller posterior basal right lower lobe patchy consolidation compatible with atelectasis. Abdomen pelvis: 1. Cholelithiasis without evidence for acute cholecystitis. 2. Intra and extrahepatic biliary ductal dilatation. Diffuse dilatation of CBD up to 14 mm diameter down to the ampulla. There could be non radiopaque calculus, stricture or periampullary lesion causing the obstruction. Given that this has developed since the prior study possible non radiopaque distal obstructing choledocholithiasis is most favored. MRCP or ERCP is recommended. 3. Probable endometrial  thickening up to 9 mm thickness. Correlation with non emergency outpatient pelvic ultrasound is recommended. XR ABDOMEN FOR NG/OG/NE TUBE PLACEMENT    Result Date: 3/10/2023  1. Endotracheal tube tip is approximately 0.9 cm from the lulu; however, the tip is somewhat directed towards the right mainstem bronchus. Consider repositioning. 2. Patchy right upper lobe airspace opacity and left basilar consolidation. 3. Multiple air-filled dilated loops of small bowel measuring up to 3.3 cm in maximum diameter. Findings favor ileus over small bowel obstruction. 4. Enteric tube tip and side-port project over the stomach. 5. Right central venous catheter tip projecting over the cavoatrial junction.        Impression:   Primary Problem  Septic shock [facility])  Active Hospital Problems    Diagnosis Date Noted    E. [facility] septic[facility] ([facility] [A41.51] 03/13/2023 Priority: Medium    Cholecystitis [K81.9] 03/13/2023     Priority: Medium    Altered mental status [R41.82] 03/10/2023     Priority: Medium    Elevated brain natriuretic peptide (BNP) level [R79.89] 03/10/2023     Priority: Medium    Septic shock ([address] [A41.9, R65.21] 03/08/2023     Priority: Medium    Ascending cholangitis [K83.09] 03/08/2023     Priority: Medium    Cholelithiasis [K80.20] 03/08/2023     Priority: Medium    Gram negative septicemia ([address] [A41.50] 03/08/2023     Priority: Medium    Choledocholithiasis [K80.50] 03/08/2023     Priority: Medium    Acute respiratory failure with hypoxia ([address] [J96.01] 03/08/2023     Priority: Medium    Metabolic encephalopathy [A27.15] 03/08/2023     Priority: Medium    Acute pulmonary embolism ([address] [I26.99] 03/08/2023     Priority: Medium         Assessment and Plan:    Pancytopenia likely due to sepsis  Leukopenia 1.9 with ANC 1170, lymphopenia 610  Stable macrocytic anemia, possibly due to vitamin B12 deficiency, hypoproliferative  Improving thrombocytopenia  Vitamin B-12 deficiency  Cholangitis with cholelithiasis s/p laparoscopic cholecystectomy 3/13/2023  Septic shock with E. coli septicemia-resolved  Acute hypoxic respiratory failure requiring intubation-resolved  Complicated UTI  History of cervical laminectomy  Psoriasis, previously on weekly methotrexate 15 mg and folic acid daily in [facility]. Plan    Continue to monitor blood cell counts as they are improving except for leukopenia. Check flow cytometry, peripheral smear  Vitamin X19 and folic acid supplementation  Further recommendations following discussion with attending      Discussed with patient and Nurse. Thank you for asking us to see this patient. Luis E López MD  Internal Medicine Resident, [facility];  [place], [place]  3/14/2023, 8:22 AM        Attending Physician Statement  The patient was seen and examined during rounds, I have discussed the care of Augustine Menezes Beatriz Lefort, including pertinent history and exam findings with the resident. I have reviewed the key elements of all parts of the encounter with the resident. I agree with the assessment, and status of the problem list as documented. Additional assessment/ plan       Patient seen and examined  Lab results reviewed  Progressive leukopenia with mild neutropenia. Hemoglobin 9.8 with MCV of 105. Platelet count is 578. CT chest and pelvis from 3/23 showed small thrombus load acute pulmonary embolism in the right lower lobe  Showed left lower lobe consolidation  Spleen was unremarkable patient noted to have undetectable vitamin B12. We will order work-up for pancytopenia including peripheral smear. Obtain flow cytometry  Patient pancytopenia can be explained by patient B12 deficiency. We will change intramuscular injection to daily for the next 5 days. Okay to give anticoagulation as long as platelet count is above 50 and no evidence of bleeding  We will obtain iron studies  We will also order for paraproteinemia  Transfuse to keep hemoglobin above 7        Electronically signed by   Pair Irene MD    on 3/14/2023 at 7:11 PM              This note is created with the assistance of a speech recognition program.  While intending to generate a document that actually reflects the content of the visit, the document can still have some errors including those of syntax and sound a like substitutions which may escape proof reading. It such instances, actual meaning can be extrapolated by contextual diversion.

## 2023-03-14 NOTE — PROGRESS NOTES
Speech Language Pathology  [facility]    Dysphagia Treatment Note    Date: 3/14/2023  Patients Name: Char Stanton  MRN: [id number]    Patient Active Problem List   Diagnosis Code    Mild ankle sprain, initial encounter S93.409A    Mild sprain of right ankle, initial encounter S93.401A    Stenosis of cervical spine with myelopathy ([address] M48.02, G99.2    Central cord syndrome ([address] S14.129A    Septic shock ([address] A41.9, R65.21    Ascending cholangitis K83.09    Cholelithiasis K80.20    Gram negative septicemia ([address] A41.50    Choledocholithiasis K80.50    Acute respiratory failure with hypoxia ([address] M30.49    Metabolic encephalopathy M81.87    Acute pulmonary embolism (HCC) I26.99    Altered mental status R41.82    Elevated brain natriuretic peptide (BNP) level R79.89    E. coli septicemia (HCC) A41.51    Cholecystitis K81.9       Pain: Pt does not complain of pain. Dysphagia Treatment  Treatment time:[id number]    Subjective: [x] Alert [x] Cooperative     [] Confused     [] Agitated    [] Lethargic    Objective/Assessment:  Pt. Seen for treatment program for dysphagia. Pt completed trials of thin by straw and Dysphagia  minced and moist (Dysphagia II) for diet tolerance monitoring as she was downgraded 3/12. Pt reports that she still choi snot have access to her dentures. Pt able to consume lunch tray that she had started prior to [address] [place] [address] arrival with no s/s of dysphagia. Pt required 100% assistance feeding this date. Recommend pt continue with current diet level Dysphagia Minced and Moist (Dysphagia II) and thin liquids. Plan:  [x] Continue ST services    [] Discharge from ST:        Discharge recommendations: []  Further therapy recommended at discharge. The patient should be able to tolerate at least 3 hours of therapy per day over 5 days or 15 hours over 7 days. [] Further therapy recommended at discharge. [x] No therapy recommended at discharge.        Satya Mendez M.S., CCC-SLP    Treatment completed by: Danitza Mayer, SLP

## 2023-03-14 NOTE — PROGRESS NOTES
Physical Therapy  Facility/Department: [address] NEURO  Physical Therapy Initial Assessment    Name: Violette Degroot  : 1950  MRN: [id number]  Date of Service: 3/14/2023  History copied and pasted from H+P  Patient has a history of central cord syndrome with history of decompressive laminectomy of cervical spine by neurosurgery on 2023 and had been in rehabilitation apparently was more somnolent and had altered mental status she was also febrile and tachycardic. She was sent to emergency room and there she was significantly febrile, tachycardic with heart rate of 130s to 140s with sinus tachycardia hypoxic requiring 4 L of oxygen altered mental status and was tachypneic. Patient in the emergency room had received 750 mL of lactated Ringer's for hypotension and is started on Levophed. She had a CT scan of the head done which was negative for acute bleeding or infarction CT cervical spine was done also and was not suggestive of any leak or infectious cause the ER CT scan of the abdomen shows that she has bili direct dilatation bilirubin were elevated, she was febrile of greater than 101 and tachycardic in the emergency room. CTA chest was positive for small subsegmental isolated right lower lobe pulm embolism very small lower. Patient did receive 1 dose of Lovenox full dose by ER attending. GI was consulted and patient was taken directly to the OR and had ERCP done she was intubated in the emergency room and will in the OR and was kept intubated. Dr. Koko Marcus had performed ERCP and she had a stone retracted from the bile duct no incision of ampulla was done because of patient received Lovenox and a stent was placed. Pt underwent LAPAROSCOPIC CHOLECYSTECTOMY 3/13/23. Discharge Recommendations:  Further therapy recommended at discharge.         PT Equipment Recommendations  Equipment Needed: No      Patient Diagnosis(es): The primary encounter diagnosis was Other acute pulmonary embolism, unspecified whether acute cor pulmonale present ([facility] [address] Diagnoses of Acute UTI, Septic shock ([facility] [address] Choledocholithiasis, Elevated brain natriuretic peptide (BNP) level, Altered mental status, unspecified altered mental status type, and Cholangitis were also pertinent to this visit. Past Medical History:  has a past medical history of Psoriasis. Past Surgical History:  has a past surgical history that includes cervical fusion (N/A, 01/18/2023); ERCP (03/08/2023); ERCP (03/08/2023); ERCP (03/08/2023); and Cholecystectomy (03/13/2023). Assessment   Pt cooperative, agreeable to dangle EOB; decreased awareness of deficits; exhibits ataxic movements BUEs, increased extensor tone BLEs, L worse than R. Able to dangle EOB with max A+1, unable to place feet on floor d/t inability to flex BLEs in sitting, used her RUE for righting, didn't use LUE. Unsafe to attempt standing. Body Structures, Functions, Activity Limitations Requiring Skilled Therapeutic Intervention: Decreased functional mobility ; Decreased ROM; Decreased strength;Decreased cognition;Decreased balance;Decreased fine motor control;Decreased coordination; Increased pain;Decreased posture  Therapy Prognosis: Guarded  Decision Making: High Complexity  Requires PT Follow-Up: Yes  Activity Tolerance  Activity Tolerance: Patient limited by pain     Plan   Physcial Therapy Plan  General Plan:  (5 visits weekly)  Current Treatment Recommendations: Strengthening, ROM, Balance training, Functional mobility training, Transfer training, Endurance training, Wheelchair mobility training, Neuromuscular re-education, Pain management, Home exercise program, Safety education & training, Patient/Caregiver education & training, Equipment evaluation, education, & procurement, Positioning, Therapeutic activities  Safety Devices  Type of Devices: Bed alarm in place, Call light within reach, Patient at risk for falls, Left in bed, Nurse notified  Restraints  Restraints Initially in Place: No Restrictions  Restrictions/Precautions  Restrictions/Precautions: Surgical Protocols, Fall Risk, General Precautions, Up as Tolerated  Required Braces or Orthoses?: No  Position Activity Restriction  Other position/activity restrictions: pt had cervical surgery 1/18/23 and has been in ECF since     Subjective   Pain: pt denies pain in neck and abdomen; c/o B anterior thigh pain/\"cramping\" with knee flexion--able to passively flex R knee, pt unable to tolerate full ROM LLE d/t increased pain with ROM  General  Patient assessed for rehabilitation services?: Yes  Response To Previous Treatment: Not applicable  Family / Caregiver Present: No  Follows Commands: Impaired (needs some repetition, visual and tactile cues)         Social/Functional History  Social/Functional History  Lives With: Other (comment)  Type of Home: House (prior to surgery in January pt states she lived with her spouse in a two story house, bed/bath upstairs; pt has been at [facility] since January)  Home Layout: Two level, Bed/Bath upstairs  Home Access: Stairs to enter with rails  Entrance Stairs - Number of Steps: 4  Entrance Stairs - Rails: Both  Home Equipment: "[employer] Man, rolling  Has the patient had two or more falls in the past year or any fall with injury in the past year?: Unknown  Receives Help From: Family  ADL Assistance: Needs assistance  Toileting: Needs assistance  Ambulation Assistance: Non-ambulatory (pt states she was able to ambulate prior to cervical surgery in January with a rwalker, but she is an unreliable historian and I was unable to confirm that information)  Transfer Assistance: Needs assistance  Active : No  Patient's  Info:  or neighbor drive  Additional Comments:  (pt oriented x 3 (said the year was 2233) but questionable accuracy re: home situation and recent ECF stay)  Vision/Hearing  Vision  Vision: Impaired  Hearing  Hearing: Within functional limits    Cognition   Orientation  Overall Orientation Status: Impaired  Orientation Level: Oriented to place;Oriented to person;Disoriented to time;Disoriented to situation (pt remembered cholecystectomy, forgot cervical laminectomy)  Cognition  Overall Cognitive Status: Exceptions  Arousal/Alertness: Delayed responses to stimuli  Following Commands: Follows one step commands with increased time;Follows one step commands with repetition;Inconsistently follows commands  Attention Span: Attends with cues to redirect  Insights: Decreased awareness of deficits  Initiation: Requires cues for some  Sequencing: Requires cues for some     Objective   Heart Rate: 63  Heart Rate Source: Monitor  BP: (!) 77/56 (dangling at EOB ~15 minutes)  BP Location: Right upper arm  BP Method: Automatic  Patient Position: Sitting (EOB)  MAP (Calculated): 63  Resp: 16  SpO2: 96 %  O2 Device: None (Room air)     Observation/Palpation  Posture: Poor (leans posteriorly in sitting, B knees extended in front of pt)        PROM RLE (degrees)  RLE PROM: Exceptions--ankle dorsiflexion lacks ~7 degrees from neutral; other WFL with gentle PROM   PROM LLE (degrees)  LLE PROM: Exceptions--ankle dorsiflexion lacks ~10 degrees from neutral; knee flexion to ~45 degrees and limited by anterior thigh pain/cramping; hip WFL with knee flexed only slightly  PROM RUE (degrees)  RUE PROM: Exceptions--shoulder elevation to ~120 degrees; elbow distal WFL  PROM LUE (degrees)  LUE PROM: Exceptions--shoulder elevation to ~90 degrees and painful; elbow flexion to ~90 and painful; finger/wrist more limited than R but able to fist with assistance   Strength RLE  Strength RLE: Exception--grossly 2/5 hip; 2+/5knee, 3-/5 ankle  Strength LLE  Strength LLE: Exception--grossly 1/5 throughout, increased   Strength RUE  Strength RUE: Exception--ataxic movement noted; able to grossly move antigravity except shoulder, 2/5  Strength LUE  Strength LUE: Exception--weak grasp (2+/5); elbow flexion 1/5; no active elbow extension noted;  shoulder grossly 1/5; ataxic movement noted, but less so than RUE, possibly b/c she has less active movement of the LUE           Bed mobility  Rolling to Left: Dependent/Total  Rolling to Right: Dependent/Total  Supine to Sit: Dependent/Total  Sit to Supine: Dependent/Total  Scooting: Dependent/Total  Transfers  Sit to Stand: Unable to assess  Stand to Sit: Unable to assess  Bed to Chair: Unable to assess  Stand Pivot Transfers: Unable to assess  Ambulation  More Ambulation?: No  Stairs/Curb  Stairs?: No     Balance  Posture: Poor  Sitting - Static: Poor  Sitting - Dynamic: Poor  PROM Exercises: PROM x 4  A/AROM Exercises: AAROM RU/LEs  Static Sitting Balance Exercises: pt dangled EOB ~15 minutes, max A+1--intermittently uses RUE to right herself    AM-PAC Score  AM-PAC Inpatient Mobility Raw Score : 6 (03/14/23 1153)  AM-PAC Inpatient T-Scale Score : 23.55 (03/14/23 1153)  Mobility Inpatient CMS 0-100% Score: 100 (03/14/23 1153)  Mobility Inpatient CMS G-Code Modifier : CN (03/14/23 1153)          Goals  Short Term Goals  Time Frame for Short Term Goals: 12 visits  Short Term Goal 1: prevent contractures x 4 maximo B ankles, knees, shoulders and L fingers  Short Term Goal 2: bed mob mod A+1  Short Term Goal 3: pt to dangle EOB 15 minutes with CG+1  Short Term Goal 4: WC mobility using RU/LEs x 15' with mod A+1  Short Term Goal 5: transfers bed to chair sliding board vs maxi mary anne/maxi  with max A+2  Patient Goals   Patient Goals : be able to stand up by myself       Education  Patient Education  Education Given To: Patient  Education Provided: Role of Therapy;Plan of Care;Home Exercise Program;Precautions;Transfer Training; Fall Prevention Strategies  Education Method: Verbal  Education Outcome: Continued education needed      Therapy Time   Individual Concurrent Group Co-treatment   Time In 3076         Time Out 2129         [id number]         Timed Code Treatment Minutes: [address]       Katy Carig, SID

## 2023-03-14 NOTE — PROGRESS NOTES
General Surgery:  Daily Progress Note          PATIENT NAME: Char Stanton     TODAY'S DATE: 3/14/2023, 5:26 AM    SUBJECTIVE:     Pt seen and examined at bedside. No acute overnight events. Afebrile, vitals normal and stable. Patient denies nausea, vomiting, and abdominal pain. Pt reports spontaneous voiding. Pt states she is doing well. Tolerating diet though states that she drank a lot of water yesterday but did not have dinner. Voided postop. OBJECTIVE:   VITALS:  BP (!) 108/53   Pulse 62   Temp 98.1 °F (36.7 °C) (Oral)   Resp 15   Wt 155 lb 10.3 oz (70.6 kg)   SpO2 98%   BMI 24.38 kg/m²      INTAKE/OUTPUT:      Intake/Output Summary (Last 24 hours) at 3/14/2023 0526  Last data filed at 3/14/2023 0400  Gross per 24 hour   Intake 3992.31 ml   Output 2060 ml   Net 1932.31 ml       PHYSICAL EXAM:  General Appearance: awake, alert, oriented, in no acute distress  HEENT:  Normocephalic, atraumatic, mucus membranes moist    Heart: Regular rate and rhythm  Lungs: normal effort with symmetric rise and fall of chest wall  Abdomen: Soft, minimally tender around incision sites. Mild erythema around sites, no drainage or bleeding noted. Incision sites clean, dry, and intact with steristrips. Extremities: No cyanosis, pitting edema, rashes noted. Skin: Skin color, texture, turgor normal. No rashes or lesions.       Data:  CBC:   Recent Labs     03/12/23  0402 03/13/23 0452 03/14/23 0337   WBC 3.9 3.4* PENDING   HGB 9.9* 10.4* 9.8*   PLT 82* 93* PENDING     Chemistry:   Recent Labs     03/12/23  0402 03/13/23 0452 03/14/23  0337    139 137   K 2.8* 3.8 3.7   * 107 105   CO2 22 23 26   GLUCOSE 127* 120* 137*   BUN 3* 3* 3*   CREATININE 0.20* 0.23* 0.20*   MG 1.4*  --   --    ANIONGAP 8* 9 6*   LABGLOM >60 >60 >60   CALCIUM 7.4* 7.4* 6.9*     Hepatic:   Recent Labs     03/11/23  0638 03/11/23  1500   AST 34*  --    ALT 69*  --    ALKPHOS 353*  --    BILITOT 0.7 0.7   BILIDIR 0.5* 0.5* Coagulation:   Recent Labs     03/11/23  0638 03/11/23  1500   APTT 36.9* 40.6*   PROT 5.1*  --    INR  --  1.5         Radiology Review:    No new imaging to review    ASSESSMENT:  Active Hospital Problems    Diagnosis Date Noted    E. coli septicemia ([facility] [address] [A41.51] 03/13/2023     Priority: Medium    Cholecystitis [K81.9] 03/13/2023     Priority: Medium    Altered mental status [R41.82] 03/10/2023     Priority: Medium    Elevated brain natriuretic peptide (BNP) level [R79.89] 03/10/2023     Priority: Medium    Septic shock ([facility] [address] [A41.9, R65.21] 03/08/2023     Priority: Medium    Ascending cholangitis [K83.09] 03/08/2023     Priority: Medium    Cholelithiasis [K80.20] 03/08/2023     Priority: Medium    Gram negative septicemia ([facility] [address] [A41.50] 03/08/2023     Priority: Medium    Choledocholithiasis [K80.50] 03/08/2023     Priority: Medium    Acute respiratory failure with hypoxia ([facility] [address] [J96.01] 03/08/2023     Priority: Medium    Metabolic encephalopathy [N95.43] 03/08/2023     Priority: Medium    Acute pulmonary embolism ([facility] [address] [I26.99] 03/08/2023     Priority: Medium       75 y.o. female with cholangitis POD# 1 s/p laparoscopic cholecystectomy       Plan:  Continue medical mgmt and supportive care per primary team  Low fat diet as tolerated  Pain control - tylenol, neurontin; naheed 2.5 prn  No abx needed from surgery standpoint  OOB and ambulate  IS  - at least 10 times per hour while awake  OK to resume anticoagulation 3/15  Follow up with Dr. Sybil Casey in 1-2 weeks    Electronically signed by Aislinn Bhagat  on 3/14/2023 at 5:26 AM      Surgery Senior Resident Addendum:  I have discussed the case, including pertinent history and exam findings with the medical student and have personally seen the patient, performing both history and physical exam. Note was edited and changes made by me, including assessment and plan. I agree with the assessment and plan as stated above. Overall doing well. Advanced to low fat diet. No further abx needed.      Electronically signed by Renetta Owens DO on 3/14/2023 at 6:55 AM

## 2023-03-15 PROBLEM — R41.82 ALTERED MENTAL STATUS: Status: RESOLVED | Noted: 2023-03-10 | Resolved: 2023-03-15

## 2023-03-15 PROBLEM — R79.89 ELEVATED BRAIN NATRIURETIC PEPTIDE (BNP) LEVEL: Status: RESOLVED | Noted: 2023-03-10 | Resolved: 2023-03-15

## 2023-03-15 PROBLEM — S93.409A: Status: RESOLVED | Noted: 2023-01-14 | Resolved: 2023-03-15

## 2023-03-15 LAB
ABSOLUTE EOS #: 0.06 K/UL (ref 0–0.44)
ABSOLUTE IMMATURE GRANULOCYTE: 0.23 K/UL (ref 0–0.3)
ABSOLUTE LYMPH #: 2.37 K/UL (ref 1.1–3.7)
ABSOLUTE MONO #: 0.63 K/UL (ref 0.1–1.2)
ANION GAP SERPL CALCULATED.3IONS-SCNC: 7 MMOL/L (ref 9–17)
BASOPHILS # BLD: 1 % (ref 0–2)
BASOPHILS ABSOLUTE: 0.03 K/UL (ref 0–0.2)
BUN SERPL-MCNC: 4 MG/DL (ref 8–23)
CALCIUM SERPL-MCNC: 7 MG/DL (ref 8.6–10.4)
CHLORIDE SERPL-SCNC: 107 MMOL/L (ref 98–107)
CO2 SERPL-SCNC: 27 MMOL/L (ref 20–31)
CREAT SERPL-MCNC: 0.3 MG/DL (ref 0.5–0.9)
EOSINOPHILS RELATIVE PERCENT: 1 % (ref 1–4)
GFR SERPL CREATININE-BSD FRML MDRD: >60 ML/MIN/1.73M2
GLUCOSE BLD-MCNC: 105 MG/DL (ref 65–105)
GLUCOSE BLD-MCNC: 111 MG/DL (ref 65–105)
GLUCOSE BLD-MCNC: 111 MG/DL (ref 65–105)
GLUCOSE BLD-MCNC: 114 MG/DL (ref 65–105)
GLUCOSE BLD-MCNC: 139 MG/DL (ref 65–105)
GLUCOSE BLD-MCNC: 155 MG/DL (ref 65–105)
GLUCOSE SERPL-MCNC: 113 MG/DL (ref 70–99)
HCT VFR BLD AUTO: 31.6 % (ref 36.3–47.1)
HGB BLD-MCNC: 9.9 G/DL (ref 11.9–15.1)
IMMATURE GRANULOCYTES: 5 %
LYMPHOCYTES # BLD: 48 % (ref 24–43)
MCH RBC QN AUTO: 34 PG (ref 25.2–33.5)
MCHC RBC AUTO-ENTMCNC: 31.3 G/DL (ref 28.4–34.8)
MCV RBC AUTO: 108.6 FL (ref 82.6–102.9)
MONOCYTES # BLD: 13 % (ref 3–12)
NRBC AUTOMATED: 0 PER 100 WBC
PDW BLD-RTO: 16.6 % (ref 11.8–14.4)
PLATELET # BLD AUTO: 154 K/UL (ref 138–453)
PMV BLD AUTO: 10.2 FL (ref 8.1–13.5)
POTASSIUM SERPL-SCNC: 3.6 MMOL/L (ref 3.7–5.3)
RBC # BLD: 2.91 M/UL (ref 3.95–5.11)
RBC # BLD: ABNORMAL 10*6/UL
SEG NEUTROPHILS: 33 % (ref 36–65)
SEGMENTED NEUTROPHILS ABSOLUTE COUNT: 1.65 K/UL (ref 1.5–8.1)
SODIUM SERPL-SCNC: 141 MMOL/L (ref 135–144)
SURGICAL PATHOLOGY REPORT: NORMAL
SURGICAL PATHOLOGY REPORT: NORMAL
WBC # BLD AUTO: 5 K/UL (ref 3.5–11.3)

## 2023-03-15 PROCEDURE — 6370000000 HC RX 637 (ALT 250 FOR IP): Performed by: STUDENT IN AN ORGANIZED HEALTH CARE EDUCATION/TRAINING PROGRAM

## 2023-03-15 PROCEDURE — 36415 COLL VENOUS BLD VENIPUNCTURE: CPT

## 2023-03-15 PROCEDURE — 05HD33Z INSERTION OF INFUSION DEVICE INTO RIGHT CEPHALIC VEIN, PERCUTANEOUS APPROACH: ICD-10-PCS | Performed by: INTERNAL MEDICINE

## 2023-03-15 PROCEDURE — 82947 ASSAY GLUCOSE BLOOD QUANT: CPT

## 2023-03-15 PROCEDURE — 6360000002 HC RX W HCPCS: Performed by: INTERNAL MEDICINE

## 2023-03-15 PROCEDURE — 6370000000 HC RX 637 (ALT 250 FOR IP): Performed by: INTERNAL MEDICINE

## 2023-03-15 PROCEDURE — 85025 COMPLETE CBC W/AUTO DIFF WBC: CPT

## 2023-03-15 PROCEDURE — 99232 SBSQ HOSP IP/OBS MODERATE 35: CPT | Performed by: STUDENT IN AN ORGANIZED HEALTH CARE EDUCATION/TRAINING PROGRAM

## 2023-03-15 PROCEDURE — 6370000000 HC RX 637 (ALT 250 FOR IP)

## 2023-03-15 PROCEDURE — 80048 BASIC METABOLIC PNL TOTAL CA: CPT

## 2023-03-15 PROCEDURE — 2580000003 HC RX 258: Performed by: INTERNAL MEDICINE

## 2023-03-15 PROCEDURE — 6360000002 HC RX W HCPCS

## 2023-03-15 PROCEDURE — 2709999900 HC NON-CHARGEABLE SUPPLY

## 2023-03-15 PROCEDURE — 76937 US GUIDE VASCULAR ACCESS: CPT

## 2023-03-15 PROCEDURE — 99233 SBSQ HOSP IP/OBS HIGH 50: CPT | Performed by: INTERNAL MEDICINE

## 2023-03-15 PROCEDURE — 2580000003 HC RX 258

## 2023-03-15 PROCEDURE — 2060000000 HC ICU INTERMEDIATE R&B

## 2023-03-15 PROCEDURE — 99232 SBSQ HOSP IP/OBS MODERATE 35: CPT | Performed by: INTERNAL MEDICINE

## 2023-03-15 RX ORDER — LIDOCAINE HYDROCHLORIDE 10 MG/ML
5 INJECTION, SOLUTION INFILTRATION; PERINEURAL ONCE
Status: DISCONTINUED | OUTPATIENT
Start: 2023-03-15 | End: 2023-03-16 | Stop reason: HOSPADM

## 2023-03-15 RX ORDER — SODIUM CHLORIDE 0.9 % (FLUSH) 0.9 %
5-40 SYRINGE (ML) INJECTION EVERY 12 HOURS SCHEDULED
Status: DISCONTINUED | OUTPATIENT
Start: 2023-03-15 | End: 2023-03-16 | Stop reason: HOSPADM

## 2023-03-15 RX ORDER — SODIUM CHLORIDE 0.9 % (FLUSH) 0.9 %
5-40 SYRINGE (ML) INJECTION PRN
Status: DISCONTINUED | OUTPATIENT
Start: 2023-03-15 | End: 2023-03-16 | Stop reason: HOSPADM

## 2023-03-15 RX ORDER — SODIUM CHLORIDE 9 MG/ML
25 INJECTION, SOLUTION INTRAVENOUS PRN
Status: DISCONTINUED | OUTPATIENT
Start: 2023-03-15 | End: 2023-03-16 | Stop reason: HOSPADM

## 2023-03-15 RX ADMIN — GABAPENTIN 200 MG: 100 CAPSULE ORAL at 08:50

## 2023-03-15 RX ADMIN — ACETAMINOPHEN 1000 MG: 500 TABLET ORAL at 05:37

## 2023-03-15 RX ADMIN — TRAZODONE HYDROCHLORIDE 50 MG: 50 TABLET ORAL at 21:12

## 2023-03-15 RX ADMIN — SODIUM CHLORIDE, PRESERVATIVE FREE 10 ML: 5 INJECTION INTRAVENOUS at 08:52

## 2023-03-15 RX ADMIN — CYANOCOBALAMIN 1000 MCG: 1000 INJECTION, SOLUTION INTRAMUSCULAR at 08:51

## 2023-03-15 RX ADMIN — CEFTRIAXONE SODIUM 2000 MG: 10 INJECTION, POWDER, FOR SOLUTION INTRAVENOUS at 01:42

## 2023-03-15 RX ADMIN — SODIUM CHLORIDE, SODIUM LACTATE, POTASSIUM CHLORIDE, CALCIUM CHLORIDE AND DEXTROSE MONOHYDRATE: 5; 600; 310; 30; 20 INJECTION, SOLUTION INTRAVENOUS at 21:25

## 2023-03-15 RX ADMIN — MIDODRINE HYDROCHLORIDE 2.5 MG: 2.5 TABLET ORAL at 08:51

## 2023-03-15 RX ADMIN — GABAPENTIN 200 MG: 100 CAPSULE ORAL at 16:39

## 2023-03-15 RX ADMIN — ENOXAPARIN SODIUM 70 MG: 100 INJECTION SUBCUTANEOUS at 09:32

## 2023-03-15 RX ADMIN — FOLIC ACID 1 MG: 1 TABLET ORAL at 08:51

## 2023-03-15 RX ADMIN — ACETAMINOPHEN 1000 MG: 500 TABLET ORAL at 16:39

## 2023-03-15 RX ADMIN — MIDODRINE HYDROCHLORIDE 2.5 MG: 2.5 TABLET ORAL at 16:39

## 2023-03-15 RX ADMIN — ACETAMINOPHEN 1000 MG: 500 TABLET ORAL at 21:11

## 2023-03-15 RX ADMIN — ENOXAPARIN SODIUM 70 MG: 100 INJECTION SUBCUTANEOUS at 21:11

## 2023-03-15 RX ADMIN — GABAPENTIN 200 MG: 100 CAPSULE ORAL at 21:12

## 2023-03-15 ASSESSMENT — PAIN DESCRIPTION - ORIENTATION: ORIENTATION: LEFT

## 2023-03-15 ASSESSMENT — ENCOUNTER SYMPTOMS
SHORTNESS OF BREATH: 0
COLOR CHANGE: 0
APNEA: 0
ABDOMINAL PAIN: 1
ABDOMINAL DISTENTION: 0
EYE DISCHARGE: 0
COUGH: 0

## 2023-03-15 ASSESSMENT — PAIN SCALES - GENERAL
PAINLEVEL_OUTOF10: 1
PAINLEVEL_OUTOF10: 3

## 2023-03-15 ASSESSMENT — PAIN DESCRIPTION - DESCRIPTORS: DESCRIPTORS: ACHING;DISCOMFORT

## 2023-03-15 ASSESSMENT — PAIN DESCRIPTION - LOCATION: LOCATION: ABDOMEN;CHEST;BREAST

## 2023-03-15 NOTE — PROGRESS NOTES
[facility]  Office: [address], DO, Juancaterinagladys Stephanie, DO, Laurent Robledo, DO, Dell Peralta Blood, DO, Maira Bull MD, Melody Chester MD, Chester Felix MD, Amie Ball MD,  Nyasia Escalona MD, Kennedy Portillo MD, Taqueria Madrid, DO, Deidra Leon MD,  Lane Rios MD, Anabella Caputo MD, Isadora Adamson, DO, Ramon Cantu MD, Chandler Lombard, MD, Jessie Torres, DO, Bailee Santamaria MD, Dorota Bean MD, Jessica Dick MD, Maco Mckee MD, Alexander Silva MD, Evelyn Rodas DO, Serenity Randolph MD, Des Hwang MD, Effie Pimentel, CNP,  Nilton Whitehead, CNP, Gabi Stock, CNP, Mariana Cardona, CNP,  [facility], Wilman Adames, CNP, Blayne Rothman, CNP, Rc Bhatia, CNP, Leighton Fitzgerald, CNP, Hussain Erickson, CNP, Thanh Luong PAZoniaC, Juliana Max, CNS, Jennifer Sanchez, CNP, Gwendolyn Seals, CNP         Marisela Mclean 19    Progress Note    3/15/2023    8:02 AM    Name:   Carlos Noriega  MRN:     [id number]     Acct:      [de-identified]   Room:   [address] [place] [address] Day:  7  Admit Date:  3/8/2023  8:48 AM    PCP:   Jourdan Garrett MD  Code Status:  Full Code    Subjective:     C/C:   Chief Complaint   Patient presents with    Altered Mental Status    Hypotension     Interval History Status: improved. Hemodynamically stable. Patient awake and alert. Looks much better today. Denied any current complaints. No acute issues overnight. Brief History:     67-year-old female presents to the emergency department for altered mental status and hypotension. Patient initially was sent to ICU for septic shock and acute respiratory failure. Patient was intubated and remained in the ICU for subsequent days. Eventually she recovered after undergoing ERCP for ascending cholangitis/choledocholithiasis. Patient was seen by general surgery, gastroenterology.   Patient has since also been placed on argatroban for concern for HIT after noticing thrombocytopenia. Patient improved and HIT was negative, now transitioned to Lovenox. Review of Systems:     Constitutional:  negative for chills, fevers, sweats  Respiratory:  negative for cough, dyspnea on exertion, shortness of breath, wheezing  Cardiovascular:  negative for chest pain, chest pressure/discomfort, lower extremity edema, palpitations  Gastrointestinal:  Neg for abdominal pain, Neg for constipation, diarrhea, nausea, vomiting  Neurological:  negative for dizziness, headache    Medications: Allergies:  No Known Allergies    Current Meds:   Scheduled Meds:    cefTRIAXone (ROCEPHIN) IV  2,000 mg IntraVENous Q24H    midodrine  2.5 mg Oral TID WC    cyanocobalamin  1,000 mcg IntraMUSCular Daily    folic acid  1 mg Oral Daily    acetaminophen  1,000 mg Oral 3 times per day    gabapentin  200 mg Oral TID    traZODone  50 mg Oral Nightly    lidocaine 1 % injection  5 mL IntraDERmal Once    sodium chloride flush  5-40 mL IntraVENous 2 times per day    [Held by provider] enoxaparin  1 mg/kg SubCUTAneous BID    sodium chloride flush  5-40 mL IntraVENous 2 times per day     Continuous Infusions:    sodium chloride      dextrose 5% in lactated ringers 50 mL/hr at 03/14/23 1818    dextrose      sodium chloride Stopped (03/10/23 1542)     PRN Meds: oxyCODONE, potassium chloride **OR** potassium alternative oral replacement **OR** potassium chloride, sodium chloride flush, sodium chloride, glucose, dextrose bolus **OR** dextrose bolus, glucagon (rDNA), dextrose, sodium chloride flush, sodium chloride, ondansetron **OR** ondansetron, polyethylene glycol    Data:     Past Medical History:   has a past medical history of Psoriasis. Social History:   reports that she quit smoking about 8 years ago. Her smoking use included cigarettes. She has never used smokeless tobacco. She reports that she does not drink alcohol and does not use drugs. Family History: History reviewed. No pertinent family history. Vitals:  /61   Pulse 73   Temp 98.4 °F (36.9 °C) (Oral)   Resp 15   Wt 182 lb 12.8 oz (82.9 kg)   SpO2 93%   BMI 28.63 kg/m²   Temp (24hrs), Av.1 °F (36.7 °C), Min:97.4 °F (36.3 °C), Max:98.4 °F (36.9 °C)    Recent Labs     23  1150 03/14/23  2028 03/15/23  0054 03/15/23  0443   POCGLU 184* 189* 155* 111*         I/O (24Hr): Intake/Output Summary (Last 24 hours) at 3/15/2023 0802  Last data filed at 3/14/2023 1811  Gross per 24 hour   Intake 1700 ml   Output 1100 ml   Net 600 ml         Labs:  Hematology:  Recent Labs     23   WBC 3.4* 1.9*   RBC 3.05* 2.90*   HGB 10.4* 9.8*   HCT 33.9* 30.7*   .1* 105.9*   MCH 34.1* 33.8*   MCHC 30.7 31.9   RDW 16.5* 16.1*   PLT 93* 120*   MPV 11.4 10.7       Chemistry:  Recent Labs     23    137   K 3.8 3.7    105   CO2 23 26   GLUCOSE 120* 137*   BUN 3* 3*   CREATININE 0.23* 0.20*   ANIONGAP 9 6*   LABGLOM >60 >60   CALCIUM 7.4* 6.9*       Recent Labs     23  0341 23  0745 23  1150 03/14/23  1924 03/14/23  2028 03/15/23  0054 03/15/23  0443   PROT  --   --   --  4.4*  --   --   --    POCGLU 134* 125* 184*  --  189* 155* 111*       ABG:  Lab Results   Component Value Date/Time    POCPH 7.425 2023 04:24 AM    POCPCO2 25.3 2023 04:24 AM    POCPO2 111.5 2023 04:24 AM    POCHCO3 16.6 2023 04:24 AM    NBEA 6 2023 04:24 AM    PHRA6SLD 99 2023 04:24 AM    FIO2 30.0 2023 04:24 AM     Lab Results   Component Value Date/Time    SPECIAL RH 2ML 2023 01:15 PM     Lab Results   Component Value Date/Time    CULTURE NO GROWTH 3 DAYS 2023 01:15 PM       Radiology:  XR CERVICAL SPINE (2-3 VIEWS)    Result Date: 3/10/2023  Stable cervical fusion     CT HEAD WO CONTRAST    Result Date: 3/8/2023  No acute intracranial abnormality. Mild cerebral atrophy. White matter microvascular disease.      CT CERVICAL SPINE WO CONTRAST    Result Date: 3/8/2023  Postoperative changes as described above. No evidence for any fluid collections in the area to suggest CSF leak or abscess. CT ABDOMEN PELVIS W IV CONTRAST Additional Contrast? None    Result Date: 3/8/2023  Chest: 1. Small thrombus load acute pulmonary embolism. A few filling defects in subsegmental branches posterior basal right lower lobe. 2. Moderate-size posterior basal left lower lobe patchy consolidation and much smaller posterior basal right lower lobe patchy consolidation compatible with atelectasis. Abdomen pelvis: 1. Cholelithiasis without evidence for acute cholecystitis. 2. Intra and extrahepatic biliary ductal dilatation. Diffuse dilatation of CBD up to 14 mm diameter down to the ampulla. There could be non radiopaque calculus, stricture or periampullary lesion causing the obstruction. Given that this has developed since the prior study possible non radiopaque distal obstructing choledocholithiasis is most favored. MRCP or ERCP is recommended. 3. Probable endometrial  thickening up to 9 mm thickness. Correlation with non emergency outpatient pelvic ultrasound is recommended. XR CHEST PORTABLE    Result Date: 3/10/2023  1. Endotracheal tube tip is approximately 0.9 cm from the lulu; however, the tip is somewhat directed towards the right mainstem bronchus. Consider repositioning. 2. Patchy right upper lobe airspace opacity and left basilar consolidation. 3. Multiple air-filled dilated loops of small bowel measuring up to 3.3 cm in maximum diameter. Findings favor ileus over small bowel obstruction. 4. Enteric tube tip and side-port project over the stomach. 5. Right central venous catheter tip projecting over the cavoatrial junction. XR CHEST PORTABLE    Result Date: 3/8/2023  Trace left basilar effusion with adjacent infiltrate. CT CHEST PULMONARY EMBOLISM W CONTRAST    Result Date: 3/8/2023  Chest: 1.  Small thrombus load acute pulmonary embolism. A few filling defects in subsegmental branches posterior basal right lower lobe. 2. Moderate-size posterior basal left lower lobe patchy consolidation and much smaller posterior basal right lower lobe patchy consolidation compatible with atelectasis. Abdomen pelvis: 1. Cholelithiasis without evidence for acute cholecystitis. 2. Intra and extrahepatic biliary ductal dilatation. Diffuse dilatation of CBD up to 14 mm diameter down to the ampulla. There could be non radiopaque calculus, stricture or periampullary lesion causing the obstruction. Given that this has developed since the prior study possible non radiopaque distal obstructing choledocholithiasis is most favored. MRCP or ERCP is recommended. 3. Probable endometrial  thickening up to 9 mm thickness. Correlation with non emergency outpatient pelvic ultrasound is recommended. XR ABDOMEN FOR NG/OG/NE TUBE PLACEMENT    Result Date: 3/10/2023  1. Endotracheal tube tip is approximately 0.9 cm from the lulu; however, the tip is somewhat directed towards the right mainstem bronchus. Consider repositioning. 2. Patchy right upper lobe airspace opacity and left basilar consolidation. 3. Multiple air-filled dilated loops of small bowel measuring up to 3.3 cm in maximum diameter. Findings favor ileus over small bowel obstruction. 4. Enteric tube tip and side-port project over the stomach. 5. Right central venous catheter tip projecting over the cavoatrial junction. Physical Examination:        General appearance:  Awake, alert, cooperative and no distress  Mental Status:   Awake, alert, answering appropriately. Lungs:  clear to auscultation bilaterally, normal effort  Heart:  regular rate and rhythm, no murmur  Abdomen: soft, non tender, Nondistented. Normal bowel sounds.     Extremities:  no edema, redness, tenderness in the calves  Skin:  no gross lesions, rashes, induration    Assessment:        Hospital Problems Last Modified POA    * (Principal) Septic shock ([address] 3/8/2023 Yes    Ascending cholangitis 3/8/2023 Yes    Cholelithiasis 3/8/2023 Yes    Gram negative septicemia ([address] 3/8/2023 Yes    Choledocholithiasis 3/8/2023 Yes    Acute respiratory failure with hypoxia ([address] 1/7/2082 Yes    Metabolic encephalopathy 2/3/6545 Yes    Acute pulmonary embolism ([address] 3/8/2023 Yes    Altered mental status 3/10/2023 Yes    Elevated brain natriuretic peptide (BNP) level 3/10/2023 Yes    E. coli septicemia ([address] 3/13/2023 Yes    Cholecystitis 3/13/2023 Yes    Pancytopenia ([address] 3/14/2023 Yes    Acute UTI 3/14/2023 Yes   Plan:        -Septic Shock-Resolved.      -E. coli septicemia likely secondary to cholangitis.-On Rocephin till 3/21. Will require total 14 days of antibiotics. Appreciate ID recommendations. -Ascending Cholangitis s/p ERCP by GI. S/p lap cholecystectomy 3/13. Advance to low-fat diet. Tylenol, Neurontin and [place] for pain control. Appreciate general surgery recommendations. --Toxic metabolic encephalopathy-improved    -Acute pulmonary embolism-On Lovenox. Plt and Hb stable    -Thrombocytopenia-HIT rule out.    -Pancytopenia likely from sepsis-stable cell lines. Appreciate heme-onc recommendations. -Macrocytic anemia likely from B12 deficiency. Continued on vitamin B12 shots for 5 doses. Apprecaite heme-onc recommendations.    -Status post fall with subsequent laminectomy from previous hospitalization-stable  PT OT following  Discharge planning-once acute issues resolved.       Ritchie Menjivar MD  3/15/2023  8:02 AM

## 2023-03-15 NOTE — DISCHARGE INSTR - COC
Continuity of Care Form    Patient Name: Heike Marie   :  1950  MRN:  [id number]    Admit date:  3/8/2023  Discharge date:  3/16/23    Code Status Order: Full Code   Advance Directives:   Advance Care Flowsheet Documentation       Date/Time Healthcare Directive Type of Healthcare Directive Copy in [address] Agent's Name Healthcare Agent's Phone Number    [phone] Yes, patient has an advance directive for healthcare treatment Durable power of  for health care; Health care treatment directive; Living will No, copy requested from family -- -- --            Admitting Physician:  Adrianne Swanson MD  PCP: Raya Owens MD    Discharging Nurse: [facility] Unit/Room#: 0107/0107-01  Discharging Unit Phone Number: [phone]    Emergency Contact:   Extended Emergency Contact Information  Primary Emergency Contact: adelita chungt  Home Phone: [phone]  Mobile Phone: [phone]  Relation: Spouse  Secondary Emergency Contact: [facility]  Home Phone: [phone]  Relation: Child  Preferred language: English   needed? No    Past Surgical History:  Past Surgical History:   Procedure Laterality Date    CERVICAL FUSION N/A 2023    C 3-7 POSTERIOR CERVICAL DECOMPRESSION AND FUSION performed by Liseth Katz DO at [facility]  2023    CHOLECYSTECTOMY, LAPAROSCOPIC N/A 3/13/2023    LAPAROSCOPIC CHOLECYSTECTOMY,  POSSIBLE OPEN performed by Torrie Lozada DO at [place] [address]    ERCP  2023    ERCP STONE REMOVAL performed by Jose Watson MD at [place] [address]    ERCP  2023    ERCP SPHINCTER/PAPILLOTOMY performed by Jose Watson MD at [place] [address]    ERCP  2023    ERCP STENT INSERTION performed by Jose Watson MD at [address] [place] [address] History: There is no immunization history on file for this patient.     Active Problems:  Patient Active Problem List   Diagnosis Code    Mild sprain of right ankle, initial encounter S93.401A    Stenosis of cervical spine with myelopathy (HCC) M48.02, G99.2    Central cord syndrome (HCC) P15.988M    Septic shock (HCC) A41.9, R65.21    Ascending cholangitis K83.09    Cholelithiasis K80.20    Gram negative septicemia (HCC) A41.50    Choledocholithiasis K80.50    Acute respiratory failure with hypoxia (HCC) G56.39    Metabolic encephalopathy O06.67    Acute pulmonary embolism (HCC) I26.99    E. coli septicemia (HCC) A41.51    Cholecystitis K81.9    Pancytopenia ([facility]) D61.818    Acute UTI N39.0       Isolation/Infection:   Isolation            No Isolation          Patient Infection Status       Infection Onset Added Last Indicated Last Indicated By Review Planned Expiration Resolved Resolved By    None active    Resolved    COVID-19 (Rule Out) 03/08/23 03/08/23 03/08/23 COVID-19, Rapid (Ordered)   03/08/23 Rule-Out Test Resulted            Nurse Assessment:  Last Vital Signs: /71   Pulse 83   Temp 98.8 °F (37.1 °C) (Oral)   Resp 20   Wt 182 lb 12.8 oz (82.9 kg)   SpO2 96%   BMI 28.63 kg/m²     Last documented pain score (0-10 scale): Pain Level: 3  Last Weight:   Wt Readings from Last 1 Encounters:   03/15/23 182 lb 12.8 oz (82.9 kg)     Mental Status:  oriented, alert, and forgetful    IV Access:  - Right Midline    Nursing Mobility/ADLs:  Walking   Dependent  Transfer  Dependent  Bathing  Dependent  Dressing  Dependent  Toileting  Dependent  Feeding  Dependent  Med Admin  Dependent  Med Delivery   whole    Wound Care Documentation and Therapy:  Incision 01/18/23 Neck Posterior (Active)   Number of days: 56       Incision 69/80/84 Umbilicus (Active)   Dressing Status Other (Comment) 03/15/23 0800   Dressing/Treatment Steri-strips;Open to air 03/15/23 0800   Closure Open to air;Steri-Strips 03/15/23 0800   Margins Approximated 03/15/23 0800   Incision Assessment Dry 03/15/23 0800   Drainage Amount None 03/15/23 0800   Odor None 03/15/23 0800   Kisha-incision Assessment Intact 03/15/23 0800   Number of days: 2       Incision 03/13/23 Abdomen Medial;Upper (Active)   Dressing Status Clean;Dry; Intact 03/15/23 0800   Dressing/Treatment Skin glue 03/15/23 0800   Closure Open to air;Steri-Strips 03/15/23 0800   Margins Approximated 03/15/23 0800   Incision Assessment Dry 03/15/23 0800   Drainage Amount None 03/15/23 0800   Odor None 03/15/23 0800   Number of days: 2       Incision 03/13/23 Abdomen Lateral;Lower;Right (Active)   Dressing Status Clean;Dry; Intact 03/15/23 0800   Dressing/Treatment Skin glue 03/15/23 0800   Closure Open to air;Steri-Strips 03/15/23 0800   Margins Approximated 03/15/23 0800   Incision Assessment Dry 03/15/23 0800   Drainage Amount None 03/15/23 0800   Odor None 03/15/23 0800   Number of days: 2       Incision 03/13/23 (Active)   Number of days: 2        Elimination:  Continence: Bowel: Yes  Bladder: No  Urinary Catheter: None   Colostomy/Ileostomy/Ileal Conduit: No       Date of Last BM: 3/15/2023    Intake/Output Summary (Last 24 hours) at 3/15/2023 1447  Last data filed at 3/15/2023 1202  Gross per 24 hour   Intake 1940 ml   Output 1100 ml   Net 840 ml     I/O last 3 completed shifts: In: 4012.3 [P.O.:1200; I.V.:2576.4; IV Piggyback:235.9]  Out: 1850 [Urine:1850]    Safety Concerns: At Risk for Falls    Impairments/Disabilities:      Vision and Mobility    Nutrition Therapy:  Current Nutrition Therapy:   Soft and Bite Sized Diet    Routes of Feeding: Oral  Liquids: Thin Liquids  Daily Fluid Restriction: no  Last Modified Barium Swallow with Video (Video Swallowing Test): not done    Treatments at the Time of Hospital Discharge:   Respiratory Treatments: none  Oxygen Therapy:  is not on home oxygen therapy.   Ventilator:    - No ventilator support    Rehab Therapies: Physical Therapy and Occupational Therapy  Weight Bearing Status/Restrictions: No weight bearing restrictions  Other Medical Equipment (for information only, NOT a DME order): wheelchair  Other Treatments: none    Patient's personal belongings (please select all that are sent with patient):       RN SIGNATURE:  Electronically signed by Rosalba Dominguez RN on 3/16/23 at 11:06 AM EDT    CASE MANAGEMENT/SOCIAL WORK SECTION    Inpatient Status Date: 3/8/23    Readmission Risk Assessment Score:  Readmission Risk              Risk of Unplanned Readmission:  19           Discharging to Facility/ [id number]    / signature: Electronically signed by Pricila Cantu RN on 3/15/23 at 3:13 PM EDT    PHYSICIAN SECTION    Prognosis: Good    Condition at Discharge: Stable    Rehab Potential (if transferring to Rehab): Good    Recommended Labs or Other Treatments After Discharge: continue Rocephin till 3/21. F/u with hematology and gen surg and GI as O/p    Physician Certification: I certify the above information and transfer of Brandon Jaramillo  is necessary for the continuing treatment of the diagnosis listed and that she requires [place] for less 30 days.      Update Admission H&P: No change in H&P    PHYSICIAN SIGNATURE:  Electronically signed by Lulu Montes MD on 3/15/23 at 2:48 PM EDT

## 2023-03-15 NOTE — PROGRESS NOTES
Comprehensive Nutrition Assessment    Type and Reason for Visit:  Initial    Nutrition Recommendations/Plan:   Continue current PO diet; if PO intake does not progress consider ordering an ONS  Will monitor plan of care, labs, and weight     Malnutrition Assessment:  Malnutrition Status:  Insufficient data (03/15/23 1514)    Context:  Acute Illness     Findings of the 6 clinical characteristics of malnutrition:  Energy Intake:  Mild decrease in energy intake (Comment)  Weight Loss:  No significant weight loss     Body Fat Loss:  Unable to assess     Muscle Mass Loss:  Unable to assess    Fluid Accumulation:  Mild Extremities, Generalized   Strength:  Not Performed    Nutrition Assessment:    Chart reviewed. Pt admitted d/t altered mental status and hypotension. Pt currently on restricted PO diet; 4 carb, low fat/chol, high fiber, and TI. Pt asleep upon visit, spoke with RN, Rn reports pt eating well and tolerating well. Per RN, pt is very weak, so they have to feed her when she wants to eat. Per chart review, pt has not had a BM since post op (3/13). Wt gain noted (27 lbs), likely due to fluids. Labs/meds reviewed. Nutrition Related Findings:    Last BM 3/13. Labs/meds reviewed. Wound Type: Surgical Incision       Current Nutrition Intake & Therapies:    Average Meal Intake: 51-75% (per chart review)  Average Supplements Intake: None Ordered  ADULT DIET; Regular; 4 carb choices (60 gm/meal); Low Fat/Low Chol/High Fiber/TI    Anthropometric Measures:  Height: 5' 7.01\" (170.2 cm)  Ideal Body Weight (IBW): 135 lbs (61 kg)    Admission Body Weight: 143 lb 8.3 oz (65.1 kg)  Current Body Weight: 182 lb 12.2 oz (82.9 kg), 135.4 % IBW. Weight Source: Bed Scale  Current BMI (kg/m2): 28.6  Weight Adjustment For: No Adjustment  BMI Categories: Overweight (BMI 25.0-29. 9)    Estimated Daily Nutrient Needs:  Energy Requirements Based On: Kcal/kg  Weight Used for Energy Requirements: Current  Energy (kcal/day): 7916-2366 kcal/d  Weight Used for Protein Requirements: Ideal  Protein (g/day): 80 g pro/d  Method Used for Fluid Requirements: 1 ml/kcal  Fluid (ml/day): 2279-0392 ml or per MD    Nutrition Diagnosis:   Inadequate oral intake related to acute injury/trauma as evidenced by intake 51-75%, intake 26-50%    Nutrition Interventions:   Food and/or Nutrient Delivery: Continue Current Diet  Nutrition Education/Counseling: No recommendation at this time  Coordination of Nutrition Care: Continue to monitor while inpatient  Plan of Care discussed with: RN    Goals:  Goals: PO intake 50% or greater, PO intake 75% or greater, by next RD assessment       Nutrition Monitoring and Evaluation:   Behavioral-Environmental Outcomes: None Identified  Food/Nutrient Intake Outcomes: Food and Nutrient Intake  Physical Signs/Symptoms Outcomes: Biochemical Data, Nutrition Focused Physical Findings, Weight, Fluid Status or Edema    Discharge Planning:     Too soon to determine     [phone]

## 2023-03-15 NOTE — PROGRESS NOTES
Today's Date: 3/15/2023  Patient Name: Carlos Noriega  Date of admission: 3/8/2023  8:48 AM  Patient's age: 75 y.o., 1950  Admission Dx: Choledocholithiasis [K80.50]  Acute UTI [N39.0]  Elevated brain natriuretic peptide (BNP) level [R79.89]  Septic shock (HCC) [A41.9, R65.21]  Altered mental status, unspecified altered mental status type [R41.82]  Other acute pulmonary embolism, unspecified whether acute cor pulmonale present ([facility] [address] [I26.99]    Reason for Consult: Pancytopenia  Requesting Physician: Faviola Carney MD    Chief Complaint: Altered mentation    History Obtained From:  patient, electronic medical record        Interval history:    Patient seen and examined  Labs and vitals reviewed  Patient resting comfortably. Complains of being tired. Peripheral smear shows absolute neutropenia lymphopenia and monocytopenia. Flow cytometry suggest reactive findings. However T-cell gene rearrangement can be ordered of concerns regarding a lymphoproliferative process is high. Gallbladder path came back negative for malignancy. Neutropenia improved. Iron studies suggest anemia of chronic disease            History of Present Illness: The patient is a 75 y.o. female who is admitted to the hospital for septic shock with cholangitis and cholelithiasis s/p laparoscopic cholecystectomy 3/13/2023. Patient is a poor historian and does not recall her past history. Chart review shows history of psoriasis on weekly methotrexate in 3962 and daily folic acid. Patient initially presented with altered mentation, and hypotension was admitted to the ICU for septic shock and acute respiratory failure, required intubation and pressors. Found to have cholangitis with cholelithiasis underwent laparoscopic cholecystectomy. Blood and urine cultures positive for E. coli. Patient also had thrombocytopenia with concern for HIT and placed on argatroban.   HIT antiplatelet antibody normal.  platelet count has improved and patient transition to Lovenox. Thrombocytopenia has improved from a low of  today. Patient noted to be anemic with hemoglobin stable around 10. Patient has had a stable decline in white cell count currently at 1.9. [address] 1170, lymphocytes 610. Patient is currently hemodynamically stable, /53, pulse 67, requiring 1 L nasal cannula oxygen. Patient denies any family history of abnormal bleeding or clotting. Denies history of cancer. Denies easy bruising, petechia, cough, hematemesis, melena, recurrent infections. Past Medical History:   has a past medical history of Psoriasis. Past Surgical History:   has a past surgical history that includes cervical fusion (N/A, 01/18/2023); ERCP (03/08/2023); ERCP (03/08/2023); ERCP (03/08/2023); Cholecystectomy (03/13/2023); and Cholecystectomy, laparoscopic (N/A, 3/13/2023). Medications:    Prior to Admission medications    Medication Sig Start Date End Date Taking? Authorizing Provider   cefTRIAXone (ROCEPHIN) infusion Infuse 2,000 mg intravenously every 24 hours for 6 days Compound per protocol 3/15/23 3/21/23 Yes Amie Null MD   methocarbamol (ROBAXIN) 750 MG tablet Take 750 mg by mouth daily   Yes Historical Provider, MD   acetaminophen (TYLENOL) 500 MG tablet Take 1,000 mg by mouth every 6 hours as needed for Pain   Yes Historical Provider, MD   ipratropium-albuterol (DUONEB) 0.5-2.5 (3) MG/3ML SOLN nebulizer solution  2/23/23   Historical Provider, MD   gabapentin (NEURONTIN) 300 MG capsule Take 1 capsule by mouth in the morning and 1 capsule at noon and 1 capsule in the evening. Do all this for 7 days.  1/20/23 1/27/23  Edu Wilson, DO   triamcinolone (KENALOG) 0.1 % cream Apply to active psoriasis twice daily (not face, armpit or groin) 7/31/19   Yani Aiken MD   fluocinonide (LIDEX) 0.05 % external solution Apply to scalp psoriasis daily for rash 7/31/19   Yani Aiken MD     Current Facility-Administered Medications   Medication Dose Route Frequency Provider Last Rate Last Admin    cefTRIAXone (ROCEPHIN) 2,000 mg in sterile water 20 mL IV syringe  2,000 mg IntraVENous Q24H Amie Singh MD   2,000 mg at 03/15/23 0142    lidocaine 1 % injection 5 mL  5 mL IntraDERmal Once Mago Vizcaino MD        sodium chloride flush 0.9 % injection 5-40 mL  5-40 mL IntraVENous 2 times per day Mago Vizcaino MD        sodium chloride flush 0.9 % injection 5-40 mL  5-40 mL IntraVENous PRN Mago Vizcaino MD        0.9 % sodium chloride infusion  25 mL IntraVENous PRN Evelyn Mims MD        oxyCODONE (ROXICODONE) immediate release tablet 2.5 mg  2.5 mg Oral Q12H PRN Abel Dunbar, DO   2.5 mg at 03/14/23 0741    midodrine (PROAMATINE) tablet 2.5 mg  2.5 mg Oral TID WC Evelyn Mims MD   2.5 mg at 03/15/23 1639    cyanocobalamin injection 1,000 mcg  1,000 mcg IntraMUSCular Daily Alvin Houston MD   1,000 mcg at 04/97/92 4473    folic acid (FOLVITE) tablet 1 mg  1 mg Oral Daily Safia Arroyo DO   1 mg at 03/15/23 7790    acetaminophen (TYLENOL) tablet 1,000 mg  1,000 mg Oral 3 times per day Safia Arroyo, DO   1,000 mg at 03/15/23 1639    gabapentin (NEURONTIN) capsule 200 mg  200 mg Oral TID Safia Arroyo DO   200 mg at 03/15/23 1639    traZODone (DESYREL) tablet 50 mg  50 mg Oral Nightly Romina Forth, DO   50 mg at 03/14/23 2117    potassium chloride (KLOR-CON M) extended release tablet 40 mEq  40 mEq Oral PRN Safiamarla Arroyo, DO        Or    potassium bicarb-citric acid (EFFER-K) effervescent tablet 40 mEq  40 mEq Oral PRN Safia Dina, DO        Or    potassium chloride 10 mEq/100 mL IVPB (Peripheral Line)  10 mEq IntraVENous PRN Safia Arroyo,  mL/hr at 03/12/23 0814 10 mEq at 03/12/23 0814    sodium chloride flush 0.9 % injection 5-40 mL  5-40 mL IntraVENous 2 times per day Safia Arroyo,    10 mL at 03/15/23 0852    sodium chloride flush 0.9 % injection 5-40 mL  5-40 mL IntraVENous PRN Safia Arroyo, DO 0.9 % sodium chloride infusion  25 mL IntraVENous PRN Edmar D Bernstein, DO        enoxaparin (LOVENOX) injection 70 mg  1 mg/kg SubCUTAneous BID Edmar D Bernstein, DO   70 mg at 03/15/23 0932    dextrose 5 % in lactated ringers infusion   IntraVENous Continuous Edmar D Bernstein, DO 50 mL/hr at 03/14/23 1818 New Bag at 03/14/23 1818    glucose chewable tablet 16 g  4 tablet Oral PRN Edmar D Bernstein, DO        dextrose bolus 10% 125 mL  125 mL IntraVENous PRN Edmar D Bernstein, DO        Or    dextrose bolus 10% 250 mL  250 mL IntraVENous PRN Edmar D Bernstein, DO        glucagon (rDNA) injection 1 mg  1 mg SubCUTAneous PRN Edmar D Bernstein, DO        dextrose 10 % infusion   IntraVENous Continuous PRN Edmar D Bernstein, DO        sodium chloride flush 0.9 % injection 5-40 mL  5-40 mL IntraVENous 2 times per day Edmar D Bernstein, DO   10 mL at 03/15/23 0852    sodium chloride flush 0.9 % injection 5-40 mL  5-40 mL IntraVENous PRN Edmar D Bernstein, DO        0.9 % sodium chloride infusion   IntraVENous PRN Edmar D Bernstein, DO   Stopped at 03/10/23 1542    ondansetron (ZOFRAN-ODT) disintegrating tablet 4 mg  4 mg Oral Q8H PRN Edmar D Bernstein, DO        Or    ondansetron (ZOFRAN) injection 4 mg  4 mg IntraVENous Q6H PRN Edmar D Bernstein, DO        polyethylene glycol (GLYCOLAX) packet 17 g  17 g Oral Daily PRN Edmar D Bernstein, DO           Allergies:  Patient has no known allergies.    Social History:   reports that she quit smoking about 8 years ago. Her smoking use included cigarettes. She has never used smokeless tobacco. She reports that she does not drink alcohol and does not use drugs.     Family History: family history is not on file.    Review of Symptoms:    Constitutional: No fever or chills. No night sweats, no weight loss   Eyes: No eye discharge, double vision, or eye pain   HEENT: negative for sore mouth, sore throat, hoarseness and voice change   Respiratory: negative for cough , sputum, dyspnea, wheezing, hemoptysis, chest pain  Cardiovascular: negative for chest pain, dyspnea, palpitations, orthopnea, PND   Gastrointestinal: negative for nausea, vomiting, diarrhea, constipation, abdominal pain, Dysphagia, hematemesis and hematochezia   Genitourinary: negative for frequency, dysuria, nocturia, urinary incontinence, and hematuria   Integument: negative for rash, skin lesions, bruises. Hematologic/Lymphatic: negative for easy bruising, bleeding, lymphadenopathy, or petechiae   Endocrine: negative for heat or cold intolerance,weight changes, change in bowel habits and hair loss   Musculoskeletal: negative for myalgias, arthralgias, pain, joint swelling,and bone pain   Neurological: negative for headaches, dizziness, seizures, weakness, numbness    PHYSICAL EXAM:      BP 96/69   Pulse 70   Temp 97.8 °F (36.6 °C) (Oral)   Resp 18   Ht 5' 7.01\" (1.702 m)   Wt 170 lb (77.1 kg)   SpO2 96%   BMI 26.62 kg/m²    Temp (24hrs), Av.2 °F (36.8 °C), Min:97.4 °F (36.3 °C), Max:98.8 °F (37.1 °C)      General appearance - well appearing, no in pain or distress . Psoriatic scales on forehead  Mental status - alert and cooperative   Eyes - pupils equal and reactive, extraocular eye movements intact.   Conjunctivae pale  Ears - bilateral TM's and external ear canals normal   Mouth - mucous membranes moist, pharynx normal without lesions   Neck - supple, no significant adenopathy   Lymphatics - no palpable lymphadenopathy, no hepatosplenomegaly   Chest - clear to auscultation, no wheezes, rales or rhonchi, symmetric air entry   Heart - normal rate, regular rhythm, normal S1, S2, no murmurs  Abdomen - soft, nontender, nondistended, no masses or organomegaly   Neurological - alert, oriented, normal speech, no focal findings or movement disorder noted   Musculoskeletal - no joint tenderness, deformity or swelling   Extremities - peripheral pulses normal, no pedal edema, no clubbing or cyanosis   Skin - normal coloration and turgor, no rashes, no suspicious skin lesions noted , psoriatic scaly lesions on bilateral lower extremities    Labs:   Complete Blood Count:   Recent Labs     03/13/23  0452 03/14/23  0337 03/15/23  0752   WBC 3.4* 1.9* 5.0   HGB 10.4* 9.8* 9.9*   .1* 105.9* 108.6*   PLT 93* 120* 154   RBC 3.05* 2.90* 2.91*   HCT 33.9* 30.7* 31.6*   MCH 34.1* 33.8* 34.0*   MCHC 30.7 31.9 31.3   RDW 16.5* 16.1* 16.6*   MPV 11.4 10.7 10.2      PT/INR:    Lab Results   Component Value Date/Time    PROTIME 15.7 03/11/2023 03:00 PM    INR 1.5 03/11/2023 03:00 PM     PTT:    Lab Results   Component Value Date/Time    APTT 40.6 03/11/2023 03:00 PM       Basal Metabolic Profile:   Recent Labs     03/13/23  0452 03/14/23  0337 03/15/23  0752    137 141   K 3.8 3.7 3.6*   BUN 3* 3* 4*   CREATININE 0.23* 0.20* 0.30*    105 107   CO2 23 26 27      LFTs  No results for input(s): ALKPHOS, ALT, AST, BILITOT, BILIDIR, LABALBU in the last 72 hours. Imaging:  XR CERVICAL SPINE (2-3 VIEWS)    Result Date: 3/10/2023  Stable cervical fusion     CT HEAD WO CONTRAST    Result Date: 3/8/2023  No acute intracranial abnormality. Mild cerebral atrophy. White matter microvascular disease. CT CERVICAL SPINE WO CONTRAST    Result Date: 3/8/2023  Postoperative changes as described above. No evidence for any fluid collections in the area to suggest CSF leak or abscess. CT ABDOMEN PELVIS W IV CONTRAST Additional Contrast? None    Result Date: 3/8/2023  Chest: 1. Small thrombus load acute pulmonary embolism. A few filling defects in subsegmental branches posterior basal right lower lobe. 2. Moderate-size posterior basal left lower lobe patchy consolidation and much smaller posterior basal right lower lobe patchy consolidation compatible with atelectasis. Abdomen pelvis: 1. Cholelithiasis without evidence for acute cholecystitis. 2. Intra and extrahepatic biliary ductal dilatation. Diffuse dilatation of CBD up to 14 mm diameter down to the ampulla. There could be non radiopaque calculus, stricture or periampullary lesion causing the obstruction. Given that this has developed since the prior study possible non radiopaque distal obstructing choledocholithiasis is most favored. MRCP or ERCP is recommended. 3. Probable endometrial  thickening up to 9 mm thickness. Correlation with non emergency outpatient pelvic ultrasound is recommended. XR CHEST PORTABLE    Result Date: 3/10/2023  1. Endotracheal tube tip is approximately 0.9 cm from the lulu; however, the tip is somewhat directed towards the right mainstem bronchus. Consider repositioning. 2. Patchy right upper lobe airspace opacity and left basilar consolidation. 3. Multiple air-filled dilated loops of small bowel measuring up to 3.3 cm in maximum diameter. Findings favor ileus over small bowel obstruction. 4. Enteric tube tip and side-port project over the stomach. 5. Right central venous catheter tip projecting over the cavoatrial junction. XR CHEST PORTABLE    Result Date: 3/8/2023  Trace left basilar effusion with adjacent infiltrate. CT CHEST PULMONARY EMBOLISM W CONTRAST    Result Date: 3/8/2023  Chest: 1. Small thrombus load acute pulmonary embolism. A few filling defects in subsegmental branches posterior basal right lower lobe. 2. Moderate-size posterior basal left lower lobe patchy consolidation and much smaller posterior basal right lower lobe patchy consolidation compatible with atelectasis. Abdomen pelvis: 1. Cholelithiasis without evidence for acute cholecystitis. 2. Intra and extrahepatic biliary ductal dilatation. Diffuse dilatation of CBD up to 14 mm diameter down to the ampulla. There could be non radiopaque calculus, stricture or periampullary lesion causing the obstruction. Given that this has developed since the prior study possible non radiopaque distal obstructing choledocholithiasis is most favored. MRCP or ERCP is recommended.  3. Probable endometrial  thickening up to 9 mm thickness. Correlation with non emergency outpatient pelvic ultrasound is recommended. XR ABDOMEN FOR NG/OG/NE TUBE PLACEMENT    Result Date: 3/10/2023  1. Endotracheal tube tip is approximately 0.9 cm from the lulu; however, the tip is somewhat directed towards the right mainstem bronchus. Consider repositioning. 2. Patchy right upper lobe airspace opacity and left basilar consolidation. 3. Multiple air-filled dilated loops of small bowel measuring up to 3.3 cm in maximum diameter. Findings favor ileus over small bowel obstruction. 4. Enteric tube tip and side-port project over the stomach. 5. Right central venous catheter tip projecting over the cavoatrial junction.        Impression:   Primary Problem  Septic shock [facility])  Active Hospital Problems    Diagnosis Date Noted    Pancytopenia [facility]) [D61.818] 03/14/2023     Priority: Medium    Acute UTI [N39.0] 03/14/2023     Priority: Medium    E. coli septicemia ([address] [A41.51] 03/13/2023     Priority: Medium    Cholecystitis [K81.9] 03/13/2023     Priority: Medium    Septic shock ([address] [A41.9, R65.21] 03/08/2023     Priority: Medium    Ascending cholangitis [K83.09] 03/08/2023     Priority: Medium    Cholelithiasis [K80.20] 03/08/2023     Priority: Medium    Gram negative septicemia ([address] [A41.50] 03/08/2023     Priority: Medium    Choledocholithiasis [K80.50] 03/08/2023     Priority: Medium    Acute respiratory failure with hypoxia ([address] [J96.01] 03/08/2023     Priority: Medium    Metabolic encephalopathy [V85.36] 03/08/2023     Priority: Medium    Acute pulmonary embolism ([address] [I26.99] 03/08/2023     Priority: Medium         Assessment and Plan:    Pancytopenia likely due to sepsis  Leukopenia 1.9 with ANC 1170, lymphopenia 610  Stable macrocytic anemia, possibly due to vitamin B12 deficiency, hypoproliferative  Improving thrombocytopenia  Vitamin B-12 deficiency  Cholangitis with cholelithiasis s/p laparoscopic cholecystectomy 3/13/2023  Septic shock with E. coli septicemia-resolved  Acute hypoxic respiratory failure requiring intubation-resolved  Complicated UTI  History of cervical laminectomy  Psoriasis, previously on weekly methotrexate 15 mg and folic acid daily in 7670. Plan    Personally reviewed results of lab work-up and the relevant clinical data  Continue B12 supplementation  Discussed results of peripheral smear and flow cytometry which is abnormal but changes can be reactive in nature. In light of improvement in cell counts suspect these changes are likely reactive. Recommend repeating peripheral smear and flow cytometry after resolution of acute illness. However primary bone marrow disorder is not completely ruled out  Okay to give anticoagulation as long as platelet count is above 50 and no evidence of bleeding  Follow-up on paraproteinemia profile  Transfuse to keep hemoglobin above 7  We will follow  Will need outpatient follow-up. Electronically signed by   Olga Lidia Gurrola MD    on 3/15/2023 at 7:28 PM              This note is created with the assistance of a speech recognition program.  While intending to generate a document that actually reflects the content of the visit, the document can still have some errors including those of syntax and sound a like substitutions which may escape proof reading. It such instances, actual meaning can be extrapolated by contextual diversion.

## 2023-03-15 NOTE — PROGRESS NOTES
[facility] -   Infectious diseases evaluation  admission date 3/8/2023    reason for consultation:   AB management    Impression :   Current:    Altered mentation  Hypotension   Septic shock  E. coli septicemia  Acute Resp failure intubated   Obstructive Ascending cholangitis - post ERCP 3/8  Chronic Cholecystitis w cholelithiasis  UTI complicated   HIT -     Other:  History of cervical laminectomy in the past  Discussion / summary of stay / plan of care     Recommendations   Ascending cholangitis post ERCP  Post  cholecystectomy on 3/13  Chronic maria teresa on path w mucosal ulcerations  E. coli septicemia  Likely related to the cholangitis  UTI/pyelonephritis, with E. coli and Aerococcus,  Difficult to rule out pyelonephritis since E. coli was found in the urine and the blood as well  Antibiotics  Zosyn x 3 days then ceftriaxone x 1 days and back to zosyn 3/12 due to fever- till 3/14 after GB  resected,  was switched to ceftriaxone till 3/21  (Due to bacteremia and cholangitis, would need 14 days AB in total till 3/21) - reconsiled -   Order a midline   Infection Control Recommendations   [place] Precautions      Antimicrobial Stewardship Recommendations   Simplification of therapy  Targeted therapy      History of Present Illness:   Initial history:  Eduardo Taylor is a 75y.o.-year-old female admitted with altered mentation, septic with respiratory failure and Intubated, blood cultures grew E. coli as well as the urine that grew E. coli and Aerococcus. In the meantime CT scan of the abdomen showed an obstructive ascending cholangitis and had a ERCP.   Ultimately she was extubated and is planned for a cholecystectomy laparoscopically on 3/13  She is treated with ceftriaxone for the E. coli that is felt to be from the common bile duct  Of note is the E. coli was also growing in the urine, and a CT of the abdomen did not suggest any pyelonephritis or any ureteral obstruction. Hence difficult to tell if there was a pyelonephritis also inflamed to this bacteremia. Infectious disease consulted for antibiotic management    Interval changes  3/15/2023   Patient Vitals for the past 8 hrs:   BP Temp Temp src Pulse Resp SpO2 Weight   03/15/23 0530 -- -- -- -- -- -- 182 lb 12.8 oz (82.9 kg)   03/15/23 0400 104/61 98.4 °F (36.9 °C) Oral 73 15 93 % --   03/15/23 0300 93/62 -- Oral 71 15 95 % --     3/13  Afebrile. Lap cholecystectomy today, will wait on fluid cx.     3/14  Afebrile, vitals stable. [facility] 3/11 neg. POD-1 lap maria teresa, awaiting fluid cx - pt remains on Zosyn. Pt doing well, pain controlled. 3/15  Afebrile, vitals stable. Surgical fluid cx pending. Pt is on Ceftriaxone. Pt still has some soreness over surgical site, RUE IV site clean. Summary of relevant labs:  Labs:  Creatinine0.20 Low - 0.23 - 0.20  WBC3.9 - 3.4 - 1.9    Micro:  BC 3/11 NGTD  BC 3/8 e coli  Urine cx 3/8 e coli, aerococcus    Imaging:  CTAP  3/8  . Small thrombus load acute pulmonary embolism. A few filling defects in subsegmental branches posterior basal right lower lobe. 2. Moderate-size posterior basal left lower lobe patchy consolidation and much smaller posterior basal right lower lobe patchy consolidation compatible with atelectasis. Abdomen pelvis:   1. Cholelithiasis without evidence for acute cholecystitis. 2. Intra and extrahepatic biliary ductal dilatation. Diffuse dilatation of CBD up to 14 mm diameter down to the ampulla. There could be non radiopaque calculus, stricture or periampullary lesion causing the obstruction. Given that this has developed since the prior study possible non radiopaque distal obstructing choledocholithiasis is most favored. MRCP or ERCP is recommended. 3. Probable endometrial  thickening up to 9 mm thickness. Correlation with non emergency outpatient pelvic ultrasound is recommended.      I have personally reviewed the past medical history, past surgical history, medications, social history, and family history, and I haveupdated the database accordingly. Allergies:   Patient has no known allergies. Review of Systems:     Review of Systems   Constitutional:  Positive for activity change. Negative for fatigue and fever. HENT:  Negative for congestion. Eyes:  Negative for discharge. Respiratory:  Negative for apnea, cough and shortness of breath. Cardiovascular:  Negative for chest pain. Gastrointestinal:  Positive for abdominal pain. Negative for abdominal distention. Endocrine: Negative for cold intolerance. Genitourinary:  Negative for dysuria and flank pain. Musculoskeletal:  Negative for arthralgias. Skin:  Negative for color change. Allergic/Immunologic: Negative for immunocompromised state. Neurological:  Negative for dizziness and headaches. Psychiatric/Behavioral:  Negative for agitation and confusion. Physical Examination :       Physical Exam  Constitutional:       General: She is not in acute distress. Appearance: Normal appearance. She is not ill-appearing. HENT:      Head: Normocephalic and atraumatic. Nose: Nose normal.      Mouth/Throat:      Mouth: Mucous membranes are moist.   Eyes:      General: No scleral icterus. Conjunctiva/sclera: Conjunctivae normal.   Cardiovascular:      Rate and Rhythm: Normal rate and regular rhythm. Heart sounds: Normal heart sounds. No murmur heard. Pulmonary:      Effort: Pulmonary effort is normal. No respiratory distress. Breath sounds: Normal breath sounds. No stridor. No wheezing. Chest:      Chest wall: No tenderness. Abdominal:      General: There is no distension. Palpations: Abdomen is soft. There is no mass. Hernia: No hernia is present. Comments: Soreness over surgical site   Genitourinary:     Comments: No trejo  Musculoskeletal:         General: No swelling or deformity. Cervical back: Neck supple. No rigidity.       Right lower leg: No edema. Left lower leg: No edema. Comments: Right UE IV clean   Skin:     General: Skin is dry. Coloration: Skin is not jaundiced. Findings: No rash. Neurological:      General: No focal deficit present. Mental Status: She is alert and oriented to person, place, and time. Psychiatric:         Mood and Affect: Mood normal.         Thought Content:  Thought content normal.       Past Medical History:     Past Medical History:   Diagnosis Date    Psoriasis        Past Surgical  History:     Past Surgical History:   Procedure Laterality Date    CERVICAL FUSION N/A 01/18/2023    C 3-7 POSTERIOR CERVICAL DECOMPRESSION AND FUSION performed by Chris Serra DO at [facility]  03/13/2023    CHOLECYSTECTOMY, LAPAROSCOPIC N/A 3/13/2023    LAPAROSCOPIC CHOLECYSTECTOMY,  POSSIBLE OPEN performed by Kirsten Denson DO at [place] [address]    ERCP  03/08/2023    ERCP STONE REMOVAL performed by Wendi Armstrong MD at [place] [address]    ERCP  03/08/2023    ERCP SPHINCTER/PAPILLOTOMY performed by Wendi Armstrong MD at [place] [address]    ERCP  03/08/2023    ERCP STENT INSERTION performed by Wendi Armstrong MD at [place] [address]       Medications:      cefTRIAXone (ROCEPHIN) IV  2,000 mg IntraVENous Q24H    midodrine  2.5 mg Oral TID WC    cyanocobalamin  1,000 mcg IntraMUSCular Daily    folic acid  1 mg Oral Daily    acetaminophen  1,000 mg Oral 3 times per day    gabapentin  200 mg Oral TID    traZODone  50 mg Oral Nightly    lidocaine 1 % injection  5 mL IntraDERmal Once    sodium chloride flush  5-40 mL IntraVENous 2 times per day    [Held by provider] enoxaparin  1 mg/kg SubCUTAneous BID    sodium chloride flush  5-40 mL IntraVENous 2 times per day       Social History:     Social History     Socioeconomic History    Marital status: Single     Spouse name: Not on file    Number of children: Not on file    Years of education: Not on file    Highest education level: Not on file   Occupational History    Not on file   Tobacco Use    Smoking status: Former     Types: Cigarettes     Quit date: 2015     Years since quittin.1    Smokeless tobacco: Never   Vaping Use    Vaping Use: Never used   Substance and Sexual Activity    Alcohol use: Never    Drug use: Never    Sexual activity: Not on file   Other Topics Concern    Not on file   Social History Narrative    ** Merged History Encounter **          Social Determinants of Health     Financial Resource Strain: Not on file   Food Insecurity: Not on file   Transportation Needs: Not on file   Physical Activity: Not on file   Stress: Not on file   Social Connections: Not on file   Intimate Partner Violence: Not on file   Housing Stability: Not on file       Family History:   History reviewed. No pertinent family history. Medical Decision Making:   I have independently reviewed/ordered the following labs:    CBC with Differential:   Recent Labs     23  0452 23   WBC 3.4* 1.9*   HGB 10.4* 9.8*   HCT 33.9* 30.7*   PLT 93* 120*   LYMPHOPCT 29 32   MONOPCT 2 4       BMP:  Recent Labs     23  0452 23    137   K 3.8 3.7    105   CO2 23 26   BUN 3* 3*   CREATININE 0.23* 0.20*       Hepatic Function Panel:   Recent Labs     23  1924   PROT 4.4*       No results for input(s): RPR in the last 72 hours. No results for input(s): HIV in the last 72 hours. No results for input(s): BC in the last 72 hours. Lab Results   Component Value Date/Time    CREATININE 0.20 2023 03:37 AM    GLUCOSE 137 2023 03:37 AM       Detailed results: Thank you for allowing us to participate in the care of this patient. Please call with questions.     This note is created with the assistance of a speech recognition program.  While intending to generate adocument that actually reflects the content of the visit, the document can still have some errors including those of syntax and sound a like substitutions which may escape proof reading. It such instances, actual meaningcan be extrapolated by contextual diversion. Ade Baron  Office: [phone]  Perfect serve / office [phone]        I have discussed the care of the patient, including pertinent history and exam findings,  with the resident. I have seen and examined the patient and the key elements of all parts of the encounter have been performed by me. I agree with the assessment, plan and orders as documented by the resident.     Amie Barrientos, Infectious Diseases

## 2023-03-15 NOTE — PROGRESS NOTES
General Surgery:  Daily Progress Note          PATIENT NAME: Char Stanton     TODAY'S DATE: 3/15/2023, 5:20 AM    SUBJECTIVE:     Pt seen and examined at bedside. No acute overnight events. Afebrile, vitals normal and stable. Pt reports she is doing well and pain is under control. Pt denies nausea, vomiting, and abdominal pain. Pt reports spontaneous voiding but has not had a bowel movement postop. Pt has advanced to a soft low fat diet and states she is tolerating it well. Has not been OOB to ambulate yet. OBJECTIVE:   VITALS:  /61   Pulse 73   Temp 98.4 °F (36.9 °C) (Oral)   Resp 15   Wt 155 lb 10.3 oz (70.6 kg)   SpO2 93%   BMI 24.38 kg/m²      INTAKE/OUTPUT:      Intake/Output Summary (Last 24 hours) at 3/15/2023 0520  Last data filed at 3/14/2023 1811  Gross per 24 hour   Intake 1700 ml   Output 1100 ml   Net 600 ml       PHYSICAL EXAM:  General Appearance: awake, alert, oriented, in no acute distress  HEENT:  Normocephalic, atraumatic, mucus membranes moist.  Heart: Regular rate and rhythm  Lungs: normal effort with symmetric rise and fall of chest wall  Abdomen: Soft, non tender. No drainage, bleeding, or erythema around incision sites. All sites are clean, dry, and intact with steristrips. Extremities:  No cyanosis or pitting edema noted. Skin: Mild erythema with scaling noted on right dorsal hand and wrist. Turgor normal. No other lesions noted. Data:  CBC:   Recent Labs     03/13/23  0452 03/14/23  0337   WBC 3.4* 1.9*   HGB 10.4* 9.8*   PLT 93* 120*     Chemistry:   Recent Labs     03/13/23  0452 03/14/23  0337    137   K 3.8 3.7    105   CO2 23 26   GLUCOSE 120* 137*   BUN 3* 3*   CREATININE 0.23* 0.20*   ANIONGAP 9 6*   LABGLOM >60 >60   CALCIUM 7.4* 6.9*     Hepatic: No results for input(s): AST, ALT, ALB, ALKPHOS, BILITOT, BILIDIR in the last 72 hours.   Coagulation:   Recent Labs     03/14/23  1924   PROT 4.4*       ASSESSMENT:  Active Hospital Problems Diagnosis Date Noted    Pancytopenia ([facility] [address] [D61.818] 03/14/2023     Priority: Medium    Acute UTI [N39.0] 03/14/2023     Priority: Medium    E. coli septicemia ([facility] [address] [A41.51] 03/13/2023     Priority: Medium    Cholecystitis [K81.9] 03/13/2023     Priority: Medium    Altered mental status [R41.82] 03/10/2023     Priority: Medium    Elevated brain natriuretic peptide (BNP) level [R79.89] 03/10/2023     Priority: Medium    Septic shock ([facility] [address] [A41.9, R65.21] 03/08/2023     Priority: Medium    Ascending cholangitis [K83.09] 03/08/2023     Priority: Medium    Cholelithiasis [K80.20] 03/08/2023     Priority: Medium    Gram negative septicemia ([facility] [address] [A41.50] 03/08/2023     Priority: Medium    Choledocholithiasis [K80.50] 03/08/2023     Priority: Medium    Acute respiratory failure with hypoxia ([facility] [address] [J96.01] 03/08/2023     Priority: Medium    Metabolic encephalopathy [F27.62] 03/08/2023     Priority: Medium    Acute pulmonary embolism ([facility] [address] [I26.99] 03/08/2023     Priority: Medium       75 y.o. female with cholangitis POD# 2 s/p laparoscopic cholecystectomy       Plan:  Continue medical management per primary team  Continue low fat diet as tolerated  Pain management with Tylenol TID, Neurontin TID, and Megha Q12 PRN  Encourage OOB and ambulation as tolerated   Continue with IS 10 times per hour  Follow up with Dr. Kristyn Shukla 2 weeks after D/C    Electronically signed by Carla Donohue  on 3/15/2023 at 5:20 AM      Surgery Senior Resident Addendum:  I have discussed the case, including pertinent history and exam findings with the medical student and have personally seen the patient, performing both history and physical exam. Note was edited and changes made by me, including assessment and plan. I agree with the assessment and plan as stated above. Doing well postop. Tolerating low fat diet. General surgery to sign off as she is stable for discharge from a surgery standpoint. Please call with any questions or concerns.  Follow up with Dr. Harry Hester in 2 week.     Electronically signed by Shelby Rocha DO on 3/15/2023 at 6:40 AM

## 2023-03-15 NOTE — PLAN OF CARE
Problem: Discharge Planning  Goal: Discharge to home or other facility with appropriate resources  3/14/2023 2240 by Madelin Cueto RN  Outcome: Progressing  Flowsheets (Taken 3/14/2023 2240)  Discharge to home or other facility with appropriate resources:   Identify barriers to discharge with patient and caregiver   Arrange for needed discharge resources and transportation as appropriate   Identify discharge learning needs (meds, wound care, etc)   Refer to discharge planning if patient needs post-hospital services based on physician order or complex needs related to functional status, cognitive ability or social support system  3/14/2023 1240 by Shaka Powell RN  Outcome: Progressing     Problem: Pain  Goal: Verbalizes/displays adequate comfort level or baseline comfort level  3/14/2023 2240 by Madelin Cueto RN  Outcome: Progressing  Flowsheets (Taken 3/14/2023 2240)  Verbalizes/displays adequate comfort level or baseline comfort level:   Encourage patient to monitor pain and request assistance   Assess pain using appropriate pain scale   Administer analgesics based on type and severity of pain and evaluate response   Implement non-pharmacological measures as appropriate and evaluate response   Consider cultural and social influences on pain and pain management   Notify Licensed Independent Practitioner if interventions unsuccessful or patient reports new pain  3/14/2023 1240 by Shaka Powell RN  Outcome: Progressing     Problem: Neurosensory - Adult  Goal: Achieves stable or improved neurological status  3/14/2023 2240 by Madelin Cueto RN  Outcome: Progressing  Flowsheets (Taken 3/14/2023 2240)  Achieves stable or improved neurological status: Assess for and report changes in neurological status  3/14/2023 1240 by Shaka Powell RN  Outcome: Progressing  Goal: Achieves maximal functionality and self care  3/14/2023 2240 by Madelin Cueto RN  Outcome: Progressing  Flowsheets (Taken 3/14/2023 2240)  Achieves maximal functionality and self care: Monitor swallowing and airway patency with patient fatigue and changes in neurological status  3/14/2023 1240 by Angela Solorio RN  Outcome: Progressing     Problem: Cardiovascular - Adult  Goal: Maintains optimal cardiac output and hemodynamic stability  3/14/2023 2240 by Felicita Noe RN  Outcome: Progressing  Flowsheets (Taken 3/14/2023 2240)  Maintains optimal cardiac output and hemodynamic stability:   Monitor blood pressure and heart rate   Monitor urine output and notify Licensed Independent Practitioner for values outside of normal range   Assess for signs of decreased cardiac output  3/14/2023 1240 by Angela Solorio RN  Outcome: Progressing  Goal: Absence of cardiac dysrhythmias or at baseline  3/14/2023 2240 by Felicita Noe RN  Outcome: Progressing  Flowsheets (Taken 3/14/2023 2240)  Absence of cardiac dysrhythmias or at baseline:   Assess for signs of decreased cardiac output   Monitor cardiac rate and rhythm  3/14/2023 1240 by Angela Solorio RN  Outcome: Progressing     Problem: Skin/Tissue Integrity - Adult  Goal: Skin integrity remains intact  3/14/2023 2240 by Felicita Noe RN  Outcome: Progressing  Flowsheets (Taken 3/13/2023 2000 by Madelyn Stevenson RN)  Skin Integrity Remains Intact: Monitor for areas of redness and/or skin breakdown  3/14/2023 1240 by Angela Solorio RN  Outcome: Progressing  Goal: Incisions, wounds, or drain sites healing without S/S of infection  3/14/2023 2240 by Felicita Noe RN  Outcome: Progressing  3/14/2023 1240 by Angela Solorio RN  Outcome: Progressing     Problem: Musculoskeletal - Adult  Goal: Return mobility to safest level of function  3/14/2023 2240 by Felicita Noe RN  Outcome: Progressing  Flowsheets (Taken 3/14/2023 2240)  Return Mobility to Safest Level of Function:   Assess patient stability and activity tolerance for standing, transferring and ambulating with or without assistive devices   Assist with transfers and ambulation using safe patient handling equipment as needed   Ensure adequate protection for wounds/incisions during mobilization  3/14/2023 1240 by Zoe Doherty RN  Outcome: Progressing  Goal: Return ADL status to a safe level of function  3/14/2023 2240 by Selena Garvey RN  Outcome: Progressing  Flowsheets (Taken 3/14/2023 2240)  Return ADL Status to a Safe Level of Function:   Administer medication as ordered   Assess activities of daily living deficits and provide assistive devices as needed  3/14/2023 1240 by Zoe Doherty RN  Outcome: Progressing     Problem: Genitourinary - Adult  Goal: Absence of urinary retention  3/14/2023 2240 by Selena Garvey RN  Outcome: Progressing  Flowsheets (Taken 3/14/2023 2240)  Absence of urinary retention: Assess patients ability to void and empty bladder  3/14/2023 1240 by Zoe Doherty RN  Outcome: Progressing     Problem: Skin/Tissue Integrity  Goal: Absence of new skin breakdown  Description: 1. Monitor for areas of redness and/or skin breakdown  2. Assess vascular access sites hourly  3. Every 4-6 hours minimum:  Change oxygen saturation probe site  4. Every 4-6 hours:  If on nasal continuous positive airway pressure, respiratory therapy assess nares and determine need for appliance change or resting period.   3/14/2023 2240 by Selena Garvey RN  Outcome: Progressing  3/14/2023 1240 by Zoe Doherty RN  Outcome: Progressing     Problem: Safety - Adult  Goal: Free from fall injury  3/14/2023 2240 by Selena Garvey RN  Outcome: Progressing  Flowsheets (Taken 3/13/2023 2000 by Sonia Null RN)  Free From Fall Injury: Instruct family/caregiver on patient safety  3/14/2023 1240 by Zoe Doherty RN  Outcome: Progressing     Problem: ABCDS Injury Assessment  Goal: Absence of physical injury  3/14/2023 2240 by Pj Rodriguez, RN  Outcome: Progressing  Flowsheets (Taken 3/14/2023 2240)  Absence of Physical Injury: Implement safety measures based on patient assessment  3/14/2023 1240 by Snehal Grant, RN  Outcome: Progressing

## 2023-03-15 NOTE — PROCEDURES
Product type [employer]  History/Labs/Allergies Reviewed  Placed By Alvaro Boogie. Héctor Rae RN  Assisted By BENJI  Time out Performed using Two Identifiers  Lot # [id number]  Expiration date 9/30/2024  Catheter size 20 gauge  Trimmed at 8 cm  Total length  inserted 8 cm  External catheter length 0 cm  Location right cephalic vein . 40 cm  Number of attempts 1  Estimated blood loss 0 ml  Placement verified by- positive blood return & flushes easily  Special equipment used- ultrasound & AST  Catheter secured with statlock  Dressing applied- Tegaderm CHG  Lidocaine administered intradermally conc.1% 1 mL  Midline education:   [ x ] Discussed with patient/Family or POA prior to procedure. Risks and Benefits along with reason for procedure were discussed and teaching was reinforced with an education handout on Midline insertion. [facility] FAQ Catheter Associated Blood Stream Infections and [address] [facility] [address] REV. 7/13 Nursing and Booklet left at bedside or in chart. Patient (Family or POA) acknowledged understanding of information taught and agreed to procedure. [  ] Was not discussed with patient/family or POA due to pts medical status at time of procedure. pts family or POA not available to discuss Midline education.  [facility] FAQ Catheter Associated Blood Stream Infections and [address] [facility] REV. 7/13 Nursing and Booklet left at bedside or in chart         Enzo Flood RN

## 2023-03-15 NOTE — CARE COORDINATION
CM spoke with Rigo Yip from [facility] and she confirmed that they are able to accept patient as soon as she is discharge ready. RAMAKRISHNA spoke with Dr. Karla Chew earlier today and ID is ok with patient discharging. PS message sent to attending questioning when patient will be ready to discharge. 1500- PS message received stating patient will be ready to discharge tomorrow. Transportation request faxed to [address] requesting 12p transportation tomorrow. Midline placed today and signed IV ATB script placed in transportation packet. DC order needed prior to discharge and HENS and SHIRLEY need completed. RAMAKRISHNA spoke with Rigo Yip from [facility] and she confirmed they will be able to accept patient with 12pm transportation time tomorrow. RN to call report to [phone]. CM called and updated patient's daughter Rodrigo Doctor [phone] with transportation time.

## 2023-03-15 NOTE — PLAN OF CARE
Problem: Discharge Planning  Goal: Discharge to home or other facility with appropriate resources  3/15/2023 1036 by Yani Paredes RN  Outcome: Progressing  3/14/2023 2240 by Terrie Ferguson RN  Outcome: Progressing  Flowsheets (Taken 3/14/2023 2240)  Discharge to home or other facility with appropriate resources:   Identify barriers to discharge with patient and caregiver   Arrange for needed discharge resources and transportation as appropriate   Identify discharge learning needs (meds, wound care, etc)   Refer to discharge planning if patient needs post-hospital services based on physician order or complex needs related to functional status, cognitive ability or social support system     Problem: Pain  Goal: Verbalizes/displays adequate comfort level or baseline comfort level  3/15/2023 1036 by Yani Paredes RN  Outcome: Progressing  3/14/2023 2240 by Terrie Ferguson RN  Outcome: Progressing  Flowsheets (Taken 3/14/2023 2240)  Verbalizes/displays adequate comfort level or baseline comfort level:   Encourage patient to monitor pain and request assistance   Assess pain using appropriate pain scale   Administer analgesics based on type and severity of pain and evaluate response   Implement non-pharmacological measures as appropriate and evaluate response   Consider cultural and social influences on pain and pain management   Notify Licensed Independent Practitioner if interventions unsuccessful or patient reports new pain     Problem: Neurosensory - Adult  Goal: Achieves stable or improved neurological status  3/15/2023 1036 by Yani Paredes RN  Outcome: Progressing  Flowsheets (Taken 3/15/2023 0800)  Achieves stable or improved neurological status: Assess for and report changes in neurological status  3/14/2023 2240 by Terrie Ferguson RN  Outcome: Progressing  Flowsheets (Taken 3/14/2023 2240)  Achieves stable or improved neurological status: Assess for and report changes in neurological status  Goal: Achieves maximal functionality and self care  3/15/2023 1036 by Yani Paredes RN  Outcome: Progressing  Flowsheets (Taken 3/15/2023 0800)  Achieves maximal functionality and self care: Monitor swallowing and airway patency with patient fatigue and changes in neurological status  3/14/2023 2240 by Terrie Ferguson RN  Outcome: Progressing  Flowsheets (Taken 3/14/2023 2240)  Achieves maximal functionality and self care: Monitor swallowing and airway patency with patient fatigue and changes in neurological status     Problem: Cardiovascular - Adult  Goal: Maintains optimal cardiac output and hemodynamic stability  3/15/2023 1036 by Yani Paredes RN  Outcome: Progressing  Flowsheets (Taken 3/15/2023 0800)  Maintains optimal cardiac output and hemodynamic stability: Monitor blood pressure and heart rate  3/14/2023 2240 by Terrie Ferguson RN  Outcome: Progressing  Flowsheets (Taken 3/14/2023 2240)  Maintains optimal cardiac output and hemodynamic stability:   Monitor blood pressure and heart rate   Monitor urine output and notify Licensed Independent Practitioner for values outside of normal range   Assess for signs of decreased cardiac output  Goal: Absence of cardiac dysrhythmias or at baseline  3/15/2023 1036 by Yani Paredes RN  Outcome: Progressing  Flowsheets (Taken 3/15/2023 0800)  Absence of cardiac dysrhythmias or at baseline: Monitor cardiac rate and rhythm  3/14/2023 2240 by Terrie Ferguson RN  Outcome: Progressing  Flowsheets (Taken 3/14/2023 2240)  Absence of cardiac dysrhythmias or at baseline:   Assess for signs of decreased cardiac output   Monitor cardiac rate and rhythm     Problem: Skin/Tissue Integrity - Adult  Goal: Skin integrity remains intact  3/15/2023 1036 by Yani Paredes RN  Outcome: Progressing  Flowsheets (Taken 3/15/2023 0800)  Skin Integrity Remains Intact: Monitor for areas of redness and/or skin breakdown  3/14/2023 2240 by Pool Soto RN  Outcome: Progressing  Flowsheets (Taken 3/13/2023 2000 by Lui Ponec RN)  Skin Integrity Remains Intact: Monitor for areas of redness and/or skin breakdown  Goal: Incisions, wounds, or drain sites healing without S/S of infection  3/15/2023 1036 by Trell Costa RN  Outcome: Progressing  Flowsheets (Taken 3/15/2023 0800)  Incisions, Wounds, or Drain Sites Healing Without Sign and Symptoms of Infection: ADMISSION and DAILY: Assess and document risk factors for pressure ulcer development  3/14/2023 2240 by Pool Soto RN  Outcome: Progressing     Problem: Musculoskeletal - Adult  Goal: Return mobility to safest level of function  3/15/2023 1036 by Trell Costa RN  Outcome: Progressing  Flowsheets (Taken 3/15/2023 0800)  Return Mobility to Safest Level of Function: Assess patient stability and activity tolerance for standing, transferring and ambulating with or without assistive devices  3/14/2023 2240 by Pool Soto RN  Outcome: Progressing  Flowsheets (Taken 3/14/2023 2240)  Return Mobility to Safest Level of Function:   Assess patient stability and activity tolerance for standing, transferring and ambulating with or without assistive devices   Assist with transfers and ambulation using safe patient handling equipment as needed   Ensure adequate protection for wounds/incisions during mobilization  Goal: Return ADL status to a safe level of function  3/15/2023 1036 by Trell Costa RN  Outcome: Progressing  Flowsheets (Taken 3/15/2023 0800)  Return ADL Status to a Safe Level of Function: Administer medication as ordered  3/14/2023 2240 by Pool Soto RN  Outcome: Progressing  Flowsheets (Taken 3/14/2023 2240)  Return ADL Status to a Safe Level of Function:   Administer medication as ordered   Assess activities of daily living deficits and provide assistive devices as needed     Problem: Genitourinary - Adult  Goal: Absence of urinary retention  3/15/2023 1036 by Yani Paredes RN  Outcome: Progressing  Flowsheets (Taken 3/15/2023 0800)  Absence of urinary retention: Assess patients ability to void and empty bladder  3/14/2023 2240 by Terrie Ferguson RN  Outcome: Progressing  Flowsheets (Taken 3/14/2023 2240)  Absence of urinary retention: Assess patients ability to void and empty bladder     Problem: Skin/Tissue Integrity  Goal: Absence of new skin breakdown  Description: 1. Monitor for areas of redness and/or skin breakdown  2. Assess vascular access sites hourly  3. Every 4-6 hours minimum:  Change oxygen saturation probe site  4. Every 4-6 hours:  If on nasal continuous positive airway pressure, respiratory therapy assess nares and determine need for appliance change or resting period.   3/15/2023 1036 by Yani Paredes RN  Outcome: Progressing  3/14/2023 2240 by Terrie Ferguson RN  Outcome: Progressing     Problem: Safety - Adult  Goal: Free from fall injury  3/15/2023 1036 by Yani Paredes RN  Outcome: Progressing  3/14/2023 2240 by Terrie Ferguson RN  Outcome: Progressing  Flowsheets (Taken 3/13/2023 2000 by Serafin Shipley RN)  Free From Fall Injury: Instruct family/caregiver on patient safety     Problem: ABCDS Injury Assessment  Goal: Absence of physical injury  3/15/2023 1036 by Yani Paredes RN  Outcome: Progressing  3/14/2023 2240 by Terrie Ferguson RN  Outcome: Progressing  Flowsheets (Taken 3/14/2023 2240)  Absence of Physical Injury: Implement safety measures based on patient assessment

## 2023-03-16 VITALS
OXYGEN SATURATION: 94 % | HEIGHT: 67 IN | SYSTOLIC BLOOD PRESSURE: 150 MMHG | DIASTOLIC BLOOD PRESSURE: 68 MMHG | HEART RATE: 72 BPM | TEMPERATURE: 97.3 F | WEIGHT: 170 LBS | BODY MASS INDEX: 26.68 KG/M2 | RESPIRATION RATE: 17 BRPM

## 2023-03-16 LAB
ABSOLUTE EOS #: 0.11 K/UL (ref 0–0.44)
ABSOLUTE IMMATURE GRANULOCYTE: 0.46 K/UL (ref 0–0.3)
ABSOLUTE LYMPH #: 2.62 K/UL (ref 1.1–3.7)
ABSOLUTE MONO #: 0.68 K/UL (ref 0.1–1.2)
ALBUMIN (CALCULATED): 2.1 G/DL (ref 3.2–5.2)
ALBUMIN PERCENT: 47 % (ref 45–65)
ALPHA 1 PERCENT: 6 % (ref 3–6)
ALPHA 2 PERCENT: 15 % (ref 6–13)
ALPHA1 GLOB SERPL ELPH-MCNC: 0.3 G/DL (ref 0.1–0.4)
ALPHA2 GLOB SERPL ELPH-MCNC: 0.7 G/DL (ref 0.5–0.9)
ANION GAP SERPL CALCULATED.3IONS-SCNC: 6 MMOL/L (ref 9–17)
B-GLOBULIN SERPL ELPH-MCNC: 0.7 G/DL (ref 0.5–1.1)
BASOPHILS # BLD: 1 % (ref 0–2)
BASOPHILS ABSOLUTE: 0.06 K/UL (ref 0–0.2)
BETA PERCENT: 17 % (ref 11–19)
BUN SERPL-MCNC: 4 MG/DL (ref 8–23)
CALCIUM SERPL-MCNC: 7.3 MG/DL (ref 8.6–10.4)
CHLORIDE SERPL-SCNC: 107 MMOL/L (ref 98–107)
CO2 SERPL-SCNC: 27 MMOL/L (ref 20–31)
CREAT SERPL-MCNC: 0.24 MG/DL (ref 0.5–0.9)
EOSINOPHILS RELATIVE PERCENT: 2 % (ref 1–4)
FREE KAPPA/LAMBDA RATIO: 0.86 (ref 0.26–1.65)
GAMMA GLOB SERPL ELPH-MCNC: 0.7 G/DL (ref 0.5–1.5)
GAMMA GLOBULIN %: 15 % (ref 9–20)
GFR SERPL CREATININE-BSD FRML MDRD: >60 ML/MIN/1.73M2
GLUCOSE BLD-MCNC: 102 MG/DL (ref 65–105)
GLUCOSE BLD-MCNC: 108 MG/DL (ref 65–105)
GLUCOSE BLD-MCNC: 112 MG/DL (ref 65–105)
GLUCOSE BLD-MCNC: 127 MG/DL (ref 65–105)
GLUCOSE SERPL-MCNC: 126 MG/DL (ref 70–99)
HCT VFR BLD AUTO: 29 % (ref 36.3–47.1)
HGB BLD-MCNC: 9.4 G/DL (ref 11.9–15.1)
IMMATURE GRANULOCYTES: 8 %
KAPPA LC FREE SER-MCNC: 2.22 MG/DL (ref 0.37–1.94)
LAMBDA LC FREE SERPL-MCNC: 2.57 MG/DL (ref 0.57–2.63)
LYMPHOCYTES # BLD: 46 % (ref 24–43)
MCH RBC QN AUTO: 34.3 PG (ref 25.2–33.5)
MCHC RBC AUTO-ENTMCNC: 32.4 G/DL (ref 28.4–34.8)
MCV RBC AUTO: 105.8 FL (ref 82.6–102.9)
MICROORGANISM SPEC CULT: NORMAL
MONOCYTES # BLD: 12 % (ref 3–12)
MORPHOLOGY: ABNORMAL
MORPHOLOGY: ABNORMAL
NRBC AUTOMATED: 0 PER 100 WBC
PATHOLOGIST: ABNORMAL
PATHOLOGIST: ABNORMAL
PDW BLD-RTO: 16.3 % (ref 11.8–14.4)
PLATELET # BLD AUTO: 168 K/UL (ref 138–453)
PMV BLD AUTO: 9.8 FL (ref 8.1–13.5)
POTASSIUM SERPL-SCNC: 3.7 MMOL/L (ref 3.7–5.3)
PROT SERPL-MCNC: 4.4 G/DL (ref 6.4–8.3)
PROTEIN ELECTROPHORESIS, SERUM: ABNORMAL
RBC # BLD: 2.74 M/UL (ref 3.95–5.11)
SEG NEUTROPHILS: 31 % (ref 36–65)
SEGMENTED NEUTROPHILS ABSOLUTE COUNT: 1.77 K/UL (ref 1.5–8.1)
SERUM IFX INTERP: ABNORMAL
SERVICE CMNT-IMP: NORMAL
SODIUM SERPL-SCNC: 140 MMOL/L (ref 135–144)
SPECIMEN DESCRIPTION: NORMAL
TOTAL PROT. SUM,%: 100 % (ref 98–102)
TOTAL PROT. SUM: 4.5 G/DL (ref 6.3–8.2)
WBC # BLD AUTO: 5.7 K/UL (ref 3.5–11.3)

## 2023-03-16 PROCEDURE — 6360000002 HC RX W HCPCS

## 2023-03-16 PROCEDURE — 6370000000 HC RX 637 (ALT 250 FOR IP)

## 2023-03-16 PROCEDURE — 92526 ORAL FUNCTION THERAPY: CPT

## 2023-03-16 PROCEDURE — 6360000002 HC RX W HCPCS: Performed by: INTERNAL MEDICINE

## 2023-03-16 PROCEDURE — 82947 ASSAY GLUCOSE BLOOD QUANT: CPT

## 2023-03-16 PROCEDURE — 2580000003 HC RX 258

## 2023-03-16 PROCEDURE — 85025 COMPLETE CBC W/AUTO DIFF WBC: CPT

## 2023-03-16 PROCEDURE — 6370000000 HC RX 637 (ALT 250 FOR IP): Performed by: STUDENT IN AN ORGANIZED HEALTH CARE EDUCATION/TRAINING PROGRAM

## 2023-03-16 PROCEDURE — 2580000003 HC RX 258: Performed by: INTERNAL MEDICINE

## 2023-03-16 PROCEDURE — 99239 HOSP IP/OBS DSCHRG MGMT >30: CPT | Performed by: STUDENT IN AN ORGANIZED HEALTH CARE EDUCATION/TRAINING PROGRAM

## 2023-03-16 PROCEDURE — 99232 SBSQ HOSP IP/OBS MODERATE 35: CPT | Performed by: INTERNAL MEDICINE

## 2023-03-16 PROCEDURE — 80048 BASIC METABOLIC PNL TOTAL CA: CPT

## 2023-03-16 PROCEDURE — 36415 COLL VENOUS BLD VENIPUNCTURE: CPT

## 2023-03-16 RX ORDER — GABAPENTIN 100 MG/1
200 CAPSULE ORAL 3 TIMES DAILY
Qty: 180 CAPSULE | Refills: 0 | Status: SHIPPED | OUTPATIENT
Start: 2023-03-16 | End: 2023-04-15

## 2023-03-16 RX ORDER — TRAZODONE HYDROCHLORIDE 50 MG/1
50 TABLET ORAL NIGHTLY
Qty: 30 TABLET | Refills: 0 | Status: SHIPPED | OUTPATIENT
Start: 2023-03-16

## 2023-03-16 RX ORDER — FOLIC ACID 1 MG/1
1 TABLET ORAL DAILY
Qty: 30 TABLET | Refills: 3 | Status: SHIPPED | OUTPATIENT
Start: 2023-03-16

## 2023-03-16 RX ORDER — CYANOCOBALAMIN 1000 UG/ML
1000 INJECTION, SOLUTION INTRAMUSCULAR; SUBCUTANEOUS DAILY
Qty: 4 ML | Refills: 0 | Status: SHIPPED | OUTPATIENT
Start: 2023-03-16 | End: 2023-03-20

## 2023-03-16 RX ADMIN — ENOXAPARIN SODIUM 70 MG: 100 INJECTION SUBCUTANEOUS at 08:48

## 2023-03-16 RX ADMIN — MIDODRINE HYDROCHLORIDE 2.5 MG: 2.5 TABLET ORAL at 08:48

## 2023-03-16 RX ADMIN — MIDODRINE HYDROCHLORIDE 2.5 MG: 2.5 TABLET ORAL at 11:48

## 2023-03-16 RX ADMIN — GABAPENTIN 200 MG: 100 CAPSULE ORAL at 08:48

## 2023-03-16 RX ADMIN — CYANOCOBALAMIN 1000 MCG: 1000 INJECTION, SOLUTION INTRAMUSCULAR at 08:48

## 2023-03-16 RX ADMIN — SODIUM CHLORIDE, PRESERVATIVE FREE 10 ML: 5 INJECTION INTRAVENOUS at 08:49

## 2023-03-16 RX ADMIN — FOLIC ACID 1 MG: 1 TABLET ORAL at 08:48

## 2023-03-16 RX ADMIN — CEFTRIAXONE SODIUM 2000 MG: 10 INJECTION, POWDER, FOR SOLUTION INTRAVENOUS at 00:30

## 2023-03-16 ASSESSMENT — ENCOUNTER SYMPTOMS
SHORTNESS OF BREATH: 0
APNEA: 0
EYE DISCHARGE: 0
COLOR CHANGE: 0
ABDOMINAL DISTENTION: 0
ABDOMINAL PAIN: 1
EYE REDNESS: 0
COUGH: 0

## 2023-03-16 NOTE — PROGRESS NOTES
Attempted to call report to [facility] with no answer.      1215: Report called to [facility]

## 2023-03-16 NOTE — PROGRESS NOTES
Speech Language Pathology  [facility]    Dysphagia Treatment Note    Date: 3/16/2023  Patients Name: Delmar Mosqueda  MRN: [id number]    Patient Active Problem List   Diagnosis Code    Mild sprain of right ankle, initial encounter S93.401A    Stenosis of cervical spine with myelopathy ([facility] [address] M48.02, G99.2    Central cord syndrome [facility]) S14.129A    Septic shock ([facility] [address] A41.9, R65.21    Ascending cholangitis K83.09    Cholelithiasis K80.20    Gram negative septicemia ([facility] [address] A41.50    Choledocholithiasis K80.50    Acute respiratory failure with hypoxia ([facility] W32.39    Metabolic encephalopathy R48.07    Acute pulmonary embolism ([facility]) I26.99    E. coli septicemia ([facility]) A41.51    Cholecystitis K81.9    Pancytopenia ([facility]) D61.818    Acute UTI N39.0       Pain: pt does not complain of pain    Dysphagia Treatment  Treatment time:[id number]    Subjective: [x] Alert [x] Cooperative     [] Confused     [] Agitated    [] Lethargic    Objective/Assessment:  Pt. Seen for treatment program for dysphagia. Pt completed trials of thin by straw and Dysphagia  soft and bite sized (Dysphagia III) as advanced textures as she was downgraded 3/12. Pt reports that she still does not have access to her dentures and will not at her next level of care. Pt able to consume regular lunch tray that ST modified to be soft and bite sized (alerted RN that pt's tray arrived as regular texture) with no s/s of dysphagia. Pt required 100% assistance for set up and 20% assistance for feeding this date. Recommend pt advance diet level to soft and bite sized (Dysphagia III) as pt is demonstrated improved motor control for complete efficient and safe oral preparation. Plan:  [x] Continue ST services    [] Discharge from ST:        Discharge recommendations: []  Further therapy recommended at discharge. The patient should be able to tolerate at least 3 hours of therapy per day over 5 days or 15 hours over 7 days.  [x] Further therapy recommended at discharge. [] No therapy recommended at discharge.        Edmundo Jovel M.S., CCC-SLP    Treatment completed by: Edmundo Jovel, SLP

## 2023-03-16 NOTE — PROGRESS NOTES
[facility] -   Infectious diseases evaluation  admission date 3/8/2023    reason for consultation:   AB management    Impression :   Current:    Altered mentation  Hypotension   Septic shock  E. coli septicemia  Acute Resp failure intubated   Obstructive Ascending cholangitis - post ERCP 3/8  Chronic Cholecystitis w cholelithiasis  UTI complicated   HIT -     Other:  History of cervical laminectomy in the past  Discussion / summary of stay / plan of care     Recommendations   Ascending cholangitis post ERCP  Post  cholecystectomy on 3/13  Chronic maria teresa on path w mucosal ulcerations  E. coli septicemia  Likely related to the cholangitis  UTI/pyelonephritis, with E. coli and Aerococcus,  Difficult to rule out pyelonephritis since E. coli was found in the urine and the blood as well  Antibiotics  Zosyn x 3 days then ceftriaxone x 1 days and back to zosyn 3/12 due to fever- till 3/14 after GB  resected,  was switched to ceftriaxone till 3/21  (Due to bacteremia and cholangitis, would need 14 days AB in total till 3/21) - reconsiled -   Midline placed 3/15  Infection Control Recommendations   [place] Precautions      Antimicrobial Stewardship Recommendations   Simplification of therapy  Targeted therapy      History of Present Illness:   Initial history:  Roldan Lloyd is a 75y.o.-year-old female admitted with altered mentation, septic with respiratory failure and Intubated, blood cultures grew E. coli as well as the urine that grew E. coli and Aerococcus. In the meantime CT scan of the abdomen showed an obstructive ascending cholangitis and had a ERCP.   Ultimately she was extubated and is planned for a cholecystectomy laparoscopically on 3/13  She is treated with ceftriaxone for the E. coli that is felt to be from the common bile duct  Of note is the E. coli was also growing in the urine, and a CT of the abdomen did not suggest any pyelonephritis or any ureteral obstruction.  Hence difficult to tell if there was a pyelonephritis also inflamed to this bacteremia.    Infectious disease consulted for antibiotic management    Interval changes  3/16/2023   Patient Vitals for the past 8 hrs:   BP Temp Temp src Pulse Resp SpO2   03/16/23 0340 115/61 97.7 °F (36.5 °C) Oral 68 20 95 %   3/13  Afebrile. Lap cholecystectomy today, will wait on fluid cx.     3/14  Afebrile, vitals stable. BC 3/11 neg. POD-1 lap maria teresa, awaiting fluid cx - pt remains on Zosyn. Pt doing well, pain controlled.     3/15  Afebrile, vitals stable. Surgical fluid cx pending. Pt is on Ceftriaxone. Pt still has some soreness over surgical site, RUE IV site clean.     3/16  Afebrile, vitals stable. Picc line placed 3/15 for continued AB. DC planning - pt going to rehab.    Summary of relevant labs:  Labs:  Creatinine0.20 Low - 0.23 - 0.20 - 0.24  WBC3.9 - 3.4 - 1.9 - 5.7    Micro:  BC 3/11 NGTD  BC 3/8 e coli  Urine cx 3/8 e coli, aerococcus    Imaging:  CTAP  3/8  . Small thrombus load acute pulmonary embolism.  A few filling defects in subsegmental branches posterior basal right lower lobe. 2. Moderate-size posterior basal left lower lobe patchy consolidation and much smaller posterior basal right lower lobe patchy consolidation compatible with atelectasis. Abdomen pelvis:   1. Cholelithiasis without evidence for acute cholecystitis. 2. Intra and extrahepatic biliary ductal dilatation.  Diffuse dilatation of CBD up to 14 mm diameter down to the ampulla.  There could be non radiopaque calculus, stricture or periampullary lesion causing the obstruction.  Given that this has developed since the prior study possible non radiopaque distal obstructing choledocholithiasis is most favored.  MRCP or ERCP is recommended. 3. Probable endometrial  thickening up to 9 mm thickness.  Correlation with non emergency outpatient pelvic ultrasound is recommended.     I have personally reviewed the past medical history, past  surgical history, medications, social history, and family history, and I haveupdated the database accordingly. Allergies:   Patient has no known allergies. Review of Systems:     Review of Systems   Constitutional:  Positive for activity change. Negative for chills, diaphoresis, fatigue and fever. HENT:  Negative for congestion. Eyes:  Negative for discharge and redness. Respiratory:  Negative for apnea, cough and shortness of breath. Cardiovascular:  Negative for chest pain. Gastrointestinal:  Positive for abdominal pain. Negative for abdominal distention. Endocrine: Negative for cold intolerance and polyphagia. Genitourinary:  Negative for dysuria and flank pain. Musculoskeletal:  Negative for arthralgias. Skin:  Negative for color change. Allergic/Immunologic: Negative for immunocompromised state. Neurological:  Negative for dizziness, speech difficulty and headaches. Psychiatric/Behavioral:  Negative for agitation and confusion. Physical Examination :       Physical Exam  Constitutional:       General: She is not in acute distress. Appearance: Normal appearance. She is not ill-appearing or diaphoretic. HENT:      Head: Normocephalic and atraumatic. Nose: Nose normal. No congestion. Mouth/Throat:      Mouth: Mucous membranes are moist.   Eyes:      General: No scleral icterus. Conjunctiva/sclera: Conjunctivae normal.   Cardiovascular:      Rate and Rhythm: Normal rate and regular rhythm. Heart sounds: Normal heart sounds. No murmur heard. Pulmonary:      Effort: Pulmonary effort is normal. No respiratory distress. Breath sounds: Normal breath sounds. No stridor. No wheezing. Chest:      Chest wall: No tenderness. Abdominal:      General: There is no distension. Palpations: Abdomen is soft. There is no mass. Hernia: No hernia is present.       Comments: Soreness over surgical site   Genitourinary:     Comments: No trejo  Musculoskeletal:         General: No swelling or deformity. Cervical back: Neck supple. No rigidity or tenderness. Right lower leg: No edema. Left lower leg: No edema. Comments: Right UE IV clean   Skin:     General: Skin is dry. Coloration: Skin is not jaundiced. Findings: No bruising or rash. Neurological:      General: No focal deficit present. Mental Status: She is alert and oriented to person, place, and time. Psychiatric:         Mood and Affect: Mood normal.         Thought Content:  Thought content normal.       Past Medical History:     Past Medical History:   Diagnosis Date    Psoriasis        Past Surgical  History:     Past Surgical History:   Procedure Laterality Date    CERVICAL FUSION N/A 01/18/2023    C 3-7 POSTERIOR CERVICAL DECOMPRESSION AND FUSION performed by Karen Barcenas DO at [address]  03/13/2023    CHOLECYSTECTOMY, LAPAROSCOPIC N/A 3/13/2023    LAPAROSCOPIC CHOLECYSTECTOMY,  POSSIBLE OPEN performed by Shon Stone DO at [place] [address]    ERCP  03/08/2023    ERCP STONE REMOVAL performed by Xiomara Mohan MD at [place] [address]    ERCP  03/08/2023    ERCP SPHINCTER/PAPILLOTOMY performed by Xiomara Mohan MD at [place] [address]    ERCP  03/08/2023    ERCP STENT INSERTION performed by Xiomara Mohan MD at [place] [address]       Medications:      cefTRIAXone (ROCEPHIN) IV  2,000 mg IntraVENous Q24H    lidocaine 1 % injection  5 mL IntraDERmal Once    sodium chloride flush  5-40 mL IntraVENous 2 times per day    midodrine  2.5 mg Oral TID WC    cyanocobalamin  1,000 mcg IntraMUSCular Daily    folic acid  1 mg Oral Daily    acetaminophen  1,000 mg Oral 3 times per day    gabapentin  200 mg Oral TID    traZODone  50 mg Oral Nightly    sodium chloride flush  5-40 mL IntraVENous 2 times per day    enoxaparin  1 mg/kg SubCUTAneous BID    sodium chloride flush  5-40 mL IntraVENous 2 times per day       Social History:     Social History     Socioeconomic History Marital status: Single     Spouse name: Not on file    Number of children: Not on file    Years of education: Not on file    Highest education level: Not on file   Occupational History    Not on file   Tobacco Use    Smoking status: Former     Types: Cigarettes     Quit date: 2015     Years since quittin.1    Smokeless tobacco: Never   Vaping Use    Vaping Use: Never used   Substance and Sexual Activity    Alcohol use: Never    Drug use: Never    Sexual activity: Not on file   Other Topics Concern    Not on file   Social History Narrative    ** Merged History Encounter **          Social Determinants of Health     Financial Resource Strain: Not on file   Food Insecurity: Not on file   Transportation Needs: Not on file   Physical Activity: Not on file   Stress: Not on file   Social Connections: Not on file   Intimate Partner Violence: Not on file   Housing Stability: Not on file       Family History:   History reviewed. No pertinent family history. Medical Decision Making:   I have independently reviewed/ordered the following labs:    CBC with Differential:   Recent Labs     03/15/23  0752 23  0338   WBC 5.0 5.7   HGB 9.9* 9.4*   HCT 31.6* 29.0*    168   LYMPHOPCT 48* 46*   MONOPCT 13* 12     BMP:  Recent Labs     03/15/23  0752 23  0338    140   K 3.6* 3.7    107   CO2 27 27   BUN 4* 4*   CREATININE 0.30* 0.24*     Hepatic Function Panel:   Recent Labs     23  1924   PROT 4.4*     No results for input(s): RPR in the last 72 hours. No results for input(s): HIV in the last 72 hours. No results for input(s): BC in the last 72 hours. Lab Results   Component Value Date/Time    CREATININE 0.24 2023 03:38 AM    GLUCOSE 126 2023 03:38 AM       Detailed results: Thank you for allowing us to participate in the care of this patient. Please call with questions.     This note is created with the assistance of a speech recognition program.  While intending to generate adocument that actually reflects the content of the visit, the document can still have some errors including those of syntax and sound a like substitutions which may escape proof reading. It such instances, actual meaningcan be extrapolated by contextual diversion. Yisel Jensen  Office: [phone]  Perfect serve / office [phone]        I have discussed the care of the patient, including pertinent history and exam findings,  with the resident. I have seen and examined the patient and the key elements of all parts of the encounter have been performed by me. I agree with the assessment, plan and orders as documented by the resident.     Amie Barrientos, Infectious Diseases

## 2023-03-16 NOTE — DISCHARGE SUMMARY
[facility]  Office: [address], DO, Austyn [facility], DO, Sandra July, DO, Temi López Blood, DO, Srikanth Hancock MD, Sarah Mariee MD, Frank Dixon MD, Sylvie Bernheim, MD,  Nicky Samuel MD, Crista Summers MD, Romina Means, DO, Stacy Verdin MD,  Monalisa Hicks, DO, Richy Manzanares MD, Nina Oliveira MD, Harshad Valentine DO, Divya Sanchez MD, Shreyas Vasquez MD, Linda Woody DO, Mikala Washington MD, Leeroy Bang MD, Macie Gutierrez MD, Mago Vizcaino MD, Abdias Petersen MD, Francisca Marion DO, Jose R Quintero MD, Erik Driscoll MD, Charlotte Burroughs, CNP,  Emily Pineda, CNP, Ari Still, CNP, Lai Hair, CNP,  Tevin Ruvalcaba, [facility], Calos Martinez, CNP, Radha Gonsales, CNP, Marino Michaels, CNP, Marli Jara, CNP, Lorraine Hernandez, [facility], Cait Andersen PA-C, Bharti Sampson, CNS, Nuris Camp, CNP, Fatoumata Marroquin,  [facility]    Discharge Summary     Patient ID: Nadja Cespedes  :  1950   MRN: [id number]     ACCOUNT:  [de-identified]   Patient's PCP: Alfredo Godinez MD  Admit Date: 3/8/2023   Discharge Date: 3/16/2023   Length of Stay: 8  Code Status:  Full Code  Admitting Physician: Angel Cheng MD  Discharge Physician: Mago Vizcaino MD     Active Discharge Diagnoses:     Hospital Problem Lists:  Principal Problem:    Septic shock [facility])  Active Problems:    Ascending cholangitis    Cholelithiasis    Gram negative septicemia [facility])    Choledocholithiasis    Acute respiratory failure with hypoxia ([facility]    Metabolic encephalopathy    Acute pulmonary embolism ([facility]    E. coli septicemia ([facility]    Cholecystitis    Pancytopenia ([facility]    Acute UTI  Resolved Problems:    Altered mental status    Elevated brain natriuretic peptide (BNP) level      Admission Condition:  poor     Discharged Condition: good    Hospital Stay:     Hospital Course:  Nadja Cespedes is a 75 y.o. female who was admitted for the management of Septic shock ([facility] , presented to ER with Altered Mental Status and Hypotension    Patient was initially admitted for septic shock from cholangitis and cholelithiasis and acute respiratory failure requiring ICU admission. She  was intubated and required pressors. She remained in the ICU for subsequent days. Her CT abdomen pelvis showed obstructive ascending cholangitis for which she underwent ERCP. Later she underwent lap cholecystectomy on 3/13/2023. Blood and urine cultures were positive for E. coli. She was treated with Zosyn for few days and later ceftriaxone for complicated UTI. Patient was seen by general surgery, gastroenterology and infectious diseases. .  She also had thrombocytopenia raising concern for HIT and was placed on argatroban. Later HIT antibody was negative and she was transitioned to Lovenox. Hematology was also consulted for worsening pancytopenia likely from sepsis and macrocytic anemia from B12 deficiency. .  She improved over time and was discharged to nursing facility in stable condition. ROS(3/16/23)  Constitutional:  negative for chills, fevers, sweats  Respiratory:  negative for cough, dyspnea on exertion, shortness of breath, wheezing  Cardiovascular:  negative for chest pain, chest pressure/discomfort, lower extremity edema, palpitations  Gastrointestinal:  Neg for abdominal pain, Neg for constipation, diarrhea, nausea, vomiting  Neurological:  negative for dizziness, headache    Physical exam- 3/16/23  General appearance:  Awake, alert, cooperative and no distress  Mental Status:   Awake, alert, answering appropriately. Lungs:  clear to auscultation bilaterally, normal effort  Heart:  regular rate and rhythm, no murmur  Abdomen: soft, non tender, Nondistented. Normal bowel sounds. Extremities:  no edema, redness, tenderness in the calves  Skin: Midline in place on right side.      Significant therapeutic interventions:   -Septic Shock-Resolved.    -E. coli septicemia likely secondary to cholangitis.-On Rocephin till 3/21. Will require total 14 days of antibiotics. -Ascending Cholangitis s/p ERCP by GI. S/p lap cholecystectomy 3/13. --Toxic metabolic encephalopathy-improved  -Acute pulmonary embolism-On Lovenox.   -Thrombocytopenia-HIT ruled out.    -Pancytopenia likely from sepsis-stable cell lines. -Macrocytic anemia likely from B12 deficiency. Continued on vitamin B12 shots for 5 doses.        Significant Diagnostic Studies:   Labs / Micro:  CBC:   Lab Results   Component Value Date/Time    WBC 5.7 03/16/2023 03:38 AM    RBC 2.74 03/16/2023 03:38 AM    HGB 9.4 03/16/2023 03:38 AM    HCT 29.0 03/16/2023 03:38 AM    .8 03/16/2023 03:38 AM    MCH 34.3 03/16/2023 03:38 AM    MCHC 32.4 03/16/2023 03:38 AM    RDW 16.3 03/16/2023 03:38 AM     03/16/2023 03:38 AM     CMP:    Lab Results   Component Value Date/Time    GLUCOSE 126 03/16/2023 03:38 AM     03/16/2023 03:38 AM    K 3.7 03/16/2023 03:38 AM     03/16/2023 03:38 AM    CO2 27 03/16/2023 03:38 AM    BUN 4 03/16/2023 03:38 AM    CREATININE 0.24 03/16/2023 03:38 AM    ANIONGAP 6 03/16/2023 03:38 AM    ALKPHOS 353 03/11/2023 06:38 AM    ALT 69 03/11/2023 06:38 AM    AST 34 03/11/2023 06:38 AM    BILITOT 0.7 03/11/2023 03:00 PM    LABALBU 2.0 03/11/2023 06:38 AM    ALBUMIN 0.6 03/11/2023 06:38 AM    LABGLOM >60 03/16/2023 03:38 AM    GFRAA >60 07/15/2019 09:03 AM    GFR      07/15/2019 09:03 AM    GFR NOT REPORTED 07/15/2019 09:03 AM    PROT 4.4 03/14/2023 07:24 PM    CALCIUM 7.3 03/16/2023 03:38 AM     U/A:    Lab Results   Component Value Date/Time    COLORU Dark Yellow 03/08/2023 09:09 AM    TURBIDITY Turbid 03/08/2023 09:09 AM    SPECGRAV 1.023 03/08/2023 09:09 AM    HGBUR NEGATIVE 03/08/2023 09:09 AM    PHUR 5.5 03/08/2023 09:09 AM    PROTEINU 1+ 03/08/2023 09:09 AM    GLUCOSEU NEGATIVE 03/08/2023 09:09 AM    KETUA LARGE 03/08/2023 09:09 AM    BILIRUBINUR LARGE 03/08/2023 09:09 AM UROBILINOGEN ELEVATED 03/08/2023 09:09 AM    NITRU POSITIVE 03/08/2023 09:09 AM    LEUKOCYTESUR SMALL 03/08/2023 09:09 AM     TSH:    Lab Results   Component Value Date/Time    TSH 3.86 01/18/2023 06:37 PM        Radiology:  XR CERVICAL SPINE (2-3 VIEWS)    Result Date: 3/10/2023  Stable cervical fusion       Consultations:    Consults:     Final Specialist Recommendations/Findings:   IP CONSULT TO NEUROSURGERY  IP CONSULT TO CRITICAL CARE  IP CONSULT TO GI  PHARMACY TO DOSE VANCOMYCIN  IP CONSULT TO GI  IP CONSULT TO GENERAL SURGERY  IP CONSULT TO INFECTIOUS DISEASES  IP CONSULT TO HEM/ONC      The patient was seen and examined on day of discharge and this discharge summary is in conjunction with any daily progress note from day of discharge. Discharge plan:     Disposition: To a non-[facility] facility    Physician Follow Up:     RITA Newell - [address], [facility]  MOB [address]  [phone]    Follow up  follow up as needed    Carol Ríos MD  [address]  [address]  [phone]    Follow up  Please call the office to make a follow up appt with GI specialist. then you will need repeat procedure for stent removal in 2 months    DO Yaneth Gilbert. Mandy Aranda[address] [address] [phone]    Schedule an appointment as soon as possible for a visit in 2 week(s)  For wound re-check       Requiring Further Evaluation/Follow Up POST HOSPITALIZATION/Incidental Findings: Discharged on IV Rocephin till 3/21 through midline. Outpatient heme-onc follow-up    Diet: regular diet    Activity: As tolerated    Instructions to Patient: Follow-up with general surgery, gastroenterology and hematology.     Discharge Medications:      Medication List        START taking these medications      cefTRIAXone  infusion  Commonly known as: ROCEPHIN  Infuse 2,000 mg intravenously every 24 hours for 6 days Compound per protocol            ASK your doctor about these medications acetaminophen 500 MG tablet  Commonly known as: TYLENOL     fluocinonide 0.05 % external solution  Commonly known as: LIDEX  Apply to scalp psoriasis daily for rash     gabapentin 300 MG capsule  Commonly known as: NEURONTIN  Take 1 capsule by mouth in the morning and 1 capsule at noon and 1 capsule in the evening. Do all this for 7 days. ipratropium-albuterol 0.5-2.5 (3) MG/3ML Soln nebulizer solution  Commonly known as: DUONEB     methocarbamol 750 MG tablet  Commonly known as: ROBAXIN     triamcinolone 0.1 % cream  Commonly known as: KENALOG  Apply to active psoriasis twice daily (not face, armpit or groin)               Where to Get Your Medications        You can get these medications from any pharmacy    Bring a paper prescription for each of these medications  cefTRIAXone  infusion         No discharge procedures on file. Time Spent on discharge is  35 mins in patient examination, evaluation, counseling as well as medication reconciliation, prescriptions for required medications, discharge plan and follow up. Electronically signed by   Lulu Montes MD  3/16/2023  8:01 AM      Thank you Dr. Fausto Sosa MD for the opportunity to be involved in this patient's care.

## 2023-03-17 ENCOUNTER — HOSPITAL ENCOUNTER (OUTPATIENT)
Age: 73
Setting detail: SPECIMEN
Discharge: HOME | End: 2023-03-17

## 2023-03-17 LAB
ABSOLUTE EOS #: 0.06 K/UL (ref 0–0.4)
ABSOLUTE IMMATURE GRANULOCYTE: 0.35 K/UL (ref 0–0.3)
ABSOLUTE LYMPH #: 2.38 K/UL (ref 1–4.8)
ABSOLUTE MONO #: 0.35 K/UL (ref 0.1–0.8)
ALBUMIN SERPL-MCNC: 2.1 G/DL (ref 3.5–5.2)
ALBUMIN/GLOBULIN RATIO: 0.7 (ref 1–2.5)
ALP SERPL-CCNC: 471 U/L (ref 35–104)
ALT SERPL-CCNC: 18 U/L (ref 5–33)
ANION GAP SERPL CALCULATED.3IONS-SCNC: 7 MMOL/L (ref 9–17)
AST SERPL-CCNC: 20 U/L
BASOPHILS # BLD: 0 % (ref 0–2)
BASOPHILS ABSOLUTE: 0 K/UL (ref 0–0.2)
BILIRUB SERPL-MCNC: 0.4 MG/DL (ref 0.3–1.2)
BUN SERPL-MCNC: 3 MG/DL (ref 8–23)
CALCIUM SERPL-MCNC: 7.6 MG/DL (ref 8.6–10.4)
CHLORIDE SERPL-SCNC: 105 MMOL/L (ref 98–107)
CO2 SERPL-SCNC: 28 MMOL/L (ref 20–31)
CREAT SERPL-MCNC: 0.27 MG/DL (ref 0.5–0.9)
EOSINOPHILS RELATIVE PERCENT: 1 % (ref 1–4)
EST. AVERAGE GLUCOSE BLD GHB EST-MCNC: 114 MG/DL
FLOW CYTOMETRY BL: NORMAL
GFR SERPL CREATININE-BSD FRML MDRD: >60 ML/MIN/1.73M2
GLUCOSE SERPL-MCNC: 103 MG/DL (ref 70–99)
HBA1C MFR BLD: 5.6 % (ref 4–6)
HCT VFR BLD AUTO: 31.1 % (ref 36.3–47.1)
HGB BLD-MCNC: 10.1 G/DL (ref 11.9–15.1)
IMMATURE GRANULOCYTES: 6 %
LYMPHOCYTES # BLD: 41 % (ref 24–44)
MCH RBC QN AUTO: 33.9 PG (ref 25.2–33.5)
MCHC RBC AUTO-ENTMCNC: 32.5 G/DL (ref 28.4–34.8)
MCV RBC AUTO: 104.4 FL (ref 82.6–102.9)
MONOCYTES # BLD: 6 % (ref 1–7)
MORPHOLOGY: ABNORMAL
MORPHOLOGY: ABNORMAL
NRBC AUTOMATED: 0 PER 100 WBC
PDW BLD-RTO: 16.2 % (ref 11.8–14.4)
PLATELET # BLD AUTO: 263 K/UL (ref 138–453)
PMV BLD AUTO: 10.8 FL (ref 8.1–13.5)
POTASSIUM SERPL-SCNC: 3.7 MMOL/L (ref 3.7–5.3)
PROT SERPL-MCNC: 5.1 G/DL (ref 6.4–8.3)
RBC # BLD: 2.98 M/UL (ref 3.95–5.11)
SEG NEUTROPHILS: 46 % (ref 36–66)
SEGMENTED NEUTROPHILS ABSOLUTE COUNT: 2.66 K/UL (ref 1.8–7.7)
SODIUM SERPL-SCNC: 140 MMOL/L (ref 135–144)
WBC # BLD AUTO: 5.8 K/UL (ref 3.5–11.3)

## 2023-03-17 PROCEDURE — 82746 ASSAY OF FOLIC ACID SERUM: CPT

## 2023-03-17 PROCEDURE — 36415 COLL VENOUS BLD VENIPUNCTURE: CPT

## 2023-03-17 PROCEDURE — 83036 HEMOGLOBIN GLYCOSYLATED A1C: CPT

## 2023-03-17 PROCEDURE — 85025 COMPLETE CBC W/AUTO DIFF WBC: CPT

## 2023-03-17 PROCEDURE — 80053 COMPREHEN METABOLIC PANEL: CPT

## 2023-03-17 PROCEDURE — P9603 ONE-WAY ALLOW PRORATED MILES: HCPCS

## 2023-03-21 ENCOUNTER — HOSPITAL ENCOUNTER (OUTPATIENT)
Age: 73
Setting detail: SPECIMEN
Discharge: HOME | End: 2023-03-21

## 2023-03-22 ENCOUNTER — HOSPITAL ENCOUNTER (OUTPATIENT)
Age: 73
Setting detail: SPECIMEN
Discharge: HOME | End: 2023-03-22

## 2023-03-22 LAB
ALBUMIN SERPL-MCNC: 2.5 G/DL (ref 3.5–5.2)
ALBUMIN/GLOBULIN RATIO: 0.8 (ref 1–2.5)
ALP SERPL-CCNC: 370 U/L (ref 35–104)
ALT SERPL-CCNC: 11 U/L (ref 5–33)
ANION GAP SERPL CALCULATED.3IONS-SCNC: 11 MMOL/L (ref 9–17)
AST SERPL-CCNC: 18 U/L
BILIRUB DIRECT SERPL-MCNC: 0.1 MG/DL
BILIRUB INDIRECT SERPL-MCNC: 0.3 MG/DL (ref 0–1)
BILIRUB SERPL-MCNC: 0.4 MG/DL (ref 0.3–1.2)
BUN SERPL-MCNC: 3 MG/DL (ref 8–23)
CALCIUM SERPL-MCNC: 8.2 MG/DL (ref 8.6–10.4)
CHLORIDE SERPL-SCNC: 101 MMOL/L (ref 98–107)
CO2 SERPL-SCNC: 25 MMOL/L (ref 20–31)
CREAT SERPL-MCNC: 0.28 MG/DL (ref 0.5–0.9)
GFR SERPL CREATININE-BSD FRML MDRD: >60 ML/MIN/1.73M2
GLUCOSE SERPL-MCNC: 128 MG/DL (ref 70–99)
HCT VFR BLD AUTO: 31.8 % (ref 36.3–47.1)
HGB BLD-MCNC: 10.2 G/DL (ref 11.9–15.1)
MCH RBC QN AUTO: 32.8 PG (ref 25.2–33.5)
MCHC RBC AUTO-ENTMCNC: 32.1 G/DL (ref 28.4–34.8)
MCV RBC AUTO: 102.3 FL (ref 82.6–102.9)
NRBC AUTOMATED: 0 PER 100 WBC
PDW BLD-RTO: 15.9 % (ref 11.8–14.4)
PLATELET # BLD AUTO: 571 K/UL (ref 138–453)
PMV BLD AUTO: 10 FL (ref 8.1–13.5)
POTASSIUM SERPL-SCNC: 3.8 MMOL/L (ref 3.7–5.3)
PROT SERPL-MCNC: 5.8 G/DL (ref 6.4–8.3)
RBC # BLD: 3.11 M/UL (ref 3.95–5.11)
SODIUM SERPL-SCNC: 137 MMOL/L (ref 135–144)
WBC # BLD AUTO: 7.8 K/UL (ref 3.5–11.3)

## 2023-03-22 PROCEDURE — 36415 COLL VENOUS BLD VENIPUNCTURE: CPT

## 2023-03-22 PROCEDURE — 85027 COMPLETE CBC AUTOMATED: CPT

## 2023-03-22 PROCEDURE — 80076 HEPATIC FUNCTION PANEL: CPT

## 2023-03-22 PROCEDURE — 80048 BASIC METABOLIC PNL TOTAL CA: CPT

## 2023-03-22 PROCEDURE — P9603 ONE-WAY ALLOW PRORATED MILES: HCPCS

## 2023-03-27 ENCOUNTER — HOSPITAL ENCOUNTER (OUTPATIENT)
Age: 73
Setting detail: SPECIMEN
Discharge: HOME | End: 2023-03-27

## 2023-03-27 LAB
ABSOLUTE EOS #: 0.1 K/UL (ref 0–0.44)
ABSOLUTE IMMATURE GRANULOCYTE: <0.03 K/UL (ref 0–0.3)
ABSOLUTE LYMPH #: 1.37 K/UL (ref 1.1–3.7)
ABSOLUTE MONO #: 0.55 K/UL (ref 0.1–1.2)
ALBUMIN SERPL-MCNC: 2.6 G/DL (ref 3.5–5.2)
ALBUMIN/GLOBULIN RATIO: 0.8 (ref 1–2.5)
ALP SERPL-CCNC: 319 U/L (ref 35–104)
ALT SERPL-CCNC: 9 U/L (ref 5–33)
AMMONIA PLAS-SCNC: 18 UMOL/L (ref 11–51)
ANION GAP SERPL CALCULATED.3IONS-SCNC: 9 MMOL/L (ref 9–17)
AST SERPL-CCNC: 18 U/L
BASOPHILS # BLD: 1 % (ref 0–2)
BASOPHILS ABSOLUTE: 0.08 K/UL (ref 0–0.2)
BILIRUB SERPL-MCNC: 0.3 MG/DL (ref 0.3–1.2)
BUN SERPL-MCNC: 5 MG/DL (ref 8–23)
CALCIUM SERPL-MCNC: 7.9 MG/DL (ref 8.6–10.4)
CHLORIDE SERPL-SCNC: 100 MMOL/L (ref 98–107)
CO2 SERPL-SCNC: 27 MMOL/L (ref 20–31)
CREAT SERPL-MCNC: 0.41 MG/DL (ref 0.5–0.9)
EOSINOPHILS RELATIVE PERCENT: 2 % (ref 1–4)
GFR SERPL CREATININE-BSD FRML MDRD: >60 ML/MIN/1.73M2
GLUCOSE SERPL-MCNC: 204 MG/DL (ref 70–99)
HCT VFR BLD AUTO: 30.3 % (ref 36.3–47.1)
HGB BLD-MCNC: 9.4 G/DL (ref 11.9–15.1)
IMMATURE GRANULOCYTES: 0 %
LYMPHOCYTES # BLD: 24 % (ref 24–43)
MAGNESIUM SERPL-MCNC: 1.6 MG/DL (ref 1.6–2.6)
MCH RBC QN AUTO: 32.1 PG (ref 25.2–33.5)
MCHC RBC AUTO-ENTMCNC: 31 G/DL (ref 28.4–34.8)
MCV RBC AUTO: 103.4 FL (ref 82.6–102.9)
MONOCYTES # BLD: 10 % (ref 3–12)
NRBC AUTOMATED: 0 PER 100 WBC
PDW BLD-RTO: 15.1 % (ref 11.8–14.4)
PLATELET # BLD AUTO: 482 K/UL (ref 138–453)
PMV BLD AUTO: 9.3 FL (ref 8.1–13.5)
POTASSIUM SERPL-SCNC: 3.8 MMOL/L (ref 3.7–5.3)
PROT SERPL-MCNC: 5.8 G/DL (ref 6.4–8.3)
RBC # BLD: 2.93 M/UL (ref 3.95–5.11)
RBC # BLD: ABNORMAL 10*6/UL
RBC # BLD: ABNORMAL 10*6/UL
SEG NEUTROPHILS: 63 % (ref 36–65)
SEGMENTED NEUTROPHILS ABSOLUTE COUNT: 3.5 K/UL (ref 1.5–8.1)
SODIUM SERPL-SCNC: 136 MMOL/L (ref 135–144)
WBC # BLD AUTO: 5.6 K/UL (ref 3.5–11.3)

## 2023-03-27 PROCEDURE — P9603 ONE-WAY ALLOW PRORATED MILES: HCPCS

## 2023-03-27 PROCEDURE — 80053 COMPREHEN METABOLIC PANEL: CPT

## 2023-03-27 PROCEDURE — 36415 COLL VENOUS BLD VENIPUNCTURE: CPT

## 2023-03-27 PROCEDURE — 82140 ASSAY OF AMMONIA: CPT

## 2023-03-27 PROCEDURE — 85025 COMPLETE CBC W/AUTO DIFF WBC: CPT

## 2023-03-27 PROCEDURE — 83735 ASSAY OF MAGNESIUM: CPT

## 2023-03-29 LAB
FOLATE SERPL-MCNC: 10.7 NG/ML
VIT B12 SERPL-MCNC: <150 PG/ML (ref 232–1245)

## 2023-03-30 LAB — FOLATE SERPL-MCNC: 10.2 NG/ML

## 2023-04-06 ENCOUNTER — TELEPHONE (OUTPATIENT)
Dept: GASTROENTEROLOGY | Age: 73
End: 2023-04-06

## 2023-04-06 NOTE — TELEPHONE ENCOUNTER
----- Message from RITA Rico - CNP sent at 3/14/2023 10:22 AM EDT -----  Regarding: follow up and OP ERCP in 2 months  Pt seen for cholangitis s/p ERCP with stent placement. She will need OP follow up with Dr. Opal Ca and to be scheduled for OP ERCP with stent removal in 2 months that Dr. Guillermo Daily can do. Unsure of dc date.      Suzanne Maravilla

## 2023-04-07 ENCOUNTER — HOSPITAL ENCOUNTER (OUTPATIENT)
Age: 73
Setting detail: SPECIMEN
Discharge: HOME | End: 2023-04-07

## 2023-04-07 LAB
ALBUMIN SERPL-MCNC: 3.2 G/DL (ref 3.5–5.2)
ALBUMIN/GLOBULIN RATIO: 0.9 (ref 1–2.5)
ALP SERPL-CCNC: 233 U/L (ref 35–104)
ALT SERPL-CCNC: 11 U/L (ref 5–33)
ANION GAP SERPL CALCULATED.3IONS-SCNC: 13 MMOL/L (ref 9–17)
AST SERPL-CCNC: 27 U/L
BILIRUB SERPL-MCNC: 0.4 MG/DL (ref 0.3–1.2)
BUN SERPL-MCNC: 7 MG/DL (ref 8–23)
CALCIUM SERPL-MCNC: 9.2 MG/DL (ref 8.6–10.4)
CHLORIDE SERPL-SCNC: 95 MMOL/L (ref 98–107)
CO2 SERPL-SCNC: 25 MMOL/L (ref 20–31)
CREAT SERPL-MCNC: 0.35 MG/DL (ref 0.5–0.9)
GFR SERPL CREATININE-BSD FRML MDRD: >60 ML/MIN/1.73M2
GLUCOSE SERPL-MCNC: 144 MG/DL (ref 70–99)
HCT VFR BLD AUTO: 36.1 % (ref 36.3–47.1)
HGB BLD-MCNC: 11.4 G/DL (ref 11.9–15.1)
MCH RBC QN AUTO: 31 PG (ref 25.2–33.5)
MCHC RBC AUTO-ENTMCNC: 31.6 G/DL (ref 28.4–34.8)
MCV RBC AUTO: 98.1 FL (ref 82.6–102.9)
NRBC AUTOMATED: 0 PER 100 WBC
PDW BLD-RTO: 14 % (ref 11.8–14.4)
PLATELET # BLD AUTO: 378 K/UL (ref 138–453)
PMV BLD AUTO: 10.6 FL (ref 8.1–13.5)
POTASSIUM SERPL-SCNC: 4.8 MMOL/L (ref 3.7–5.3)
PROT SERPL-MCNC: 6.9 G/DL (ref 6.4–8.3)
RBC # BLD: 3.68 M/UL (ref 3.95–5.11)
SODIUM SERPL-SCNC: 133 MMOL/L (ref 135–144)
WBC # BLD AUTO: 6.9 K/UL (ref 3.5–11.3)

## 2023-04-07 PROCEDURE — 36415 COLL VENOUS BLD VENIPUNCTURE: CPT

## 2023-04-07 PROCEDURE — 80053 COMPREHEN METABOLIC PANEL: CPT

## 2023-04-07 PROCEDURE — P9603 ONE-WAY ALLOW PRORATED MILES: HCPCS

## 2023-04-07 PROCEDURE — 85027 COMPLETE CBC AUTOMATED: CPT

## 2023-04-20 ENCOUNTER — OFFICE VISIT (OUTPATIENT)
Dept: ONCOLOGY | Age: 73
End: 2023-04-20
Payer: MEDICARE

## 2023-04-20 ENCOUNTER — HOSPITAL ENCOUNTER (OUTPATIENT)
Age: 73
Discharge: HOME | End: 2023-04-20
Payer: MEDICARE

## 2023-04-20 VITALS
TEMPERATURE: 97 F | SYSTOLIC BLOOD PRESSURE: 120 MMHG | BODY MASS INDEX: 26.62 KG/M2 | WEIGHT: 170 LBS | HEART RATE: 85 BPM | DIASTOLIC BLOOD PRESSURE: 83 MMHG

## 2023-04-20 DIAGNOSIS — D61.818 PANCYTOPENIA (HCC): Primary | ICD-10-CM

## 2023-04-20 DIAGNOSIS — D64.9 ANEMIA, UNSPECIFIED TYPE: ICD-10-CM

## 2023-04-20 DIAGNOSIS — D61.818 PANCYTOPENIA (HCC): ICD-10-CM

## 2023-04-20 LAB
ABSOLUTE EOS #: 0.2 K/UL (ref 0–0.4)
ABSOLUTE LYMPH #: 1.4 K/UL (ref 1–4.8)
ABSOLUTE MONO #: 0.6 K/UL (ref 0.1–1.3)
ABSOLUTE RETIC #: 0.04 M/UL (ref 0.03–0.08)
BASOPHILS # BLD: 1 % (ref 0–2)
BASOPHILS ABSOLUTE: 0.1 K/UL (ref 0–0.2)
EOSINOPHILS RELATIVE PERCENT: 4 % (ref 0–4)
FERRITIN SERPL-MCNC: 151 NG/ML (ref 13–150)
HCT VFR BLD AUTO: 39.3 % (ref 36–46)
HGB BLD-MCNC: 12.9 G/DL (ref 12–16)
IMMATURE RETIC FRACT: 6.9 % (ref 2.7–18.3)
IRON SATURATION: 23 % (ref 20–55)
IRON SERPL-MCNC: 55 UG/DL (ref 37–145)
LYMPHOCYTES # BLD: 23 % (ref 24–44)
MCH RBC QN AUTO: 30.2 PG (ref 26–34)
MCHC RBC AUTO-ENTMCNC: 32.8 G/DL (ref 31–37)
MCV RBC AUTO: 92.1 FL (ref 80–100)
MONOCYTES # BLD: 11 % (ref 1–7)
PDW BLD-RTO: 16.1 % (ref 11.5–14.9)
PLATELET # BLD AUTO: 475 K/UL (ref 150–450)
PMV BLD AUTO: 8.1 FL (ref 6–12)
RBC # BLD: 4.27 M/UL (ref 4–5.2)
RETIC HEMOGLOBIN: 31.8 PG (ref 28.2–35.7)
RETICS/RBC NFR AUTO: 1 % (ref 0.5–1.9)
SEG NEUTROPHILS: 61 % (ref 36–66)
SEGMENTED NEUTROPHILS ABSOLUTE COUNT: 3.8 K/UL (ref 1.3–9.1)
TIBC SERPL-MCNC: 243 UG/DL (ref 250–450)
UNSATURATED IRON BINDING CAPACITY: 188 UG/DL (ref 112–347)
WBC # BLD AUTO: 6.1 K/UL (ref 3.5–11)

## 2023-04-20 PROCEDURE — 99214 OFFICE O/P EST MOD 30 MIN: CPT | Performed by: INTERNAL MEDICINE

## 2023-04-20 PROCEDURE — 1090F PRES/ABSN URINE INCON ASSESS: CPT | Performed by: INTERNAL MEDICINE

## 2023-04-20 PROCEDURE — 1036F TOBACCO NON-USER: CPT | Performed by: INTERNAL MEDICINE

## 2023-04-20 PROCEDURE — 85045 AUTOMATED RETICULOCYTE COUNT: CPT

## 2023-04-20 PROCEDURE — 82746 ASSAY OF FOLIC ACID SERUM: CPT

## 2023-04-20 PROCEDURE — G8427 DOCREV CUR MEDS BY ELIG CLIN: HCPCS | Performed by: INTERNAL MEDICINE

## 2023-04-20 PROCEDURE — G8417 CALC BMI ABV UP PARAM F/U: HCPCS | Performed by: INTERNAL MEDICINE

## 2023-04-20 PROCEDURE — 36415 COLL VENOUS BLD VENIPUNCTURE: CPT

## 2023-04-20 PROCEDURE — 1123F ACP DISCUSS/DSCN MKR DOCD: CPT | Performed by: INTERNAL MEDICINE

## 2023-04-20 PROCEDURE — 82607 VITAMIN B-12: CPT

## 2023-04-20 PROCEDURE — 3017F COLORECTAL CA SCREEN DOC REV: CPT | Performed by: INTERNAL MEDICINE

## 2023-04-20 PROCEDURE — G8400 PT W/DXA NO RESULTS DOC: HCPCS | Performed by: INTERNAL MEDICINE

## 2023-04-20 PROCEDURE — 85025 COMPLETE CBC W/AUTO DIFF WBC: CPT

## 2023-04-20 PROCEDURE — 83540 ASSAY OF IRON: CPT

## 2023-04-20 PROCEDURE — 82728 ASSAY OF FERRITIN: CPT

## 2023-04-20 PROCEDURE — 83550 IRON BINDING TEST: CPT

## 2023-04-20 PROCEDURE — 99211 OFF/OP EST MAY X REQ PHY/QHP: CPT | Performed by: INTERNAL MEDICINE

## 2023-04-21 LAB
FOLATE SERPL-MCNC: >20 NG/ML
VIT B12 SERPL-MCNC: 477 PG/ML (ref 232–1245)

## 2023-04-24 LAB
FLOW CYTOMETRY BL: NORMAL
SURGICAL PATHOLOGY REPORT: NORMAL

## 2023-04-25 ENCOUNTER — HOSPITAL ENCOUNTER (OUTPATIENT)
Age: 73
Setting detail: SPECIMEN
Discharge: HOME | End: 2023-04-25

## 2023-04-25 ENCOUNTER — TELEPHONE (OUTPATIENT)
Dept: ONCOLOGY | Age: 73
End: 2023-04-25

## 2023-04-25 LAB
ABSOLUTE EOS #: 0.21 K/UL (ref 0–0.44)
ABSOLUTE IMMATURE GRANULOCYTE: <0.03 K/UL (ref 0–0.3)
ABSOLUTE LYMPH #: 2.13 K/UL (ref 1.1–3.7)
ABSOLUTE MONO #: 0.66 K/UL (ref 0.1–1.2)
ABSOLUTE RETIC #: 0.05 M/UL (ref 0.03–0.08)
ALBUMIN SERPL-MCNC: 3.4 G/DL (ref 3.5–5.2)
ALBUMIN/GLOBULIN RATIO: 1 (ref 1–2.5)
ALP SERPL-CCNC: 185 U/L (ref 35–104)
ALT SERPL-CCNC: 14 U/L (ref 5–33)
ANION GAP SERPL CALCULATED.3IONS-SCNC: 12 MMOL/L (ref 9–17)
AST SERPL-CCNC: 19 U/L
BASOPHILS # BLD: 1 % (ref 0–2)
BASOPHILS ABSOLUTE: 0.08 K/UL (ref 0–0.2)
BILIRUB SERPL-MCNC: 0.3 MG/DL (ref 0.3–1.2)
BUN SERPL-MCNC: 7 MG/DL (ref 8–23)
CALCIUM SERPL-MCNC: 9.3 MG/DL (ref 8.6–10.4)
CHLORIDE SERPL-SCNC: 99 MMOL/L (ref 98–107)
CO2 SERPL-SCNC: 28 MMOL/L (ref 20–31)
CREAT SERPL-MCNC: 0.36 MG/DL (ref 0.5–0.9)
EOSINOPHILS RELATIVE PERCENT: 4 % (ref 1–4)
FERRITIN SERPL-MCNC: 120 NG/ML (ref 13–150)
FOLATE SERPL-MCNC: >20 NG/ML
GFR SERPL CREATININE-BSD FRML MDRD: >60 ML/MIN/1.73M2
GLUCOSE SERPL-MCNC: 107 MG/DL (ref 70–99)
HCT VFR BLD AUTO: 41.8 % (ref 36.3–47.1)
HGB BLD-MCNC: 13.3 G/DL (ref 11.9–15.1)
IMMATURE GRANULOCYTES: 0 %
IMMATURE RETIC FRACT: 8.6 % (ref 2.7–18.3)
IRON SATURATION: 20 % (ref 20–55)
IRON SERPL-MCNC: 45 UG/DL (ref 37–145)
LYMPHOCYTES # BLD: 37 % (ref 24–43)
MCH RBC QN AUTO: 29.6 PG (ref 25.2–33.5)
MCHC RBC AUTO-ENTMCNC: 31.8 G/DL (ref 28.4–34.8)
MCV RBC AUTO: 92.9 FL (ref 82.6–102.9)
MONOCYTES # BLD: 12 % (ref 3–12)
NRBC AUTOMATED: 0 PER 100 WBC
PDW BLD-RTO: 13.8 % (ref 11.8–14.4)
PLATELET # BLD AUTO: 509 K/UL (ref 138–453)
PMV BLD AUTO: 9.7 FL (ref 8.1–13.5)
POTASSIUM SERPL-SCNC: 3.9 MMOL/L (ref 3.7–5.3)
PREALB SERPL-MCNC: 11.4 MG/DL (ref 20–40)
PROT SERPL-MCNC: 6.9 G/DL (ref 6.4–8.3)
RBC # BLD: 4.5 M/UL (ref 3.95–5.11)
RETIC HEMOGLOBIN: 31.6 PG (ref 28.2–35.7)
RETICS/RBC NFR AUTO: 1.2 % (ref 0.5–1.9)
SEG NEUTROPHILS: 46 % (ref 36–65)
SEGMENTED NEUTROPHILS ABSOLUTE COUNT: 2.64 K/UL (ref 1.5–8.1)
SODIUM SERPL-SCNC: 139 MMOL/L (ref 135–144)
T4 FREE SERPL-MCNC: 1.4 NG/DL (ref 0.9–1.7)
TIBC SERPL-MCNC: 227 UG/DL (ref 250–450)
TSH SERPL-ACNC: 0.23 UIU/ML (ref 0.3–5)
UNSATURATED IRON BINDING CAPACITY: 182 UG/DL (ref 112–347)
VIT B12 SERPL-MCNC: 435 PG/ML (ref 232–1245)
WBC # BLD AUTO: 5.7 K/UL (ref 3.5–11.3)

## 2023-04-25 PROCEDURE — 82728 ASSAY OF FERRITIN: CPT

## 2023-04-25 PROCEDURE — P9603 ONE-WAY ALLOW PRORATED MILES: HCPCS

## 2023-04-25 PROCEDURE — 85045 AUTOMATED RETICULOCYTE COUNT: CPT

## 2023-04-25 PROCEDURE — 84439 ASSAY OF FREE THYROXINE: CPT

## 2023-04-25 PROCEDURE — 84134 ASSAY OF PREALBUMIN: CPT

## 2023-04-25 PROCEDURE — 84443 ASSAY THYROID STIM HORMONE: CPT

## 2023-04-25 PROCEDURE — 82746 ASSAY OF FOLIC ACID SERUM: CPT

## 2023-04-25 PROCEDURE — 82607 VITAMIN B-12: CPT

## 2023-04-25 PROCEDURE — 80053 COMPREHEN METABOLIC PANEL: CPT

## 2023-04-25 PROCEDURE — 83550 IRON BINDING TEST: CPT

## 2023-04-25 PROCEDURE — 85025 COMPLETE CBC W/AUTO DIFF WBC: CPT

## 2023-04-25 PROCEDURE — 36415 COLL VENOUS BLD VENIPUNCTURE: CPT

## 2023-04-25 PROCEDURE — 83540 ASSAY OF IRON: CPT

## 2023-04-25 NOTE — TELEPHONE ENCOUNTER
Abs now      Rv based on labs from today     Labs today   Rv based on labs   PT was given AVS   Electronically signed by Marlee Truong MA on 4/25/2023 at 10:11 AM

## 2023-04-26 ENCOUNTER — OFFICE VISIT (OUTPATIENT)
Dept: NEUROSURGERY | Age: 73
End: 2023-04-26

## 2023-04-26 VITALS
WEIGHT: 170 LBS | SYSTOLIC BLOOD PRESSURE: 125 MMHG | DIASTOLIC BLOOD PRESSURE: 80 MMHG | RESPIRATION RATE: 16 BRPM | HEART RATE: 80 BPM | OXYGEN SATURATION: 97 % | HEIGHT: 67 IN | BODY MASS INDEX: 26.68 KG/M2

## 2023-04-26 DIAGNOSIS — G82.50 SPASTIC QUADRIPARESIS (HCC): Primary | ICD-10-CM

## 2023-04-26 DIAGNOSIS — S14.129D CENTRAL CORD SYNDROME, SUBSEQUENT ENCOUNTER (HCC): ICD-10-CM

## 2023-04-26 NOTE — PROGRESS NOTES
[address] [facility]  [address]B [address] [facility],[place] [address]  Dept: [phone]    Patient:  Rowdy Christopher  YOB: 1950  Date: 4/26/23    The patient is a 75 y.o. female who presents today for consult of the following problems:     Chief Complaint   Patient presents with    Neurologic Problem     Central cord syndrome             HPI:     Rowdy Christopher is a 75 y.o. female on whom neurosurgical consultation was requested by Erin Hicks MD for management of cervical stenosis myelopathy. Patient underwent a C3-7 posterior decompression fusion fixation. Has been rehabilitation with subsequent mission for sepsis. Subsequent went back with estimated blood patient reports does not have much recovery in terms of left-sided function clean ability to stand or utilize her left arm much. Does have pain distally in the left upper and lower extremity due to contracture. Denisse Balling History:     Past Medical History:   Diagnosis Date    Psoriasis      Past Surgical History:   Procedure Laterality Date    CERVICAL FUSION N/A 01/18/2023    C 3-7 POSTERIOR CERVICAL DECOMPRESSION AND FUSION performed by Ekta Polanco DO at [facility]  03/13/2023    CHOLECYSTECTOMY, LAPAROSCOPIC N/A 3/13/2023    LAPAROSCOPIC CHOLECYSTECTOMY,  POSSIBLE OPEN performed by Virgil Douglas DO at [place] [address]    ERCP  03/08/2023    ERCP STONE REMOVAL performed by Safia Muro MD at [place] [address]    ERCP  03/08/2023    ERCP SPHINCTER/PAPILLOTOMY performed by Safia Muro MD at [place] [address]    ERCP  03/08/2023    ERCP STENT INSERTION performed by Safia Muro MD at [place] [address]     No family history on file.   Current Outpatient Medications on File Prior to Visit   Medication Sig Dispense Refill    traZODone (DESYREL) 50 MG tablet Take 1 tablet by mouth nightly 30 tablet 0    folic acid (FOLVITE) 1 MG tablet Take 1

## 2023-05-02 ENCOUNTER — INITIAL CONSULT (OUTPATIENT)
Dept: PHYSICAL MEDICINE AND REHAB | Age: 73
End: 2023-05-02
Payer: MEDICARE

## 2023-05-02 ENCOUNTER — HOSPITAL ENCOUNTER (OUTPATIENT)
Age: 73
Setting detail: SPECIMEN
Discharge: HOME | End: 2023-05-02
Payer: MEDICARE

## 2023-05-02 VITALS
DIASTOLIC BLOOD PRESSURE: 84 MMHG | TEMPERATURE: 98 F | WEIGHT: 130 LBS | HEART RATE: 96 BPM | BODY MASS INDEX: 23.04 KG/M2 | SYSTOLIC BLOOD PRESSURE: 120 MMHG | HEIGHT: 63 IN

## 2023-05-02 DIAGNOSIS — S14.129D CENTRAL CORD SYNDROME, SUBSEQUENT ENCOUNTER (HCC): Primary | ICD-10-CM

## 2023-05-02 DIAGNOSIS — R25.2 SPASTICITY: ICD-10-CM

## 2023-05-02 DIAGNOSIS — R20.8 ALLODYNIA: ICD-10-CM

## 2023-05-02 LAB
ABSOLUTE EOS #: 0.15 K/UL (ref 0–0.44)
ABSOLUTE IMMATURE GRANULOCYTE: <0.03 K/UL (ref 0–0.3)
ABSOLUTE LYMPH #: 1.55 K/UL (ref 1.1–3.7)
ABSOLUTE MONO #: 0.64 K/UL (ref 0.1–1.2)
ALBUMIN SERPL-MCNC: 3.2 G/DL (ref 3.5–5.2)
ALBUMIN/GLOBULIN RATIO: 0.9 (ref 1–2.5)
ALP SERPL-CCNC: 139 U/L (ref 35–104)
ALT SERPL-CCNC: 17 U/L (ref 5–33)
AMMONIA PLAS-SCNC: 10 UMOL/L (ref 11–51)
ANION GAP SERPL CALCULATED.3IONS-SCNC: 10 MMOL/L (ref 9–17)
AST SERPL-CCNC: 26 U/L
BASOPHILS # BLD: 1 % (ref 0–2)
BASOPHILS ABSOLUTE: 0.06 K/UL (ref 0–0.2)
BILIRUB SERPL-MCNC: 0.2 MG/DL (ref 0.3–1.2)
BUN SERPL-MCNC: 10 MG/DL (ref 8–23)
CALCIUM SERPL-MCNC: 9 MG/DL (ref 8.6–10.4)
CHLORIDE SERPL-SCNC: 101 MMOL/L (ref 98–107)
CO2 SERPL-SCNC: 27 MMOL/L (ref 20–31)
CREAT SERPL-MCNC: 0.33 MG/DL (ref 0.5–0.9)
EOSINOPHILS RELATIVE PERCENT: 3 % (ref 1–4)
GFR SERPL CREATININE-BSD FRML MDRD: >60 ML/MIN/1.73M2
GLUCOSE SERPL-MCNC: 124 MG/DL (ref 70–99)
HCT VFR BLD AUTO: 40.3 % (ref 36.3–47.1)
HGB BLD-MCNC: 13.3 G/DL (ref 11.9–15.1)
IMMATURE GRANULOCYTES: 0 %
LYMPHOCYTES # BLD: 28 % (ref 24–43)
MCH RBC QN AUTO: 29.7 PG (ref 25.2–33.5)
MCHC RBC AUTO-ENTMCNC: 33 G/DL (ref 28.4–34.8)
MCV RBC AUTO: 90 FL (ref 82.6–102.9)
MONOCYTES # BLD: 12 % (ref 3–12)
NRBC AUTOMATED: 0 PER 100 WBC
PDW BLD-RTO: 14 % (ref 11.8–14.4)
PLATELET # BLD AUTO: 471 K/UL (ref 138–453)
PMV BLD AUTO: 9.6 FL (ref 8.1–13.5)
POTASSIUM SERPL-SCNC: 4 MMOL/L (ref 3.7–5.3)
PROT SERPL-MCNC: 6.6 G/DL (ref 6.4–8.3)
RBC # BLD: 4.48 M/UL (ref 3.95–5.11)
SEG NEUTROPHILS: 56 % (ref 36–65)
SEGMENTED NEUTROPHILS ABSOLUTE COUNT: 3.14 K/UL (ref 1.5–8.1)
SODIUM SERPL-SCNC: 138 MMOL/L (ref 135–144)
WBC # BLD AUTO: 5.6 K/UL (ref 3.5–11.3)

## 2023-05-02 PROCEDURE — 36415 COLL VENOUS BLD VENIPUNCTURE: CPT

## 2023-05-02 PROCEDURE — P9603 ONE-WAY ALLOW PRORATED MILES: HCPCS

## 2023-05-02 PROCEDURE — G8427 DOCREV CUR MEDS BY ELIG CLIN: HCPCS | Performed by: PHYSICAL MEDICINE & REHABILITATION

## 2023-05-02 PROCEDURE — 99205 OFFICE O/P NEW HI 60 MIN: CPT | Performed by: PHYSICAL MEDICINE & REHABILITATION

## 2023-05-02 PROCEDURE — 82140 ASSAY OF AMMONIA: CPT

## 2023-05-02 PROCEDURE — 1090F PRES/ABSN URINE INCON ASSESS: CPT | Performed by: PHYSICAL MEDICINE & REHABILITATION

## 2023-05-02 PROCEDURE — G8420 CALC BMI NORM PARAMETERS: HCPCS | Performed by: PHYSICAL MEDICINE & REHABILITATION

## 2023-05-02 PROCEDURE — 85025 COMPLETE CBC W/AUTO DIFF WBC: CPT

## 2023-05-02 PROCEDURE — 80053 COMPREHEN METABOLIC PANEL: CPT

## 2023-05-02 RX ORDER — METHOCARBAMOL 500 MG/1
500 TABLET, FILM COATED ORAL 3 TIMES DAILY
COMMUNITY
End: 2023-05-02 | Stop reason: ALTCHOICE

## 2023-05-02 RX ORDER — BACLOFEN 10 MG/1
10 TABLET ORAL 3 TIMES DAILY
Qty: 90 TABLET | Refills: 2 | Status: SHIPPED | OUTPATIENT
Start: 2023-05-02

## 2023-05-02 RX ORDER — BISACODYL 10 MG
10 SUPPOSITORY, RECTAL RECTAL DAILY PRN
COMMUNITY

## 2023-05-02 RX ORDER — MINERAL OIL 100 G/100G
1 OIL RECTAL DAILY PRN
COMMUNITY

## 2023-05-02 RX ORDER — ACETAMINOPHEN 500 MG
500 TABLET ORAL EVERY 6 HOURS PRN
COMMUNITY

## 2023-05-02 RX ORDER — QUETIAPINE FUMARATE 25 MG/1
12.5 TABLET, FILM COATED ORAL NIGHTLY
Qty: 30 TABLET | Refills: 1 | Status: SHIPPED | OUTPATIENT
Start: 2023-05-02

## 2023-05-02 RX ORDER — GABAPENTIN 100 MG/1
400 CAPSULE ORAL 3 TIMES DAILY
Qty: 360 CAPSULE | Refills: 2 | Status: SHIPPED | OUTPATIENT
Start: 2023-05-02 | End: 2023-07-31

## 2023-05-02 NOTE — PATIENT INSTRUCTIONS
Please discontinue Trazodone at hs and replace with the Seroquel ordered in prescription. Please do not adjust these medications without reviewing with Dr. Lozano . Clinic phone is [phone].

## 2023-05-02 NOTE — PROGRESS NOTES
Data is abnormally low  !: Data is abnormal  (H): Data is abnormally high  Rpt: View report in Results Review for more information    Assessment:       Diagnosis Orders   1. Central cord syndrome, subsequent encounter ([address]        2. Spasticity        3. Allodynia               Plan:      Heath Souza (nurse at [facility]) to give verbal orders to accompany prescriptions sent with patient. Will discontinue Trazodone and methocarbamol for ineffective treatment. Start baclofen for spasticity management and titrate up as tolerated. Will plan for Botox when allodynia/pain have improved and she is more likely to be able to tolerate the procedure. Increase gabapentin for allodynia neurologic pain - will titrate up as tolerated. Renal function WNL. Start Seroquel at hs to improve sleep and address hallucinations as Trazodone has proven ineffective. Will review MRI results when available. Anticipate therapy will likely be reduced to 1 day/week in LTC. If treatment improves pain will consider increasing to TIW to improve mobility. Discussed potential prognosis of 12-18 months before we know extent of recovery of spinal cord injury. She may benefit from power wheelchair in the future. Will defer skin lesions/psoriasis management to facility medical management. No orders of the defined types were placed in this encounter. Orders Placed This Encounter   Medications    baclofen (LIORESAL) 10 MG tablet     Sig: Take 1 tablet by mouth 3 times daily     Dispense:  90 tablet     Refill:  2    QUEtiapine (SEROQUEL) 25 MG tablet     Sig: Take 0.5 tablets by mouth at bedtime     Dispense:  30 tablet     Refill:  1    gabapentin (NEURONTIN) 100 MG capsule     Sig: Take 4 capsules by mouth 3 times daily for 90 days. Dispense:  360 capsule     Refill:  2     Return in about 8 weeks (around 6/27/2023).      Time spent: 60 minutes    Electronically signed by Michelle Krishnamurthy MD on 5/2/2023 at 5:56 PM.

## 2023-05-08 ENCOUNTER — OFFICE VISIT (OUTPATIENT)
Dept: GASTROENTEROLOGY | Age: 73
End: 2023-05-08
Payer: MEDICARE

## 2023-05-08 VITALS
DIASTOLIC BLOOD PRESSURE: 72 MMHG | WEIGHT: 130 LBS | BODY MASS INDEX: 23.04 KG/M2 | SYSTOLIC BLOOD PRESSURE: 96 MMHG | HEIGHT: 63 IN

## 2023-05-08 DIAGNOSIS — K80.50 CHOLEDOCHOLITHIASIS: Primary | ICD-10-CM

## 2023-05-08 PROCEDURE — G8400 PT W/DXA NO RESULTS DOC: HCPCS | Performed by: INTERNAL MEDICINE

## 2023-05-08 PROCEDURE — 99213 OFFICE O/P EST LOW 20 MIN: CPT | Performed by: INTERNAL MEDICINE

## 2023-05-08 PROCEDURE — G8420 CALC BMI NORM PARAMETERS: HCPCS | Performed by: INTERNAL MEDICINE

## 2023-05-08 PROCEDURE — 1036F TOBACCO NON-USER: CPT | Performed by: INTERNAL MEDICINE

## 2023-05-08 PROCEDURE — G8427 DOCREV CUR MEDS BY ELIG CLIN: HCPCS | Performed by: INTERNAL MEDICINE

## 2023-05-08 PROCEDURE — 3017F COLORECTAL CA SCREEN DOC REV: CPT | Performed by: INTERNAL MEDICINE

## 2023-05-08 PROCEDURE — 1123F ACP DISCUSS/DSCN MKR DOCD: CPT | Performed by: INTERNAL MEDICINE

## 2023-05-08 PROCEDURE — 1090F PRES/ABSN URINE INCON ASSESS: CPT | Performed by: INTERNAL MEDICINE

## 2023-05-08 ASSESSMENT — ENCOUNTER SYMPTOMS
CONSTIPATION: 0
SHORTNESS OF BREATH: 0
VOICE CHANGE: 0
WHEEZING: 0
NAUSEA: 1
COUGH: 0
ANAL BLEEDING: 0
DIARRHEA: 0
EYES NEGATIVE: 1
ABDOMINAL PAIN: 0
CHOKING: 0
BLOOD IN STOOL: 0
RECTAL PAIN: 0
VOMITING: 0
TROUBLE SWALLOWING: 0
RESPIRATORY NEGATIVE: 1
ABDOMINAL DISTENTION: 0

## 2023-05-08 NOTE — PROGRESS NOTES
daily as needed for Constipation, Disp: , Rfl:     acetaminophen (TYLENOL) 500 MG tablet, Take 1 tablet by mouth every 6 hours as needed for Pain, Disp: , Rfl:     bisacodyl (DULCOLAX) 10 MG suppository, Place 1 suppository rectally daily as needed for Constipation, Disp: , Rfl:     mineral oil enema, Place 1 enema rectally daily as needed for Constipation, Disp: , Rfl:     baclofen (LIORESAL) 10 MG tablet, Take 1 tablet by mouth 3 times daily, Disp: 90 tablet, Rfl: 2    QUEtiapine (SEROQUEL) 25 MG tablet, Take 0.5 tablets by mouth at bedtime, Disp: 30 tablet, Rfl: 1    gabapentin (NEURONTIN) 100 MG capsule, Take 4 capsules by mouth 3 times daily for 90 days. , Disp: 360 capsule, Rfl: 2    folic acid (FOLVITE) 1 MG tablet, Take 1 tablet by mouth daily, Disp: 30 tablet, Rfl: 3    cyanocobalamin 1000 MCG/ML injection, Inject 1 mL into the muscle daily for 4 doses, Disp: 4 mL, Rfl: 0    ipratropium-albuterol (DUONEB) 0.5-2.5 (3) MG/3ML SOLN nebulizer solution, , Disp: , Rfl:     triamcinolone (KENALOG) 0.1 % cream, Apply to active psoriasis twice daily (not face, armpit or groin), Disp: 80 g, Rfl: 11    fluocinonide (LIDEX) 0.05 % external solution, Apply to scalp psoriasis daily for rash, Disp: 60 mL, Rfl: 11    ALLERGIES:   No Known Allergies    FAMILY HISTORY: No family history on file.       SOCIAL HISTORY:   Social History     Socioeconomic History    Marital status: Single     Spouse name: Not on file    Number of children: Not on file    Years of education: Not on file    Highest education level: Not on file   Occupational History    Not on file   Tobacco Use    Smoking status: Former     Types: Cigarettes     Quit date: 2015     Years since quittin.3    Smokeless tobacco: Never   Vaping Use    Vaping Use: Never used   Substance and Sexual Activity    Alcohol use: Never    Drug use: Never    Sexual activity: Not on file   Other Topics Concern    Not on file   Social History Narrative    ** Merged History

## 2023-05-10 ENCOUNTER — TELEPHONE (OUTPATIENT)
Dept: GASTROENTEROLOGY | Age: 73
End: 2023-05-10

## 2023-05-16 RX ORDER — GABAPENTIN 400 MG/1
400 CAPSULE ORAL 3 TIMES DAILY
COMMUNITY
Start: 2023-05-02

## 2023-05-16 RX ORDER — MIRTAZAPINE 7.5 MG/1
7.5 TABLET, FILM COATED ORAL NIGHTLY
COMMUNITY
Start: 2023-05-08

## 2023-05-16 RX ORDER — TRAZODONE HYDROCHLORIDE 50 MG/1
25 TABLET ORAL NIGHTLY
COMMUNITY
Start: 2023-05-02 | End: 2023-05-16

## 2023-05-16 NOTE — PROGRESS NOTES
Pre-op instructions faxed, received and discussed with [facility] for patient. Patient will be arriving via transport van from facility in a wheelchair with lift. Daughter-Elham and  Beryle Bash will be meeting patient at hospital to sign consents.

## 2023-05-17 ENCOUNTER — HOSPITAL ENCOUNTER (OUTPATIENT)
Age: 73
Setting detail: OUTPATIENT SURGERY
Discharge: HOME | End: 2023-05-17
Attending: INTERNAL MEDICINE | Admitting: INTERNAL MEDICINE
Payer: MEDICARE

## 2023-05-17 ENCOUNTER — ANESTHESIA (OUTPATIENT)
Dept: OPERATING ROOM | Age: 73
End: 2023-05-17
Payer: MEDICARE

## 2023-05-17 ENCOUNTER — ANESTHESIA EVENT (OUTPATIENT)
Dept: OPERATING ROOM | Age: 73
End: 2023-05-17
Payer: MEDICARE

## 2023-05-17 ENCOUNTER — APPOINTMENT (OUTPATIENT)
Dept: GENERAL RADIOLOGY | Age: 73
End: 2023-05-17
Attending: INTERNAL MEDICINE
Payer: MEDICARE

## 2023-05-17 VITALS
BODY MASS INDEX: 20.09 KG/M2 | DIASTOLIC BLOOD PRESSURE: 77 MMHG | RESPIRATION RATE: 12 BRPM | HEIGHT: 67 IN | SYSTOLIC BLOOD PRESSURE: 159 MMHG | OXYGEN SATURATION: 96 % | TEMPERATURE: 96.8 F | HEART RATE: 84 BPM | WEIGHT: 128 LBS

## 2023-05-17 PROCEDURE — 6360000002 HC RX W HCPCS

## 2023-05-17 PROCEDURE — 3700000000 HC ANESTHESIA ATTENDED CARE: Performed by: INTERNAL MEDICINE

## 2023-05-17 PROCEDURE — 2720000010 HC SURG SUPPLY STERILE: Performed by: INTERNAL MEDICINE

## 2023-05-17 PROCEDURE — 2709999900 HC NON-CHARGEABLE SUPPLY: Performed by: INTERNAL MEDICINE

## 2023-05-17 PROCEDURE — 3209999900 FLUORO FOR SURGICAL PROCEDURES

## 2023-05-17 PROCEDURE — 2580000003 HC RX 258

## 2023-05-17 PROCEDURE — 3609014900 HC ERCP W/SPHINCTEROTOMY &/OR PAPILLOTOMY: Performed by: INTERNAL MEDICINE

## 2023-05-17 PROCEDURE — 3609015200 HC ERCP REMOVE CALCULI/DEBRIS BILIARY/PANCREAS DUCT: Performed by: INTERNAL MEDICINE

## 2023-05-17 PROCEDURE — 3609015000 HC ERCP REMOVE FOREIGN BODY/STENT BILIARY/PANC DUCT: Performed by: INTERNAL MEDICINE

## 2023-05-17 PROCEDURE — 7100000010 HC PHASE II RECOVERY - FIRST 15 MIN: Performed by: INTERNAL MEDICINE

## 2023-05-17 PROCEDURE — 6360000004 HC RX CONTRAST MEDICATION: Performed by: INTERNAL MEDICINE

## 2023-05-17 PROCEDURE — 2500000003 HC RX 250 WO HCPCS

## 2023-05-17 PROCEDURE — 3700000001 HC ADD 15 MINUTES (ANESTHESIA): Performed by: INTERNAL MEDICINE

## 2023-05-17 PROCEDURE — C1769 GUIDE WIRE: HCPCS | Performed by: INTERNAL MEDICINE

## 2023-05-17 PROCEDURE — 7100000011 HC PHASE II RECOVERY - ADDTL 15 MIN: Performed by: INTERNAL MEDICINE

## 2023-05-17 RX ORDER — PROPOFOL 10 MG/ML
INJECTION, EMULSION INTRAVENOUS PRN
Status: DISCONTINUED | OUTPATIENT
Start: 2023-05-17 | End: 2023-05-17 | Stop reason: SDUPTHER

## 2023-05-17 RX ORDER — LIDOCAINE HYDROCHLORIDE 10 MG/ML
INJECTION, SOLUTION EPIDURAL; INFILTRATION; INTRACAUDAL; PERINEURAL PRN
Status: DISCONTINUED | OUTPATIENT
Start: 2023-05-17 | End: 2023-05-17 | Stop reason: SDUPTHER

## 2023-05-17 RX ORDER — FENTANYL CITRATE 50 UG/ML
INJECTION, SOLUTION INTRAMUSCULAR; INTRAVENOUS PRN
Status: DISCONTINUED | OUTPATIENT
Start: 2023-05-17 | End: 2023-05-17 | Stop reason: SDUPTHER

## 2023-05-17 RX ORDER — FENTANYL CITRATE 50 UG/ML
25 INJECTION, SOLUTION INTRAMUSCULAR; INTRAVENOUS EVERY 5 MIN PRN
Status: DISCONTINUED | OUTPATIENT
Start: 2023-05-17 | End: 2023-05-17 | Stop reason: HOSPADM

## 2023-05-17 RX ORDER — SODIUM CHLORIDE 0.9 % (FLUSH) 0.9 %
5-40 SYRINGE (ML) INJECTION PRN
Status: DISCONTINUED | OUTPATIENT
Start: 2023-05-17 | End: 2023-05-17 | Stop reason: HOSPADM

## 2023-05-17 RX ORDER — SODIUM CHLORIDE, SODIUM LACTATE, POTASSIUM CHLORIDE, CALCIUM CHLORIDE 600; 310; 30; 20 MG/100ML; MG/100ML; MG/100ML; MG/100ML
INJECTION, SOLUTION INTRAVENOUS CONTINUOUS PRN
Status: DISCONTINUED | OUTPATIENT
Start: 2023-05-17 | End: 2023-05-17 | Stop reason: SDUPTHER

## 2023-05-17 RX ORDER — ONDANSETRON 2 MG/ML
4 INJECTION INTRAMUSCULAR; INTRAVENOUS
Status: DISCONTINUED | OUTPATIENT
Start: 2023-05-17 | End: 2023-05-17 | Stop reason: HOSPADM

## 2023-05-17 RX ORDER — SODIUM CHLORIDE 9 MG/ML
INJECTION, SOLUTION INTRAVENOUS PRN
Status: DISCONTINUED | OUTPATIENT
Start: 2023-05-17 | End: 2023-05-17 | Stop reason: HOSPADM

## 2023-05-17 RX ORDER — FENTANYL CITRATE 50 UG/ML
50 INJECTION, SOLUTION INTRAMUSCULAR; INTRAVENOUS EVERY 5 MIN PRN
Status: DISCONTINUED | OUTPATIENT
Start: 2023-05-17 | End: 2023-05-17 | Stop reason: HOSPADM

## 2023-05-17 RX ORDER — SODIUM CHLORIDE 0.9 % (FLUSH) 0.9 %
5-40 SYRINGE (ML) INJECTION EVERY 12 HOURS SCHEDULED
Status: DISCONTINUED | OUTPATIENT
Start: 2023-05-17 | End: 2023-05-17 | Stop reason: HOSPADM

## 2023-05-17 RX ADMIN — PROPOFOL 20 MG: 10 INJECTION, EMULSION INTRAVENOUS at 14:01

## 2023-05-17 RX ADMIN — FENTANYL CITRATE 50 MCG: 50 INJECTION, SOLUTION INTRAMUSCULAR; INTRAVENOUS at 14:03

## 2023-05-17 RX ADMIN — PROPOFOL 30 MG: 10 INJECTION, EMULSION INTRAVENOUS at 14:07

## 2023-05-17 RX ADMIN — PROPOFOL 40 MG: 10 INJECTION, EMULSION INTRAVENOUS at 14:04

## 2023-05-17 RX ADMIN — LIDOCAINE HYDROCHLORIDE 50 MG: 10 INJECTION, SOLUTION EPIDURAL; INFILTRATION; INTRACAUDAL; PERINEURAL at 13:57

## 2023-05-17 RX ADMIN — SODIUM CHLORIDE, POTASSIUM CHLORIDE, SODIUM LACTATE AND CALCIUM CHLORIDE: 600; 310; 30; 20 INJECTION, SOLUTION INTRAVENOUS at 13:23

## 2023-05-17 RX ADMIN — PROPOFOL 50 MG: 10 INJECTION, EMULSION INTRAVENOUS at 13:57

## 2023-05-17 ASSESSMENT — PAIN - FUNCTIONAL ASSESSMENT: PAIN_FUNCTIONAL_ASSESSMENT: 0-10

## 2023-05-17 NOTE — DISCHARGE INSTRUCTIONS
Resume previous regular diet. Resume home medications. If you develop any significant abdominal pain, nausea, vomiting, fever-come back to the emergency room. No alcoholic beverages, no driving or operating machinery, no making important decisions for 24 hours. You may have a normal diet but should eat lightly day of surgery. Drink plenty of fluids.   Urinate within 8 hours after surgery, if unable to urinate call your doctor

## 2023-05-17 NOTE — ANESTHESIA PRE PROCEDURE
Department of Anesthesiology  Preprocedure Note       Name:  Char Stanton   Age:  75 y.o.  :  1950                                          MRN:  [id number]         Date:  2023      Surgeon: Nancie Drummond):  Gentry Herrera MD    Procedure: Procedure(s):  ERCP ENDOSCOPIC RETROGRADE CHOLANGIOPANCREATOGRAPHY  (C-ARM)    Medications prior to admission:   Prior to Admission medications    Medication Sig Start Date End Date Taking? Authorizing Provider   gabapentin (NEURONTIN) 400 MG capsule Take 1 capsule by mouth 3 times daily.  23   Historical Provider, MD   mirtazapine (REMERON) 7.5 MG tablet Take 1 tablet by mouth nightly 23   Historical Provider, MD   magnesium hydroxide (MILK OF MAGNESIA) 400 MG/5ML suspension Take 30 mLs by mouth daily as needed for Constipation    Historical Provider, MD   acetaminophen (TYLENOL) 500 MG tablet Take 1 tablet by mouth every 6 hours as needed for Pain    Historical Provider, MD   bisacodyl (DULCOLAX) 10 MG suppository Place 1 suppository rectally daily as needed for Constipation    Historical Provider, MD   mineral oil enema Place 1 enema rectally daily as needed for Constipation    Historical Provider, MD   baclofen (LIORESAL) 10 MG tablet Take 1 tablet by mouth 3 times daily 23   Berhane Witt MD   QUEtiapine (SEROQUEL) 25 MG tablet Take 0.5 tablets by mouth at bedtime 23   Berhane Witt MD   folic acid (FOLVITE) 1 MG tablet Take 1 tablet by mouth daily 3/16/23   Gianna He MD   cyanocobalamin 1000 MCG/ML injection Inject 1 mL into the muscle daily for 4 doses 3/16/23 3/20/23  Gianna He MD   ipratropium-albuterol (Deirdre Finder) 0.5-2.5 (3) MG/3ML SOLN nebulizer solution  23   Historical Provider, MD   triamcinolone (KENALOG) 0.1 % cream Apply to active psoriasis twice daily (not face, armpit or groin) 19   Mirian Dallas MD   fluocinonide (LIDEX) 0.05 % external solution Apply to scalp psoriasis daily for rash 19   Alan Kumar

## 2023-05-17 NOTE — ANESTHESIA POSTPROCEDURE EVALUATION
Department of Anesthesiology  Postprocedure Note    Patient: Clotilda Hashimoto  MRN: [id number]  [place]: 1950  Date of evaluation: 5/17/2023      Procedure Summary     Date: 05/17/23 Room / Location: [address]    Anesthesia Start: 9614 Anesthesia Stop: 1424    Procedures:       ERCP STENT REMOVAL      ERCP STONE REMOVAL      ERCP SPHINCTER/PAPILLOTOMY Diagnosis:       Choledocholithiasis      (CHOLEDOCHOLITHIASIS)    Surgeons: Fany Mena MD Responsible Provider: Choco Cannon MD    Anesthesia Type: general ASA Status: 4          Anesthesia Type: No value filed.     Rachel Phase I:      Rachel Phase II: Rachel Score: 10    POST-OP ANESTHESIA NOTE       BP (!) 159/77   Pulse 84   Temp 96.8 °F (36 °C) (Temporal)   Resp 12   Ht 5' 7\" (1.702 m)   Wt 128 lb (58.1 kg)   SpO2 96%   BMI 20.05 kg/m²    Pain Assessment: None - Denies Pain  Pain Level: 0       Anesthesia Post Evaluation    Patient location during evaluation: PACU  Patient participation: complete - patient participated  Level of consciousness: awake  Pain score: 0  Airway patency: patent  Nausea & Vomiting: no vomiting and no nausea  Complications: no  Cardiovascular status: hemodynamically stable  Respiratory status: acceptable  Hydration status: stable

## 2023-05-17 NOTE — H&P
temporal temperature is 97 °F (36.1 °C). Her blood pressure is 129/77 and her pulse is 78. Her respiration is 16 and oxygen saturation is 98%. CONSTITUTIONAL:alert & cooperative, no acute distress. Limited historian, present with family member. SKIN:  Brief inspection of skin, warm and dry. Patchy/flaky skin noted facial, perioral, auricular. HEAD:  Normocephalic, atraumatic   EYES: EOMs intact. EARS:  Hearing grossly WNL. NOSE:  Nares patent. No rhinorrhea. MOUTH/THROAT:  benign  NECK:good ROM   LUNGS: Clear to auscultation bilaterally, no wheezes. CARDIOVASCULAR: Heart sounds are normal.  Regular rate and rhythm without murmur. ABDOMEN: soft, non tender, non distended. EXTREMITIES: no edema bilateral lower extremities, muscle atrophy noted BLE, weakness, arrives via wheelchair from nursing facility.   (Central cord syndrome)    IMPRESSIONS:   choledocholithiasis   has a past medical history of Acute pulmonary embolism ([address] Acute respiratory failure with hypoxia ([address] Anemia, Ascending cholangitis, Central cord syndrome ([address] Cholangitis, Choledocholithiasis, History of hallucinations, Lives in nursing home, Metabolic encephalopathy (33/8237), Pancytopenia ([address] Peripheral neuropathy, Psoriasis, Septic shock ([address] (2023), Septicemia due to E. coli ([address] (03/2023), Spasm, Spastic quadriparesis ([address] Under care of team (05/15/2023), and Under care of team.   PLANS:   ERCP    EDMUNDO Walker PA-C  Electronically signed 5/17/2023 at 11:23 AM

## 2023-05-17 NOTE — OP NOTE
ERCP      Patient:   Anuja Toney   :    1950  Acc#:    [id number]   Referring/PCP: Dave Gonzalez MD  Facility:   [facility]  Date:     2023   Endoscopist:  Car Sotelo MD, [facility]    Procedure: ERCP with stent removal, extension of sphincterotomy and balloon extraction of stone. Indication: CBD stent removal.    Postprocedure diagnosis: CBD stent removal, extension of sphincterotomy and stone removal.    Anesthesia:  MAC    EBL: None from the procedure    Specimen: None    Description of Procedure:  Prior to the procedure, a history and physical exam was performed and informed consent was obtained. The risks were discussed including pancreatitis, bleeding, and perforation. After the patient was placed in the supine position eved, the therapeutic duodenoscope scope was inserted into the mouth and advanced to the second portion of the duodenum allowing the papilla to be visualized. Using the a wire guided approach with the sphincterotome, the CBD was cannulated and a cholangiogram was performed. Findings: The ampulla had a stent emerging out. This was grabbed with snare and removed. Stent was inspected and was intact. A 0.035 guidewire was introduced into the common bile duct and the sphincterotome was advanced over the guidewire and the initial cholangiogram revealed filling defect in the distal bile duct. CBD diameter was 10 mm. Clips were seen from cholecystectomy. A 8 mm sphincterotomy extension was performed to remove the stone. The sphincterotome was exchanged, over a wire for a 12 mm above injection balloon. Multiple balloon sweeps were performed revealing stone and sludge. A terminal balloon occlusion cholangiongram appeared normal.    The duodenoscope was removed and the patient tolerated the procedure well. The pancreatic duct was not manipulated during the procedure. Plan: Low-fat diet.   Resume home

## 2023-05-22 ENCOUNTER — HOSPITAL ENCOUNTER (OUTPATIENT)
Dept: MRI IMAGING | Age: 73
Discharge: HOME | End: 2023-05-24
Payer: MEDICARE

## 2023-05-22 DIAGNOSIS — G82.50 SPASTIC QUADRIPARESIS (HCC): ICD-10-CM

## 2023-05-22 PROCEDURE — 72141 MRI NECK SPINE W/O DYE: CPT

## 2023-05-22 PROCEDURE — 70551 MRI BRAIN STEM W/O DYE: CPT

## 2023-06-21 ENCOUNTER — OFFICE VISIT (OUTPATIENT)
Dept: PHYSICAL MEDICINE AND REHAB | Age: 73
End: 2023-06-21
Payer: MEDICARE

## 2023-06-21 VITALS
TEMPERATURE: 98.2 F | BODY MASS INDEX: 20.05 KG/M2 | DIASTOLIC BLOOD PRESSURE: 78 MMHG | HEIGHT: 67 IN | SYSTOLIC BLOOD PRESSURE: 120 MMHG | HEART RATE: 80 BPM

## 2023-06-21 DIAGNOSIS — S14.129D CENTRAL CORD SYNDROME, SUBSEQUENT ENCOUNTER (HCC): Primary | ICD-10-CM

## 2023-06-21 DIAGNOSIS — R20.8 ALLODYNIA: ICD-10-CM

## 2023-06-21 DIAGNOSIS — R25.2 SPASTICITY: ICD-10-CM

## 2023-06-21 PROCEDURE — G8427 DOCREV CUR MEDS BY ELIG CLIN: HCPCS | Performed by: PHYSICAL MEDICINE & REHABILITATION

## 2023-06-21 PROCEDURE — G8420 CALC BMI NORM PARAMETERS: HCPCS | Performed by: PHYSICAL MEDICINE & REHABILITATION

## 2023-06-21 PROCEDURE — G8400 PT W/DXA NO RESULTS DOC: HCPCS | Performed by: PHYSICAL MEDICINE & REHABILITATION

## 2023-06-21 PROCEDURE — 1090F PRES/ABSN URINE INCON ASSESS: CPT | Performed by: PHYSICAL MEDICINE & REHABILITATION

## 2023-06-21 PROCEDURE — 1123F ACP DISCUSS/DSCN MKR DOCD: CPT | Performed by: PHYSICAL MEDICINE & REHABILITATION

## 2023-06-21 PROCEDURE — 99213 OFFICE O/P EST LOW 20 MIN: CPT | Performed by: PHYSICAL MEDICINE & REHABILITATION

## 2023-06-21 PROCEDURE — 3017F COLORECTAL CA SCREEN DOC REV: CPT | Performed by: PHYSICAL MEDICINE & REHABILITATION

## 2023-06-21 PROCEDURE — 1036F TOBACCO NON-USER: CPT | Performed by: PHYSICAL MEDICINE & REHABILITATION

## 2023-06-21 RX ORDER — MIRTAZAPINE 15 MG/1
TABLET, FILM COATED ORAL
COMMUNITY
Start: 2023-06-09

## 2023-06-21 RX ORDER — GABAPENTIN 300 MG/1
600 CAPSULE ORAL 3 TIMES DAILY
Qty: 180 CAPSULE | Refills: 2
Start: 2023-06-21 | End: 2023-09-19

## 2023-06-21 RX ORDER — BACLOFEN 10 MG/1
15 TABLET ORAL 3 TIMES DAILY
Qty: 90 TABLET | Refills: 2
Start: 2023-06-21

## 2023-06-21 NOTE — PROGRESS NOTES
canal stenosis or foraminal narrowing. IMPRESSION:  Posterior fixation from C3-C7 with associated laminectomy without evidence  for complication. Multilevel degenerative change with mild bilateral foraminal narrowing at  C6-7. MRI BRAIN 5/22/23  FINDINGS:  INTRACRANIAL STRUCTURES/VENTRICLES: The sellar and suprasellar structures,  optic chiasm, corpus callosum, pineal gland, tectum, and midline brainstem  structures are unremarkable. The craniocervical junction is unremarkable. There is no acute hemorrhage, mass effect, or midline shift. There is  satisfactory overall gray-white matter differentiation. There is chronic  microvascular disease. The ventricular structures are symmetric and  unremarkable. The infratentorial structures including the cerebellopontine  angles and internal auditory canals are unremarkable. There is no abnormal  restricted diffusion. There is no abnormal blooming artifact on  susceptibility weighted imaging. ORBITS: The visualized portion of the orbits demonstrate no acute abnormality. SINUSES: The visualized paranasal sinuses and mastoid air cells demonstrate  no acute abnormality. BONES/SOFT TISSUES: The bone marrow signal intensity appears normal. The soft  tissues demonstrate no acute abnormality. IMPRESSION:  Chronic microvascular disease without acute intracranial abnormality. Assessment:       Diagnosis Orders   1. Central cord syndrome, subsequent encounter ([facility] [address]        2. Spasticity        3. Allodynia             Plan:      Orders sent back to facility to increase gabapentin to 600 mg TID and baclofen to 15 mg TID. Continue with self-directed stretching/restorative program. Discussed prognosis with patient and  today. Educated them on cervical stenosis and central cord injury as well as spasticity. Reviewed potential treatment options for spasticity.  They are in the process of applying for Medicaid and reviewed potential therapy options

## 2023-07-18 ENCOUNTER — HOSPITAL ENCOUNTER (OUTPATIENT)
Age: 73
Setting detail: SPECIMEN
Discharge: HOME | End: 2023-07-18
Payer: MEDICARE

## 2023-07-18 LAB
ALBUMIN SERPL-MCNC: 3.9 G/DL (ref 3.5–5.2)
ALBUMIN/GLOB SERPL: 0.9 {RATIO} (ref 1–2.5)
ALP SERPL-CCNC: 167 U/L (ref 35–104)
ALT SERPL-CCNC: 10 U/L (ref 5–33)
AMMONIA PLAS-SCNC: 21 UMOL/L (ref 11–51)
ANION GAP SERPL CALCULATED.3IONS-SCNC: 14 MMOL/L (ref 9–17)
AST SERPL-CCNC: 18 U/L
BASOPHILS # BLD: 0.09 K/UL (ref 0–0.2)
BASOPHILS NFR BLD: 1 % (ref 0–2)
BILIRUB SERPL-MCNC: 0.3 MG/DL (ref 0.3–1.2)
BUN SERPL-MCNC: 10 MG/DL (ref 8–23)
CALCIUM SERPL-MCNC: 10.1 MG/DL (ref 8.6–10.4)
CHLORIDE SERPL-SCNC: 97 MMOL/L (ref 98–107)
CO2 SERPL-SCNC: 28 MMOL/L (ref 20–31)
CREAT SERPL-MCNC: 0.4 MG/DL (ref 0.5–0.9)
EOSINOPHIL # BLD: 0.17 K/UL (ref 0–0.44)
EOSINOPHILS RELATIVE PERCENT: 2 % (ref 1–4)
ERYTHROCYTE [DISTWIDTH] IN BLOOD BY AUTOMATED COUNT: 14.6 % (ref 11.8–14.4)
FOLATE SERPL-MCNC: >20 NG/ML
GFR SERPL CREATININE-BSD FRML MDRD: >60 ML/MIN/1.73M2
GLUCOSE SERPL-MCNC: 174 MG/DL (ref 70–99)
HCT VFR BLD AUTO: 44.1 % (ref 36.3–47.1)
HGB BLD-MCNC: 13.3 G/DL (ref 11.9–15.1)
IMM GRANULOCYTES # BLD AUTO: <0.03 K/UL (ref 0–0.3)
IMM GRANULOCYTES NFR BLD: 0 %
LYMPHOCYTES # BLD: 33 % (ref 24–43)
LYMPHOCYTES NFR BLD: 2.34 K/UL (ref 1.1–3.7)
MCH RBC QN AUTO: 25.5 PG (ref 25.2–33.5)
MCHC RBC AUTO-ENTMCNC: 30.2 G/DL (ref 28.4–34.8)
MCV RBC AUTO: 84.6 FL (ref 82.6–102.9)
MONOCYTES NFR BLD: 0.46 K/UL (ref 0.1–1.2)
MONOCYTES NFR BLD: 6 % (ref 3–12)
NEUTROPHILS NFR BLD: 58 % (ref 36–65)
NEUTS SEG NFR BLD: 4.09 K/UL (ref 1.5–8.1)
NRBC BLD-RTO: 0 PER 100 WBC
PLATELET # BLD AUTO: 444 K/UL (ref 138–453)
PMV BLD AUTO: 10.5 FL (ref 8.1–13.5)
POTASSIUM SERPL-SCNC: 3.8 MMOL/L (ref 3.7–5.3)
PROT SERPL-MCNC: 8.2 G/DL (ref 6.4–8.3)
RBC # BLD AUTO: 5.21 M/UL (ref 3.95–5.11)
RBC # BLD: ABNORMAL 10*6/UL
SODIUM SERPL-SCNC: 139 MMOL/L (ref 135–144)
TSH SERPL DL<=0.05 MIU/L-ACNC: 0.57 UIU/ML (ref 0.3–5)
VIT B12 SERPL-MCNC: 239 PG/ML (ref 232–1245)
WBC OTHER # BLD: 7.2 K/UL (ref 3.5–11.3)

## 2023-07-18 PROCEDURE — 80053 COMPREHEN METABOLIC PANEL: CPT

## 2023-07-18 PROCEDURE — 85027 COMPLETE CBC AUTOMATED: CPT

## 2023-07-18 PROCEDURE — 82746 ASSAY OF FOLIC ACID SERUM: CPT

## 2023-07-18 PROCEDURE — 84443 ASSAY THYROID STIM HORMONE: CPT

## 2023-07-18 PROCEDURE — 36415 COLL VENOUS BLD VENIPUNCTURE: CPT

## 2023-07-18 PROCEDURE — 82607 VITAMIN B-12: CPT

## 2023-07-18 PROCEDURE — 82140 ASSAY OF AMMONIA: CPT

## 2023-07-18 PROCEDURE — P9603 ONE-WAY ALLOW PRORATED MILES: HCPCS

## 2023-08-02 ENCOUNTER — OFFICE VISIT (OUTPATIENT)
Dept: NEUROSURGERY | Age: 73
End: 2023-08-02
Payer: MEDICARE

## 2023-08-02 VITALS
SYSTOLIC BLOOD PRESSURE: 104 MMHG | TEMPERATURE: 97.2 F | HEIGHT: 67 IN | DIASTOLIC BLOOD PRESSURE: 73 MMHG | HEART RATE: 78 BPM | WEIGHT: 128 LBS | OXYGEN SATURATION: 97 % | BODY MASS INDEX: 20.09 KG/M2

## 2023-08-02 DIAGNOSIS — S14.129D CENTRAL CORD SYNDROME, SUBSEQUENT ENCOUNTER (HCC): ICD-10-CM

## 2023-08-02 DIAGNOSIS — G82.50 SPASTIC QUADRIPARESIS (HCC): Primary | ICD-10-CM

## 2023-08-02 DIAGNOSIS — G99.2 STENOSIS OF CERVICAL SPINE WITH MYELOPATHY (HCC): ICD-10-CM

## 2023-08-02 DIAGNOSIS — M48.02 STENOSIS OF CERVICAL SPINE WITH MYELOPATHY (HCC): ICD-10-CM

## 2023-08-02 PROCEDURE — G8420 CALC BMI NORM PARAMETERS: HCPCS | Performed by: NEUROLOGICAL SURGERY

## 2023-08-02 PROCEDURE — G8400 PT W/DXA NO RESULTS DOC: HCPCS | Performed by: NEUROLOGICAL SURGERY

## 2023-08-02 PROCEDURE — 3017F COLORECTAL CA SCREEN DOC REV: CPT | Performed by: NEUROLOGICAL SURGERY

## 2023-08-02 PROCEDURE — 1090F PRES/ABSN URINE INCON ASSESS: CPT | Performed by: NEUROLOGICAL SURGERY

## 2023-08-02 PROCEDURE — 1123F ACP DISCUSS/DSCN MKR DOCD: CPT | Performed by: NEUROLOGICAL SURGERY

## 2023-08-02 PROCEDURE — 99213 OFFICE O/P EST LOW 20 MIN: CPT | Performed by: NEUROLOGICAL SURGERY

## 2023-08-02 PROCEDURE — 1036F TOBACCO NON-USER: CPT | Performed by: NEUROLOGICAL SURGERY

## 2023-08-02 PROCEDURE — G8427 DOCREV CUR MEDS BY ELIG CLIN: HCPCS | Performed by: NEUROLOGICAL SURGERY

## 2023-08-02 NOTE — PROGRESS NOTES
([address])    3. Stenosis of cervical spine with myelopathy ([address])          Plan: Extensive discussion with the patient that she still has the potential for improvement and recovery from spinal cord injury can take upwards of 1 year to 18 months. Would recommend continued aggressive measures including daily physical therapy range of motion and treatment of her spasticity. Followup: No follow-ups on file. Prescriptions Ordered:  No orders of the defined types were placed in this encounter. Orders Placed:  Orders Placed This Encounter   Procedures    Kiah León MD, Physical Medicine and Rehabilitation, [facility]     Referral Priority:   Routine     Referral Type:   Eval and Treat     Referral Reason:   Specialty Services Required     Referred to Provider:   Jessy Burk MD     Requested Specialty:   Physical Medicine and Rehab     Number of Visits Requested:   1        Electronically signed by Fabrice Chavis DO on 8/2/2023 at 9:19 AM    Please note that this chart was generated using voice recognition Dragon dictation software. Although every effort was made to ensure the accuracy of this automated transcription, some errors in transcription may have occurred.

## 2023-08-05 ENCOUNTER — HOSPITAL ENCOUNTER (OUTPATIENT)
Age: 73
Setting detail: SPECIMEN
Discharge: HOME | End: 2023-08-05

## 2023-08-05 LAB
ALBUMIN SERPL-MCNC: 3.6 G/DL (ref 3.5–5.2)
ALP SERPL-CCNC: 145 U/L (ref 35–104)
ALT SERPL-CCNC: 12 U/L (ref 5–33)
ANION GAP SERPL CALCULATED.3IONS-SCNC: 14 MMOL/L (ref 9–17)
AST SERPL-CCNC: 19 U/L
BACTERIA URNS QL MICRO: ABNORMAL
BILIRUB SERPL-MCNC: 0.4 MG/DL (ref 0.3–1.2)
BILIRUB UR QL STRIP: NEGATIVE
BUN SERPL-MCNC: 13 MG/DL (ref 8–23)
BUN/CREAT SERPL: ABNORMAL (ref 9–20)
CALCIUM SERPL-MCNC: 9.7 MG/DL (ref 8.6–10.4)
CHLORIDE SERPL-SCNC: 100 MMOL/L (ref 98–107)
CLARITY UR: ABNORMAL
CO2 SERPL-SCNC: 27 MMOL/L (ref 20–31)
COLOR UR: YELLOW
CREAT SERPL-MCNC: <0.4 MG/DL (ref 0.5–0.9)
EPI CELLS #/AREA URNS HPF: ABNORMAL /HPF (ref 0–5)
ERYTHROCYTE [DISTWIDTH] IN BLOOD BY AUTOMATED COUNT: 15.5 % (ref 11.8–14.4)
GFR SERPL CREATININE-BSD FRML MDRD: ABNORMAL ML/MIN/1.73M2
GLUCOSE SERPL-MCNC: 68 MG/DL (ref 70–99)
GLUCOSE UR STRIP-MCNC: NEGATIVE MG/DL
HCT VFR BLD AUTO: 41.3 % (ref 36.3–47.1)
HGB BLD-MCNC: 12.6 G/DL (ref 11.9–15.1)
HGB UR QL STRIP.AUTO: ABNORMAL
KETONES UR STRIP-MCNC: NEGATIVE MG/DL
LEUKOCYTE ESTERASE UR QL STRIP: ABNORMAL
MCH RBC QN AUTO: 26.1 PG (ref 25.2–33.5)
MCHC RBC AUTO-ENTMCNC: 30.5 G/DL (ref 28.4–34.8)
MCV RBC AUTO: 85.5 FL (ref 82.6–102.9)
NITRITE UR QL STRIP: POSITIVE
NRBC BLD-RTO: 0 PER 100 WBC
PH UR STRIP: 7 [PH] (ref 5–8)
PLATELET # BLD AUTO: 514 K/UL (ref 138–453)
PMV BLD AUTO: 10.3 FL (ref 8.1–13.5)
POTASSIUM SERPL-SCNC: 3.8 MMOL/L (ref 3.7–5.3)
PROT SERPL-MCNC: 7.6 G/DL (ref 6.4–8.3)
PROT UR STRIP-MCNC: NEGATIVE MG/DL
RBC # BLD AUTO: 4.83 M/UL (ref 3.95–5.11)
RBC #/AREA URNS HPF: ABNORMAL /HPF (ref 0–2)
SODIUM SERPL-SCNC: 141 MMOL/L (ref 135–144)
SP GR UR STRIP: 1.01 (ref 1–1.03)
UROBILINOGEN UR STRIP-ACNC: ABNORMAL EU/DL (ref 0–1)
WBC #/AREA URNS HPF: ABNORMAL /HPF (ref 0–5)
WBC OTHER # BLD: 6 K/UL (ref 3.5–11.3)

## 2023-08-05 PROCEDURE — P9603 ONE-WAY ALLOW PRORATED MILES: HCPCS

## 2023-08-05 PROCEDURE — 87086 URINE CULTURE/COLONY COUNT: CPT

## 2023-08-05 PROCEDURE — 85027 COMPLETE CBC AUTOMATED: CPT

## 2023-08-05 PROCEDURE — 80053 COMPREHEN METABOLIC PANEL: CPT

## 2023-08-05 PROCEDURE — 81001 URINALYSIS AUTO W/SCOPE: CPT

## 2023-08-05 PROCEDURE — 36415 COLL VENOUS BLD VENIPUNCTURE: CPT

## 2023-08-06 LAB
MICROORGANISM SPEC CULT: ABNORMAL
SERVICE CMNT-IMP: ABNORMAL
SPECIMEN DESCRIPTION: ABNORMAL

## 2023-08-07 LAB
MICROORGANISM SPEC CULT: ABNORMAL
SERVICE CMNT-IMP: ABNORMAL
SPECIMEN DESCRIPTION: ABNORMAL

## 2023-08-11 ENCOUNTER — HOSPITAL ENCOUNTER (OUTPATIENT)
Age: 73
Setting detail: SPECIMEN
Discharge: HOME | End: 2023-08-11

## 2023-08-11 LAB
ANION GAP SERPL CALCULATED.3IONS-SCNC: 14 MMOL/L (ref 9–17)
BASOPHILS # BLD: 0.06 K/UL (ref 0–0.2)
BASOPHILS NFR BLD: 1 % (ref 0–2)
BUN SERPL-MCNC: 11 MG/DL (ref 8–23)
CALCIUM SERPL-MCNC: 9.5 MG/DL (ref 8.6–10.4)
CHLORIDE SERPL-SCNC: 100 MMOL/L (ref 98–107)
CO2 SERPL-SCNC: 26 MMOL/L (ref 20–31)
CREAT SERPL-MCNC: 0.4 MG/DL (ref 0.5–0.9)
EOSINOPHIL # BLD: 0.18 K/UL (ref 0–0.44)
EOSINOPHILS RELATIVE PERCENT: 3 % (ref 1–4)
ERYTHROCYTE [DISTWIDTH] IN BLOOD BY AUTOMATED COUNT: 15.8 % (ref 11.8–14.4)
GFR SERPL CREATININE-BSD FRML MDRD: >60 ML/MIN/1.73M2
GLUCOSE SERPL-MCNC: 148 MG/DL (ref 70–99)
HCT VFR BLD AUTO: 48.3 % (ref 36.3–47.1)
HGB BLD-MCNC: 14.1 G/DL (ref 11.9–15.1)
IMM GRANULOCYTES # BLD AUTO: <0.03 K/UL (ref 0–0.3)
IMM GRANULOCYTES NFR BLD: 0 %
LYMPHOCYTES # BLD: 33 % (ref 24–43)
LYMPHOCYTES NFR BLD: 2.04 K/UL (ref 1.1–3.7)
MCH RBC QN AUTO: 26.2 PG (ref 25.2–33.5)
MCHC RBC AUTO-ENTMCNC: 29.2 G/DL (ref 28.4–34.8)
MCV RBC AUTO: 89.8 FL (ref 82.6–102.9)
MONOCYTES NFR BLD: 0.45 K/UL (ref 0.1–1.2)
MONOCYTES NFR BLD: 7 % (ref 3–12)
NEUTROPHILS NFR BLD: 56 % (ref 36–65)
NEUTS SEG NFR BLD: 3.4 K/UL (ref 1.5–8.1)
NRBC BLD-RTO: 0 PER 100 WBC
PLATELET # BLD AUTO: 370 K/UL (ref 138–453)
PMV BLD AUTO: 10.2 FL (ref 8.1–13.5)
POTASSIUM SERPL-SCNC: 3.8 MMOL/L (ref 3.7–5.3)
RBC # BLD AUTO: 5.38 M/UL (ref 3.95–5.11)
RBC # BLD: ABNORMAL 10*6/UL
SODIUM SERPL-SCNC: 140 MMOL/L (ref 135–144)
WBC OTHER # BLD: 6.2 K/UL (ref 3.5–11.3)

## 2023-08-11 PROCEDURE — 80048 BASIC METABOLIC PNL TOTAL CA: CPT

## 2023-08-11 PROCEDURE — P9603 ONE-WAY ALLOW PRORATED MILES: HCPCS

## 2023-08-11 PROCEDURE — 85025 COMPLETE CBC W/AUTO DIFF WBC: CPT

## 2023-08-11 PROCEDURE — 36415 COLL VENOUS BLD VENIPUNCTURE: CPT

## 2023-08-15 ENCOUNTER — OFFICE VISIT (OUTPATIENT)
Dept: PHYSICAL MEDICINE AND REHAB | Age: 73
End: 2023-08-15
Payer: MEDICARE

## 2023-08-15 VITALS
HEART RATE: 72 BPM | HEIGHT: 67 IN | TEMPERATURE: 98 F | SYSTOLIC BLOOD PRESSURE: 116 MMHG | DIASTOLIC BLOOD PRESSURE: 78 MMHG | BODY MASS INDEX: 20.09 KG/M2 | WEIGHT: 128 LBS

## 2023-08-15 DIAGNOSIS — R20.8 ALLODYNIA: ICD-10-CM

## 2023-08-15 DIAGNOSIS — S14.129D CENTRAL CORD SYNDROME, SUBSEQUENT ENCOUNTER (HCC): Primary | ICD-10-CM

## 2023-08-15 DIAGNOSIS — R25.2 SPASTICITY: ICD-10-CM

## 2023-08-15 PROCEDURE — 99212 OFFICE O/P EST SF 10 MIN: CPT | Performed by: PHYSICAL MEDICINE & REHABILITATION

## 2023-08-15 PROCEDURE — 1123F ACP DISCUSS/DSCN MKR DOCD: CPT | Performed by: PHYSICAL MEDICINE & REHABILITATION

## 2023-08-15 RX ORDER — OLANZAPINE 2.5 MG/1
TABLET ORAL
COMMUNITY
Start: 2023-08-04

## 2023-08-15 RX ORDER — GABAPENTIN 600 MG/1
TABLET ORAL
COMMUNITY
Start: 2023-08-14

## 2023-08-15 NOTE — PROGRESS NOTES
[address] PHYSICAL MEDICINE & REHABILITATION  [address]  [address] [address]  Dept: [phone]  Dept Fax: [phone]    Outpatient Followup Note    Kaur Bobo, 75 y.o., female, presents for follow up c/o of Spasms  . HPI:     HPI  Patient with history of cervical myelopathy and central cord syndrome after fall down stairs in January 2023 treated with surgical decompression/fusion. She remains at [facility] and is now receiving therapy again. She is using soft collar prn for comfort. She does feel that her neurologic pain and spasms are improved on gabapentin and baclofen. She does get some painful spasms of toe flexors intermittently. Her primary concerns today are visual hallucinations which she feels have worsened recently to BID. MAR indicates she was started on olanzepine last week.      Past Medical History:   Diagnosis Date    Acute pulmonary embolism ([address])     Acute respiratory failure with hypoxia (HCC)     Anemia     Ascending cholangitis     Central cord syndrome ([address])     Cholangitis     Choledocholithiasis     History of hallucinations     Lives in nursing home     [address] [place] [address] :  # [phone], fax # [phone]    Metabolic encephalopathy 50/5683    Pancytopenia ([address])     Peripheral neuropathy     Psoriasis     Septic shock ([address]) 2023    Septicemia due to E. 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 Phillip De La Rosa, last visit 4/2023    Under care of team     PCP: Danica Callejas, [facility]      Past Surgical History:   Procedure Laterality Date    CERVICAL FUSION N/A 01/18/2023    C 3-7 POSTERIOR CERVICAL DECOMPRESSION AND FUSION performed by Addie Mckeon DO at [address], LAPAROSCOPIC N/A 03/13/2023    LAPAROSCOPIC CHOLECYSTECTOMY,  POSSIBLE OPEN performed by Christina Pruitt DO at

## 2023-08-18 ENCOUNTER — HOSPITAL ENCOUNTER (OUTPATIENT)
Age: 73
Setting detail: SPECIMEN
Discharge: HOME | End: 2023-08-18

## 2023-08-18 LAB
BACTERIA URNS QL MICRO: ABNORMAL
EPI CELLS #/AREA URNS HPF: ABNORMAL /HPF (ref 0–5)
RBC #/AREA URNS HPF: ABNORMAL /HPF (ref 0–2)
WBC #/AREA URNS HPF: ABNORMAL /HPF (ref 0–5)

## 2023-08-18 PROCEDURE — 87088 URINE BACTERIA CULTURE: CPT

## 2023-08-18 PROCEDURE — 87186 SC STD MICRODIL/AGAR DIL: CPT

## 2023-08-18 PROCEDURE — 87086 URINE CULTURE/COLONY COUNT: CPT

## 2023-08-18 PROCEDURE — 81001 URINALYSIS AUTO W/SCOPE: CPT

## 2023-08-19 LAB
BILIRUB UR QL STRIP: NEGATIVE
CLARITY UR: ABNORMAL
COLOR UR: YELLOW
GLUCOSE UR STRIP-MCNC: NEGATIVE MG/DL
HGB UR QL STRIP.AUTO: ABNORMAL
KETONES UR STRIP-MCNC: NEGATIVE MG/DL
LEUKOCYTE ESTERASE UR QL STRIP: ABNORMAL
NITRITE UR QL STRIP: NEGATIVE
PH UR STRIP: 6 [PH] (ref 5–8)
PROT UR STRIP-MCNC: NEGATIVE MG/DL
SP GR UR STRIP: 1 (ref 1–1.03)
UROBILINOGEN UR STRIP-ACNC: NORMAL EU/DL (ref 0–1)

## 2023-08-20 LAB
MICROORGANISM SPEC CULT: ABNORMAL
SERVICE CMNT-IMP: ABNORMAL
SPECIMEN DESCRIPTION: ABNORMAL

## 2023-09-23 ENCOUNTER — HOSPITAL ENCOUNTER (OUTPATIENT)
Age: 73
Setting detail: SPECIMEN
Discharge: HOME | End: 2023-09-23

## 2023-09-23 PROCEDURE — 87086 URINE CULTURE/COLONY COUNT: CPT

## 2023-09-23 PROCEDURE — 87186 SC STD MICRODIL/AGAR DIL: CPT

## 2023-09-23 PROCEDURE — 81001 URINALYSIS AUTO W/SCOPE: CPT

## 2023-09-23 PROCEDURE — 87077 CULTURE AEROBIC IDENTIFY: CPT

## 2023-09-24 LAB
BACTERIA URNS QL MICRO: ABNORMAL
BILIRUB UR QL STRIP: ABNORMAL
CLARITY UR: ABNORMAL
COLOR UR: YELLOW
EPI CELLS #/AREA URNS HPF: ABNORMAL /HPF (ref 0–5)
GLUCOSE UR STRIP-MCNC: NEGATIVE MG/DL
HGB UR QL STRIP.AUTO: ABNORMAL
KETONES UR STRIP-MCNC: NEGATIVE MG/DL
LEUKOCYTE ESTERASE UR QL STRIP: ABNORMAL
NITRITE UR QL STRIP: POSITIVE
PH UR STRIP: 6 [PH] (ref 5–8)
PROT UR STRIP-MCNC: ABNORMAL MG/DL
RBC #/AREA URNS HPF: ABNORMAL /HPF (ref 0–2)
SP GR UR STRIP: 1.02 (ref 1–1.03)
UROBILINOGEN UR STRIP-ACNC: NORMAL EU/DL (ref 0–1)
WBC #/AREA URNS HPF: ABNORMAL /HPF (ref 0–5)

## 2023-09-26 LAB
MICROORGANISM SPEC CULT: ABNORMAL
SERVICE CMNT-IMP: ABNORMAL
SPECIMEN DESCRIPTION: ABNORMAL

## 2023-10-10 ENCOUNTER — HOSPITAL ENCOUNTER (OUTPATIENT)
Age: 73
Setting detail: SPECIMEN
Discharge: HOME | End: 2023-10-10

## 2023-10-10 LAB
ANION GAP SERPL CALCULATED.3IONS-SCNC: 13 MMOL/L (ref 9–17)
BILIRUB UR QL STRIP: NEGATIVE
BUN SERPL-MCNC: 16 MG/DL (ref 8–23)
CALCIUM SERPL-MCNC: 10 MG/DL (ref 8.6–10.4)
CASTS #/AREA URNS LPF: NORMAL /LPF (ref 0–8)
CHLORIDE SERPL-SCNC: 98 MMOL/L (ref 98–107)
CLARITY UR: CLEAR
CO2 SERPL-SCNC: 29 MMOL/L (ref 20–31)
COLOR UR: YELLOW
CREAT SERPL-MCNC: 0.4 MG/DL (ref 0.5–0.9)
EPI CELLS #/AREA URNS HPF: NORMAL /HPF (ref 0–5)
ERYTHROCYTE [DISTWIDTH] IN BLOOD BY AUTOMATED COUNT: 14.8 % (ref 11.8–14.4)
EST. AVERAGE GLUCOSE BLD GHB EST-MCNC: 111 MG/DL
GFR SERPL CREATININE-BSD FRML MDRD: >60 ML/MIN/1.73M2
GLUCOSE SERPL-MCNC: 87 MG/DL (ref 70–99)
GLUCOSE UR STRIP-MCNC: NEGATIVE MG/DL
HBA1C MFR BLD: 5.5 % (ref 4–6)
HCT VFR BLD AUTO: 44.5 % (ref 36.3–47.1)
HGB BLD-MCNC: 13.9 G/DL (ref 11.9–15.1)
HGB UR QL STRIP.AUTO: NEGATIVE
KETONES UR STRIP-MCNC: NEGATIVE MG/DL
LEUKOCYTE ESTERASE UR QL STRIP: NEGATIVE
MCH RBC QN AUTO: 26.5 PG (ref 25.2–33.5)
MCHC RBC AUTO-ENTMCNC: 31.2 G/DL (ref 28.4–34.8)
MCV RBC AUTO: 84.8 FL (ref 82.6–102.9)
NITRITE UR QL STRIP: NEGATIVE
NRBC BLD-RTO: 0 PER 100 WBC
PH UR STRIP: 6 [PH] (ref 5–8)
PLATELET # BLD AUTO: 482 K/UL (ref 138–453)
PMV BLD AUTO: 9.9 FL (ref 8.1–13.5)
POTASSIUM SERPL-SCNC: 3.4 MMOL/L (ref 3.7–5.3)
PROT UR STRIP-MCNC: ABNORMAL MG/DL
RBC # BLD AUTO: 5.25 M/UL (ref 3.95–5.11)
RBC #/AREA URNS HPF: NORMAL /HPF (ref 0–4)
SODIUM SERPL-SCNC: 140 MMOL/L (ref 135–144)
SP GR UR STRIP: 1.02 (ref 1–1.03)
UROBILINOGEN UR STRIP-ACNC: NORMAL EU/DL (ref 0–1)
WBC #/AREA URNS HPF: NORMAL /HPF (ref 0–5)
WBC OTHER # BLD: 8.4 K/UL (ref 3.5–11.3)

## 2023-10-10 PROCEDURE — 80048 BASIC METABOLIC PNL TOTAL CA: CPT

## 2023-10-10 PROCEDURE — 85027 COMPLETE CBC AUTOMATED: CPT

## 2023-10-10 PROCEDURE — P9603 ONE-WAY ALLOW PRORATED MILES: HCPCS

## 2023-10-10 PROCEDURE — 36415 COLL VENOUS BLD VENIPUNCTURE: CPT

## 2023-10-10 PROCEDURE — 81001 URINALYSIS AUTO W/SCOPE: CPT

## 2023-10-10 PROCEDURE — 83036 HEMOGLOBIN GLYCOSYLATED A1C: CPT

## 2023-10-10 PROCEDURE — 87086 URINE CULTURE/COLONY COUNT: CPT

## 2023-10-11 LAB
BASOPHILS # BLD: 0.06 K/UL (ref 0–0.2)
BASOPHILS NFR BLD: 1 % (ref 0–2)
EOSINOPHIL # BLD: <0.03 K/UL (ref 0–0.44)
EOSINOPHILS RELATIVE PERCENT: 0 % (ref 1–4)
IMM GRANULOCYTES # BLD AUTO: <0.03 K/UL (ref 0–0.3)
IMM GRANULOCYTES NFR BLD: 0 %
LYMPHOCYTES # BLD: 26 % (ref 24–43)
LYMPHOCYTES NFR BLD: 2.18 K/UL (ref 1.1–3.7)
MICROORGANISM SPEC CULT: NORMAL
MONOCYTES NFR BLD: 0.89 K/UL (ref 0.1–1.2)
MONOCYTES NFR BLD: 11 % (ref 3–12)
NEUTROPHILS NFR BLD: 62 % (ref 36–65)
NEUTS SEG NFR BLD: 5.14 K/UL (ref 1.5–8.1)
RBC # BLD: ABNORMAL 10*6/UL
SERVICE CMNT-IMP: NORMAL
SPECIMEN DESCRIPTION: NORMAL

## 2023-11-15 ENCOUNTER — OFFICE VISIT (OUTPATIENT)
Dept: NEUROSURGERY | Age: 73
End: 2023-11-15
Payer: COMMERCIAL

## 2023-11-15 VITALS
HEART RATE: 66 BPM | HEIGHT: 67 IN | WEIGHT: 128 LBS | BODY MASS INDEX: 20.09 KG/M2 | TEMPERATURE: 99 F | SYSTOLIC BLOOD PRESSURE: 126 MMHG | DIASTOLIC BLOOD PRESSURE: 65 MMHG

## 2023-11-15 DIAGNOSIS — G99.2 STENOSIS OF CERVICAL SPINE WITH MYELOPATHY (HCC): ICD-10-CM

## 2023-11-15 DIAGNOSIS — M48.02 STENOSIS OF CERVICAL SPINE WITH MYELOPATHY (HCC): ICD-10-CM

## 2023-11-15 DIAGNOSIS — G82.50 SPASTIC QUADRIPARESIS (HCC): Primary | ICD-10-CM

## 2023-11-15 DIAGNOSIS — S14.129D CENTRAL CORD SYNDROME, SUBSEQUENT ENCOUNTER (HCC): ICD-10-CM

## 2023-11-15 PROCEDURE — 1123F ACP DISCUSS/DSCN MKR DOCD: CPT | Performed by: NEUROLOGICAL SURGERY

## 2023-11-15 PROCEDURE — 99213 OFFICE O/P EST LOW 20 MIN: CPT | Performed by: NEUROLOGICAL SURGERY

## 2023-11-15 NOTE — PROGRESS NOTES
[address] [facility],[address] NEUROSURGERY CENTER [place] [address] [address]  [facility] [address] [facility] [address], [address] [address]  Dept: [phone]    Patient:  Carlos Rodriguez  YOB: 1950  Date: 11/15/23    The patient is a 75 y.o. female who presents today for consult of the following problems:     Chief Complaint   Patient presents with    Other     Follow up of Spastic quadriparesis              HPI:     Carlos Rodriguez is a 75 y.o. female on whom neurosurgical consultation was requested by Wade Yuan MD for management of cervical stenosis with myelopathy as well as incomplete quadriplegia. Patient has had significant proved in her upper extremities but unfortunate her lower EXTR still very spastic. Currently residing in a long-term care facility and there has been some interruption of her therapy due to insurance issues. .      History:     Past Medical History:   Diagnosis Date    Acute pulmonary embolism ([address])     Acute respiratory failure with hypoxia (HCC)     Anemia     Ascending cholangitis     Central cord syndrome (HCC)     Cholangitis     Choledocholithiasis     History of hallucinations     Lives in nursing home     [address] [place] [address] :  # [phone], fax # [phone]    Metabolic encephalopathy 04/9566    Pancytopenia ([address])     Peripheral neuropathy     Psoriasis     Septic shock ([address]) 2023    Septicemia due to E. 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 Rimma Browne, last visit 4/2023    Under care of team     PCP: Cinda Meléndez, [facility]     Past Surgical History:   Procedure Laterality Date    CERVICAL FUSION N/A 01/18/2023    C 3-7 POSTERIOR CERVICAL DECOMPRESSION AND FUSION performed by Barbie Osborne DO at [address], LAPAROSCOPIC N/A 03/13/2023    LAPAROSCOPIC CHOLECYSTECTOMY,  POSSIBLE

## 2024-03-20 ENCOUNTER — HOSPITAL ENCOUNTER (OUTPATIENT)
Age: 74
Setting detail: SPECIMEN
Discharge: HOME | End: 2024-03-20
Payer: MEDICARE

## 2024-03-20 LAB
BACTERIA URNS QL MICRO: ABNORMAL
BILIRUB UR QL STRIP: NEGATIVE
CASTS #/AREA URNS LPF: ABNORMAL /LPF (ref 0–2)
CLARITY UR: ABNORMAL
COLOR UR: YELLOW
EPI CELLS #/AREA URNS HPF: ABNORMAL /HPF (ref 0–5)
GLUCOSE UR STRIP-MCNC: NEGATIVE MG/DL
HGB UR QL STRIP.AUTO: ABNORMAL
KETONES UR STRIP-MCNC: NEGATIVE MG/DL
LEUKOCYTE ESTERASE UR QL STRIP: ABNORMAL
NITRITE UR QL STRIP: POSITIVE
PH UR STRIP: 6 [PH] (ref 5–8)
PROT UR STRIP-MCNC: ABNORMAL MG/DL
RBC #/AREA URNS HPF: ABNORMAL /HPF (ref 0–2)
SP GR UR STRIP: 1.01 (ref 1–1.03)
UROBILINOGEN UR STRIP-ACNC: NORMAL EU/DL (ref 0–1)
WBC #/AREA URNS HPF: ABNORMAL /HPF (ref 0–5)

## 2024-03-20 PROCEDURE — 81001 URINALYSIS AUTO W/SCOPE: CPT

## 2024-03-20 PROCEDURE — 87186 SC STD MICRODIL/AGAR DIL: CPT

## 2024-03-20 PROCEDURE — 87086 URINE CULTURE/COLONY COUNT: CPT

## 2024-03-20 PROCEDURE — 87088 URINE BACTERIA CULTURE: CPT

## 2024-03-21 LAB
MICROORGANISM SPEC CULT: ABNORMAL
SERVICE CMNT-IMP: ABNORMAL
SPECIMEN DESCRIPTION: ABNORMAL

## 2024-06-17 ENCOUNTER — HOSPITAL ENCOUNTER (OUTPATIENT)
Age: 74
Setting detail: SPECIMEN
Discharge: HOME | End: 2024-06-17
Payer: MEDICARE

## 2024-06-17 LAB
ANION GAP SERPL CALCULATED.3IONS-SCNC: 15 MMOL/L (ref 9–17)
BACTERIA URNS QL MICRO: ABNORMAL
BASOPHILS # BLD: 0.08 K/UL (ref 0–0.2)
BASOPHILS NFR BLD: 1 % (ref 0–2)
BILIRUB UR QL STRIP: NEGATIVE
BUN SERPL-MCNC: 9 MG/DL (ref 8–23)
BUN/CREAT SERPL: 18 (ref 9–20)
CALCIUM SERPL-MCNC: 9.6 MG/DL (ref 8.6–10.4)
CHLORIDE SERPL-SCNC: 100 MMOL/L (ref 98–107)
CLARITY UR: CLEAR
CO2 SERPL-SCNC: 24 MMOL/L (ref 20–31)
COLOR UR: YELLOW
CREAT SERPL-MCNC: 0.5 MG/DL (ref 0.5–0.9)
EOSINOPHIL # BLD: 0.24 K/UL (ref 0–0.44)
EOSINOPHILS RELATIVE PERCENT: 3 % (ref 1–4)
EPI CELLS #/AREA URNS HPF: ABNORMAL /HPF (ref 0–5)
ERYTHROCYTE [DISTWIDTH] IN BLOOD BY AUTOMATED COUNT: 14.5 % (ref 11.8–14.4)
GFR, ESTIMATED: >90 ML/MIN/1.73M2
GLUCOSE SERPL-MCNC: 72 MG/DL (ref 70–99)
GLUCOSE UR STRIP-MCNC: NEGATIVE MG/DL
HCT VFR BLD AUTO: 45.5 % (ref 36.3–47.1)
HGB BLD-MCNC: 13.7 G/DL (ref 11.9–15.1)
HGB UR QL STRIP.AUTO: ABNORMAL
IMM GRANULOCYTES # BLD AUTO: 0.02 K/UL (ref 0–0.3)
IMM GRANULOCYTES NFR BLD: 0 %
KETONES UR STRIP-MCNC: NEGATIVE MG/DL
LEUKOCYTE ESTERASE UR QL STRIP: ABNORMAL
LYMPHOCYTES NFR BLD: 2.9 K/UL (ref 1.1–3.7)
LYMPHOCYTES RELATIVE PERCENT: 31 % (ref 24–43)
MCH RBC QN AUTO: 27.5 PG (ref 25.2–33.5)
MCHC RBC AUTO-ENTMCNC: 30.1 G/DL (ref 28.4–34.8)
MCV RBC AUTO: 91.2 FL (ref 82.6–102.9)
MONOCYTES NFR BLD: 0.73 K/UL (ref 0.1–1.2)
MONOCYTES NFR BLD: 8 % (ref 3–12)
NEUTROPHILS NFR BLD: 57 % (ref 36–65)
NEUTS SEG NFR BLD: 5.47 K/UL (ref 1.5–8.1)
NITRITE UR QL STRIP: POSITIVE
NRBC BLD-RTO: 0 PER 100 WBC
PH UR STRIP: 6 [PH] (ref 5–8)
PLATELET # BLD AUTO: 385 K/UL (ref 138–453)
PMV BLD AUTO: 10 FL (ref 8.1–13.5)
POTASSIUM SERPL-SCNC: 4.1 MMOL/L (ref 3.7–5.3)
PROT UR STRIP-MCNC: NEGATIVE MG/DL
RBC # BLD AUTO: 4.99 M/UL (ref 3.95–5.11)
RBC # BLD: ABNORMAL 10*6/UL
RBC #/AREA URNS HPF: ABNORMAL /HPF (ref 0–2)
SODIUM SERPL-SCNC: 139 MMOL/L (ref 135–144)
SP GR UR STRIP: 1 (ref 1–1.03)
UROBILINOGEN UR STRIP-ACNC: NORMAL EU/DL (ref 0–1)
WBC #/AREA URNS HPF: ABNORMAL /HPF (ref 0–5)
WBC OTHER # BLD: 9.4 K/UL (ref 3.5–11.3)

## 2024-06-17 PROCEDURE — 87186 SC STD MICRODIL/AGAR DIL: CPT

## 2024-06-17 PROCEDURE — 87086 URINE CULTURE/COLONY COUNT: CPT

## 2024-06-17 PROCEDURE — 81001 URINALYSIS AUTO W/SCOPE: CPT

## 2024-06-17 PROCEDURE — 85025 COMPLETE CBC W/AUTO DIFF WBC: CPT

## 2024-06-17 PROCEDURE — 80048 BASIC METABOLIC PNL TOTAL CA: CPT

## 2024-06-17 PROCEDURE — P9603 ONE-WAY ALLOW PRORATED MILES: HCPCS

## 2024-06-17 PROCEDURE — 36415 COLL VENOUS BLD VENIPUNCTURE: CPT

## 2024-06-17 PROCEDURE — 87088 URINE BACTERIA CULTURE: CPT

## 2024-06-19 ENCOUNTER — OFFICE VISIT (OUTPATIENT)
Dept: PHYSICAL MEDICINE AND REHAB | Age: 74
End: 2024-06-19
Payer: MEDICARE

## 2024-06-19 VITALS — HEART RATE: 69 BPM | SYSTOLIC BLOOD PRESSURE: 148 MMHG | DIASTOLIC BLOOD PRESSURE: 82 MMHG | TEMPERATURE: 98.7 F

## 2024-06-19 DIAGNOSIS — S14.129D CENTRAL CORD SYNDROME, SUBSEQUENT ENCOUNTER (HCC): ICD-10-CM

## 2024-06-19 DIAGNOSIS — G89.29 CHRONIC PAIN OF RIGHT KNEE: Primary | ICD-10-CM

## 2024-06-19 DIAGNOSIS — R25.2 SPASTICITY: ICD-10-CM

## 2024-06-19 DIAGNOSIS — M25.561 CHRONIC PAIN OF RIGHT KNEE: Primary | ICD-10-CM

## 2024-06-19 LAB
MICROORGANISM SPEC CULT: ABNORMAL
SERVICE CMNT-IMP: ABNORMAL
SPECIMEN DESCRIPTION: ABNORMAL

## 2024-06-19 PROCEDURE — G8420 CALC BMI NORM PARAMETERS: HCPCS | Performed by: PHYSICAL MEDICINE & REHABILITATION

## 2024-06-19 PROCEDURE — 99214 OFFICE O/P EST MOD 30 MIN: CPT | Performed by: PHYSICAL MEDICINE & REHABILITATION

## 2024-06-19 PROCEDURE — 20610 DRAIN/INJ JOINT/BURSA W/O US: CPT | Performed by: PHYSICAL MEDICINE & REHABILITATION

## 2024-06-19 PROCEDURE — 3017F COLORECTAL CA SCREEN DOC REV: CPT | Performed by: PHYSICAL MEDICINE & REHABILITATION

## 2024-06-19 PROCEDURE — G8427 DOCREV CUR MEDS BY ELIG CLIN: HCPCS | Performed by: PHYSICAL MEDICINE & REHABILITATION

## 2024-06-19 PROCEDURE — G8400 PT W/DXA NO RESULTS DOC: HCPCS | Performed by: PHYSICAL MEDICINE & REHABILITATION

## 2024-06-19 PROCEDURE — 1090F PRES/ABSN URINE INCON ASSESS: CPT | Performed by: PHYSICAL MEDICINE & REHABILITATION

## 2024-06-19 PROCEDURE — 1123F ACP DISCUSS/DSCN MKR DOCD: CPT | Performed by: PHYSICAL MEDICINE & REHABILITATION

## 2024-06-19 PROCEDURE — 1036F TOBACCO NON-USER: CPT | Performed by: PHYSICAL MEDICINE & REHABILITATION

## 2024-06-19 RX ORDER — CEFTRIAXONE 1 G/1
INJECTION, POWDER, FOR SOLUTION INTRAMUSCULAR; INTRAVENOUS
COMMUNITY
Start: 2024-06-17

## 2024-06-19 RX ORDER — GABAPENTIN 400 MG/1
400 CAPSULE ORAL 3 TIMES DAILY
COMMUNITY
Start: 2024-06-10

## 2024-06-19 RX ORDER — TRIAMCINOLONE ACETONIDE 40 MG/ML
40 INJECTION, SUSPENSION INTRA-ARTICULAR; INTRAMUSCULAR ONCE
Status: COMPLETED | OUTPATIENT
Start: 2024-06-19 | End: 2024-06-19

## 2024-06-19 RX ORDER — LIDOCAINE HYDROCHLORIDE 10 MG/ML
4 INJECTION, SOLUTION INFILTRATION; PERINEURAL ONCE
Status: COMPLETED | OUTPATIENT
Start: 2024-06-19 | End: 2024-06-19

## 2024-06-19 RX ADMIN — TRIAMCINOLONE ACETONIDE 40 MG: 40 INJECTION, SUSPENSION INTRA-ARTICULAR; INTRAMUSCULAR at 09:43

## 2024-06-19 RX ADMIN — LIDOCAINE HYDROCHLORIDE 4 ML: 10 INJECTION, SOLUTION INFILTRATION; PERINEURAL at 09:41

## 2024-06-19 NOTE — PROGRESS NOTES
[facility] PHYSICAL MEDICINE & REHABILITATION  [address] [address]  Dept: [phone]  Dept Fax: [phone]    Outpatient Followup Note    Kristie Hogan, 74 y.o., female, presents for follow up c/o of Neurologic Problem  .     HPI:     HPI  Patient with history of cervical myelopathy and central cord syndrome after fall down stairs in January 2023 treated with surgical decompression/fusion. She remains at [facility] for long-term care.     Today her primary complaint is R posterior knee pain which is intermittent. She describes it as sharp stabbing pain which has been present since the injury. The pain is worse with any passive ROM of the R knee and with positioning for ADLs. It is worse with pressure posteriorly to the knee.    She is currently receiving therapy one day weekly - PT focusing on core and limb strengthening.    She continues to c/o neck stiffness and tightness especially on rotation and lateral bending. She is using the soft collar less frequently but typically needs it during transportation to appointments.    Bladder - She notes intact bladder sensation but reports incontinent episodes due to logistics/timing - she typically wears a brief but has not required catheterization.   Bowel movements - she reports control and sensation intact but also notes some timing/logistics issues with transfers leading to some incontinent episodes.     Past Medical History:   Diagnosis Date    Acute pulmonary embolism (HCC)     Acute respiratory failure with hypoxia (HCC)     Anemia     Ascending cholangitis     Central cord syndrome (HCC)     Cholangitis     Choledocholithiasis     History of hallucinations     Lives in nursing home     [facility] :  # [phone], fax # [phone]    Metabolic encephalopathy 03/2023    Pancytopenia (HCC)     Peripheral neuropathy     Psoriasis     Septic shock ([facility]) 2023

## 2024-09-06 ENCOUNTER — HOSPITAL ENCOUNTER (OUTPATIENT)
Age: 74
Setting detail: SPECIMEN
Discharge: HOME | End: 2024-09-06

## 2024-09-06 LAB
AMORPH SED URNS QL MICRO: ABNORMAL
BACTERIA URNS QL MICRO: ABNORMAL
EPI CELLS #/AREA URNS HPF: ABNORMAL /HPF (ref 0–5)
RBC #/AREA URNS HPF: ABNORMAL /HPF (ref 0–2)
WBC #/AREA URNS HPF: ABNORMAL /HPF (ref 0–5)

## 2024-09-06 PROCEDURE — 87077 CULTURE AEROBIC IDENTIFY: CPT

## 2024-09-06 PROCEDURE — 87186 SC STD MICRODIL/AGAR DIL: CPT

## 2024-09-06 PROCEDURE — 87086 URINE CULTURE/COLONY COUNT: CPT

## 2024-09-06 PROCEDURE — 81001 URINALYSIS AUTO W/SCOPE: CPT

## 2024-09-07 LAB
BILIRUB UR QL STRIP: NEGATIVE
CLARITY UR: CLEAR
COLOR UR: YELLOW
GLUCOSE UR STRIP-MCNC: NEGATIVE MG/DL
HGB UR QL STRIP.AUTO: NEGATIVE
KETONES UR STRIP-MCNC: NEGATIVE MG/DL
LEUKOCYTE ESTERASE UR QL STRIP: ABNORMAL
NITRITE UR QL STRIP: NEGATIVE
PH UR STRIP: 6 [PH] (ref 5–8)
PROT UR STRIP-MCNC: NEGATIVE MG/DL
SP GR UR STRIP: 1 (ref 1–1.03)
UROBILINOGEN UR STRIP-ACNC: NORMAL EU/DL (ref 0–1)

## 2024-09-08 LAB
MICROORGANISM SPEC CULT: ABNORMAL
SPECIMEN DESCRIPTION: ABNORMAL

## 2024-09-10 NOTE — ED PROVIDER NOTES
[facility] ED  Emergency Department Encounter  Emergency Medicine Resident     Pt Name:Mehrdad Grullon  MRN: [id number]  [place] 1950  Date of evaluation: 1/14/23  PCP:  No primary care provider on file. Note Started: 7:24 PM EST      CHIEF COMPLAINT       Right ankle pain      HISTORY OF PRESENT ILLNESS  (Location/Symptom, Timing/Onset, Context/Setting, Quality, Duration, Modifying Factors, Severity.)      Mehrdad Grullon is a 68 y/o female who presents with fall down 9 steps. Brought in by EMS patient came in from home. Patient is fully alert and oriented and states that she had a mechanical fall down steps, she did not lose consciousness did not hit her head. She is complaining of right ankle pain. There is no obvious deformity to the right ankle. She is denying any medical history, no blood thinner use, no allergies to medications. She remembers the events. PAST MEDICAL / SURGICAL / SOCIAL / FAMILY HISTORY      has no past medical history on file. reviewed with patient and none reported. has no past surgical history on file.   History of right ankle surgery    Social History     Socioeconomic History    Marital status: Single     Spouse name: Not on file    Number of children: Not on file    Years of education: Not on file    Highest education level: Not on file   Occupational History    Not on file   Tobacco Use    Smoking status: Not on file    Smokeless tobacco: Not on file   Substance and Sexual Activity    Alcohol use: Not on file    Drug use: Not on file    Sexual activity: Not on file   Other Topics Concern    Not on file   Social History Narrative    Not on file     Social Determinants of Health     Financial Resource Strain: Not on file   Food Insecurity: Not on file   Transportation Needs: Not on file   Physical Activity: Not on file   Stress: Not on file   Social Connections: Not on file   Intimate Partner Violence: Not on file   Housing Stability: Not on file       No family history on file. Allergies:  Patient has no allergy information on record. Home Medications:  Prior to Admission medications    Not on File     No medications    REVIEW OF SYSTEMS       Review of Systems   Cardiovascular:  Negative for chest pain. Gastrointestinal:  Negative for abdominal pain. Musculoskeletal:  Negative for back pain and joint swelling. Right ankle pain    Neurological:  Negative for dizziness, syncope and headaches. PHYSICAL EXAM      INITIAL VITALS:   BP (!) 123/59   Pulse 97   Temp 98 °F (36.7 °C) (Oral)   Resp 22   Ht 5' 7\" (1.702 m)   Wt 180 lb (81.6 kg)   SpO2 94%   BMI 28.19 kg/m²     Physical Exam  HENT:      Head: Normocephalic and atraumatic. Right Ear: Tympanic membrane normal.      Left Ear: Tympanic membrane normal.      Nose: Nose normal.      Mouth/Throat:      Mouth: Mucous membranes are moist.      Pharynx: Oropharynx is clear. Eyes:      Extraocular Movements: Extraocular movements intact. Pupils: Pupils are equal, round, and reactive to light. Cardiovascular:      Rate and Rhythm: Normal rate and regular rhythm. Pulmonary:      Effort: Pulmonary effort is normal.      Breath sounds: Normal breath sounds. Abdominal:      General: Abdomen is flat. Palpations: Abdomen is soft. Musculoskeletal:         General: Tenderness present. No swelling, deformity or signs of injury. Cervical back: No tenderness. DDX/DIAGNOSTIC RESULTS / EMERGENCY DEPARTMENT COURSE / MDM     Medical Decision Making  48-year-old female with no past medical history no medication use no anticoagulation presenting with a mechanical fall down 9 steps. Arrived by EMS. C-collar applied in trauma bay. She has a GCS of 15, airway open intact breathing and regular rate and rhythm with clear lung sounds bilaterally.   No obvious injury on primary exam.  She is complaining of right ankle pain however there is no obvious deformity intact distal pulses are intact. Less concerned about head injury as she states did not hit her head and has no neck pain. Tachycardia likely due to pain. Trauma priority was called, trauma team at bedside, will order scans per trauma. Amount and/or Complexity of Data Reviewed  External Data Reviewed: notes. Details: outpatient visits for psoriasis, no other medical problems on review  Labs: ordered. Radiology: ordered. EKG      All EKG's are interpreted by the Emergency Department Physician who either signs or Co-signs this chart in the absence of a cardiologist.    EMERGENCY DEPARTMENT COURSE:      ED Course as of 01/14/23 2230   Sat Jan 14, 2023 2028    CT LUMBAR IMPRESSION:  Mild compression fracture of the L1 vertebral body which appears old. [QC]   2028 RIGHT ANKLE XRAY  shows no acute fractures [QC]   2029 Pt still complaining of ankle pain, another 50mcg fentanyl given [QC]   2030 Creatinine(!): 1.13  Prior labs reviewed, no previous elevated Cr values, no apparent history of kidney issues [QC]   2053 Trauma cleared c-spine [QC]   2124 With ambulate patient, patient refusing due to pain. She ambulates at home with a walker, lives at home alone. Will admit patient to observation unit for pain management and PT OT evaluation in the morning. [QC]   2135 Reassessed patient, she is resting comfortably in bed and states that all her pain is disappeared. Assisted patient in calling her  to call the neighbor's that she can get a ride home. [QC]   2229 Patient states she does not have a ride home, attempted to ambulate patient, and she is unable to. She states that she also has bilateral hand numbness and is concerned about this. However she is denying any neck pain, and strong . She lives at home and takes care of her 79-year-old . Her neighbors unable to pick her up and take her home to care for her.   Will admit to observation unit for concern for her safety as well as PT OT. [QC]      ED Course User Index  [QC] John Gannon MD       PROCEDURES:      CONSULTS:  None    CRITICAL CARE:      FINAL IMPRESSION      1. Sprain of right ankle, unspecified ligament, initial encounter          DISPOSITION / [address] Admitted 01/14/2023 10:28:52 PM      PATIENT REFERRED TO:  No follow-up provider specified.     DISCHARGE MEDICATIONS:  New Prescriptions    No medications on file       John Gannon MD  Emergency Medicine Resident    (Please note that portions of thisnote were completed with a voice recognition program.  Efforts were made to edit the dictations but occasionally words are mis-transcribed.)      John Gannon MD  Resident  01/14/23 8724 What Type Of Note Output Would You Prefer (Optional)?: Standard Output What Is The Reason For Today's Visit?: Full Body Skin Examination What Is The Reason For Today's Visit? (Being Monitored For X): concerning skin lesions on an annual basis WEAKNESS

## 2024-10-10 ENCOUNTER — PROCEDURE VISIT (OUTPATIENT)
Dept: PHYSICAL MEDICINE AND REHAB | Age: 74
End: 2024-10-10

## 2024-10-10 VITALS
DIASTOLIC BLOOD PRESSURE: 80 MMHG | HEIGHT: 67 IN | BODY MASS INDEX: 26.68 KG/M2 | WEIGHT: 170 LBS | SYSTOLIC BLOOD PRESSURE: 110 MMHG | HEART RATE: 72 BPM

## 2024-10-10 DIAGNOSIS — M17.11 PRIMARY OSTEOARTHRITIS OF RIGHT KNEE: ICD-10-CM

## 2024-10-10 DIAGNOSIS — S14.129D CENTRAL CORD SYNDROME, SUBSEQUENT ENCOUNTER (HCC): ICD-10-CM

## 2024-10-10 DIAGNOSIS — M62.838 OTHER MUSCLE SPASM: Primary | ICD-10-CM

## 2024-10-10 RX ORDER — LANOLIN ALCOHOL/MO/W.PET/CERES
3 CREAM (GRAM) TOPICAL NIGHTLY PRN
COMMUNITY

## 2024-10-10 NOTE — PROGRESS NOTES
[facility] PHYSICAL MEDICINE & REHABILITATION  [address] [address]  Dept: [phone]  Dept Fax: [phone]    Outpatient Followup Note    Kristie Hogan, 74 y.o., female, presents for follow up c/o of spasticity (Wanting botox injections today) and Spasms  .     HPI:     HPI  Patient with spasticity affecting distal BLEs which is causing B ankle plantar flexion/inversion increasing her pain with ADLs such as lower extremity dressing and increasing risk for skin breakdown due to positioning in her wheelchair and bed. She is reportedly wearing PRAFO at .     Past Medical History:   Diagnosis Date    Acute pulmonary embolism (HCC)     Acute respiratory failure with hypoxia     Anemia     Ascending cholangitis     Central cord syndrome (HCC)     Cholangitis     Choledocholithiasis     History of hallucinations     Lives in nursing home     [facility] :  # [phone], fax # [phone]    Metabolic encephalopathy 03/2023    Pancytopenia (HCC)     Peripheral neuropathy     Psoriasis     Septic shock (HCC) 2023    Septicemia due to E. coli (HCC) 03/2023    Spasm     Spastic quadriparesis (HCC)     Under care of team 05/15/2023    Hematologist:Dr. Alvin [place], last visit 4/2023    Under care of team     PCP: Dr.Bruce Rice, [facility]      Past Surgical History:   Procedure Laterality Date    CERVICAL FUSION N/A 01/18/2023    C 3-7 POSTERIOR CERVICAL DECOMPRESSION AND FUSION performed by Hanh Bates DO at [email or web address] OR    CHOLECYSTECTOMY, LAPAROSCOPIC N/A 03/13/2023    LAPAROSCOPIC CHOLECYSTECTOMY,  POSSIBLE OPEN performed by Eugene Draper DO at [facility] [[email or web address] OR    ERCP  03/08/2023    ERCP STONE REMOVAL performed by Eliceo Bonner MD at [facility] [[email or web address] OR    ERCP  03/08/2023    ERCP SPHINCTER/PAPILLOTOMY performed by Eliceo Bonner MD at [facility] [[email or web address] OR    ERCP  03/08/2023    ERCP STENT INSERTION performed by

## 2024-10-12 ENCOUNTER — HOSPITAL ENCOUNTER (OUTPATIENT)
Age: 74
Setting detail: SPECIMEN
Discharge: HOME | End: 2024-10-12
Payer: MEDICARE

## 2024-10-12 LAB
BACTERIA URNS QL MICRO: ABNORMAL
BILIRUB UR QL STRIP: NEGATIVE
CLARITY UR: CLEAR
COLOR UR: YELLOW
EPI CELLS #/AREA URNS HPF: ABNORMAL /HPF (ref 0–5)
GLUCOSE UR STRIP-MCNC: NEGATIVE MG/DL
HGB UR QL STRIP.AUTO: NEGATIVE
KETONES UR STRIP-MCNC: NEGATIVE MG/DL
LEUKOCYTE ESTERASE UR QL STRIP: ABNORMAL
NITRITE UR QL STRIP: NEGATIVE
PH UR STRIP: 7 [PH] (ref 5–8)
PROT UR STRIP-MCNC: NEGATIVE MG/DL
RBC #/AREA URNS HPF: ABNORMAL /HPF (ref 0–2)
SP GR UR STRIP: 1.01 (ref 1–1.03)
UROBILINOGEN UR STRIP-ACNC: NORMAL EU/DL (ref 0–1)
WBC #/AREA URNS HPF: ABNORMAL /HPF (ref 0–5)

## 2024-10-12 PROCEDURE — 81001 URINALYSIS AUTO W/SCOPE: CPT

## 2024-10-12 PROCEDURE — 87086 URINE CULTURE/COLONY COUNT: CPT

## 2024-10-13 LAB
MICROORGANISM SPEC CULT: ABNORMAL
SPECIMEN DESCRIPTION: ABNORMAL

## 2024-10-14 LAB
MICROORGANISM SPEC CULT: ABNORMAL
SPECIMEN DESCRIPTION: ABNORMAL

## 2024-12-17 ENCOUNTER — HOSPITAL ENCOUNTER (OUTPATIENT)
Age: 74
Setting detail: SPECIMEN
Discharge: HOME | End: 2024-12-17
Payer: COMMERCIAL

## 2024-12-17 LAB
BACTERIA URNS QL MICRO: ABNORMAL
BILIRUB UR QL STRIP: NEGATIVE
CASTS #/AREA URNS LPF: ABNORMAL /LPF (ref 0–8)
CLARITY UR: ABNORMAL
COLOR UR: YELLOW
EPI CELLS #/AREA URNS HPF: ABNORMAL /HPF (ref 0–5)
GLUCOSE UR STRIP-MCNC: NEGATIVE MG/DL
HGB UR QL STRIP.AUTO: NEGATIVE
KETONES UR STRIP-MCNC: NEGATIVE MG/DL
LEUKOCYTE ESTERASE UR QL STRIP: ABNORMAL
NITRITE UR QL STRIP: POSITIVE
PH UR STRIP: 8 [PH] (ref 5–8)
PROT UR STRIP-MCNC: NEGATIVE MG/DL
RBC #/AREA URNS HPF: ABNORMAL /HPF (ref 0–4)
SP GR UR STRIP: 1.01 (ref 1–1.03)
UROBILINOGEN UR STRIP-ACNC: NORMAL EU/DL (ref 0–1)
WBC #/AREA URNS HPF: ABNORMAL /HPF (ref 0–5)

## 2024-12-17 PROCEDURE — 87186 SC STD MICRODIL/AGAR DIL: CPT

## 2024-12-17 PROCEDURE — 87077 CULTURE AEROBIC IDENTIFY: CPT

## 2024-12-17 PROCEDURE — 87086 URINE CULTURE/COLONY COUNT: CPT

## 2024-12-17 PROCEDURE — 81001 URINALYSIS AUTO W/SCOPE: CPT

## 2024-12-18 ENCOUNTER — HOSPITAL ENCOUNTER (OUTPATIENT)
Age: 74
Setting detail: SPECIMEN
Discharge: HOME | End: 2024-12-18
Payer: COMMERCIAL

## 2024-12-18 LAB
25(OH)D3 SERPL-MCNC: <6 NG/ML (ref 30–100)
ANION GAP SERPL CALCULATED.3IONS-SCNC: 9 MMOL/L (ref 9–16)
BUN SERPL-MCNC: 10 MG/DL (ref 8–23)
CALCIUM SERPL-MCNC: 9.2 MG/DL (ref 8.6–10.4)
CHLORIDE SERPL-SCNC: 103 MMOL/L (ref 98–107)
CO2 SERPL-SCNC: 28 MMOL/L (ref 20–31)
CREAT SERPL-MCNC: 0.4 MG/DL (ref 0.6–0.9)
ERYTHROCYTE [DISTWIDTH] IN BLOOD BY AUTOMATED COUNT: 13.2 % (ref 11.8–14.4)
GFR, ESTIMATED: >90 ML/MIN/1.73M2
GLUCOSE SERPL-MCNC: 106 MG/DL (ref 74–99)
HCT VFR BLD AUTO: 45.3 % (ref 36.3–47.1)
HGB BLD-MCNC: 13.7 G/DL (ref 11.9–15.1)
MAGNESIUM SERPL-MCNC: 2 MG/DL (ref 1.6–2.4)
MCH RBC QN AUTO: 27.6 PG (ref 25.2–33.5)
MCHC RBC AUTO-ENTMCNC: 30.2 G/DL (ref 28.4–34.8)
MCV RBC AUTO: 91.3 FL (ref 82.6–102.9)
MICROORGANISM SPEC CULT: ABNORMAL
NRBC BLD-RTO: 0 PER 100 WBC
PLATELET # BLD AUTO: 427 K/UL (ref 138–453)
PMV BLD AUTO: 10.2 FL (ref 8.1–13.5)
POTASSIUM SERPL-SCNC: 4.1 MMOL/L (ref 3.7–5.3)
RBC # BLD AUTO: 4.96 M/UL (ref 3.95–5.11)
SERVICE CMNT-IMP: ABNORMAL
SODIUM SERPL-SCNC: 140 MMOL/L (ref 136–145)
SPECIMEN DESCRIPTION: ABNORMAL
WBC OTHER # BLD: 8.7 K/UL (ref 3.5–11.3)

## 2024-12-18 PROCEDURE — 36415 COLL VENOUS BLD VENIPUNCTURE: CPT

## 2024-12-18 PROCEDURE — 80048 BASIC METABOLIC PNL TOTAL CA: CPT

## 2024-12-18 PROCEDURE — 85027 COMPLETE CBC AUTOMATED: CPT

## 2024-12-18 PROCEDURE — 82306 VITAMIN D 25 HYDROXY: CPT

## 2024-12-18 PROCEDURE — P9603 ONE-WAY ALLOW PRORATED MILES: HCPCS

## 2024-12-18 PROCEDURE — 83735 ASSAY OF MAGNESIUM: CPT

## 2025-01-27 ENCOUNTER — HOSPITAL ENCOUNTER (OUTPATIENT)
Age: 75
Setting detail: SPECIMEN
Discharge: HOME | End: 2025-01-27
Payer: MEDICARE

## 2025-01-27 ENCOUNTER — HOSPITAL ENCOUNTER (OUTPATIENT)
Age: 75
Setting detail: SPECIMEN
End: 2025-01-27
Payer: MEDICARE

## 2025-01-27 LAB
BACTERIA URNS QL MICRO: ABNORMAL
BILIRUB UR QL STRIP: NEGATIVE
CASTS #/AREA URNS LPF: ABNORMAL /LPF (ref 0–8)
CLARITY UR: ABNORMAL
COLOR UR: YELLOW
EPI CELLS #/AREA URNS HPF: ABNORMAL /HPF (ref 0–5)
GLUCOSE UR STRIP-MCNC: NEGATIVE MG/DL
HGB UR QL STRIP.AUTO: ABNORMAL
KETONES UR STRIP-MCNC: NEGATIVE MG/DL
LEUKOCYTE ESTERASE UR QL STRIP: ABNORMAL
NITRITE UR QL STRIP: POSITIVE
PH UR STRIP: 7.5 [PH] (ref 5–8)
PROT UR STRIP-MCNC: NEGATIVE MG/DL
RBC #/AREA URNS HPF: ABNORMAL /HPF (ref 0–4)
SP GR UR STRIP: 1.01 (ref 1–1.03)
UROBILINOGEN UR STRIP-ACNC: NORMAL EU/DL (ref 0–1)
WBC #/AREA URNS HPF: ABNORMAL /HPF (ref 0–5)

## 2025-01-27 PROCEDURE — 87077 CULTURE AEROBIC IDENTIFY: CPT

## 2025-01-27 PROCEDURE — 87086 URINE CULTURE/COLONY COUNT: CPT

## 2025-01-27 PROCEDURE — 87186 SC STD MICRODIL/AGAR DIL: CPT

## 2025-01-27 PROCEDURE — 81001 URINALYSIS AUTO W/SCOPE: CPT

## 2025-01-28 LAB
MICROORGANISM SPEC CULT: ABNORMAL
SERVICE CMNT-IMP: ABNORMAL
SPECIMEN DESCRIPTION: ABNORMAL

## 2025-02-07 ENCOUNTER — HOSPITAL ENCOUNTER (OUTPATIENT)
Age: 75
Setting detail: SPECIMEN
Discharge: HOME | End: 2025-02-07

## 2025-02-07 LAB — 25(OH)D3 SERPL-MCNC: 19.3 NG/ML (ref 30–100)

## 2025-02-07 PROCEDURE — 36415 COLL VENOUS BLD VENIPUNCTURE: CPT

## 2025-02-07 PROCEDURE — 82306 VITAMIN D 25 HYDROXY: CPT

## 2025-02-13 ENCOUNTER — HOSPITAL ENCOUNTER (OUTPATIENT)
Age: 75
Setting detail: SPECIMEN
Discharge: HOME | End: 2025-02-13

## 2025-02-13 PROCEDURE — 87086 URINE CULTURE/COLONY COUNT: CPT

## 2025-02-13 PROCEDURE — 81001 URINALYSIS AUTO W/SCOPE: CPT

## 2025-02-14 LAB
BACTERIA URNS QL MICRO: ABNORMAL
BILIRUB UR QL STRIP: NEGATIVE
CLARITY UR: ABNORMAL
COLOR UR: YELLOW
EPI CELLS #/AREA URNS HPF: ABNORMAL /HPF (ref 0–5)
GLUCOSE UR STRIP-MCNC: NEGATIVE MG/DL
HGB UR QL STRIP.AUTO: ABNORMAL
KETONES UR STRIP-MCNC: NEGATIVE MG/DL
LEUKOCYTE ESTERASE UR QL STRIP: ABNORMAL
NITRITE UR QL STRIP: NEGATIVE
PH UR STRIP: 8 [PH] (ref 5–8)
PROT UR STRIP-MCNC: NEGATIVE MG/DL
RBC #/AREA URNS HPF: ABNORMAL /HPF (ref 0–2)
SP GR UR STRIP: 1 (ref 1–1.03)
UROBILINOGEN UR STRIP-ACNC: NORMAL EU/DL (ref 0–1)
WBC #/AREA URNS HPF: ABNORMAL /HPF (ref 0–5)
YEAST URNS QL MICRO: ABNORMAL

## 2025-02-15 LAB
MICROORGANISM SPEC CULT: NORMAL
SERVICE CMNT-IMP: NORMAL
SPECIMEN DESCRIPTION: NORMAL

## 2025-02-25 ENCOUNTER — HOSPITAL ENCOUNTER (OUTPATIENT)
Age: 75
Setting detail: SPECIMEN
Discharge: HOME | End: 2025-02-25
Payer: MEDICARE

## 2025-02-25 PROCEDURE — 87086 URINE CULTURE/COLONY COUNT: CPT

## 2025-02-25 PROCEDURE — 87186 SC STD MICRODIL/AGAR DIL: CPT

## 2025-02-25 PROCEDURE — 87077 CULTURE AEROBIC IDENTIFY: CPT

## 2025-02-27 LAB
MICROORGANISM SPEC CULT: ABNORMAL
SPECIMEN DESCRIPTION: ABNORMAL

## 2025-03-26 ENCOUNTER — PROCEDURE VISIT (OUTPATIENT)
Dept: PHYSICAL MEDICINE AND REHAB | Age: 75
End: 2025-03-26
Payer: MEDICARE

## 2025-03-26 VITALS
BODY MASS INDEX: 26.63 KG/M2 | DIASTOLIC BLOOD PRESSURE: 76 MMHG | SYSTOLIC BLOOD PRESSURE: 138 MMHG | HEART RATE: 71 BPM | HEIGHT: 67 IN

## 2025-03-26 DIAGNOSIS — M62.838 OTHER MUSCLE SPASM: Primary | ICD-10-CM

## 2025-03-26 PROCEDURE — 64642 CHEMODENERV 1 EXTREMITY 1-4: CPT | Performed by: PHYSICAL MEDICINE & REHABILITATION

## 2025-03-26 PROCEDURE — 95874 GUIDE NERV DESTR NEEDLE EMG: CPT | Performed by: PHYSICAL MEDICINE & REHABILITATION

## 2025-03-26 PROCEDURE — 64643 CHEMODENERV 1 EXTREM 1-4 EA: CPT | Performed by: PHYSICAL MEDICINE & REHABILITATION

## 2025-03-26 RX ORDER — TRIAMCINOLONE ACETONIDE 1 MG/G
CREAM TOPICAL 2 TIMES DAILY
COMMUNITY

## 2025-03-26 RX ORDER — GLUCOSAMINE HCL 500 MG
TABLET ORAL DAILY
COMMUNITY

## 2025-03-26 RX ORDER — DOCUSATE SODIUM 100 MG/1
100 CAPSULE, LIQUID FILLED ORAL 2 TIMES DAILY
COMMUNITY

## 2025-03-26 NOTE — PROGRESS NOTES
[facility] PHYSICAL MEDICINE & REHABILITATION  [address] [address]  Dept: [phone]  Dept Fax: [phone]    Outpatient Followup Note    Kristie Hogan, 75 y.o., female, presents for follow up c/o of spasticity (In legs;  here today for botox injections), Spasms, and Knee Pain  .     HPI:     History of Present Illness         HPI  Patient with spasticity BLEs after cervical myelopathy/central cord syndrome from fall down stairs in January 2023. She is being seen today for spasticity management with Botox. She notes fairly significant relief of her pain and tone after last Botox treatment but will benefit from more routine dosing every 3 months.     Past Medical History:   Diagnosis Date    Acute pulmonary embolism (HCC)     Acute respiratory failure with hypoxia (HCC)     Anemia     Ascending cholangitis (HCC)     Central cord syndrome (HCC)     Cholangitis (HCC)     Choledocholithiasis     History of hallucinations     Lives in nursing home     [facility] :  # [phone], fax # [phone]    Metabolic encephalopathy 03/2023    Pancytopenia (HCC)     Peripheral neuropathy     Psoriasis     Septic shock (HCC) 2023    Septicemia due to E. coli (HCC) 03/2023    Spasm     Spastic quadriparesis (HCC)     Under care of team 05/15/2023    Hematologist:Dr. Alvin Houston [place], last visit 4/2023    Under care of team     PCP: Dr.Bruce Rice, [facility]      Past Surgical History:   Procedure Laterality Date    CERVICAL FUSION N/A 01/18/2023    C 3-7 POSTERIOR CERVICAL DECOMPRESSION AND FUSION performed by Hanh Bates DO at [facility] OR    CHOLECYSTECTOMY, LAPAROSCOPIC N/A 03/13/2023    LAPAROSCOPIC CHOLECYSTECTOMY,  POSSIBLE OPEN performed by Eugene Draper DO at [facility] OR    ERCP  03/08/2023    ERCP STONE REMOVAL performed by Eliceo Bonner MD at [facility] OR    ERCP  03/08/2023    ERCP

## 2025-04-20 ENCOUNTER — HOSPITAL ENCOUNTER (INPATIENT)
Age: 75
LOS: 3 days | Discharge: NURSING, REHAB OR LONG-TERM CARE | DRG: 040 | End: 2025-04-23
Attending: EMERGENCY MEDICINE | Admitting: PSYCHIATRY & NEUROLOGY
Payer: MEDICARE

## 2025-04-20 ENCOUNTER — APPOINTMENT (OUTPATIENT)
Dept: MRI IMAGING | Age: 75
DRG: 040 | End: 2025-04-20
Payer: MEDICARE

## 2025-04-20 ENCOUNTER — APPOINTMENT (OUTPATIENT)
Dept: CT IMAGING | Age: 75
DRG: 040 | End: 2025-04-20
Payer: MEDICARE

## 2025-04-20 DIAGNOSIS — I63.9 CEREBROVASCULAR ACCIDENT (CVA), UNSPECIFIED MECHANISM (HCC): Primary | ICD-10-CM

## 2025-04-20 DIAGNOSIS — I63.412 CEREBROVASCULAR ACCIDENT (CVA) DUE TO EMBOLISM OF LEFT MIDDLE CEREBRAL ARTERY (HCC): ICD-10-CM

## 2025-04-20 DIAGNOSIS — I63.9 CEREBROVASCULAR ACCIDENT (CVA) (HCC): ICD-10-CM

## 2025-04-20 LAB
ANION GAP SERPL CALCULATED.3IONS-SCNC: 12 MMOL/L (ref 9–16)
BACTERIA URNS QL MICRO: ABNORMAL
BASOPHILS # BLD: 0.07 K/UL (ref 0–0.2)
BASOPHILS NFR BLD: 1 % (ref 0–2)
BILIRUB UR QL STRIP: NEGATIVE
BUN BLD-MCNC: 6 MG/DL (ref 8–26)
BUN SERPL-MCNC: 7 MG/DL (ref 8–23)
CA-I BLD-SCNC: 1.24 MMOL/L (ref 1.15–1.33)
CALCIUM SERPL-MCNC: 9.4 MG/DL (ref 8.6–10.4)
CASTS #/AREA URNS LPF: ABNORMAL /LPF (ref 0–2)
CHLORIDE BLD-SCNC: 105 MMOL/L (ref 98–107)
CHLORIDE SERPL-SCNC: 103 MMOL/L (ref 98–107)
CK SERPL-CCNC: 48 U/L (ref 26–192)
CLARITY UR: ABNORMAL
CO2 BLD CALC-SCNC: 35 MMOL/L (ref 22–30)
CO2 SERPL-SCNC: 26 MMOL/L (ref 20–31)
COLOR UR: YELLOW
CREAT SERPL-MCNC: 0.6 MG/DL (ref 0.6–0.9)
EGFR, POC: >90 ML/MIN/1.73M2
EOSINOPHIL # BLD: 0.25 K/UL (ref 0–0.44)
EOSINOPHILS RELATIVE PERCENT: 3 % (ref 1–4)
EPI CELLS #/AREA URNS HPF: ABNORMAL /HPF (ref 0–5)
ERYTHROCYTE [DISTWIDTH] IN BLOOD BY AUTOMATED COUNT: 14.1 % (ref 11.8–14.4)
GFR, ESTIMATED: >90 ML/MIN/1.73M2
GLUCOSE BLD-MCNC: 100 MG/DL (ref 74–100)
GLUCOSE SERPL-MCNC: 104 MG/DL (ref 74–99)
GLUCOSE UR STRIP-MCNC: NEGATIVE MG/DL
HCO3 VENOUS: 35.8 MMOL/L (ref 22–29)
HCT VFR BLD AUTO: 44 % (ref 36–46)
HCT VFR BLD AUTO: 44.6 % (ref 36.3–47.1)
HGB BLD-MCNC: 13.7 G/DL (ref 11.9–15.1)
HGB UR QL STRIP.AUTO: ABNORMAL
IMM GRANULOCYTES # BLD AUTO: <0.03 K/UL (ref 0–0.3)
IMM GRANULOCYTES NFR BLD: 0 %
INR PPP: 0.9
KETONES UR STRIP-MCNC: NEGATIVE MG/DL
LEUKOCYTE ESTERASE UR QL STRIP: ABNORMAL
LYMPHOCYTES NFR BLD: 2.66 K/UL (ref 1.1–3.7)
LYMPHOCYTES RELATIVE PERCENT: 36 % (ref 24–43)
MCH RBC QN AUTO: 27.1 PG (ref 25.2–33.5)
MCHC RBC AUTO-ENTMCNC: 30.7 G/DL (ref 28.4–34.8)
MCV RBC AUTO: 88.1 FL (ref 82.6–102.9)
MONOCYTES NFR BLD: 0.74 K/UL (ref 0.1–1.2)
MONOCYTES NFR BLD: 10 % (ref 3–12)
MYOGLOBIN SERPL-MCNC: 34 NG/ML (ref 25–58)
NEUTROPHILS NFR BLD: 50 % (ref 36–65)
NEUTS SEG NFR BLD: 3.61 K/UL (ref 1.5–8.1)
NITRITE UR QL STRIP: POSITIVE
NRBC BLD-RTO: 0 PER 100 WBC
O2 SAT, VEN: 55.8 % (ref 60–85)
PARTIAL THROMBOPLASTIN TIME: 26.5 SEC (ref 23–36.5)
PCO2 VENOUS: 62.1 MM HG (ref 41–51)
PH UR STRIP: 6.5 [PH] (ref 5–8)
PH VENOUS: 7.37 (ref 7.32–7.43)
PLATELET # BLD AUTO: 431 K/UL (ref 138–453)
PMV BLD AUTO: 9.7 FL (ref 8.1–13.5)
PO2 VENOUS: 31.3 MM HG (ref 30–50)
POC ANION GAP: 6 MMOL/L (ref 7–16)
POC CREATININE: 0.6 MG/DL (ref 0.51–1.19)
POC HEMOGLOBIN (CALC): 14.9 G/DL (ref 12–16)
POC LACTIC ACID: 1.2 MMOL/L (ref 0.56–1.39)
POSITIVE BASE EXCESS, VEN: 8 MMOL/L (ref 0–3)
POTASSIUM BLD-SCNC: 3.9 MMOL/L (ref 3.5–4.5)
POTASSIUM SERPL-SCNC: 4.1 MMOL/L (ref 3.7–5.3)
PROT UR STRIP-MCNC: ABNORMAL MG/DL
PROTHROMBIN TIME: 12.6 SEC (ref 11.7–14.9)
RBC # BLD AUTO: 5.06 M/UL (ref 3.95–5.11)
RBC #/AREA URNS HPF: ABNORMAL /HPF (ref 0–2)
SODIUM BLD-SCNC: 145 MMOL/L (ref 138–146)
SODIUM SERPL-SCNC: 141 MMOL/L (ref 136–145)
SP GR UR STRIP: 1.03 (ref 1–1.03)
TROPONIN I SERPL HS-MCNC: 15 NG/L (ref 0–14)
TROPONIN I SERPL HS-MCNC: 17 NG/L (ref 0–14)
UROBILINOGEN UR STRIP-ACNC: NORMAL EU/DL (ref 0–1)
WBC #/AREA URNS HPF: ABNORMAL /HPF (ref 0–5)
WBC OTHER # BLD: 7.4 K/UL (ref 3.5–11.3)

## 2025-04-20 PROCEDURE — 83874 ASSAY OF MYOGLOBIN: CPT

## 2025-04-20 PROCEDURE — 2060000000 HC ICU INTERMEDIATE R&B

## 2025-04-20 PROCEDURE — 6360000004 HC RX CONTRAST MEDICATION

## 2025-04-20 PROCEDURE — 2500000003 HC RX 250 WO HCPCS: Performed by: STUDENT IN AN ORGANIZED HEALTH CARE EDUCATION/TRAINING PROGRAM

## 2025-04-20 PROCEDURE — 84520 ASSAY OF UREA NITROGEN: CPT

## 2025-04-20 PROCEDURE — 99223 1ST HOSP IP/OBS HIGH 75: CPT | Performed by: PSYCHIATRY & NEUROLOGY

## 2025-04-20 PROCEDURE — 84484 ASSAY OF TROPONIN QUANT: CPT

## 2025-04-20 PROCEDURE — 85025 COMPLETE CBC W/AUTO DIFF WBC: CPT

## 2025-04-20 PROCEDURE — 82553 CREATINE MB FRACTION: CPT

## 2025-04-20 PROCEDURE — 82550 ASSAY OF CK (CPK): CPT

## 2025-04-20 PROCEDURE — 85014 HEMATOCRIT: CPT

## 2025-04-20 PROCEDURE — 82330 ASSAY OF CALCIUM: CPT

## 2025-04-20 PROCEDURE — 82803 BLOOD GASES ANY COMBINATION: CPT

## 2025-04-20 PROCEDURE — 93005 ELECTROCARDIOGRAM TRACING: CPT | Performed by: PSYCHIATRY & NEUROLOGY

## 2025-04-20 PROCEDURE — 6370000000 HC RX 637 (ALT 250 FOR IP): Performed by: STUDENT IN AN ORGANIZED HEALTH CARE EDUCATION/TRAINING PROGRAM

## 2025-04-20 PROCEDURE — 85610 PROTHROMBIN TIME: CPT

## 2025-04-20 PROCEDURE — 85730 THROMBOPLASTIN TIME PARTIAL: CPT

## 2025-04-20 PROCEDURE — 80051 ELECTROLYTE PANEL: CPT

## 2025-04-20 PROCEDURE — 94761 N-INVAS EAR/PLS OXIMETRY MLT: CPT

## 2025-04-20 PROCEDURE — 99285 EMERGENCY DEPT VISIT HI MDM: CPT

## 2025-04-20 PROCEDURE — 70551 MRI BRAIN STEM W/O DYE: CPT

## 2025-04-20 PROCEDURE — 70450 CT HEAD/BRAIN W/O DYE: CPT

## 2025-04-20 PROCEDURE — 82947 ASSAY GLUCOSE BLOOD QUANT: CPT

## 2025-04-20 PROCEDURE — 6360000002 HC RX W HCPCS: Performed by: STUDENT IN AN ORGANIZED HEALTH CARE EDUCATION/TRAINING PROGRAM

## 2025-04-20 PROCEDURE — 83605 ASSAY OF LACTIC ACID: CPT

## 2025-04-20 PROCEDURE — 80048 BASIC METABOLIC PNL TOTAL CA: CPT

## 2025-04-20 PROCEDURE — 82565 ASSAY OF CREATININE: CPT

## 2025-04-20 PROCEDURE — 70496 CT ANGIOGRAPHY HEAD: CPT

## 2025-04-20 PROCEDURE — 81001 URINALYSIS AUTO W/SCOPE: CPT

## 2025-04-20 RX ORDER — SODIUM CHLORIDE 9 MG/ML
INJECTION, SOLUTION INTRAVENOUS PRN
Status: DISCONTINUED | OUTPATIENT
Start: 2025-04-20 | End: 2025-04-23 | Stop reason: HOSPADM

## 2025-04-20 RX ORDER — SODIUM CHLORIDE 0.9 % (FLUSH) 0.9 %
5-40 SYRINGE (ML) INJECTION PRN
Status: DISCONTINUED | OUTPATIENT
Start: 2025-04-20 | End: 2025-04-23 | Stop reason: HOSPADM

## 2025-04-20 RX ORDER — ASPIRIN 81 MG/1
324 TABLET, CHEWABLE ORAL DAILY
Status: DISCONTINUED | OUTPATIENT
Start: 2025-04-20 | End: 2025-04-20

## 2025-04-20 RX ORDER — BISACODYL 10 MG
10 SUPPOSITORY, RECTAL RECTAL DAILY PRN
Status: DISCONTINUED | OUTPATIENT
Start: 2025-04-20 | End: 2025-04-23 | Stop reason: HOSPADM

## 2025-04-20 RX ORDER — SODIUM CHLORIDE 0.9 % (FLUSH) 0.9 %
5-40 SYRINGE (ML) INJECTION EVERY 12 HOURS SCHEDULED
Status: DISCONTINUED | OUTPATIENT
Start: 2025-04-20 | End: 2025-04-23 | Stop reason: HOSPADM

## 2025-04-20 RX ORDER — IOPAMIDOL 755 MG/ML
75 INJECTION, SOLUTION INTRAVASCULAR
Status: COMPLETED | OUTPATIENT
Start: 2025-04-20 | End: 2025-04-20

## 2025-04-20 RX ORDER — DOCUSATE SODIUM 100 MG/1
100 CAPSULE, LIQUID FILLED ORAL 2 TIMES DAILY
Status: DISCONTINUED | OUTPATIENT
Start: 2025-04-20 | End: 2025-04-23 | Stop reason: HOSPADM

## 2025-04-20 RX ORDER — CLOPIDOGREL 300 MG/1
300 TABLET, FILM COATED ORAL ONCE
Status: COMPLETED | OUTPATIENT
Start: 2025-04-20 | End: 2025-04-20

## 2025-04-20 RX ORDER — HYDRALAZINE HYDROCHLORIDE 20 MG/ML
10 INJECTION INTRAMUSCULAR; INTRAVENOUS EVERY 6 HOURS PRN
Status: DISCONTINUED | OUTPATIENT
Start: 2025-04-20 | End: 2025-04-21

## 2025-04-20 RX ORDER — ONDANSETRON 4 MG/1
4 TABLET, ORALLY DISINTEGRATING ORAL EVERY 8 HOURS PRN
Status: DISCONTINUED | OUTPATIENT
Start: 2025-04-20 | End: 2025-04-23 | Stop reason: HOSPADM

## 2025-04-20 RX ORDER — ROSUVASTATIN CALCIUM 20 MG/1
40 TABLET, COATED ORAL NIGHTLY
Status: DISCONTINUED | OUTPATIENT
Start: 2025-04-20 | End: 2025-04-23 | Stop reason: HOSPADM

## 2025-04-20 RX ORDER — CLOPIDOGREL BISULFATE 75 MG/1
75 TABLET ORAL DAILY
Status: DISCONTINUED | OUTPATIENT
Start: 2025-04-21 | End: 2025-04-23 | Stop reason: HOSPADM

## 2025-04-20 RX ORDER — ONDANSETRON 2 MG/ML
4 INJECTION INTRAMUSCULAR; INTRAVENOUS EVERY 6 HOURS PRN
Status: DISCONTINUED | OUTPATIENT
Start: 2025-04-20 | End: 2025-04-23 | Stop reason: HOSPADM

## 2025-04-20 RX ORDER — LABETALOL HYDROCHLORIDE 5 MG/ML
10 INJECTION, SOLUTION INTRAVENOUS EVERY 10 MIN PRN
Status: DISCONTINUED | OUTPATIENT
Start: 2025-04-20 | End: 2025-04-21

## 2025-04-20 RX ORDER — BACLOFEN 10 MG/1
20 TABLET ORAL 3 TIMES DAILY
Status: DISCONTINUED | OUTPATIENT
Start: 2025-04-20 | End: 2025-04-23 | Stop reason: HOSPADM

## 2025-04-20 RX ORDER — POLYETHYLENE GLYCOL 3350 17 G/17G
17 POWDER, FOR SOLUTION ORAL DAILY PRN
Status: DISCONTINUED | OUTPATIENT
Start: 2025-04-20 | End: 2025-04-23 | Stop reason: HOSPADM

## 2025-04-20 RX ORDER — ENOXAPARIN SODIUM 100 MG/ML
40 INJECTION SUBCUTANEOUS DAILY
Status: DISCONTINUED | OUTPATIENT
Start: 2025-04-20 | End: 2025-04-23 | Stop reason: HOSPADM

## 2025-04-20 RX ORDER — ASPIRIN 81 MG/1
81 TABLET, CHEWABLE ORAL DAILY
Status: DISCONTINUED | OUTPATIENT
Start: 2025-04-21 | End: 2025-04-23 | Stop reason: HOSPADM

## 2025-04-20 RX ADMIN — DOCUSATE SODIUM 100 MG: 100 CAPSULE, LIQUID FILLED ORAL at 20:07

## 2025-04-20 RX ADMIN — ROSUVASTATIN CALCIUM 40 MG: 20 TABLET, FILM COATED ORAL at 20:07

## 2025-04-20 RX ADMIN — ENOXAPARIN SODIUM 40 MG: 100 INJECTION SUBCUTANEOUS at 20:07

## 2025-04-20 RX ADMIN — CLOPIDOGREL BISULFATE 300 MG: 300 TABLET, FILM COATED ORAL at 14:46

## 2025-04-20 RX ADMIN — SODIUM CHLORIDE, PRESERVATIVE FREE 10 ML: 5 INJECTION INTRAVENOUS at 20:08

## 2025-04-20 RX ADMIN — BACLOFEN 20 MG: 10 TABLET ORAL at 20:07

## 2025-04-20 RX ADMIN — ASPIRIN 324 MG: 81 TABLET, CHEWABLE ORAL at 14:46

## 2025-04-20 RX ADMIN — IOPAMIDOL 75 ML: 755 INJECTION, SOLUTION INTRAVENOUS at 13:39

## 2025-04-20 ASSESSMENT — PAIN SCALES - GENERAL: PAINLEVEL_OUTOF10: 0

## 2025-04-20 ASSESSMENT — LIFESTYLE VARIABLES
HOW MANY STANDARD DRINKS CONTAINING ALCOHOL DO YOU HAVE ON A TYPICAL DAY: PATIENT DOES NOT DRINK
HOW OFTEN DO YOU HAVE A DRINK CONTAINING ALCOHOL: NEVER

## 2025-04-20 NOTE — PLAN OF CARE
Problem: Chronic Conditions and Co-morbidities  Goal: Patient's chronic conditions and co-morbidity symptoms are monitored and maintained or improved  Outcome: Progressing     Problem: Discharge Planning  Goal: Discharge to home or other facility with appropriate resources  Outcome: Progressing     Problem: Safety - Adult  Goal: Free from fall injury  Outcome: Progressing     Problem: ABCDS Injury Assessment  Goal: Absence of physical injury  Outcome: Progressing     Problem: Skin/Tissue Integrity  Goal: Skin integrity remains intact  Description: 1.  Monitor for areas of redness and/or skin breakdown2.  Assess vascular access sites hourly3.  Every 4-6 hours minimum:  Change oxygen saturation probe site4.  Every 4-6 hours:  If on nasal continuous positive airway pressure, respiratory therapy assess nares and determine need for appliance change or resting period  Outcome: Progressing

## 2025-04-20 NOTE — ED NOTES
Back to room 40, from CT scan  
CT called, trauma on able will take her after   
Dago wick Placed   
Returned from MRI, no change in status .   
Returned from MRI. No change in status   
The following labs were labeled with appropriate pt sticker and tubed to lab:     [] Blue     [] Lavender   [] on ice  [] Green/yellow  [] Green/black [] on ice  [] Grey  [] on ice  [] Yellow  [] Red  [] Pink  [] Type/ Screen  [] ABG  [] VBG    [] COVID-19 swab    [] Rapid  [] PCR  [] Flu swab  [] Peds Viral Panel     [x] Urine Sample  [] Fecal Sample  [] Pelvic Cultures  [] Blood Cultures  [] X 2  [] STREP Cultures  [] Wound Cultures    
To MRI via stretcher  
Writer called the  brief update given   
3 times daily To the right knee    DOCUSATE SODIUM (COLACE) 100 MG CAPSULE    Take 1 capsule by mouth 2 times daily    FLUOCINONIDE (LIDEX) 0.05 % EXTERNAL SOLUTION    Apply to scalp psoriasis daily for rash    FOLIC ACID (FOLVITE) 1 MG TABLET    Take 1 tablet by mouth daily    GABAPENTIN (NEURONTIN) 300 MG CAPSULE    Take 2 capsules by mouth 3 times daily for 90 days.    GABAPENTIN (NEURONTIN) 400 MG CAPSULE    Take 1 capsule by mouth 3 times daily.    GABAPENTIN (NEURONTIN) 600 MG TABLET    1 tab TID    IPRATROPIUM-ALBUTEROL (DUONEB) 0.5-2.5 (3) MG/3ML SOLN NEBULIZER SOLUTION        MAGNESIUM HYDROXIDE (MILK OF MAGNESIA) 400 MG/5ML SUSPENSION    Take 30 mLs by mouth daily as needed for Constipation    MELATONIN 3 MG TABS TABLET    Take 1 tablet by mouth nightly as needed (insomnia)    MINERAL OIL ENEMA    Place 1 enema rectally daily as needed for Constipation    MIRTAZAPINE (REMERON) 15 MG TABLET        MIRTAZAPINE (REMERON) 7.5 MG TABLET    Take 1 tablet by mouth nightly    OLANZAPINE (ZYPREXA) 2.5 MG TABLET        SERTRALINE (ZOLOFT) 50 MG TABLET        TRIAMCINOLONE (KENALOG) 0.1 % CREAM    Apply to active psoriasis twice daily (not face, armpit or groin)    TRIAMCINOLONE (KENALOG) 0.1 % CREAM    Apply topically 2 times daily Apply topically 2 times daily.  Buttocks and lower extremities for psoriasis    VITAMIN D3 125 MCG (5000 UT) TABS TABLET    Take 1 tablet by mouth daily     Orders Placed This Encounter   Medications    iopamidol (ISOVUE-370) 76 % injection 75 mL    DISCONTD: aspirin chewable tablet 324 mg    clopidogrel (PLAVIX) tablet 300 mg    aspirin chewable tablet 81 mg    clopidogrel (PLAVIX) tablet 75 mg    rosuvastatin (CRESTOR) tablet 40 mg       SURGICAL HISTORY       Past Surgical History:   Procedure Laterality Date    CERVICAL FUSION N/A 01/18/2023    C 3-7 POSTERIOR CERVICAL DECOMPRESSION AND FUSION performed by Hanh Bates DO at [facility] OR    CHOLECYSTECTOMY, LAPAROSCOPIC N/A 03/13/2023

## 2025-04-20 NOTE — ED PROVIDER NOTES
[facility]     Emergency Department     Faculty Attestation    I performed a history and physical examination of the patient and discussed management with the resident. I reviewed the resident’s note and agree with the documented findings and plan of care. Any areas of disagreement are noted on the chart. I was personally present for the key portions of any procedures. I have documented in the chart those procedures where I was not present during the key portions. I have reviewed the emergency nurses triage note. I agree with the chief complaint, past medical history, past surgical history, allergies, medications, social and family history as documented unless otherwise noted below. Documentation of the HPI, Physical Exam and Medical Decision Making performed by medical students or scribes is based on my personal performance of the HPI, PE and MDM. For Physician Assistant/ Nurse Practitioner cases/documentation I have personally evaluated this patient and have completed at least one if not all key elements of the E/M (history, physical exam, and MDM). Additional findings are as noted.    Vital signs:   Vitals:    04/20/25 1316   Pulse: 73   Temp: 98.8 °F (37.1 °C)      75-year-old female with known history of pulmonary embolism, central cord syndrome, and spinal canal stenosis presents with right sided facial droop and slurred speech.  Last known well was 8 PM last night.  Patient is not currently on any anticoagulation.  On exam, patient noted to have right-sided facial droop, slurred speech.  Generalized weakness without significant lateralizing signs.  Stroke team currently at bedside.    EKG Interpretation    Interpreted by emergency department physician    Rhythm: normal sinus   Rate: normal  Axis: normal  Ectopy: none  Conduction: normal  ST Segments: normal  T Waves: normal  Q Waves: none    Clinical Impression: normal EKG    MD Rody Montenegro M.D,  Attending

## 2025-04-20 NOTE — H&P
JVD  CHEST: On NC  ABD: Soft, NTTP  NEURO:     MENTAL STATUS: AAOx3    LANG/SPEECH: dysarthria, and mild expressive aphasia presenting as word finding difficulties    CRANIAL NERVES:    II: Pupils equal and reactive     III, IV, VI: Intact oculomotor movements     V: Normal     VII: Right facial weakness     VIII: normal hearing to speech    MOTOR:     Upper Extremity  Lower Extremity   Right  4/5 3+/5 at hip flexion, with sig spasticity   Left 4/5 3+/5 at hip flexion, with sig spasticity              SENSORY: equal sensation in bilateral upper extremity. No sensation at baseline in lowers.    COORD: Normal finger to nose        Initial NIHSS  Time Performed:  120 PM    1a.  Level of consciousness:  0 - alert; keenly responsive  1b.  Level of consciousness questions:  0 - answers both questions correctly  1c.  Level of consciousness questions:  0 - performs both tasks correctly  2.    Best Gaze:  0 - normal  3.    Visual:  0 - no visual loss  4.    Facial Palsy:  1 - minor paralysis (flattened nasolabial fold, asymmetric on smiling)  5a.  Motor left arm:  0 - no drift, limb holds 90 (or 45) degrees for full 10 seconds  5b.  Motor right arm:  1 - drift, limb holds 90 (or 45) degrees but drifts down before full 10 seconds: does not hit bed  6a.  Motor left leg:  3 - no effort against gravity; leg falls to bed immediately  6b.  Motor right le - some effort against gravity; leg falls to bed by 5 seconds but has some effort against gravity  7.    Limb Ataxia:  0 - absent  8.    Sensory:  0 - normal; no sensory loss  9.    Best Language:  0 - no aphasia, normal  10.  Dysarthria:  1 - mild to moderate, patient slurs at least some words and at worst, can be understood with some difficulty  11.  Extinction and Inattention:  0 - no abnormality     Total:  8 (3)   Modified Winthrop Score Scale (mRS):   Pre-admission mRS: 4: Moderately severe disability; unable to walk and attend to bodily needs without assistance   Current

## 2025-04-20 NOTE — ED TRIAGE NOTES
LKW was 830 last night   Brought in from nursing home   was having a hard time talking with her noticed her speech to be different  Called for assistance at nursing home they call for squad     GC 15   Vitals /45, HR 73,  SP 02 93% RA, 97% 4 liters , glucose 143, C 02 44    Right sided facial drooping, slurred speech does follow commands     IN rehab for bilateral leg weakness, strength ing exercises   Total care

## 2025-04-20 NOTE — ED PROVIDER NOTES
[facility] EMERGENCY DEPARTMENT  Emergency Department Encounter  Emergency Medicine Resident     Pt Name:Kristie Hogan  MRN: [id number]  Birthdate 1950  Date of evaluation: 4/20/25  PCP:  Ruddy Rice MD  Note Started: 1:53 PM EDT      CHIEF COMPLAINT       Chief Complaint   Patient presents with    Cerebrovascular Accident    Facial Droop    Aphasia       HISTORY OF PRESENT ILLNESS  (Location/Symptom, Timing/Onset, Context/Setting, Quality, Duration, Modifying Factors, Severity.)      Kristie Hogan is a 75 y.o. female who presents with concerns for right sided facial droop and slurred speech.  Last well-known was last night at approximately 8:30 PM prior to going to bed.  Patient and  currently at a rehab facility.  Patient currently at rehab for central cord.  History of PEs as per EMS, not on any blood thinners.   noted worsening slurred speech and EMS was called today.  Came in as a stroke alert priority.  Patient currently denies any chest pain, shortness of breath, headaches or changes in vision.  Does have weakness all over.    PAST MEDICAL / SURGICAL / SOCIAL / FAMILY HISTORY      has a past medical history of Acute pulmonary embolism (HCC), Acute respiratory failure with hypoxia, Anemia, Ascending cholangitis (HCC), Central cord syndrome (HCC), Cholangitis (HCC), Choledocholithiasis, History of hallucinations, Lives in nursing home, Metabolic encephalopathy, Pancytopenia, Peripheral neuropathy, Psoriasis, Septic shock (HCC), Septicemia due to E. coli (HCC), Spasm, Spastic quadriparesis (HCC), Under care of team, and Under care of team.     has a past surgical history that includes cervical fusion (N/A, 01/18/2023); ERCP (03/08/2023); ERCP (03/08/2023); ERCP (03/08/2023); Cholecystectomy, laparoscopic (N/A, 03/13/2023); ERCP (05/17/2023); ERCP (N/A, 5/17/2023); ERCP (5/17/2023); and ERCP (5/17/2023).    Social History     Socioeconomic History    Marital status:

## 2025-04-21 LAB
ANION GAP SERPL CALCULATED.3IONS-SCNC: 12 MMOL/L (ref 9–16)
BUN SERPL-MCNC: 7 MG/DL (ref 8–23)
CALCIUM SERPL-MCNC: 9 MG/DL (ref 8.6–10.4)
CHLORIDE SERPL-SCNC: 102 MMOL/L (ref 98–107)
CHOLEST SERPL-MCNC: 196 MG/DL (ref 0–199)
CHOLESTEROL/HDL RATIO: 3.5
CO2 SERPL-SCNC: 26 MMOL/L (ref 20–31)
CREAT SERPL-MCNC: 0.5 MG/DL (ref 0.6–0.9)
ERYTHROCYTE [DISTWIDTH] IN BLOOD BY AUTOMATED COUNT: 14.1 % (ref 11.8–14.4)
EST. AVERAGE GLUCOSE BLD GHB EST-MCNC: 117 MG/DL
GFR, ESTIMATED: >90 ML/MIN/1.73M2
GLUCOSE SERPL-MCNC: 107 MG/DL (ref 74–99)
HBA1C MFR BLD: 5.7 % (ref 4–6)
HCT VFR BLD AUTO: 45.3 % (ref 36.3–47.1)
HDLC SERPL-MCNC: 56 MG/DL
HGB BLD-MCNC: 13.9 G/DL (ref 11.9–15.1)
LDLC SERPL CALC-MCNC: 105 MG/DL (ref 0–100)
MCH RBC QN AUTO: 27.8 PG (ref 25.2–33.5)
MCHC RBC AUTO-ENTMCNC: 30.7 G/DL (ref 28.4–34.8)
MCV RBC AUTO: 90.6 FL (ref 82.6–102.9)
NRBC BLD-RTO: 0 PER 100 WBC
PLATELET # BLD AUTO: 415 K/UL (ref 138–453)
PMV BLD AUTO: 9.8 FL (ref 8.1–13.5)
POTASSIUM SERPL-SCNC: 4.2 MMOL/L (ref 3.7–5.3)
RBC # BLD AUTO: 5 M/UL (ref 3.95–5.11)
SODIUM SERPL-SCNC: 140 MMOL/L (ref 136–145)
TRIGL SERPL-MCNC: 173 MG/DL
VLDLC SERPL CALC-MCNC: 35 MG/DL (ref 1–30)
WBC OTHER # BLD: 7.2 K/UL (ref 3.5–11.3)

## 2025-04-21 PROCEDURE — 93005 ELECTROCARDIOGRAM TRACING: CPT | Performed by: PSYCHIATRY & NEUROLOGY

## 2025-04-21 PROCEDURE — 97530 THERAPEUTIC ACTIVITIES: CPT

## 2025-04-21 PROCEDURE — 87086 URINE CULTURE/COLONY COUNT: CPT

## 2025-04-21 PROCEDURE — 93005 ELECTROCARDIOGRAM TRACING: CPT | Performed by: INTERNAL MEDICINE

## 2025-04-21 PROCEDURE — 6360000002 HC RX W HCPCS: Performed by: STUDENT IN AN ORGANIZED HEALTH CARE EDUCATION/TRAINING PROGRAM

## 2025-04-21 PROCEDURE — 2500000003 HC RX 250 WO HCPCS: Performed by: STUDENT IN AN ORGANIZED HEALTH CARE EDUCATION/TRAINING PROGRAM

## 2025-04-21 PROCEDURE — 6360000002 HC RX W HCPCS

## 2025-04-21 PROCEDURE — 6370000000 HC RX 637 (ALT 250 FOR IP): Performed by: STUDENT IN AN ORGANIZED HEALTH CARE EDUCATION/TRAINING PROGRAM

## 2025-04-21 PROCEDURE — 2500000003 HC RX 250 WO HCPCS

## 2025-04-21 PROCEDURE — 97535 SELF CARE MNGMENT TRAINING: CPT

## 2025-04-21 PROCEDURE — 83036 HEMOGLOBIN GLYCOSYLATED A1C: CPT

## 2025-04-21 PROCEDURE — 2060000000 HC ICU INTERMEDIATE R&B

## 2025-04-21 PROCEDURE — 97166 OT EVAL MOD COMPLEX 45 MIN: CPT

## 2025-04-21 PROCEDURE — 87186 SC STD MICRODIL/AGAR DIL: CPT

## 2025-04-21 PROCEDURE — 92523 SPEECH SOUND LANG COMPREHEN: CPT

## 2025-04-21 PROCEDURE — 99222 1ST HOSP IP/OBS MODERATE 55: CPT | Performed by: GENERAL PRACTICE

## 2025-04-21 PROCEDURE — 80061 LIPID PANEL: CPT

## 2025-04-21 PROCEDURE — 99232 SBSQ HOSP IP/OBS MODERATE 35: CPT | Performed by: PSYCHIATRY & NEUROLOGY

## 2025-04-21 PROCEDURE — 99222 1ST HOSP IP/OBS MODERATE 55: CPT | Performed by: INTERNAL MEDICINE

## 2025-04-21 PROCEDURE — 80048 BASIC METABOLIC PNL TOTAL CA: CPT

## 2025-04-21 PROCEDURE — 36415 COLL VENOUS BLD VENIPUNCTURE: CPT

## 2025-04-21 PROCEDURE — 97162 PT EVAL MOD COMPLEX 30 MIN: CPT

## 2025-04-21 PROCEDURE — 87077 CULTURE AEROBIC IDENTIFY: CPT

## 2025-04-21 PROCEDURE — 85027 COMPLETE CBC AUTOMATED: CPT

## 2025-04-21 RX ORDER — HYDRALAZINE HYDROCHLORIDE 20 MG/ML
10 INJECTION INTRAMUSCULAR; INTRAVENOUS EVERY 6 HOURS PRN
Status: DISCONTINUED | OUTPATIENT
Start: 2025-04-21 | End: 2025-04-23

## 2025-04-21 RX ORDER — LABETALOL HYDROCHLORIDE 5 MG/ML
10 INJECTION, SOLUTION INTRAVENOUS EVERY 10 MIN PRN
Status: DISCONTINUED | OUTPATIENT
Start: 2025-04-21 | End: 2025-04-23

## 2025-04-21 RX ADMIN — CLOPIDOGREL BISULFATE 75 MG: 75 TABLET, FILM COATED ORAL at 09:06

## 2025-04-21 RX ADMIN — SODIUM CHLORIDE, PRESERVATIVE FREE 10 ML: 5 INJECTION INTRAVENOUS at 20:19

## 2025-04-21 RX ADMIN — BACLOFEN 20 MG: 10 TABLET ORAL at 20:19

## 2025-04-21 RX ADMIN — WATER 1000 MG: 1 INJECTION INTRAMUSCULAR; INTRAVENOUS; SUBCUTANEOUS at 11:05

## 2025-04-21 RX ADMIN — ROSUVASTATIN CALCIUM 40 MG: 20 TABLET, FILM COATED ORAL at 20:19

## 2025-04-21 RX ADMIN — ASPIRIN 81 MG: 81 TABLET, CHEWABLE ORAL at 09:06

## 2025-04-21 RX ADMIN — ENOXAPARIN SODIUM 40 MG: 100 INJECTION SUBCUTANEOUS at 09:07

## 2025-04-21 RX ADMIN — BACLOFEN 20 MG: 10 TABLET ORAL at 09:06

## 2025-04-21 RX ADMIN — SODIUM CHLORIDE, PRESERVATIVE FREE 10 ML: 5 INJECTION INTRAVENOUS at 09:06

## 2025-04-21 RX ADMIN — BACLOFEN 20 MG: 10 TABLET ORAL at 15:51

## 2025-04-21 ASSESSMENT — ENCOUNTER SYMPTOMS
GASTROINTESTINAL NEGATIVE: 1
RESPIRATORY NEGATIVE: 1

## 2025-04-21 ASSESSMENT — PAIN SCALES - GENERAL
PAINLEVEL_OUTOF10: 2
PAINLEVEL_OUTOF10: 0

## 2025-04-21 NOTE — PLAN OF CARE
Problem: Chronic Conditions and Co-morbidities  Goal: Patient's chronic conditions and co-morbidity symptoms are monitored and maintained or improved  4/21/2025 1602 by Dee Sanchez RN  Outcome: Progressing  4/21/2025 0453 by Steven Joseph RN  Outcome: Progressing     Problem: Discharge Planning  Goal: Discharge to home or other facility with appropriate resources  4/21/2025 1602 by Dee Sanchez RN  Outcome: Progressing  4/21/2025 0453 by Steven Joseph RN  Outcome: Progressing     Problem: Safety - Adult  Goal: Free from fall injury  4/21/2025 1602 by Dee Sanchez RN  Outcome: Progressing  4/21/2025 0453 by Steven Joseph RN  Outcome: Progressing     Problem: ABCDS Injury Assessment  Goal: Absence of physical injury  4/21/2025 1602 by Dee Sanchez RN  Outcome: Progressing  4/21/2025 0453 by Steven Joseph RN  Outcome: Progressing     Problem: Skin/Tissue Integrity  Goal: Skin integrity remains intact  Description: 1.  Monitor for areas of redness and/or skin breakdown2.  Assess vascular access sites hourly3.  Every 4-6 hours minimum:  Change oxygen saturation probe site4.  Every 4-6 hours:  If on nasal continuous positive airway pressure, respiratory therapy assess nares and determine need for appliance change or resting period  4/21/2025 1602 by Dee Sanchez RN  Outcome: Progressing  4/21/2025 0453 by Steven Joseph RN  Outcome: Progressing

## 2025-04-21 NOTE — PLAN OF CARE
Problem: Chronic Conditions and Co-morbidities  Goal: Patient's chronic conditions and co-morbidity symptoms are monitored and maintained or improved  4/21/2025 0453 by Steven Joseph RN  Outcome: Progressing  4/20/2025 1839 by Elise Rosales RN  Outcome: Progressing     Problem: Discharge Planning  Goal: Discharge to home or other facility with appropriate resources  4/21/2025 0453 by Steven Joseph RN  Outcome: Progressing  4/20/2025 1839 by Elise Rosales RN  Outcome: Progressing     Problem: Safety - Adult  Goal: Free from fall injury  4/21/2025 0453 by Steven Joseph RN  Outcome: Progressing  4/20/2025 1839 by Elise Rosales RN  Outcome: Progressing     Problem: ABCDS Injury Assessment  Goal: Absence of physical injury  4/21/2025 0453 by Steven Joseph RN  Outcome: Progressing  4/20/2025 1839 by Elise Rosales RN  Outcome: Progressing     Problem: Skin/Tissue Integrity  Goal: Skin integrity remains intact  Description: 1.  Monitor for areas of redness and/or skin breakdown2.  Assess vascular access sites hourly3.  Every 4-6 hours minimum:  Change oxygen saturation probe site4.  Every 4-6 hours:  If on nasal continuous positive airway pressure, respiratory therapy assess nares and determine need for appliance change or resting period  4/21/2025 0453 by Steven Joseph RN  Outcome: Progressing  4/20/2025 1839 by Elise Rosales RN  Outcome: Progressing

## 2025-04-21 NOTE — CARE COORDINATION
Case Management Assessment  Initial Evaluation    Date/Time of Evaluation: 4/21/2025 1:24 PM  Assessment Completed by: Michael Rodríguez    If patient is discharged prior to next notation, then this note serves as note for discharge by case management.    Patient Name: Kristie Hogan                   YOB: 1950  Diagnosis: Acute ischemic stroke (HCC) [I63.9]  Cerebrovascular accident (CVA), unspecified mechanism (HCC) [I63.9]                   Date / Time: 4/20/2025  1:13 PM    Patient Admission Status: Inpatient   Readmission Risk (Low < 19, Mod (19-27), High > 27): Readmission Risk Score: 15.8    Current PCP: Ruddy Rice MD  PCP verified by CM? (P) Yes    Chart Reviewed: Yes      History Provided by: (P) Patient  Patient Orientation: (P) Alert and Oriented    Patient Cognition: (P) Alert    Hospitalization in the last 30 days (Readmission):  No    If yes, Readmission Assessment in  Navigator will be completed.    Advance Directives:      Code Status: Full Code   Patient's Primary Decision Maker is: (P) Legal Next of Kin      Discharge Planning:    Patient lives with: (P) Other (Comment) (Maj[place]) Type of Home: (P) Skilled Nursing Facility  Primary Care Giver: (P) Self  Patient Support Systems include: (P) Other (Comment) (Facilty staff)   Current Financial resources: (P) Medicaid, Medicare  Current community resources: (P) None  Current services prior to admission: (P) Durable Medical Equipment, Skilled Nursing Facility            Current DME: (P) Wheelchair            Type of Home Care services:       ADLS  Prior functional level: (P) Assistance with the following:, Bathing, Dressing, Toileting, Feeding, Cooking, Housework, Shopping, Mobility  Current functional level: (P) Assistance with the following:, Bathing, Dressing, Toileting, Feeding, Cooking, Housework, Shopping, Mobility    PT AM-PAC: 6 /24  OT AM-PAC:   /24    Family can provide assistance at DC: (P) No  Would you

## 2025-04-21 NOTE — CARE COORDINATION
Case Management   Daily Progress Note       Patient Name: Kristie Hogan                   YOB: 1950  Diagnosis: Acute ischemic stroke (HCC) [I63.9]  Cerebrovascular accident (CVA), unspecified mechanism (HCC) [I63.9]                         days  Length of Stay: 1  days    Anticipated Discharge Date: To be determined    Readmission Risk (Low < 19, Mod (19-27), High > 27): Readmission Risk Score: 15.8        Current Transitional Plan    [] Home Independently    [] Home with HC    [] Skilled Nursing Facility    [] Acute Rehabilitation    [] Long Term Acute Care (LTAC)    [x] Other: [facility]    Plan for the Stay (Medical Management) :          Workflow Continuation (Additional Notes) :    Called and spoke to Sabine with [facility]. She confirmed the patient is a long-term resident of the facility and is able to return back when discharged from the hospital.     Michael Rodríguez  April 21, 2025

## 2025-04-22 ENCOUNTER — APPOINTMENT (OUTPATIENT)
Age: 75
DRG: 040 | End: 2025-04-22
Attending: INTERNAL MEDICINE
Payer: MEDICARE

## 2025-04-22 LAB
ANION GAP SERPL CALCULATED.3IONS-SCNC: 14 MMOL/L (ref 9–16)
B2 MICROGLOB SERPL IA-MCNC: 3.1 MG/L
BUN SERPL-MCNC: 7 MG/DL (ref 8–23)
CALCIUM SERPL-MCNC: 9.2 MG/DL (ref 8.6–10.4)
CHLORIDE SERPL-SCNC: 101 MMOL/L (ref 98–107)
CO2 SERPL-SCNC: 24 MMOL/L (ref 20–31)
CREAT SERPL-MCNC: 0.5 MG/DL (ref 0.6–0.9)
ECHO AO ROOT DIAM: 2.8 CM
ECHO AO ROOT INDEX: 1.49 CM/M2
ECHO AV AREA PEAK VELOCITY: 2.5 CM2
ECHO AV AREA VTI: 3.6 CM2
ECHO AV AREA/BSA PEAK VELOCITY: 1.3 CM2/M2
ECHO AV AREA/BSA VTI: 1.9 CM2/M2
ECHO AV MEAN GRADIENT: 3 MMHG
ECHO AV MEAN VELOCITY: 0.8 M/S
ECHO AV PEAK GRADIENT: 6 MMHG
ECHO AV PEAK VELOCITY: 1.3 M/S
ECHO AV VELOCITY RATIO: 0.85
ECHO AV VTI: 20.6 CM
ECHO BSA: 1.94 M2
ECHO LA AREA 2C: 9.1 CM2
ECHO LA AREA 4C: 9.1 CM2
ECHO LA DIAMETER INDEX: 1.6 CM/M2
ECHO LA DIAMETER: 3 CM
ECHO LA MAJOR AXIS: 4.4 CM
ECHO LA MINOR AXIS: 4.2 CM
ECHO LA TO AORTIC ROOT RATIO: 1.07
ECHO LA VOL BP: 16 ML (ref 22–52)
ECHO LA VOL MOD A2C: 16 ML (ref 22–52)
ECHO LA VOL MOD A4C: 15 ML (ref 22–52)
ECHO LA VOL/BSA BIPLANE: 9 ML/M2 (ref 16–34)
ECHO LA VOLUME INDEX MOD A2C: 9 ML/M2 (ref 16–34)
ECHO LA VOLUME INDEX MOD A4C: 8 ML/M2 (ref 16–34)
ECHO LV E' LATERAL VELOCITY: 5.33 CM/S
ECHO LV E' SEPTAL VELOCITY: 4.9 CM/S
ECHO LV EDV A2C: 10 ML
ECHO LV EDV A4C: 11 ML
ECHO LV EDV INDEX A4C: 6 ML/M2
ECHO LV EDV NDEX A2C: 5 ML/M2
ECHO LV EF PHYSICIAN: 66 %
ECHO LV EJECTION FRACTION A2C: 65 %
ECHO LV EJECTION FRACTION A4C: 66 %
ECHO LV EJECTION FRACTION BIPLANE: 66 % (ref 55–100)
ECHO LV ESV A2C: 4 ML
ECHO LV ESV A4C: 4 ML
ECHO LV ESV INDEX A2C: 2 ML/M2
ECHO LV ESV INDEX A4C: 2 ML/M2
ECHO LV FRACTIONAL SHORTENING: 41 % (ref 28–44)
ECHO LV INTERNAL DIMENSION DIASTOLE INDEX: 2.07 CM/M2
ECHO LV INTERNAL DIMENSION DIASTOLIC: 3.9 CM (ref 3.9–5.3)
ECHO LV INTERNAL DIMENSION SYSTOLIC INDEX: 1.22 CM/M2
ECHO LV INTERNAL DIMENSION SYSTOLIC: 2.3 CM
ECHO LV IVSD: 0.8 CM (ref 0.6–0.9)
ECHO LV MASS 2D: 89.7 G (ref 67–162)
ECHO LV MASS INDEX 2D: 47.7 G/M2 (ref 43–95)
ECHO LV POSTERIOR WALL DIASTOLIC: 0.8 CM (ref 0.6–0.9)
ECHO LV RELATIVE WALL THICKNESS RATIO: 0.41
ECHO LVOT AREA: 2.8 CM2
ECHO LVOT AV VTI INDEX: 1.25
ECHO LVOT DIAM: 1.9 CM
ECHO LVOT MEAN GRADIENT: 3 MMHG
ECHO LVOT PEAK GRADIENT: 5 MMHG
ECHO LVOT PEAK VELOCITY: 1.1 M/S
ECHO LVOT STROKE VOLUME INDEX: 38.9 ML/M2
ECHO LVOT SV: 73.1 ML
ECHO LVOT VTI: 25.8 CM
ECHO MV A VELOCITY: 0.78 M/S
ECHO MV AREA VTI: 3 CM2
ECHO MV E DECELERATION TIME (DT): 247 MS
ECHO MV E VELOCITY: 0.6 M/S
ECHO MV E/A RATIO: 0.77
ECHO MV E/E' LATERAL: 11.26
ECHO MV E/E' RATIO (AVERAGED): 11.75
ECHO MV E/E' SEPTAL: 12.24
ECHO MV LVOT VTI INDEX: 0.96
ECHO MV MAX VELOCITY: 1.1 M/S
ECHO MV MEAN GRADIENT: 2 MMHG
ECHO MV MEAN VELOCITY: 0.7 M/S
ECHO MV PEAK GRADIENT: 4 MMHG
ECHO MV VTI: 24.7 CM
ECHO PV MAX VELOCITY: 1.2 M/S
ECHO PV PEAK GRADIENT: 5 MMHG
ECHO RV BASAL DIMENSION: 2.7 CM
EKG ATRIAL RATE: 70 BPM
EKG P AXIS: 24 DEGREES
EKG P-R INTERVAL: 152 MS
EKG Q-T INTERVAL: 396 MS
EKG QRS DURATION: 74 MS
EKG QTC CALCULATION (BAZETT): 427 MS
EKG R AXIS: 11 DEGREES
EKG T AXIS: 46 DEGREES
EKG VENTRICULAR RATE: 70 BPM
ERYTHROCYTE [DISTWIDTH] IN BLOOD BY AUTOMATED COUNT: 14 % (ref 11.8–14.4)
GFR, ESTIMATED: >90 ML/MIN/1.73M2
GLUCOSE SERPL-MCNC: 104 MG/DL (ref 74–99)
HCT VFR BLD AUTO: 45.1 % (ref 36.3–47.1)
HCYS SERPL-SCNC: 37.1 UMOL/L (ref 0–15)
HGB BLD-MCNC: 13.6 G/DL (ref 11.9–15.1)
MAGNESIUM SERPL-MCNC: 1.9 MG/DL (ref 1.6–2.4)
MCH RBC QN AUTO: 27.4 PG (ref 25.2–33.5)
MCHC RBC AUTO-ENTMCNC: 30.2 G/DL (ref 28.4–34.8)
MCV RBC AUTO: 90.9 FL (ref 82.6–102.9)
NRBC BLD-RTO: 0 PER 100 WBC
PLATELET # BLD AUTO: 353 K/UL (ref 138–453)
PMV BLD AUTO: 9.6 FL (ref 8.1–13.5)
POTASSIUM SERPL-SCNC: 4 MMOL/L (ref 3.7–5.3)
RBC # BLD AUTO: 4.96 M/UL (ref 3.95–5.11)
SODIUM SERPL-SCNC: 139 MMOL/L (ref 136–145)
TSH SERPL DL<=0.05 MIU/L-ACNC: 1.59 UIU/ML (ref 0.27–4.2)
WBC OTHER # BLD: 6.7 K/UL (ref 3.5–11.3)

## 2025-04-22 PROCEDURE — 85303 CLOT INHIBIT PROT C ACTIVITY: CPT

## 2025-04-22 PROCEDURE — 2500000003 HC RX 250 WO HCPCS

## 2025-04-22 PROCEDURE — 85300 ANTITHROMBIN III ACTIVITY: CPT

## 2025-04-22 PROCEDURE — 97110 THERAPEUTIC EXERCISES: CPT

## 2025-04-22 PROCEDURE — 85613 RUSSELL VIPER VENOM DILUTED: CPT

## 2025-04-22 PROCEDURE — 86147 CARDIOLIPIN ANTIBODY EA IG: CPT

## 2025-04-22 PROCEDURE — 97530 THERAPEUTIC ACTIVITIES: CPT

## 2025-04-22 PROCEDURE — 6360000002 HC RX W HCPCS: Performed by: STUDENT IN AN ORGANIZED HEALTH CARE EDUCATION/TRAINING PROGRAM

## 2025-04-22 PROCEDURE — 2500000003 HC RX 250 WO HCPCS: Performed by: STUDENT IN AN ORGANIZED HEALTH CARE EDUCATION/TRAINING PROGRAM

## 2025-04-22 PROCEDURE — 6370000000 HC RX 637 (ALT 250 FOR IP): Performed by: STUDENT IN AN ORGANIZED HEALTH CARE EDUCATION/TRAINING PROGRAM

## 2025-04-22 PROCEDURE — 36415 COLL VENOUS BLD VENIPUNCTURE: CPT

## 2025-04-22 PROCEDURE — 92507 TX SP LANG VOICE COMM INDIV: CPT

## 2025-04-22 PROCEDURE — 97129 THER IVNTJ 1ST 15 MIN: CPT

## 2025-04-22 PROCEDURE — 93306 TTE W/DOPPLER COMPLETE: CPT | Performed by: INTERNAL MEDICINE

## 2025-04-22 PROCEDURE — 82232 ASSAY OF BETA-2 PROTEIN: CPT

## 2025-04-22 PROCEDURE — 99232 SBSQ HOSP IP/OBS MODERATE 35: CPT | Performed by: PSYCHIATRY & NEUROLOGY

## 2025-04-22 PROCEDURE — 81240 F2 GENE: CPT

## 2025-04-22 PROCEDURE — 93306 TTE W/DOPPLER COMPLETE: CPT

## 2025-04-22 PROCEDURE — 2060000000 HC ICU INTERMEDIATE R&B

## 2025-04-22 PROCEDURE — 85302 CLOT INHIBIT PROT C ANTIGEN: CPT

## 2025-04-22 PROCEDURE — 85027 COMPLETE CBC AUTOMATED: CPT

## 2025-04-22 PROCEDURE — 83735 ASSAY OF MAGNESIUM: CPT

## 2025-04-22 PROCEDURE — 93010 ELECTROCARDIOGRAM REPORT: CPT | Performed by: INTERNAL MEDICINE

## 2025-04-22 PROCEDURE — 80048 BASIC METABOLIC PNL TOTAL CA: CPT

## 2025-04-22 PROCEDURE — 85306 CLOT INHIBIT PROT S FREE: CPT

## 2025-04-22 PROCEDURE — 85305 CLOT INHIBIT PROT S TOTAL: CPT

## 2025-04-22 PROCEDURE — 83090 ASSAY OF HOMOCYSTEINE: CPT

## 2025-04-22 PROCEDURE — 85730 THROMBOPLASTIN TIME PARTIAL: CPT

## 2025-04-22 PROCEDURE — 85610 PROTHROMBIN TIME: CPT

## 2025-04-22 PROCEDURE — 6360000002 HC RX W HCPCS

## 2025-04-22 PROCEDURE — 84443 ASSAY THYROID STIM HORMONE: CPT

## 2025-04-22 RX ORDER — SODIUM CHLORIDE 0.9 % (FLUSH) 0.9 %
5-40 SYRINGE (ML) INJECTION EVERY 12 HOURS SCHEDULED
OUTPATIENT
Start: 2025-04-22

## 2025-04-22 RX ORDER — SODIUM CHLORIDE 0.9 % (FLUSH) 0.9 %
5-40 SYRINGE (ML) INJECTION PRN
OUTPATIENT
Start: 2025-04-22

## 2025-04-22 RX ORDER — SODIUM CHLORIDE 9 MG/ML
INJECTION, SOLUTION INTRAVENOUS PRN
OUTPATIENT
Start: 2025-04-22

## 2025-04-22 RX ADMIN — ENOXAPARIN SODIUM 40 MG: 100 INJECTION SUBCUTANEOUS at 07:43

## 2025-04-22 RX ADMIN — DOCUSATE SODIUM 100 MG: 100 CAPSULE, LIQUID FILLED ORAL at 07:44

## 2025-04-22 RX ADMIN — BACLOFEN 20 MG: 10 TABLET ORAL at 07:42

## 2025-04-22 RX ADMIN — SODIUM CHLORIDE, PRESERVATIVE FREE 10 ML: 5 INJECTION INTRAVENOUS at 20:20

## 2025-04-22 RX ADMIN — ASPIRIN 81 MG: 81 TABLET, CHEWABLE ORAL at 07:44

## 2025-04-22 RX ADMIN — WATER 1000 MG: 1 INJECTION INTRAMUSCULAR; INTRAVENOUS; SUBCUTANEOUS at 07:43

## 2025-04-22 RX ADMIN — SODIUM CHLORIDE, PRESERVATIVE FREE 10 ML: 5 INJECTION INTRAVENOUS at 07:44

## 2025-04-22 RX ADMIN — BACLOFEN 20 MG: 10 TABLET ORAL at 19:57

## 2025-04-22 RX ADMIN — DOCUSATE SODIUM 100 MG: 100 CAPSULE, LIQUID FILLED ORAL at 19:57

## 2025-04-22 RX ADMIN — ROSUVASTATIN CALCIUM 40 MG: 20 TABLET, FILM COATED ORAL at 19:58

## 2025-04-22 RX ADMIN — CLOPIDOGREL BISULFATE 75 MG: 75 TABLET, FILM COATED ORAL at 07:42

## 2025-04-22 ASSESSMENT — PAIN DESCRIPTION - ORIENTATION: ORIENTATION: LEFT;RIGHT;LOWER

## 2025-04-22 ASSESSMENT — PAIN SCALES - GENERAL
PAINLEVEL_OUTOF10: 0
PAINLEVEL_OUTOF10: 2
PAINLEVEL_OUTOF10: 0

## 2025-04-22 ASSESSMENT — PAIN DESCRIPTION - DESCRIPTORS: DESCRIPTORS: SQUEEZING

## 2025-04-22 ASSESSMENT — PAIN DESCRIPTION - LOCATION: LOCATION: GENERALIZED

## 2025-04-22 ASSESSMENT — ENCOUNTER SYMPTOMS
GASTROINTESTINAL NEGATIVE: 1
RESPIRATORY NEGATIVE: 1

## 2025-04-22 NOTE — PLAN OF CARE
Problem: Chronic Conditions and Co-morbidities  Goal: Patient's chronic conditions and co-morbidity symptoms are monitored and maintained or improved  Outcome: Progressing  Flowsheets (Taken 4/22/2025 0800)  Care Plan - Patient's Chronic Conditions and Co-Morbidity Symptoms are Monitored and Maintained or Improved: Monitor and assess patient's chronic conditions and comorbid symptoms for stability, deterioration, or improvement     Problem: Discharge Planning  Goal: Discharge to home or other facility with appropriate resources  Outcome: Progressing  Flowsheets (Taken 4/22/2025 0800)  Discharge to home or other facility with appropriate resources: Identify barriers to discharge with patient and caregiver     Problem: Safety - Adult  Goal: Free from fall injury  Outcome: Progressing  Flowsheets (Taken 4/22/2025 0914)  Free From Fall Injury: Instruct family/caregiver on patient safety     Problem: ABCDS Injury Assessment  Goal: Absence of physical injury  Outcome: Progressing  Flowsheets (Taken 4/22/2025 0914)  Absence of Physical Injury: Implement safety measures based on patient assessment     Problem: Skin/Tissue Integrity  Goal: Skin integrity remains intact  Description: 1.  Monitor for areas of redness and/or skin breakdown2.  Assess vascular access sites hourly3.  Every 4-6 hours minimum:  Change oxygen saturation probe site4.  Every 4-6 hours:  If on nasal continuous positive airway pressure, respiratory therapy assess nares and determine need for appliance change or resting period  Outcome: Progressing  Flowsheets  Taken 4/22/2025 0914  Skin Integrity Remains Intact: Monitor for areas of redness and/or skin breakdown  Taken 4/22/2025 0800  Skin Integrity Remains Intact: Monitor for areas of redness and/or skin breakdown     Problem: Pain  Goal: Verbalizes/displays adequate comfort level or baseline comfort level  Outcome: Progressing  Flowsheets  Taken 4/22/2025 1600  Verbalizes/displays adequate comfort

## 2025-04-22 NOTE — PLAN OF CARE
Problem: Chronic Conditions and Co-morbidities  Goal: Patient's chronic conditions and co-morbidity symptoms are monitored and maintained or improved  4/21/2025 2137 by Areli Wise RN  Outcome: Progressing  Flowsheets (Taken 4/21/2025 2137)  Care Plan - Patient's Chronic Conditions and Co-Morbidity Symptoms are Monitored and Maintained or Improved:   Monitor and assess patient's chronic conditions and comorbid symptoms for stability, deterioration, or improvement   Collaborate with multidisciplinary team to address chronic and comorbid conditions and prevent exacerbation or deterioration  4/21/2025 1602 by Dee Sanchez RN  Outcome: Progressing     Problem: Discharge Planning  Goal: Discharge to home or other facility with appropriate resources  4/21/2025 2137 by Areli Wise RN  Outcome: Progressing  Flowsheets (Taken 4/21/2025 2137)  Discharge to home or other facility with appropriate resources:   Identify barriers to discharge with patient and caregiver   Arrange for needed discharge resources and transportation as appropriate   Identify discharge learning needs (meds, wound care, etc)  4/21/2025 1602 by Dee Sanchez RN  Outcome: Progressing     Problem: Safety - Adult  Goal: Free from fall injury  4/21/2025 2137 by Areli Wise RN  Outcome: Progressing  Flowsheets (Taken 4/21/2025 2137)  Free From Fall Injury: Instruct family/caregiver on patient safety  4/21/2025 1602 by Dee Sanchez RN  Outcome: Progressing     Problem: ABCDS Injury Assessment  Goal: Absence of physical injury  4/21/2025 1602 by Dee Sanchez RN  Outcome: Progressing     Problem: Skin/Tissue Integrity  Goal: Skin integrity remains intact  Description: 1.  Monitor for areas of redness and/or skin breakdown2.  Assess vascular access sites hourly3.  Every 4-6 hours minimum:  Change oxygen saturation probe site4.  Every 4-6 hours:  If on nasal continuous positive airway pressure, respiratory therapy assess nares and

## 2025-04-23 ENCOUNTER — APPOINTMENT (OUTPATIENT)
Age: 75
DRG: 040 | End: 2025-04-23
Attending: INTERNAL MEDICINE
Payer: MEDICARE

## 2025-04-23 VITALS
WEIGHT: 187.39 LBS | OXYGEN SATURATION: 97 % | RESPIRATION RATE: 22 BRPM | TEMPERATURE: 98 F | SYSTOLIC BLOOD PRESSURE: 140 MMHG | HEART RATE: 86 BPM | DIASTOLIC BLOOD PRESSURE: 70 MMHG | HEIGHT: 63 IN | BODY MASS INDEX: 33.2 KG/M2

## 2025-04-23 LAB
ANION GAP SERPL CALCULATED.3IONS-SCNC: 16 MMOL/L (ref 9–16)
BASOPHILS # BLD: 0.05 K/UL (ref 0–0.2)
BASOPHILS NFR BLD: 1 % (ref 0–2)
BUN SERPL-MCNC: 7 MG/DL (ref 8–23)
CALCIUM SERPL-MCNC: 9.3 MG/DL (ref 8.6–10.4)
CARDIOLIPIN IGA SER IA-ACNC: 7.2 APL (ref 0–14)
CARDIOLIPIN IGG SER IA-ACNC: 4.5 GPL (ref 0–10)
CARDIOLIPIN IGM SER IA-ACNC: <0.8 MPL (ref 0–10)
CHLORIDE SERPL-SCNC: 100 MMOL/L (ref 98–107)
CO2 SERPL-SCNC: 22 MMOL/L (ref 20–31)
CREAT SERPL-MCNC: 0.5 MG/DL (ref 0.6–0.9)
DILUTE RUSSELL VIPER VENOM TIME: NORMAL
ECHO BSA: 1.94 M2
ECHO BSA: 1.94 M2
EKG ATRIAL RATE: 74 BPM
EKG P AXIS: 27 DEGREES
EKG P-R INTERVAL: 154 MS
EKG Q-T INTERVAL: 398 MS
EKG QRS DURATION: 76 MS
EKG QTC CALCULATION (BAZETT): 441 MS
EKG R AXIS: 11 DEGREES
EKG T AXIS: 48 DEGREES
EKG VENTRICULAR RATE: 74 BPM
EOSINOPHIL # BLD: 0.09 K/UL (ref 0–0.44)
EOSINOPHILS RELATIVE PERCENT: 1 % (ref 1–4)
ERYTHROCYTE [DISTWIDTH] IN BLOOD BY AUTOMATED COUNT: 14 % (ref 11.8–14.4)
FOLATE SERPL-MCNC: 38.3 NG/ML (ref 4.8–24.2)
GFR, ESTIMATED: >90 ML/MIN/1.73M2
GLUCOSE SERPL-MCNC: 81 MG/DL (ref 74–99)
HCT VFR BLD AUTO: 45.3 % (ref 36.3–47.1)
HGB BLD-MCNC: 14 G/DL (ref 11.9–15.1)
IMM GRANULOCYTES # BLD AUTO: 0.04 K/UL (ref 0–0.3)
IMM GRANULOCYTES NFR BLD: 0 %
INR PPP: 0.9
LUPUS ANTICOAG: NORMAL
LYMPHOCYTES NFR BLD: 2.24 K/UL (ref 1.1–3.7)
LYMPHOCYTES RELATIVE PERCENT: 25 % (ref 24–43)
MCH RBC QN AUTO: 27.3 PG (ref 25.2–33.5)
MCHC RBC AUTO-ENTMCNC: 30.9 G/DL (ref 28.4–34.8)
MCV RBC AUTO: 88.3 FL (ref 82.6–102.9)
MICROORGANISM SPEC CULT: ABNORMAL
MONOCYTES NFR BLD: 0.87 K/UL (ref 0.1–1.2)
MONOCYTES NFR BLD: 10 % (ref 3–12)
NEUTROPHILS NFR BLD: 63 % (ref 36–65)
NEUTS SEG NFR BLD: 5.61 K/UL (ref 1.5–8.1)
NRBC BLD-RTO: 0 PER 100 WBC
PARTIAL THROMBOPLASTIN TIME: 29.8 SEC (ref 23–36.5)
PLATELET # BLD AUTO: 421 K/UL (ref 138–453)
PMV BLD AUTO: 9.6 FL (ref 8.1–13.5)
POTASSIUM SERPL-SCNC: 4.2 MMOL/L (ref 3.7–5.3)
PROTHROMBIN TIME: 12.7 SEC (ref 11.7–14.9)
RBC # BLD AUTO: 5.13 M/UL (ref 3.95–5.11)
SERVICE CMNT-IMP: ABNORMAL
SODIUM SERPL-SCNC: 138 MMOL/L (ref 136–145)
SPECIMEN DESCRIPTION: ABNORMAL
VIT B12 SERPL-MCNC: <150 PG/ML (ref 232–1245)
WBC OTHER # BLD: 8.9 K/UL (ref 3.5–11.3)

## 2025-04-23 PROCEDURE — 0JH602Z INSERTION OF MONITORING DEVICE INTO CHEST SUBCUTANEOUS TISSUE AND FASCIA, OPEN APPROACH: ICD-10-PCS | Performed by: INTERNAL MEDICINE

## 2025-04-23 PROCEDURE — 6360000002 HC RX W HCPCS: Performed by: STUDENT IN AN ORGANIZED HEALTH CARE EDUCATION/TRAINING PROGRAM

## 2025-04-23 PROCEDURE — 86146 BETA-2 GLYCOPROTEIN ANTIBODY: CPT

## 2025-04-23 PROCEDURE — 36415 COLL VENOUS BLD VENIPUNCTURE: CPT

## 2025-04-23 PROCEDURE — 6370000000 HC RX 637 (ALT 250 FOR IP): Performed by: STUDENT IN AN ORGANIZED HEALTH CARE EDUCATION/TRAINING PROGRAM

## 2025-04-23 PROCEDURE — 82607 VITAMIN B-12: CPT

## 2025-04-23 PROCEDURE — 99152 MOD SED SAME PHYS/QHP 5/>YRS: CPT | Performed by: INTERNAL MEDICINE

## 2025-04-23 PROCEDURE — 93312 ECHO TRANSESOPHAGEAL: CPT

## 2025-04-23 PROCEDURE — 92507 TX SP LANG VOICE COMM INDIV: CPT

## 2025-04-23 PROCEDURE — 86147 CARDIOLIPIN ANTIBODY EA IG: CPT

## 2025-04-23 PROCEDURE — 2500000003 HC RX 250 WO HCPCS: Performed by: STUDENT IN AN ORGANIZED HEALTH CARE EDUCATION/TRAINING PROGRAM

## 2025-04-23 PROCEDURE — C1764 EVENT RECORDER, CARDIAC: HCPCS | Performed by: INTERNAL MEDICINE

## 2025-04-23 PROCEDURE — 99232 SBSQ HOSP IP/OBS MODERATE 35: CPT | Performed by: PSYCHIATRY & NEUROLOGY

## 2025-04-23 PROCEDURE — 85025 COMPLETE CBC W/AUTO DIFF WBC: CPT

## 2025-04-23 PROCEDURE — 6360000002 HC RX W HCPCS

## 2025-04-23 PROCEDURE — 93010 ELECTROCARDIOGRAM REPORT: CPT | Performed by: INTERNAL MEDICINE

## 2025-04-23 PROCEDURE — 99232 SBSQ HOSP IP/OBS MODERATE 35: CPT | Performed by: INTERNAL MEDICINE

## 2025-04-23 PROCEDURE — 99239 HOSP IP/OBS DSCHRG MGMT >30: CPT | Performed by: PSYCHIATRY & NEUROLOGY

## 2025-04-23 PROCEDURE — 99153 MOD SED SAME PHYS/QHP EA: CPT | Performed by: INTERNAL MEDICINE

## 2025-04-23 PROCEDURE — 80048 BASIC METABOLIC PNL TOTAL CA: CPT

## 2025-04-23 PROCEDURE — 6370000000 HC RX 637 (ALT 250 FOR IP): Performed by: INTERNAL MEDICINE

## 2025-04-23 PROCEDURE — 82746 ASSAY OF FOLIC ACID SERUM: CPT

## 2025-04-23 PROCEDURE — C1892 INTRO/SHEATH,FIXED,PEEL-AWAY: HCPCS | Performed by: INTERNAL MEDICINE

## 2025-04-23 PROCEDURE — 33285 INSJ SUBQ CAR RHYTHM MNTR: CPT | Performed by: INTERNAL MEDICINE

## 2025-04-23 PROCEDURE — 2709999900 HC NON-CHARGEABLE SUPPLY: Performed by: INTERNAL MEDICINE

## 2025-04-23 PROCEDURE — 6360000002 HC RX W HCPCS: Performed by: INTERNAL MEDICINE

## 2025-04-23 PROCEDURE — 99222 1ST HOSP IP/OBS MODERATE 55: CPT | Performed by: INTERNAL MEDICINE

## 2025-04-23 PROCEDURE — 2500000003 HC RX 250 WO HCPCS

## 2025-04-23 PROCEDURE — B24BZZ4 ULTRASONOGRAPHY OF HEART WITH AORTA, TRANSESOPHAGEAL: ICD-10-PCS | Performed by: INTERNAL MEDICINE

## 2025-04-23 DEVICE — ICM LNQ22 LINQ II PRIME US
Type: IMPLANTABLE DEVICE | Status: FUNCTIONAL
Brand: LINQ II™

## 2025-04-23 RX ORDER — HYDRALAZINE HYDROCHLORIDE 20 MG/ML
10 INJECTION INTRAMUSCULAR; INTRAVENOUS EVERY 6 HOURS PRN
Status: DISCONTINUED | OUTPATIENT
Start: 2025-04-23 | End: 2025-04-23 | Stop reason: HOSPADM

## 2025-04-23 RX ORDER — CEPHALEXIN 500 MG/1
500 CAPSULE ORAL 2 TIMES DAILY
Qty: 14 CAPSULE | Refills: 0 | Status: SHIPPED | OUTPATIENT
Start: 2025-04-23 | End: 2025-04-30

## 2025-04-23 RX ORDER — LIDOCAINE HYDROCHLORIDE 20 MG/ML
SOLUTION OROPHARYNGEAL PRN
Status: DISCONTINUED | OUTPATIENT
Start: 2025-04-23 | End: 2025-04-23 | Stop reason: HOSPADM

## 2025-04-23 RX ORDER — FOLIC ACID 1 MG/1
1 TABLET ORAL DAILY
Qty: 30 TABLET | Refills: 5 | Status: SHIPPED | OUTPATIENT
Start: 2025-04-23

## 2025-04-23 RX ORDER — LABETALOL HYDROCHLORIDE 5 MG/ML
10 INJECTION, SOLUTION INTRAVENOUS EVERY 10 MIN PRN
Status: DISCONTINUED | OUTPATIENT
Start: 2025-04-23 | End: 2025-04-23 | Stop reason: HOSPADM

## 2025-04-23 RX ORDER — ASPIRIN 81 MG/1
81 TABLET, CHEWABLE ORAL DAILY
Qty: 30 TABLET | Refills: 3 | Status: SHIPPED | OUTPATIENT
Start: 2025-04-24

## 2025-04-23 RX ORDER — ROSUVASTATIN CALCIUM 40 MG/1
40 TABLET, COATED ORAL NIGHTLY
Qty: 30 TABLET | Refills: 3 | Status: SHIPPED | OUTPATIENT
Start: 2025-04-23

## 2025-04-23 RX ORDER — MULTIVITAMIN WITH IRON
500 TABLET ORAL DAILY
Qty: 30 TABLET | Refills: 3 | Status: SHIPPED | OUTPATIENT
Start: 2025-04-23 | End: 2026-04-23

## 2025-04-23 RX ORDER — FENTANYL CITRATE 50 UG/ML
INJECTION, SOLUTION INTRAMUSCULAR; INTRAVENOUS PRN
Status: DISCONTINUED | OUTPATIENT
Start: 2025-04-23 | End: 2025-04-23 | Stop reason: HOSPADM

## 2025-04-23 RX ORDER — MIDAZOLAM HYDROCHLORIDE 1 MG/ML
INJECTION, SOLUTION INTRAMUSCULAR; INTRAVENOUS PRN
Status: DISCONTINUED | OUTPATIENT
Start: 2025-04-23 | End: 2025-04-23 | Stop reason: HOSPADM

## 2025-04-23 RX ORDER — CLOPIDOGREL BISULFATE 75 MG/1
75 TABLET ORAL DAILY
Qty: 19 TABLET | Refills: 0 | Status: SHIPPED | OUTPATIENT
Start: 2025-04-24

## 2025-04-23 RX ADMIN — WATER 1000 MG: 1 INJECTION INTRAMUSCULAR; INTRAVENOUS; SUBCUTANEOUS at 07:37

## 2025-04-23 RX ADMIN — SODIUM CHLORIDE, PRESERVATIVE FREE 10 ML: 5 INJECTION INTRAVENOUS at 07:35

## 2025-04-23 RX ADMIN — CLOPIDOGREL BISULFATE 75 MG: 75 TABLET, FILM COATED ORAL at 07:34

## 2025-04-23 RX ADMIN — ENOXAPARIN SODIUM 40 MG: 100 INJECTION SUBCUTANEOUS at 07:34

## 2025-04-23 RX ADMIN — BACLOFEN 20 MG: 10 TABLET ORAL at 07:35

## 2025-04-23 RX ADMIN — ASPIRIN 81 MG: 81 TABLET, CHEWABLE ORAL at 07:35

## 2025-04-23 RX ADMIN — DOCUSATE SODIUM 100 MG: 100 CAPSULE, LIQUID FILLED ORAL at 07:36

## 2025-04-23 ASSESSMENT — PAIN DESCRIPTION - LOCATION: LOCATION: BACK

## 2025-04-23 ASSESSMENT — ENCOUNTER SYMPTOMS
RESPIRATORY NEGATIVE: 1
GASTROINTESTINAL NEGATIVE: 1

## 2025-04-23 ASSESSMENT — PAIN DESCRIPTION - ORIENTATION: ORIENTATION: LEFT;RIGHT;LOWER

## 2025-04-23 ASSESSMENT — PAIN SCALES - GENERAL
PAINLEVEL_OUTOF10: 3
PAINLEVEL_OUTOF10: 0

## 2025-04-23 ASSESSMENT — PAIN DESCRIPTION - DESCRIPTORS: DESCRIPTORS: SPASM

## 2025-04-23 NOTE — DISCHARGE INSTR - COC
Continuity of Care Form    Patient Name: Kristie Hogan   :  1950  MRN:  [id number]    Admit date:  2025  Discharge date:      Code Status Order: Full Code   Advance Directives:     Admitting Physician:  Frank Willingham MD  PCP: Ruddy Rice MD    Discharging Nurse: Beatris Martin  Discharging Hospital Unit/Room#: 0138/0138-01  Discharging Unit Phone Number: [phone]    Emergency Contact:   Extended Emergency Contact Information  Primary Emergency Contact: Reji Hogan  Home Phone: [phone]  Mobile Phone: [phone]  Relation: Spouse  Secondary Emergency Contact: Elham Hernandez  Home Phone: [phone]  Relation: Child  Preferred language: English   needed? No    Past Surgical History:  Past Surgical History:   Procedure Laterality Date    CERVICAL FUSION N/A 2023    C 3-7 POSTERIOR CERVICAL DECOMPRESSION AND FUSION performed by Hanh Bates DO at [facility] OR    CHOLECYSTECTOMY, LAPAROSCOPIC N/A 2023    LAPAROSCOPIC CHOLECYSTECTOMY,  POSSIBLE OPEN performed by Eugene Draper DO at [facility] OR    ERCP  2023    ERCP STONE REMOVAL performed by Eliceo Bonner MD at [facility] OR    ERCP  2023    ERCP SPHINCTER/PAPILLOTOMY performed by Eliceo Bonner MD at [facility] OR    ERCP  2023    ERCP STENT INSERTION performed by Eliceo Bonner MD at [facility] OR    ERCP  2023    STENT REMOVAL, STONE REMOVAL    ERCP N/A 2023    ERCP STENT REMOVAL performed by Eliceo Bonner MD at [facility] OR    ERCP  2023    ERCP STONE REMOVAL performed by Eliceo Bonner MD at [facility] OR    ERCP  2023    ERCP SPHINCTER/PAPILLOTOMY performed by Eliceo Bonner MD at [facility] OR       Immunization History:     There is no immunization history on file for this patient.    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

## 2025-04-23 NOTE — PLAN OF CARE
Problem: Chronic Conditions and Co-morbidities  Goal: Patient's chronic conditions and co-morbidity symptoms are monitored and maintained or improved  4/22/2025 2302 by Areli Wise RN  Outcome: Progressing  Flowsheets (Taken 4/22/2025 0800 by Beatris Martin, RN)  Care Plan - Patient's Chronic Conditions and Co-Morbidity Symptoms are Monitored and Maintained or Improved: Monitor and assess patient's chronic conditions and comorbid symptoms for stability, deterioration, or improvement  4/22/2025 1708 by Beatris Martin RN  Outcome: Progressing  Flowsheets (Taken 4/22/2025 0800)  Care Plan - Patient's Chronic Conditions and Co-Morbidity Symptoms are Monitored and Maintained or Improved: Monitor and assess patient's chronic conditions and comorbid symptoms for stability, deterioration, or improvement     Problem: Discharge Planning  Goal: Discharge to home or other facility with appropriate resources  4/22/2025 2302 by Areli Wise RN  Outcome: Progressing  Flowsheets (Taken 4/22/2025 2302)  Discharge to home or other facility with appropriate resources:   Identify barriers to discharge with patient and caregiver   Identify discharge learning needs (meds, wound care, etc)   Arrange for needed discharge resources and transportation as appropriate  4/22/2025 1708 by Beatris Martin RN  Outcome: Progressing  Flowsheets (Taken 4/22/2025 0800)  Discharge to home or other facility with appropriate resources: Identify barriers to discharge with patient and caregiver     Problem: Safety - Adult  Goal: Free from fall injury  4/22/2025 2302 by Areli Wise RN  Outcome: Progressing  Flowsheets (Taken 4/22/2025 0914 by Beatris Martin RN)  Free From Fall Injury: Instruct family/caregiver on patient safety  4/22/2025 1708 by Beatris Martin RN  Outcome: Progressing  Flowsheets (Taken 4/22/2025 0914)  Free From Fall Injury: Instruct family/caregiver on patient safety     Problem: ABCDS Injury

## 2025-04-23 NOTE — PRE SEDATION
Prior to transesophageal echocardiogram and loop recorder placement      Sedation Plan  ASA: class 2 - patient with mild systemic disease     Mallampati class: II - soft palate, uvula, fauces visible.    Sedation plan: moderate (conscious sedation)    Risks, benefits, and alternatives discussed with patient.        Immediate reassessment prior to sedation:  Patient's status reviewed and vital signs assessed; acceptable to perform procedure and proceed to administer sedation as planned.

## 2025-04-23 NOTE — CARE COORDINATION
Case Management   Daily Progress Note       Patient Name: Kristie Hogan                   YOB: 1950  Diagnosis: Acute ischemic stroke (HCC) [I63.9]  Cerebrovascular accident (CVA), unspecified mechanism (HCC) [I63.9]                       GMLOS: 4.5 days  Length of Stay: 3  days    Anticipated Discharge Date: Ready for discharge    Readmission Risk (Low < 19, Mod (19-27), High > 27): Readmission Risk Score: 15.6        Current Transitional Plan    [] Home Independently    [] Home with HC    [x] Skilled Nursing Facility    [] Acute Rehabilitation    [] Long Term Acute Care (LTAC)    [] Other:     Plan for the Stay (Medical Management) :          Workflow Continuation (Additional Notes) :    Spoke to Sabine with [facility] and was informed the patient is able to return back anytime today. Transport paperwork faxed to [facility].     Michael Rodríguez  April 23, 2025

## 2025-04-23 NOTE — DISCHARGE SUMMARY
Keflex on discharge.  She was found to have elevated homocystine.  Hematology was consulted and recommended outpatient workup due to high chances of having false positive result during inpatient admission.    Patient is stable from neurological standpoint to be discharged.    Procedures: RAMIN and loop recorder placement    Any Hospital Acquired Infections: none    Discharge Functional Status:  stable    Disposition: [facility]    Patient Instructions:   Strict compliance with medications  Take aspirin and Plavix for 21 days followed by aspirin monotherapy  Follow-up outpatient with neurology, cardiology, hematology        Discharge Medications:  Current Discharge Medication List        START taking these medications    Details   aspirin 81 MG chewable tablet Take 1 tablet by mouth daily  Qty: 30 tablet, Refills: 3      rosuvastatin (CRESTOR) 40 MG tablet Take 1 tablet by mouth nightly  Qty: 30 tablet, Refills: 3      clopidogrel (PLAVIX) 75 MG tablet Take 1 tablet by mouth daily  Qty: 19 tablet, Refills: 0      cephALEXin (KEFLEX) 500 MG capsule Take 1 capsule by mouth 2 times daily for 7 days  Qty: 14 capsule, Refills: 0           CONTINUE these medications which have NOT CHANGED    Details   Cranberry 400 MG TABS Take by mouth daily      docusate sodium (COLACE) 100 MG capsule Take 1 capsule by mouth 2 times daily      Vitamin D3 125 MCG (5000 UT) TABS tablet Take 1 tablet by mouth daily      diclofenac sodium (VOLTAREN) 1 % GEL Apply topically 3 times daily To the right knee      !! triamcinolone (KENALOG) 0.1 % cream Apply topically 2 times daily Apply topically 2 times daily.  Buttocks and lower extremities for psoriasis      melatonin 3 MG TABS tablet Take 1 tablet by mouth nightly as needed (insomnia)      cefTRIAXone (ROCEPHIN) 1 g injection       gabapentin (NEURONTIN) 400 MG capsule Take 1 capsule by mouth 3 times daily.      OLANZapine (ZYPREXA) 2.5 MG tablet       !! mirtazapine (REMERON) 15 MG tablet

## 2025-04-23 NOTE — CONSULTS
Physical Medicine & Rehabilitation  Consult Note      Admitting Physician:   Frank Willingham MD    Primary Care Provider:   Ruddy Rice MD     Reason for Consult:  Acute Inpatient Rehabilitation    Chief Complaint: Facial droop, aphasia    History of Present Illness:  Referring Provider is requesting an evaluation for appropriate placement upon discharge from acute care. History from chart review and patient.    Kristie Hogan is a 75 y.o. right handed female admitted to [facility] on 4/20/2025.      75 year old female presented to the hospital from her extended care facility with concerns of dysarthria and right facial droop. CT head showed hypodensity in left frontal gyrus, CTA unremarkable. 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 UA suggestive of UTI, started on ceftriaxone.     Review of Systems:  Constitutional: negative for anorexia, chills, fatigue, fevers, sweats and weight loss  Eyes: negative for redness and visual disturbance  Ears, nose, mouth, throat, and face: negative for earaches, sore throat and tinnitus  Respiratory: negative for cough and shortness of breath  Cardiovascular: negative for chest pain, dyspnea and palpitations  Gastrointestinal: negative for abdominal pain, change in bowel habits, constipation, nausea and vomiting  Genitourinary:negative for dysuria, frequency, hesitancy and urinary incontinence  Integument/breast: negative for pruritus and rash  Musculoskeletal:negative for stiff joints  Neurological: negative for dizziness, headaches   Behavioral/Psych: negative for decreased appetite, depression        Premorbid function:  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 
dysphagia.  Genitourinary: negative for frequency, dysuria, nocturia, urinary incontinence, and hematuria.  Integument: negative for rash, skin lesions, bruises.  Hematologic/Lymphatic: negative for easy bruising, bleeding, lymphadenopathy, or petechiae.  Endocrine: negative for heat or cold intolerance,weight changes, change in bowel habits and hair loss.  Musculoskeletal: negative for myalgias, arthralgias, pain, joint swelling, and bone pain.  Neurological: negative for headaches, dizziness, seizures, weakness, numbness.     PHYSICAL EXAM:      /66   Pulse 78   Temp 98.7 °F (37.1 °C) (Oral)   Resp 19   Ht 1.6 m (5' 3\")   Wt 85 kg (187 lb 6.3 oz)   SpO2 98%   PF (!) 20 L/min   BMI 33.19 kg/m²    Temp (24hrs), Av.3 °F (36.8 °C), Min:98 °F (36.7 °C), Max:98.7 °F (37.1 °C)      General:  Pleasant and cooperative. No apparent distress.  HEENT:  Normocephalic, atraumatic, mucus membranes moist.  Right facial droop  Neck:  Trachea midline. No adenopathy.  Chest: Symmetric chest rise. Lungs clear to auscultation bilaterally, no wheezes, rales, crackles or rhonchi. No accessory muscle use.  CV: Regular rhythm. No murmur, no gallops, no rubs.  Abdomen:  Abdomen is soft, non-tender, non-distended, no rebound or guarding. Bowel sounds active.  Extremities:  No lower extremity edema or cyanosis, peripheral pulses intact.  Neurological:  A&O x3 though forgetful.  Right sided weakness.  Dysarthric speech  Skin:  Warm and dry.    Labs:   Complete Blood Count:   Recent Labs     25  1329 25  0645 25  0639   WBC 7.4 7.2 6.7   HGB 13.7 13.9 13.6   MCV 88.1 90.6 90.9    415 353   RBC 5.06 5.00 4.96   HCT 44.6 45.3 45.1   MCH 27.1 27.8 27.4   MCHC 30.7 30.7 30.2   RDW 14.1 14.1 14.0   MPV 9.7 9.8 9.6      PT/INR:    Lab Results   Component Value Date/Time    PROTIME 12.7 2025 12:11 PM    INR 0.9 2025 12:11 PM     PTT:    Lab Results   Component Value Date/Time    APTT 29.8 
found.    NIHSS, mRS, & Stroke decision-making      Initial NIHSS  Time Performed:  120 PM    1a.  Level of consciousness:  0 - alert; keenly responsive  1b.  Level of consciousness questions:  0 - answers both questions correctly  1c.  Level of consciousness questions:  0 - performs both tasks correctly  2.    Best Gaze:  0 - normal  3.    Visual:  0 - no visual loss  4.    Facial Palsy:  1 - minor paralysis (flattened nasolabial fold, asymmetric on smiling)  5a.  Motor left arm:  0 - no drift, limb holds 90 (or 45) degrees for full 10 seconds  5b.  Motor right arm:  1 - drift, limb holds 90 (or 45) degrees but drifts down before full 10 seconds: does not hit bed  6a.  Motor left leg:  3 - no effort against gravity; leg falls to bed immediately  6b.  Motor right le - some effort against gravity; leg falls to bed by 5 seconds but has some effort against gravity  7.    Limb Ataxia:  0 - absent  8.    Sensory:  0 - normal; no sensory loss  9.    Best Language:  0 - no aphasia, normal  10.  Dysarthria:  1 - mild to moderate, patient slurs at least some words and at worst, can be understood with some difficulty  11.  Extinction and Inattention:  0 - no abnormality    Total:  8 (3)   Modified Wise Score Scale (mRS):   Pre-admission mRS: 4: Moderately severe disability; unable to walk and attend to bodily needs without assistance   Current mRS: 4: Moderately severe disability; unable to walk and attend to bodily needs without assistance      Last Known Well (date and time)   3931-3000 4/19   Candidate for IV Tenecteplase therapy    Yes []  Risks including 6% of sich/death, benefits of potential improved thrombolysis, and alternatives to IV thrombolytics discussed with patient and/or family.  N/A  N/A  No   [x] due to the following exclusion criteria: Last well more than 4.5 hrs   Candidate for Thrombectomy   Yes []    No  [] due to the following exclusion criteria: No LVO on Imaging       Assessment & Plan   
gait, balance, coordination.  Psychiatric: No anxiety, or depression.  Endocrine: No temperature intolerance. No excessive thirst, fluid intake, or urination. No tremor.  Hematologic/Lymphatic: No abnormal bruising or bleeding, blood clots or swollen lymph nodes.  Allergic/Immunologic: No nasal congestion or hives.    PHYSICAL EXAM:    Vitals:    VITALS:  BP (!) 127/58   Pulse 69   Temp 98.1 °F (36.7 °C) (Oral)   Resp 17   Ht 1.6 m (5' 3\")   Wt 85 kg (187 lb 6.3 oz)   SpO2 91%   BMI 33.19 kg/m²   24HR INTAKE/OUTPUT:    Intake/Output Summary (Last 24 hours) at 4/21/2025 1718  Last data filed at 4/21/2025 1600  Gross per 24 hour   Intake 220 ml   Output 1850 ml   Net -1630 ml       Constitutional and General Appearance: Awake, alert, cooperative  HEENT: PERRL, normal JVP, no carotid bruit, normal oral mucosa  Respiratory:  Clear to auscultation bilaterally, no wheezing, no rales  Cardiovascular:  The apical impulse is not displaced, regular rate and rhythm, S1, S2, 2/6 systolic murmur  Abdomen:  No masses or tenderness, bowel sounds present  Extremities:   No cyanosis, clubbing or edema, normal peripheral pulses  Skin:  Warm and dry  Neurological:  Awake, alert, right facial droop    DATA:   EKG is ordered    Echocardiogram 1/18/2023  Global left ventricular systolic function is normal. Estimated LVEF 55-60%.  Mild left ventricular hypertrophy. Grade I (mild) left ventricular diastolic dysfunction.  Normal right ventricular size and function.  No significant valvular regurgitation or stenosis seen.        Cardiology Labs:  Recent Labs     04/20/25  1329 04/20/25  1543   MYOGLOBIN 34  --    CKTOTAL 48  --    TROPHS 17* 15*     Warfarin PT/INR:  Lab Results   Component Value Date/Time    PROTIME 12.6 04/20/2025 01:29 PM    INR 0.9 04/20/2025 01:29 PM     CBC:  Lab Results   Component Value Date/Time    WBC 7.2 04/21/2025 06:45 AM    RBC 5.00 04/21/2025 06:45 AM    HGB 13.9 04/21/2025 06:45 AM    HCT 45.3

## 2025-04-23 NOTE — PROGRESS NOTES
Cardiology progress note        Date:  4/23/2025  Patient: Kristie Hogan  Admission:  4/20/2025  1:13 PM  Admit DX: Acute ischemic stroke (HCC) [I63.9]  Cerebrovascular accident (CVA), unspecified mechanism (HCC) [I63.9]  Age:  75 y.o., 1950     LOS: 3 days     Reason for evaluation:   Acute stroke s/p RAMIN and loop recorder placement       SUBJECTIVE:     The patient was seen and examined. Notes and labs reviewed.    We have performed RAMIN which did not reveal any interatrial septal defect or any left atrial appendage thrombus, however left atrium appears large eyes in size    We have placed [employer] loop recorder    OBJECTIVE:      EXAM:   Vitals:    VITALS:  /61   Pulse 78   Temp 97.8 °F (36.6 °C) (Oral)   Resp 19   Ht 1.6 m (5' 3\")   Wt 85 kg (187 lb 6.3 oz)   SpO2 98%   PF (!) 20 L/min   BMI 33.19 kg/m²    24HR INTAKE/OUTPUT:    Intake/Output Summary (Last 24 hours) at 4/23/2025 1237  Last data filed at 4/23/2025 1023  Gross per 24 hour   Intake 600 ml   Output 1250 ml   Net -650 ml       General appearance: awake, alert, no apparent discomfort  HEENT: atraumatic, normocephalic, no JVD, no significant carotid bruit  Lungs: clear to auscultation bilaterally  Heart: regular rate and rhythm, S1, S2, 2/6 systolic murmur  Abdomen: soft, non-tender; bowel sounds active  Extremities: no significant lower extremity edema  Neurologic: awake, alert, right facial droop    Current Inpatient Medications:   cefTRIAXone (ROCEPHIN) IV  1,000 mg IntraVENous Q24H    aspirin  81 mg Oral Daily    clopidogrel  75 mg Oral Daily    rosuvastatin  40 mg Oral Nightly    baclofen  20 mg Oral TID    docusate sodium  100 mg Oral BID    sodium chloride flush  5-40 mL IntraVENous 2 times per day    enoxaparin  40 mg SubCUTAneous Daily       IV Infusions (if any):   sodium chloride         Diagnostics:   Telemetry: Sinus rhythm     EKG 4/21/2025  Sinus rhythm 70 bpm    Echocardiogram 4/21/2025    Left 
              Cardiology progress note        Date:  4/22/2025  Patient: Kristie Hogan  Admission:  4/20/2025  1:13 PM  Admit DX: Acute ischemic stroke (HCC) [I63.9]  Cerebrovascular accident (CVA), unspecified mechanism (HCC) [I63.9]  Age:  75 y.o., 1950     LOS: 2 days     Reason for evaluation:   Acute stroke and need for RAMIN and loop recorder placement      SUBJECTIVE:     The patient was seen and examined. Notes and labs reviewed.    Patient is recovering from acute stroke    We will schedule for RAMIN and loop recorder placement tomorrow as recommended by neurology team    OBJECTIVE:      EXAM:   Vitals:    VITALS:  BP (!) 144/65   Pulse 84   Temp 98 °F (36.7 °C) (Oral)   Resp 17   Ht 1.6 m (5' 3\")   Wt 85 kg (187 lb 6.3 oz)   SpO2 97%   PF (!) 20 L/min   BMI 33.19 kg/m²    24HR INTAKE/OUTPUT:    Intake/Output Summary (Last 24 hours) at 4/22/2025 1906  Last data filed at 4/22/2025 1736  Gross per 24 hour   Intake 600 ml   Output 1125 ml   Net -525 ml       General appearance: awake, alert, no apparent discomfort  HEENT: atraumatic, normocephalic, no JVD, no significant carotid bruit  Lungs: clear to auscultation bilaterally  Heart: regular rate and rhythm, S1, S2, 2/6 systolic murmur  Abdomen: soft, non-tender; bowel sounds active  Extremities: no significant lower extremity edema  Neurologic: awake, alert, right facial droop    Current Inpatient Medications:   cefTRIAXone (ROCEPHIN) IV  1,000 mg IntraVENous Q24H    aspirin  81 mg Oral Daily    clopidogrel  75 mg Oral Daily    rosuvastatin  40 mg Oral Nightly    baclofen  20 mg Oral TID    docusate sodium  100 mg Oral BID    sodium chloride flush  5-40 mL IntraVENous 2 times per day    enoxaparin  40 mg SubCUTAneous Daily       IV Infusions (if any):   sodium chloride         Diagnostics:   Telemetry: Sinus rhythm    EKG     Echocardiogram 4/21/2025    Left Ventricle: Normal left ventricular systolic function. EF by 2D Simpsons Biplane is 66%. 
  [facility]     Emergency/Trauma Note    PATIENT NAME: Kristie Hogan    Shift date: 04/20/2025  Shift day: Sunday   Shift # 1    Room # 40/     Name: Kristie Hogan            Age: 75 y.o.  Gender: female          Gnosticism: None   Place of Catholic:     Trauma/Incident type: Stroke Alert  Admit Date & Time: 4/20/2025  1:13 PM  TRAUMA NAME: None    PATIENT/EVENT DESCRIPTION:  Kristie Hogan is a 75 y.o. female who arrived ED as stroke alert. Patient to be admitted to [facility]. Patient was awake when  visited.      SPIRITUAL ASSESSMENT-INTERVENTION-OUTCOME:  No spiritual assessment was carried out because patient was having a rough time. However, patient was open to spiritual care. Family was not present at the time but patient said family was aware of her admission to [facility].  maintained listening presence, offered support, prayed with patient and reassured her that she  was in good hands. Patient expressed appreciation for the spiritual and emotional support she received.     PATIENT BELONGINGS:  This  did not handle patient belongings.     ANY BELONGINGS OF SIGNIFICANT VALUE NOTED:  Unknown    REGISTRATION STAFF NOTIFIED?  Yes    WHAT IS YOUR SPIRITUAL CARE PLAN FOR THIS PATIENT?:  Follow up visits recommended for ongoing assessment of patient's condition and for more prayers and support.     Electronically signed by Chaplain Emmie, on 4/20/2025 at 2:29 PM.  [facility]  [phone]    
Facility/Department: [address] STEPDOWN  Initial Speech/Language/Cognitive Assessment    NAME: Kristie Hogan  : 1950   MRN: [id number]  ADMISSION DATE: 2025  ADMITTING DIAGNOSIS: has Mild sprain of right ankle, initial encounter; Stenosis of cervical spine with myelopathy (HCC); Central cord syndrome (HCC); Septic shock (HCC); Ascending cholangitis (HCC); Cholelithiasis; Gram negative septicemia (HCC); Choledocholithiasis; Acute respiratory failure with hypoxia; Metabolic encephalopathy; Acute pulmonary embolism (HCC); E. coli septicemia (HCC); Cholecystitis; Pancytopenia; Acute UTI; Cerebrovascular accident (CVA) (HCC); and Acute ischemic stroke (HCC) on their problem list.    Date of Eval: 2025   Evaluating Therapist: Marielena Langston    RECENT RESULTS  CT OF HEAD/MRI:     MRI of the Brain WO Contrast: 2025   Small acute infarcts in the left cerebral hemisphere.     CT of the Head WO Contrast: 2025  9 mm age-indeterminate left middle frontal gyrus infarct.     No acute intracranial hemorrhage.    Primary Complaint:   Kristie Hogan is a 75 y.o. 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     Patient lives at [facility] care facility, reports that patient has been at her baseline prior to going to bed around 8 pm. Patient then was noted today that she is having dysarthria and cocnerns for right sided facial droop. On examination patient does have evidence of moderate dysarthria, and seems to be slight word finding difficulty as well as R sided mild facial droop. She does have R upper extremity subtle drift and bilateral lower extremities are spastic but able to raise hip off bed.     Pain:  Pain Assessment  Pain Assessment: None - Denies Pain  Pain Level: 0    Vision/ 
[facility] Neurology   IN-PATIENT SERVICE   [facility]    Progress Note             Date:   4/22/2025  Patient name:  Kristie Hogan  Date of admission:  4/20/2025  1:13 PM  MRN:   [id number]  Account:  [id number]  YOB: 1950  PCP:    Ruddy Rice MD  Room:   [address] [place] [address]  Code Status:    Full Code    Chief Complaint:     Chief Complaint   Patient presents with    Cerebrovascular Accident    Facial Droop    Aphasia       Interval hx:     The patient was seen and examined at bedside.  No acute event overnight, vitally stable.  PMR consulted with low level to tolerate acute rehab, recommended return to extended-care facility, cardiology consulted recommended to have TTE and depends on the results we will determine the need for RAMIN and loop record.  Will have hypercoagulable work up      Brief History of Present Illness:     75 y.o. 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     Patient lives at [facility], reports that patient has been at her baseline prior to going to bed around 8 pm. Patient then was noted today that she is having dysarthria and cocnerns for right sided facial droop. On examination patient does have evidence of moderate dysarthria, and seems to be slight word finding difficulty as well as R sided mild facial droop. She does have R upper extremity subtle drift and bilateral lower extremities are spastic but able to raise hip off bed.      NIHSS of 9     LK: [id number] 4/19  NIHSS: 8  Modified Ramiro Scale: 4  SBP: 185  Glucose: 104  CT head without contrast: Area of hypodensity in the left frontal gyrus, could represent infarct of subacute or age indeterminate stroke.     TNK: No a candidate  Endovascular: Not a candidate      Post 
Physical Therapy  Facility/Department: [address] STE[address]OWN   Physical Therapy Initial Evaluation    Patient Name: Kristie Hogan        MRN: [id number]    : 1950    Date of Service: 2025    Chief Complaint   Patient presents with    Cerebrovascular Accident    Facial Droop    Aphasia     Past Medical History:  has a past medical history of Acute pulmonary embolism (HCC), Acute respiratory failure with hypoxia, Anemia, Ascending cholangitis (HCC), Central cord syndrome (HCC), Cholangitis (HCC), Choledocholithiasis, History of hallucinations, Lives in nursing home, Metabolic encephalopathy, Pancytopenia, Peripheral neuropathy, Psoriasis, Septic shock (HCC), Septicemia due to E. coli (HCC), Spasm, Spastic quadriparesis (HCC), Under care of team, and Under care of team.  Past Surgical History:  has a past surgical history that includes cervical fusion (N/A, 2023); ERCP (2023); ERCP (2023); ERCP (2023); Cholecystectomy, laparoscopic (N/A, 2023); ERCP (2023); ERCP (N/A, 2023); ERCP (2023); and ERCP (2023).    Discharge Recommendations   Further therapy recommended at discharge.    PT Equipment Recommendations  Equipment Needed: No    Assessment  Body Structures, Functions, Activity Limitations Requiring Skilled Therapeutic Intervention: Decreased functional mobility , Decreased strength, Decreased endurance, Decreased balance, Decreased ROM, Increased pain  Assessment: Pt is maxA+2 bed mobility, maxA-intermittent brief episodes of CGA. Pt has been non-ambulatory in ~2 years d/t spastic quadriparesis. Pt uses miguel angel lift to transfer at facility, has had therapy services recently discontinued. Pt is currently unsafe to return to prior living arrangements. Pt would benefit from continued acute PT to address deficits.  Therapy Prognosis: Good  Decision Making: Medium Complexity  Requires PT Follow-Up: Yes  Activity Tolerance  Activity Tolerance: Patient limited by 
[facility] Neurology   IN-PATIENT SERVICE   [facility]    Progress Note             Date:   4/21/2025  Patient name:  Kristie Hogan  Date of admission:  4/20/2025  1:13 PM  MRN:   [id number]  Account:  [id number]  YOB: 1950  PCP:    Ruddy Rice MD  Room:   [address] [place] [address]  Code Status:    Full Code    Chief Complaint:     Chief Complaint   Patient presents with    Cerebrovascular Accident    Facial Droop    Aphasia       Interval hx:     The patient was seen and examined at bedside.   Is vitally stable, alert and oriented x 3.   No acute events overnight.  Neuro exam; still has mild right facial droop, dysarthria significantly improving  MRI brain showed multiple tiny infarcts in the left MCA distribution  Cardiology consulted for possible RAMIN and loop  UA suggestive of UTI.  Started on IV ceftriaxone.  PMR consulted.    Brief History of Present Illness:     75 y.o. 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     Patient lives at [facility], reports that patient has been at her baseline prior to going to bed around 8 pm. Patient then was noted today that she is having dysarthria and cocnerns for right sided facial droop. On examination patient does have evidence of moderate dysarthria, and seems to be slight word finding difficulty as well as R sided mild facial droop. She does have R upper extremity subtle drift and bilateral lower extremities are spastic but able to raise hip off bed.      NIHSS of 9     LKW: [id number] 4/19  NIHSS: 8  Modified Denver Scale: 4  SBP: 185  Glucose: 104  CT head without contrast: Area of hypodensity in the left frontal gyrus, could represent infarct of subacute or age indeterminate stroke.     TNK: No a 
Speech Language Pathology  [facility]    Dysarthria Treatment Note    Date: 4/23/2025  Patient’s Name: Kristie Hogan  MRN: [id number]  Diagnosis:   Patient Active Problem List   Diagnosis Code    Mild sprain of right ankle, initial encounter S93.401A    Stenosis of cervical spine with myelopathy ([facility]) M48.02, G99.2    Central cord syndrome ([facility]) S14.129A    Septic shock ([facility]) A41.9, R65.21    Ascending cholangitis ([facility]) K83.09    Cholelithiasis K80.20    Gram negative septicemia ([facility]) A41.50    Choledocholithiasis K80.50    Acute respiratory failure with hypoxia J96.01    Metabolic encephalopathy G93.41    Acute pulmonary embolism ([facility]) I26.99    E. coli septicemia ([facility]) A41.51    Cholecystitis K81.9    Pancytopenia D61.818    Acute UTI N39.0    Cerebrovascular accident (CVA) ([facility]) I63.9    Acute ischemic stroke ([facility]) I63.9       Pain: 0/10    Dysarthria Treatment    Treatment time: [id number]      Subjective: [x] Alert [x] Cooperative     [] Confused     [] Agitated    [] Lethargic      Objective/Assessment:    Pt. Seen for O/M treatment program for dysarthria.  Pt. Completed O/M exercises X 8 X 5 sets with max verbal, visual and tactile cues.  Education provided re: compensatory strategies to increase speech intelligibility/clarity.  Pt. Verbalized understanding.        Other:   ST. Attempted to provide cognitive treatment. Pt. Was requested to be transported to cath lab. ST. discontinued session.      Plan:  [x] Continue ST services    [] Discharge from ST:      Discharge recommendations: [x]  Further therapy recommended at discharge.The patient should be able to tolerate at least 3 hours of therapy per day over 5 days or 15 hours over 7 days. [] Further therapy recommended at discharge.   [] No therapy recommended at discharge.    Completed by: Marielena Langston  Clinician    Cosigned By: Kianna Sandoval M.S.CCC/SLP   
Writer sent urine down to lab for cultures, Tana stated lab was already collected already.  stated she would put specimen in the refrigerator in case it is needed for a repeat. Writer did inform tech and print off order and sent it down to lab that order was placed 4/21/25 at 0730.  
recommended at discharge.      Completed by: Marielena Langston  Clinician    Cosigned By: Kianna Sandoval M.S.CCC/SLP   
Patient/Caregiver education & training, Equipment evaluation, education, & procurement, Therapeutic activities    Goals  Patient Goals   Patient Goals : \"To walk and go home\"  Short Term Goals  Time Frame for Short Term Goals: 10 visits  Short Term Goal 1: Pt will be Dilcia+2 in supine <-> sit  Short Term Goal 2: Pt will be modA+1 in rolling JOELLE  Short Term Goal 3: Pt will supervision sitting EOB for 10 min  Short Term Goal 4: Pt will be IND in HEP to continue w/ self-directed exercises/strengthening    Minutes  PT Individual Minutes  Time In: 1507  Time Out: 1533  Minutes: 26  Time Code Minutes  Timed Code Treatment Minutes: 23 Minutes    Electronically signed by El Rodríguez PTA on 4/22/25 at 4:15 PM EDT      
made;Assistance required to correct errors made;Assistance required to generate solutions  Insights: Decreased awareness of deficits  Initiation: Requires cues for some  Cognition Comment: Dysarthria.    Activities of Daily Living  Feeding: Setup;Verbal cueing;Increased time to complete;Beverage management;Bringing food to mouth assist;Minimal assistance  Feeding Skilled Clinical Factors: pt engaged in beverage management requiring assistance to setup and open items however was able to bring drink to mouth independently  Grooming: Setup;Increased time to complete;Verbal cueing;Minimal assistance  Grooming Skilled Clinical Factors: pt perform oral hygiene requiring setup and SBA for Min VCs to initiate task. OT facilitated hair brushing requiring Min A to reach posterior aspect of head for thoroughness this date  UE Bathing: Maximum assistance;Setup;Verbal cueing;Increased time to complete;Based on clinical judgement  LE Bathing: Maximum assistance;Setup;Verbal cueing;Increased time to complete;Based on clinical judgement  UE Dressing: Maximum assistance;Verbal cueing;Setup;Increased time to complete;Based on clinical judgement  LE Dressing: Based on clinical judgement;Dependent/Total  Toileting: Based on clinical judgement;Dependent/Total    Balance  Balance  Sitting: Impaired (pt tolerated static and dynamic sitting at EOB with Max A progressing to Min A intermittently requiring near constant support d/t posterior and lateral lean as pt fatigued. Pt tolerated X5 WB to each elbow with return to midline requiring Max A to promote improved trunk strength and control. Pt tolerated ~20 min of static and dynamic sitting this date)  Standing:  (EVELINE)    Transfers/Mobility  Bed mobility  Supine to Sit: 2 Person assistance;Substantial/Maximal assistance  Sit to Supine: Substantial/Maximal assistance;2 Person assistance  Bed Mobility Comments: HOB elevated ~30 degrees. Assist required w/ trunk and BLE with max VCs for 
RWT Ratio 0.41     LV Mass 2D 89.7 67 - 162 g    LV Mass 2D Index 47.7 43 - 95 g/m2    MV E/A 0.77     E/E' Ratio (Averaged) 11.75     E/E' Lateral 11.26     E/E' Septal 12.24     LA Volume Index BP 9 (A) 16 - 34 ml/m2    LVOT Stroke Volume Index 38.9 mL/m2    LA Volume Index MOD A2C 9 (A) 16 - 34 ml/m2    LA Volume Index MOD A4C 8 (A) 16 - 34 ml/m2    LA Size Index 1.60 cm/m2    LA/AO Root Ratio 1.07     Ao Root Index 1.49 cm/m2    AV Velocity Ratio 0.85     LVOT:AV VTI Index 1.25     JASPAL/BSA VTI 1.9 cm2/m2    JASPAL/BSA Peak Velocity 1.3 cm2/m2    MV:LVOT VTI Index 0.96     EF Physician 66 %    EF BP 66 55 - 100 %     Recent Labs     04/22/25  0639   WBC 6.7   RBC 4.96   HGB 13.6   HCT 45.1   MCV 90.9   MCH 27.4   MCHC 30.2   RDW 14.0      MPV 9.6     Recent Labs     04/22/25  0639      K 4.0      CO2 24   BUN 7*   CREATININE 0.5*   GLUCOSE 104*   CALCIUM 9.2     Hemoglobin A1C   Date Value Ref Range Status   04/21/2025 5.7 4.0 - 6.0 % Final       Assessment :      Primary Problem  Acute ischemic stroke (HCC)    Active Hospital Problems    Diagnosis Date Noted    Cerebrovascular accident (CVA) (HCC) [I63.9] 04/20/2025    Acute ischemic stroke (HCC) [I63.9] 04/20/2025   75 y.o. female w PMH as above, who presents with acute onset of dysarthria and right sided facial droop. Concerns for AIS, with LKW 8 pm night prior. Patient outside of window. CTH showing concerns for left frontal gyrus hypodensity possibly an age-indeterminate infarct .  MRI brain showed multiple tiny infarcts in left MCA distribution versus ICA watershed areas.    # Acute left MCA territory stroke      Plan:       # Acute left MCA territory stroke  - Status post loading with aspirin and Plavix  - Continue aspirin 81 Mg, Plavix 75 Mg for 21 days then aspirin monotherapy  - Continue Crestor 40 Mg  - , 5.7A1c   -ECHO: EF 65%, normal wall motion and negative bubble  - RAMIN and LOOP today  - PT OT SLP, PMR consulted, low level

## 2025-04-24 LAB
B2 GLYCOPROT1 IGA SERPL IA-ACNC: 4.9 ELISA U/ML (ref 0–7)
B2 GLYCOPROT1 IGG SERPL IA-ACNC: 1.4 ELISA U/ML (ref 0–7)
B2 GLYCOPROT1 IGM SERPL IA-ACNC: <0.9 ELISA U/ML (ref 0–7)
CARDIOLIPIN IGA SER IA-ACNC: 9.2 APL (ref 0–14)
PROT C AG ACT/NOR PPP IA: >95 % (ref 63–153)
PROT S AG ACT/NOR PPP IA: 143 % (ref 63–126)
PROT S FREE AG ACT/NOR PPP IA: 117 % (ref 55–123)

## 2025-04-26 LAB
EKG ATRIAL RATE: 72 BPM
EKG P AXIS: 60 DEGREES
EKG P-R INTERVAL: 156 MS
EKG Q-T INTERVAL: 378 MS
EKG QRS DURATION: 64 MS
EKG QTC CALCULATION (BAZETT): 413 MS
EKG R AXIS: 58 DEGREES
EKG T AXIS: 75 DEGREES
EKG VENTRICULAR RATE: 72 BPM

## 2025-04-28 LAB
AT III ACT/NOR PPP CHRO: 110 % (ref 83–122)
CARDIOLIPIN IGA SER IA-ACNC: 7.2 APL (ref 0–14)
CARDIOLIPIN IGG SER IA-ACNC: 4.5 GPL (ref 0–10)
CARDIOLIPIN IGM SER IA-ACNC: <0.8 MPL (ref 0–10)
DILUTE RUSSELL VIPER VENOM TIME: NORMAL
F2 C.20210G>A GENO BLD/T: NEGATIVE
INR PPP: 0.9
PARTIAL THROMBOPLASTIN TIME: 29.8 SEC (ref 23–36.5)
PROTEIN C ACTIVITY: 107 %
PROTEIN S ACTIVITY: 114 % (ref 59–130)
PROTHROMBIN TIME: 12.7 SEC (ref 11.7–14.9)
SPECIMEN SOURCE: NORMAL

## 2025-04-30 ENCOUNTER — TELEPHONE (OUTPATIENT)
Age: 75
End: 2025-04-30

## 2025-04-30 NOTE — TELEPHONE ENCOUNTER
[facility] called to schedule a 1 month HFU  Please return call ask for nurse taking care of patient [phone]  Thank you

## 2025-04-30 NOTE — TELEPHONE ENCOUNTER
Spoke to patient nurse at [facility] scheduled patient on 5/29 at [facility]. Gave address to Dr. Roman office.

## 2025-05-01 ENCOUNTER — HOSPITAL ENCOUNTER (OUTPATIENT)
Age: 75
Setting detail: SPECIMEN
Discharge: HOME | End: 2025-05-01

## 2025-05-01 LAB
ANION GAP SERPL CALCULATED.3IONS-SCNC: 12 MMOL/L (ref 9–16)
BUN SERPL-MCNC: 8 MG/DL (ref 8–23)
CALCIUM SERPL-MCNC: 9.8 MG/DL (ref 8.8–10.2)
CHLORIDE SERPL-SCNC: 103 MMOL/L (ref 98–107)
CO2 SERPL-SCNC: 29 MMOL/L (ref 20–31)
CREAT SERPL-MCNC: 0.5 MG/DL (ref 0.5–0.9)
ERYTHROCYTE [DISTWIDTH] IN BLOOD BY AUTOMATED COUNT: 13.9 % (ref 11.8–14.4)
GFR, ESTIMATED: >90 ML/MIN/1.73M2
GLUCOSE SERPL-MCNC: 99 MG/DL (ref 82–115)
HCT VFR BLD AUTO: 43.6 % (ref 36.3–47.1)
HGB BLD-MCNC: 13.9 G/DL (ref 11.9–15.1)
MCH RBC QN AUTO: 28.7 PG (ref 25.2–33.5)
MCHC RBC AUTO-ENTMCNC: 31.9 G/DL (ref 28.4–34.8)
MCV RBC AUTO: 89.9 FL (ref 82.6–102.9)
NRBC BLD-RTO: 0 PER 100 WBC
PLATELET # BLD AUTO: 419 K/UL (ref 138–453)
PMV BLD AUTO: 10.2 FL (ref 8.1–13.5)
POTASSIUM SERPL-SCNC: 4.2 MMOL/L (ref 3.7–5.3)
RBC # BLD AUTO: 4.85 M/UL (ref 3.95–5.11)
SODIUM SERPL-SCNC: 144 MMOL/L (ref 136–145)
WBC OTHER # BLD: 6.4 K/UL (ref 3.5–11.3)

## 2025-05-01 PROCEDURE — 80048 BASIC METABOLIC PNL TOTAL CA: CPT

## 2025-05-01 PROCEDURE — 85027 COMPLETE CBC AUTOMATED: CPT

## 2025-05-01 PROCEDURE — 36415 COLL VENOUS BLD VENIPUNCTURE: CPT

## 2025-05-07 ENCOUNTER — TELEPHONE (OUTPATIENT)
Age: 75
End: 2025-05-07

## 2025-05-07 ENCOUNTER — OFFICE VISIT (OUTPATIENT)
Age: 75
End: 2025-05-07
Payer: MEDICARE

## 2025-05-07 VITALS
WEIGHT: 187 LBS | SYSTOLIC BLOOD PRESSURE: 133 MMHG | RESPIRATION RATE: 18 BRPM | OXYGEN SATURATION: 90 % | TEMPERATURE: 96.9 F | BODY MASS INDEX: 33.13 KG/M2 | HEART RATE: 62 BPM | DIASTOLIC BLOOD PRESSURE: 84 MMHG

## 2025-05-07 DIAGNOSIS — E72.11 HYPERHOMOCYSTEINEMIA: ICD-10-CM

## 2025-05-07 DIAGNOSIS — I63.9 CEREBROVASCULAR ACCIDENT (CVA), UNSPECIFIED MECHANISM (HCC): Primary | ICD-10-CM

## 2025-05-07 DIAGNOSIS — E53.8 LOW SERUM VITAMIN B12: ICD-10-CM

## 2025-05-07 PROCEDURE — G8417 CALC BMI ABV UP PARAM F/U: HCPCS | Performed by: INTERNAL MEDICINE

## 2025-05-07 PROCEDURE — 99214 OFFICE O/P EST MOD 30 MIN: CPT | Performed by: INTERNAL MEDICINE

## 2025-05-07 PROCEDURE — 1126F AMNT PAIN NOTED NONE PRSNT: CPT | Performed by: INTERNAL MEDICINE

## 2025-05-07 PROCEDURE — 1123F ACP DISCUSS/DSCN MKR DOCD: CPT | Performed by: INTERNAL MEDICINE

## 2025-05-07 PROCEDURE — 1159F MED LIST DOCD IN RCRD: CPT | Performed by: INTERNAL MEDICINE

## 2025-05-07 PROCEDURE — 1090F PRES/ABSN URINE INCON ASSESS: CPT | Performed by: INTERNAL MEDICINE

## 2025-05-07 PROCEDURE — 1111F DSCHRG MED/CURRENT MED MERGE: CPT | Performed by: INTERNAL MEDICINE

## 2025-05-07 PROCEDURE — 1036F TOBACCO NON-USER: CPT | Performed by: INTERNAL MEDICINE

## 2025-05-07 PROCEDURE — G8400 PT W/DXA NO RESULTS DOC: HCPCS | Performed by: INTERNAL MEDICINE

## 2025-05-07 PROCEDURE — G8427 DOCREV CUR MEDS BY ELIG CLIN: HCPCS | Performed by: INTERNAL MEDICINE

## 2025-05-07 RX ORDER — ATORVASTATIN CALCIUM 80 MG/1
80 TABLET, FILM COATED ORAL DAILY
COMMUNITY

## 2025-05-07 NOTE — TELEPHONE ENCOUNTER
Instructions   from Dr. Alvin Houston MD    Labs in 4 weeks  Rv in 6 weeks    Today's medication changes   CHANGE how you take:  cyanocobalamin   Changed by: Dr. Alvin Houston MD  vitamin B-12 (CYANOCOBALAMIN)   Changed by: Dr. Alvin Houston MD  cyanocobalamin ([facility] VITAMIN B12)   Changed by: Dr. Alvin Houston MD  Accurate as of May 7, 2025  2:09 PM.  Review your updated medication list below.     these medications at [facility] [place], [address] -  [phone] - F [phone]  cyanocobalamin  Address: [address] [place] [address]   Phone: [phone]       Labs ordered today  Factor V Leiden  Complete this on or around 5/7/2025.  Homocysteine  Complete this on or around 5/7/2025.  Vitamin B12 & Folate  Complete this on or around 5/7/2025.    Next appointment scheduled for 6/18/2025 at 12pm.

## 2025-05-07 NOTE — PROGRESS NOTES
Today's Date: 5/7/2025  Patient Name: Kristie Hogan  Date of admission: No admission date for patient encounter.  Patient's age: 75 y.o., 1950  Admission Dx: No admission diagnoses are documented for this encounter.    No chief complaint on file.        Chief Complaint:  Posthospital discharge follow-up visit.  Stroke.  Hypercoagulable workup    History Obtained From:  patient, electronic medical record    Interval history:  History of Present Illness  Patient presents to the clinic to establish care.  Last seen in office in April 2023.  Patient admitted with hospital with a stroke.  Hypercoagulable workup done showed elevated homocystine level and a low vitamin B12.  Patient has been started on B12 supplements.  Patient is currently on aspirin Plavix.  Resides in a nursing home.    She was recently hospitalized due to an acute stroke, during which her  observed a drooping of her mouth and slurring of her speech. She is currently residing in a nursing home. She has been unable to walk since a fall down the stairs approximately 2.5 years ago.     PAST SURGICAL HISTORY: Loop recorder implant.    History of Present Illness:      The patient is a 75 y.o. female who is admitted to the hospital for septic shock with cholangitis and cholelithiasis s/p laparoscopic cholecystectomy 3/13/2023.    Patient is a poor historian and does not recall her past history.  Chart review shows history of psoriasis on weekly methotrexate in 2019 and daily folic acid.     Patient initially presented with altered mentation, and hypotension was admitted to the ICU for septic shock and acute respiratory failure, required intubation and pressors.  Found to have cholangitis with cholelithiasis underwent laparoscopic cholecystectomy.  Blood and urine cultures positive for E. coli.  Patient also had thrombocytopenia with concern for HIT and placed on argatroban.  HIT antiplatelet antibody normal.  platelet count has improved and

## 2025-05-29 ENCOUNTER — OFFICE VISIT (OUTPATIENT)
Age: 75
End: 2025-05-29
Payer: MEDICARE

## 2025-05-29 ENCOUNTER — TELEPHONE (OUTPATIENT)
Age: 75
End: 2025-05-29

## 2025-05-29 VITALS
WEIGHT: 187 LBS | BODY MASS INDEX: 33.13 KG/M2 | HEART RATE: 81 BPM | DIASTOLIC BLOOD PRESSURE: 75 MMHG | HEIGHT: 63 IN | SYSTOLIC BLOOD PRESSURE: 137 MMHG | OXYGEN SATURATION: 98 %

## 2025-05-29 DIAGNOSIS — E78.5 DYSLIPIDEMIA: ICD-10-CM

## 2025-05-29 DIAGNOSIS — Z95.818 STATUS POST PLACEMENT OF IMPLANTABLE LOOP RECORDER: ICD-10-CM

## 2025-05-29 DIAGNOSIS — I26.99 ACUTE PULMONARY EMBOLISM, UNSPECIFIED PULMONARY EMBOLISM TYPE, UNSPECIFIED WHETHER ACUTE COR PULMONALE PRESENT (HCC): Primary | ICD-10-CM

## 2025-05-29 DIAGNOSIS — I10 ESSENTIAL HYPERTENSION: ICD-10-CM

## 2025-05-29 PROCEDURE — 1160F RVW MEDS BY RX/DR IN RCRD: CPT | Performed by: INTERNAL MEDICINE

## 2025-05-29 PROCEDURE — 3078F DIAST BP <80 MM HG: CPT | Performed by: INTERNAL MEDICINE

## 2025-05-29 PROCEDURE — 3075F SYST BP GE 130 - 139MM HG: CPT | Performed by: INTERNAL MEDICINE

## 2025-05-29 PROCEDURE — 93000 ELECTROCARDIOGRAM COMPLETE: CPT | Performed by: INTERNAL MEDICINE

## 2025-05-29 PROCEDURE — 1123F ACP DISCUSS/DSCN MKR DOCD: CPT | Performed by: INTERNAL MEDICINE

## 2025-05-29 PROCEDURE — 99215 OFFICE O/P EST HI 40 MIN: CPT | Performed by: INTERNAL MEDICINE

## 2025-05-29 PROCEDURE — 1036F TOBACCO NON-USER: CPT | Performed by: INTERNAL MEDICINE

## 2025-05-29 PROCEDURE — G8427 DOCREV CUR MEDS BY ELIG CLIN: HCPCS | Performed by: INTERNAL MEDICINE

## 2025-05-29 PROCEDURE — 1159F MED LIST DOCD IN RCRD: CPT | Performed by: INTERNAL MEDICINE

## 2025-05-29 PROCEDURE — 1090F PRES/ABSN URINE INCON ASSESS: CPT | Performed by: INTERNAL MEDICINE

## 2025-05-29 PROCEDURE — G8417 CALC BMI ABV UP PARAM F/U: HCPCS | Performed by: INTERNAL MEDICINE

## 2025-05-29 PROCEDURE — G8400 PT W/DXA NO RESULTS DOC: HCPCS | Performed by: INTERNAL MEDICINE

## 2025-05-29 RX ORDER — METOPROLOL SUCCINATE 25 MG/1
25 TABLET, EXTENDED RELEASE ORAL DAILY
Qty: 90 TABLET | Refills: 3 | Status: SHIPPED | OUTPATIENT
Start: 2025-05-29

## 2025-05-29 NOTE — TELEPHONE ENCOUNTER
Milton from [facility] is calling in about orders on PT.  PT brought back paperwork stating to check blood pressures.   They would like to know how often they need to check and the duration, if they should fax back to the office.    You can get a hold of Milton on her cell phone if you call in TODAY at [phone]

## 2025-05-29 NOTE — TELEPHONE ENCOUNTER
Spoke with Milton from [facility] [facility]. Informed her that Per Dr Roman, blood pressure can be check random.   Advise to notify the office if Blood pressure increase for consecutive days. Mitlon verbalized understating.

## 2025-05-29 NOTE — PROGRESS NOTES
Cardiology Office Follow up      Kristie Hogan  1950  [id number]    Today: 5/29/25    CC: Patient is here for routine follow up visit.     HPI:   This is a 75-year-old female who is a resident of an extended care facility and has history of spinal stenosis with central cord syndrome and spastic incomplete paraplegia, s/p cervical spinal fusion, pulmonary embolism, anemia, psoriasis and depression/anxiety was admitted with complaints of dysarthria and right facial droop.    MRI brain revealed small acute infarct in the left cerebral hemisphere with multiple tiny infarcts in the left MCA distribution.  Agitated saline study by RAMIN did not show any interatrial septal defect.  [employer] loop recorder was placed.     She has recovered well from the stroke and is back at her baseline.  She has been on dual antiplatelet therapy at this point.  EKG and loop recorder revealed sinus rhythm.  We are starting low-dose metoprolol and have requested her to keep a log of blood pressure and heart rate.      Past Medical:  Past Medical History:   Diagnosis Date    Acute pulmonary embolism (HCC)     Acute respiratory failure with hypoxia (HCC)     Anemia     Ascending cholangitis (HCC)     Central cord syndrome (HCC)     Cholangitis (HCC)     Choledocholithiasis     History of hallucinations     Lives in nursing home     [facility] :  # [phone], fax # [phone]    Metabolic encephalopathy 03/2023    Pancytopenia (HCC)     Peripheral neuropathy     Psoriasis     Septic shock (HCC) 2023    Septicemia due to E. coli (HCC) 03/2023    Spasm     Spastic quadriparesis (HCC)     Under care of team 05/15/2023    Hematologist:Dr. Alvin Houston [place], last visit 4/2023    Under care of team     PCP: Dr.Bruce Rice, [facility]         Past Surgical:  Past Surgical History:   Procedure Laterality Date    CERVICAL FUSION N/A 01/18/2023    C 3-7 POSTERIOR CERVICAL DECOMPRESSION

## 2025-06-04 ENCOUNTER — HOSPITAL ENCOUNTER (OUTPATIENT)
Age: 75
Setting detail: SPECIMEN
Discharge: HOME | End: 2025-06-04
Payer: MEDICARE

## 2025-06-04 LAB
BACTERIA URNS QL MICRO: ABNORMAL
BILIRUB UR QL STRIP: NEGATIVE
CLARITY UR: CLEAR
COLOR UR: YELLOW
CRYSTALS URNS MICRO: ABNORMAL /HPF
EPI CELLS #/AREA URNS HPF: ABNORMAL /HPF (ref 0–5)
GLUCOSE UR STRIP-MCNC: NEGATIVE MG/DL
HGB UR QL STRIP.AUTO: NEGATIVE
KETONES UR STRIP-MCNC: ABNORMAL MG/DL
LEUKOCYTE ESTERASE UR QL STRIP: NEGATIVE
NITRITE UR QL STRIP: NEGATIVE
PH UR STRIP: 6 [PH] (ref 5–8)
PROT UR STRIP-MCNC: ABNORMAL MG/DL
RBC #/AREA URNS HPF: ABNORMAL /HPF (ref 0–2)
SP GR UR STRIP: 1.02 (ref 1–1.03)
UROBILINOGEN UR STRIP-ACNC: NORMAL EU/DL (ref 0–1)
WBC #/AREA URNS HPF: ABNORMAL /HPF (ref 0–5)

## 2025-06-04 PROCEDURE — 87086 URINE CULTURE/COLONY COUNT: CPT

## 2025-06-04 PROCEDURE — 81001 URINALYSIS AUTO W/SCOPE: CPT

## 2025-06-05 LAB
MICROORGANISM SPEC CULT: NORMAL
SERVICE CMNT-IMP: NORMAL
SPECIMEN DESCRIPTION: NORMAL

## 2025-06-16 ENCOUNTER — HOSPITAL ENCOUNTER (OUTPATIENT)
Age: 75
Setting detail: SPECIMEN
Discharge: HOME | End: 2025-06-16
Payer: MEDICARE

## 2025-06-16 LAB
FOLATE SERPL-MCNC: >40 NG/ML (ref 4.8–24.2)
HCYS SERPL-SCNC: 14.8 UMOL/L (ref 0–15)
VIT B12 SERPL-MCNC: 466 PG/ML (ref 232–1245)

## 2025-06-16 PROCEDURE — 36415 COLL VENOUS BLD VENIPUNCTURE: CPT

## 2025-06-16 PROCEDURE — 82746 ASSAY OF FOLIC ACID SERUM: CPT

## 2025-06-16 PROCEDURE — 82607 VITAMIN B-12: CPT

## 2025-06-16 PROCEDURE — 85220 BLOOC CLOT FACTOR V TEST: CPT

## 2025-06-16 PROCEDURE — 83090 ASSAY OF HOMOCYSTEINE: CPT

## 2025-06-18 ENCOUNTER — HOSPITAL ENCOUNTER (OUTPATIENT)
Age: 75
Discharge: HOME | End: 2025-06-18
Payer: MEDICARE

## 2025-06-18 ENCOUNTER — OFFICE VISIT (OUTPATIENT)
Age: 75
End: 2025-06-18
Payer: MEDICARE

## 2025-06-18 ENCOUNTER — TELEPHONE (OUTPATIENT)
Age: 75
End: 2025-06-18

## 2025-06-18 VITALS
OXYGEN SATURATION: 92 % | SYSTOLIC BLOOD PRESSURE: 123 MMHG | HEART RATE: 71 BPM | DIASTOLIC BLOOD PRESSURE: 77 MMHG | RESPIRATION RATE: 18 BRPM | TEMPERATURE: 96.9 F

## 2025-06-18 DIAGNOSIS — E53.8 LOW SERUM VITAMIN B12: ICD-10-CM

## 2025-06-18 DIAGNOSIS — E72.11 HYPERHOMOCYSTEINEMIA: ICD-10-CM

## 2025-06-18 DIAGNOSIS — I63.9 CEREBROVASCULAR ACCIDENT (CVA), UNSPECIFIED MECHANISM (HCC): ICD-10-CM

## 2025-06-18 LAB
FOLATE: NORMAL
VITAMIN B-12: 466

## 2025-06-18 PROCEDURE — 36415 COLL VENOUS BLD VENIPUNCTURE: CPT

## 2025-06-18 PROCEDURE — G8400 PT W/DXA NO RESULTS DOC: HCPCS | Performed by: INTERNAL MEDICINE

## 2025-06-18 PROCEDURE — G8427 DOCREV CUR MEDS BY ELIG CLIN: HCPCS | Performed by: INTERNAL MEDICINE

## 2025-06-18 PROCEDURE — 1123F ACP DISCUSS/DSCN MKR DOCD: CPT | Performed by: INTERNAL MEDICINE

## 2025-06-18 PROCEDURE — 1125F AMNT PAIN NOTED PAIN PRSNT: CPT | Performed by: INTERNAL MEDICINE

## 2025-06-18 PROCEDURE — G8417 CALC BMI ABV UP PARAM F/U: HCPCS | Performed by: INTERNAL MEDICINE

## 2025-06-18 PROCEDURE — 1036F TOBACCO NON-USER: CPT | Performed by: INTERNAL MEDICINE

## 2025-06-18 PROCEDURE — 81241 F5 GENE: CPT

## 2025-06-18 PROCEDURE — 1090F PRES/ABSN URINE INCON ASSESS: CPT | Performed by: INTERNAL MEDICINE

## 2025-06-18 PROCEDURE — 99213 OFFICE O/P EST LOW 20 MIN: CPT | Performed by: INTERNAL MEDICINE

## 2025-06-18 NOTE — TELEPHONE ENCOUNTER
Instructions   from Dr. Alvin Houston MD    Rv depending on labs from today         Will reach out to pt once we receive lab results and confirmation from [place]

## 2025-06-18 NOTE — PROGRESS NOTES
Today's Date: 6/18/2025  Patient Name: Kristie Hogan  Date of admission: No admission date for patient encounter.  Patient's age: 75 y.o., 1950  Admission Dx: No admission diagnoses are documented for this encounter.    Chief Complaint   Patient presents with    Follow-up     Review status of disease     Pain     Knee and neck     Discuss Labs     Reviewed results    History Obtained From:  patient, electronic medical record    Interval history:  History of Present Illness  Patient presents to the clinic to establish care.  Last seen in office in April 2023.  Patient admitted with hospital with a stroke.  Hypercoagulable workup done showed elevated homocystine level and a low vitamin B12.  Patient has been started on B12 supplements.  Patient is currently on aspirin Plavix.  Resides in a nursing home.    She was recently hospitalized due to an acute stroke, during which her  observed a drooping of her mouth and slurring of her speech. She is currently residing in a nursing home. She has been unable to walk since a fall down the stairs approximately 2.5 years ago.     PAST SURGICAL HISTORY: Loop recorder implant.    Interval history: 6/18/2025    The patient presents for stroke workup.    She is uncertain about the presence of a loop recorder. She has been adhering to her prescribed medication regimen, which includes B12 supplements. She is scheduled for a consultation with her neurologist in the near future. She has also had blood drawn for laboratory testing today. She is currently residing in a nursing facility and anticipates this will be a permanent arrangement. She is on aspirin and Plavix.    History of Present Illness:      The patient is a 75 y.o. female who is admitted to the hospital for septic shock with cholangitis and cholelithiasis s/p laparoscopic cholecystectomy 3/13/2023.    Patient is a poor historian and does not recall her past history.  Chart review shows history of psoriasis

## 2025-06-23 ENCOUNTER — OFFICE VISIT (OUTPATIENT)
Dept: NEUROLOGY | Age: 75
End: 2025-06-23

## 2025-06-23 VITALS
WEIGHT: 172 LBS | HEIGHT: 64 IN | OXYGEN SATURATION: 95 % | SYSTOLIC BLOOD PRESSURE: 119 MMHG | HEART RATE: 78 BPM | BODY MASS INDEX: 29.37 KG/M2 | DIASTOLIC BLOOD PRESSURE: 68 MMHG

## 2025-06-23 DIAGNOSIS — Z86.73 CHRONIC ISCHEMIC LEFT MCA STROKE: Primary | ICD-10-CM

## 2025-06-23 LAB
F5 P.R506Q BLD/T QL: NEGATIVE
FACT V ACT/NOR PPP: 98 % (ref 50–150)
SPECIMEN SOURCE: NORMAL

## 2025-06-23 RX ORDER — HYDROXYZINE HYDROCHLORIDE 25 MG/1
TABLET, FILM COATED ORAL
COMMUNITY
Start: 2025-06-16

## 2025-06-23 ASSESSMENT — ENCOUNTER SYMPTOMS
RESPIRATORY NEGATIVE: 1
GASTROINTESTINAL NEGATIVE: 1

## 2025-06-23 NOTE — PROGRESS NOTES
wheelchair for ambulation.  She had elevated homocystine during hospital admission.  Repeat homocystine was within normal limit.  As per visit with Cardiology 5/29/2025, loop recorder showed sinus rhythm  She is currently compliant with aspirin and Lipitor    PLAN:     Continue ASA 81 mg and Lipitor 80 mg daily  Continue working with physical therapy  Continue FU with cardiology and hematology  FU in 1 year    Discussed with Dr. Mckeon    Ms. Hogan received counseling on the following healthy behaviors: medical compliance, smoking cessation, blood pressure control, regular follow up with primary doctor.        Electronically signed by Beau Mathew MD on 6/23/2025 at 12:55 PM        I have discussed the case of Kristie Hogan, including pertinent history and exam findings with the resident. I have seen and examined the patient and the key elements of the encounter have been performed by me. I agree with the assessment, plan and orders as documented by the resident with changes made to the note as needed.       Ailin Mckeon MD  Neurology    This note is created with the assistance of a speech-recognition program. While intending to generate a document that actually reflects the content of the visit, the document can still have some errors including those of syntax and sound a- like substitutions which may escape proofreading.  In such instances, actual meaning can be extrapolated by contextual derivation.

## 2025-06-25 ENCOUNTER — PROCEDURE VISIT (OUTPATIENT)
Dept: PHYSICAL MEDICINE AND REHAB | Age: 75
End: 2025-06-25
Payer: MEDICARE

## 2025-06-25 VITALS — HEART RATE: 79 BPM | DIASTOLIC BLOOD PRESSURE: 84 MMHG | TEMPERATURE: 98.6 F | SYSTOLIC BLOOD PRESSURE: 128 MMHG

## 2025-06-25 DIAGNOSIS — S14.129D CENTRAL CORD SYNDROME, SUBSEQUENT ENCOUNTER (HCC): ICD-10-CM

## 2025-06-25 DIAGNOSIS — M62.838 OTHER MUSCLE SPASM: Primary | ICD-10-CM

## 2025-06-25 PROCEDURE — 95874 GUIDE NERV DESTR NEEDLE EMG: CPT | Performed by: PHYSICAL MEDICINE & REHABILITATION

## 2025-06-25 PROCEDURE — 64643 CHEMODENERV 1 EXTREM 1-4 EA: CPT | Performed by: PHYSICAL MEDICINE & REHABILITATION

## 2025-06-25 PROCEDURE — 64642 CHEMODENERV 1 EXTREMITY 1-4: CPT | Performed by: PHYSICAL MEDICINE & REHABILITATION

## 2025-06-25 NOTE — PROGRESS NOTES
[facility] PHYSICAL MEDICINE & REHABILITATION  [address] [address]  Dept: [phone]  Dept Fax: [phone]    Outpatient Followup Note    Kristie Hogan, 75 y.o., female, presents for follow up c/o of Botox Injection  .     HPI:     History of Present Illness         HPI  Patient with spasticity BLEs after cervical myelopathy/central cord syndrome from fall down stairs in January 2023. She is being seen today for spasticity management with Botox. She continues to note good relief of her pain and tone with Botox treatment. She continues on 15 mg baclofen TID as well. She remains in facility setting and presents today in custom wheelchair.     Past Medical History:   Diagnosis Date    Acute pulmonary embolism (HCC)     Acute respiratory failure with hypoxia (HCC)     Anemia     Ascending cholangitis (HCC)     Central cord syndrome (HCC)     Cholangitis (HCC)     Choledocholithiasis     History of hallucinations     Lives in nursing home     [facility] :  # [phone], fax # [phone]    Metabolic encephalopathy 03/2023    Pancytopenia (HCC)     Peripheral neuropathy     Psoriasis     Septic shock (HCC) 2023    Septicemia due to E. coli (HCC) 03/2023    Spasm     Spastic quadriparesis (HCC)     Under care of team 05/15/2023    Hematologist:Dr. Alvin Houston [place], last visit 4/2023    Under care of team     PCP: Dr.Bruce Rice, [facility]      Past Surgical History:   Procedure Laterality Date    CERVICAL FUSION N/A 01/18/2023    C 3-7 POSTERIOR CERVICAL DECOMPRESSION AND FUSION performed by Hanh Bates DO at [facility] OR    CHOLECYSTECTOMY, LAPAROSCOPIC N/A 03/13/2023    LAPAROSCOPIC CHOLECYSTECTOMY,  POSSIBLE OPEN performed by Eugene Draper DO at [facility] OR    EP DEVICE PROCEDURE N/A 4/23/2025    taiwo / nic/Loop recorder insert / rm 138 performed by Nicci Roman MD at [facility] CARDIAC

## 2025-07-16 ENCOUNTER — HOSPITAL ENCOUNTER (OUTPATIENT)
Age: 75
Setting detail: SPECIMEN
Discharge: HOME | End: 2025-07-16

## 2025-07-16 PROCEDURE — 87186 SC STD MICRODIL/AGAR DIL: CPT

## 2025-07-16 PROCEDURE — 87077 CULTURE AEROBIC IDENTIFY: CPT

## 2025-07-16 PROCEDURE — 87086 URINE CULTURE/COLONY COUNT: CPT

## 2025-07-18 LAB
MICROORGANISM SPEC CULT: ABNORMAL
SERVICE CMNT-IMP: ABNORMAL
SPECIMEN DESCRIPTION: ABNORMAL

## 2025-07-21 ENCOUNTER — HOSPITAL ENCOUNTER (OUTPATIENT)
Age: 75
Setting detail: SPECIMEN
Discharge: HOME | End: 2025-07-21
Payer: MEDICARE

## 2025-07-21 LAB
25(OH)D3 SERPL-MCNC: 42.7 NG/ML (ref 30–100)
ANION GAP SERPL CALCULATED.3IONS-SCNC: 12 MMOL/L (ref 9–16)
BASOPHILS # BLD: 0.08 K/UL (ref 0–0.2)
BASOPHILS NFR BLD: 1 % (ref 0–2)
BUN SERPL-MCNC: 8 MG/DL (ref 8–23)
CALCIUM SERPL-MCNC: 9.2 MG/DL (ref 8.8–10.2)
CHLORIDE SERPL-SCNC: 99 MMOL/L (ref 98–107)
CHOLEST SERPL-MCNC: 131 MG/DL (ref 0–199)
CHOLESTEROL/HDL RATIO: 2.4
CO2 SERPL-SCNC: 29 MMOL/L (ref 20–31)
CREAT SERPL-MCNC: 0.5 MG/DL (ref 0.5–0.9)
EOSINOPHIL # BLD: 0.21 K/UL (ref 0–0.44)
EOSINOPHILS RELATIVE PERCENT: 3 % (ref 1–4)
ERYTHROCYTE [DISTWIDTH] IN BLOOD BY AUTOMATED COUNT: 13.7 % (ref 11.8–14.4)
GFR, ESTIMATED: >90 ML/MIN/1.73M2
GLUCOSE SERPL-MCNC: 86 MG/DL (ref 82–115)
HCT VFR BLD AUTO: 43.7 % (ref 36.3–47.1)
HDLC SERPL-MCNC: 55 MG/DL
HGB BLD-MCNC: 13.7 G/DL (ref 11.9–15.1)
IMM GRANULOCYTES # BLD AUTO: 0.02 K/UL (ref 0–0.3)
IMM GRANULOCYTES NFR BLD: 0 %
LDLC SERPL CALC-MCNC: 50 MG/DL (ref 0–100)
LYMPHOCYTES NFR BLD: 2.06 K/UL (ref 1.1–3.7)
LYMPHOCYTES RELATIVE PERCENT: 29 % (ref 24–43)
MCH RBC QN AUTO: 28.4 PG (ref 25.2–33.5)
MCHC RBC AUTO-ENTMCNC: 31.4 G/DL (ref 28.4–34.8)
MCV RBC AUTO: 90.5 FL (ref 82.6–102.9)
MONOCYTES NFR BLD: 0.62 K/UL (ref 0.1–1.2)
MONOCYTES NFR BLD: 9 % (ref 3–12)
NEUTROPHILS NFR BLD: 58 % (ref 36–65)
NEUTS SEG NFR BLD: 4.18 K/UL (ref 1.5–8.1)
NRBC BLD-RTO: 0 PER 100 WBC
PLATELET # BLD AUTO: 395 K/UL (ref 138–453)
PMV BLD AUTO: 10.2 FL (ref 8.1–13.5)
POTASSIUM SERPL-SCNC: 4.7 MMOL/L (ref 3.7–5.3)
RBC # BLD AUTO: 4.83 M/UL (ref 3.95–5.11)
SODIUM SERPL-SCNC: 139 MMOL/L (ref 136–145)
TRIGL SERPL-MCNC: 131 MG/DL (ref 0–149)
VIT B12 SERPL-MCNC: 586 PG/ML (ref 232–1245)
VLDLC SERPL CALC-MCNC: 26 MG/DL (ref 1–30)
WBC OTHER # BLD: 7.2 K/UL (ref 3.5–11.3)

## 2025-07-21 PROCEDURE — 80061 LIPID PANEL: CPT

## 2025-07-21 PROCEDURE — 80048 BASIC METABOLIC PNL TOTAL CA: CPT

## 2025-07-21 PROCEDURE — 85025 COMPLETE CBC W/AUTO DIFF WBC: CPT

## 2025-07-21 PROCEDURE — 36415 COLL VENOUS BLD VENIPUNCTURE: CPT

## 2025-07-21 PROCEDURE — 82607 VITAMIN B-12: CPT

## 2025-07-21 PROCEDURE — 82306 VITAMIN D 25 HYDROXY: CPT

## 2025-08-12 ENCOUNTER — APPOINTMENT (OUTPATIENT)
Dept: CT IMAGING | Age: 75
DRG: 542 | End: 2025-08-12
Payer: MEDICARE

## 2025-08-12 ENCOUNTER — HOSPITAL ENCOUNTER (INPATIENT)
Age: 75
LOS: 1 days | Discharge: HOME | DRG: 542 | End: 2025-08-13
Attending: EMERGENCY MEDICINE | Admitting: INTERNAL MEDICINE
Payer: MEDICARE

## 2025-08-12 ENCOUNTER — APPOINTMENT (OUTPATIENT)
Dept: GENERAL RADIOLOGY | Age: 75
DRG: 542 | End: 2025-08-12
Payer: MEDICARE

## 2025-08-12 DIAGNOSIS — S32.019A CLOSED FRACTURE OF FIRST LUMBAR VERTEBRA, UNSPECIFIED FRACTURE MORPHOLOGY, INITIAL ENCOUNTER (HCC): Primary | ICD-10-CM

## 2025-08-12 DIAGNOSIS — M54.50 ACUTE MIDLINE LOW BACK PAIN WITHOUT SCIATICA: ICD-10-CM

## 2025-08-12 PROBLEM — Z87.81 HISTORY OF SPINAL FRACTURE: Status: ACTIVE | Noted: 2025-08-12

## 2025-08-12 LAB
ANION GAP SERPL CALCULATED.3IONS-SCNC: 17 MMOL/L (ref 9–16)
BASOPHILS # BLD: 0.08 K/UL (ref 0–0.2)
BASOPHILS NFR BLD: 1 % (ref 0–2)
BUN SERPL-MCNC: 8 MG/DL (ref 8–23)
CALCIUM SERPL-MCNC: 9.3 MG/DL (ref 8.6–10.4)
CHLORIDE SERPL-SCNC: 97 MMOL/L (ref 98–107)
CO2 SERPL-SCNC: 24 MMOL/L (ref 20–31)
CREAT SERPL-MCNC: 0.5 MG/DL (ref 0.7–1.2)
EOSINOPHIL # BLD: 0.12 K/UL (ref 0–0.44)
EOSINOPHILS RELATIVE PERCENT: 1 % (ref 0–4)
ERYTHROCYTE [DISTWIDTH] IN BLOOD BY AUTOMATED COUNT: 13.5 % (ref 11.5–14.9)
GFR, ESTIMATED: >90 ML/MIN/1.73M2
GLUCOSE SERPL-MCNC: 107 MG/DL (ref 74–99)
HCT VFR BLD AUTO: 46.6 % (ref 36–46)
HGB BLD-MCNC: 14.7 G/DL (ref 12–16)
IMM GRANULOCYTES # BLD AUTO: 0.03 K/UL (ref 0–0.3)
IMM GRANULOCYTES NFR BLD: 0 %
LYMPHOCYTES NFR BLD: 1.59 K/UL (ref 1.1–3.7)
LYMPHOCYTES RELATIVE PERCENT: 14 % (ref 24–44)
MCH RBC QN AUTO: 27.5 PG (ref 26–34)
MCHC RBC AUTO-ENTMCNC: 31.5 G/DL (ref 31–37)
MCV RBC AUTO: 87.3 FL (ref 80–100)
MONOCYTES NFR BLD: 1.02 K/UL (ref 0.1–1.2)
MONOCYTES NFR BLD: 9 % (ref 3–12)
NEUTROPHILS NFR BLD: 75 % (ref 36–66)
NEUTS SEG NFR BLD: 8.95 K/UL (ref 1.5–8.1)
NRBC BLD-RTO: 0 PER 100 WBC
PLATELET # BLD AUTO: 481 K/UL (ref 150–450)
PMV BLD AUTO: 10.5 FL (ref 8–13.5)
POTASSIUM SERPL-SCNC: 4.1 MMOL/L (ref 3.7–5.3)
RBC # BLD AUTO: 5.34 M/UL (ref 3.95–5.11)
SODIUM SERPL-SCNC: 138 MMOL/L (ref 136–145)
WBC OTHER # BLD: 11.8 K/UL (ref 3.5–11)

## 2025-08-12 PROCEDURE — 73562 X-RAY EXAM OF KNEE 3: CPT

## 2025-08-12 PROCEDURE — 36415 COLL VENOUS BLD VENIPUNCTURE: CPT

## 2025-08-12 PROCEDURE — 1200000000 HC SEMI PRIVATE

## 2025-08-12 PROCEDURE — 96372 THER/PROPH/DIAG INJ SC/IM: CPT

## 2025-08-12 PROCEDURE — 85025 COMPLETE CBC W/AUTO DIFF WBC: CPT

## 2025-08-12 PROCEDURE — 72131 CT LUMBAR SPINE W/O DYE: CPT

## 2025-08-12 PROCEDURE — 80048 BASIC METABOLIC PNL TOTAL CA: CPT

## 2025-08-12 PROCEDURE — 72128 CT CHEST SPINE W/O DYE: CPT

## 2025-08-12 PROCEDURE — 6360000002 HC RX W HCPCS

## 2025-08-12 PROCEDURE — 99285 EMERGENCY DEPT VISIT HI MDM: CPT

## 2025-08-12 RX ORDER — ORPHENADRINE CITRATE 30 MG/ML
60 INJECTION INTRAMUSCULAR; INTRAVENOUS ONCE
Status: COMPLETED | OUTPATIENT
Start: 2025-08-12 | End: 2025-08-12

## 2025-08-12 RX ADMIN — ORPHENADRINE CITRATE 60 MG: 60 INJECTION INTRAMUSCULAR; INTRAVENOUS at 20:51

## 2025-08-12 ASSESSMENT — PAIN DESCRIPTION - ORIENTATION: ORIENTATION: MID

## 2025-08-12 ASSESSMENT — PAIN - FUNCTIONAL ASSESSMENT
PAIN_FUNCTIONAL_ASSESSMENT: 0-10
PAIN_FUNCTIONAL_ASSESSMENT: 0-10

## 2025-08-12 ASSESSMENT — PAIN SCALES - GENERAL
PAINLEVEL_OUTOF10: 10
PAINLEVEL_OUTOF10: 10

## 2025-08-12 ASSESSMENT — PAIN DESCRIPTION - LOCATION
LOCATION: BACK
LOCATION: BACK

## 2025-08-13 ENCOUNTER — APPOINTMENT (OUTPATIENT)
Dept: GENERAL RADIOLOGY | Age: 75
DRG: 542 | End: 2025-08-13
Payer: MEDICARE

## 2025-08-13 VITALS
DIASTOLIC BLOOD PRESSURE: 79 MMHG | RESPIRATION RATE: 16 BRPM | WEIGHT: 175 LBS | OXYGEN SATURATION: 97 % | BODY MASS INDEX: 31.01 KG/M2 | SYSTOLIC BLOOD PRESSURE: 118 MMHG | HEIGHT: 63 IN | TEMPERATURE: 98.1 F | HEART RATE: 74 BPM

## 2025-08-13 PROBLEM — M25.562 ACUTE PAIN OF LEFT KNEE: Status: ACTIVE | Noted: 2025-08-13

## 2025-08-13 PROBLEM — G82.50 CHRONIC INCOMPLETE QUADRIPLEGIA (HCC): Status: ACTIVE | Noted: 2025-08-13

## 2025-08-13 PROBLEM — S32.019A CLOSED FRACTURE OF FIRST LUMBAR VERTEBRA (HCC): Status: ACTIVE | Noted: 2025-08-13

## 2025-08-13 PROBLEM — G89.29 CHRONIC PAIN OF RIGHT KNEE: Status: ACTIVE | Noted: 2025-08-13

## 2025-08-13 PROBLEM — M54.50 ACUTE MIDLINE LOW BACK PAIN WITHOUT SCIATICA: Status: ACTIVE | Noted: 2025-08-13

## 2025-08-13 PROBLEM — M25.561 CHRONIC PAIN OF RIGHT KNEE: Status: ACTIVE | Noted: 2025-08-13

## 2025-08-13 PROBLEM — S32.010S CLOSED COMPRESSION FRACTURE OF L1 LUMBAR VERTEBRA, SEQUELA: Status: ACTIVE | Noted: 2025-08-13

## 2025-08-13 LAB
ANION GAP SERPL CALCULATED.3IONS-SCNC: 13 MMOL/L (ref 9–16)
BASOPHILS # BLD: 0.07 K/UL (ref 0–0.2)
BASOPHILS NFR BLD: 1 % (ref 0–2)
BUN SERPL-MCNC: 7 MG/DL (ref 8–23)
CALCIUM SERPL-MCNC: 9.3 MG/DL (ref 8.6–10.4)
CHLORIDE SERPL-SCNC: 99 MMOL/L (ref 98–107)
CO2 SERPL-SCNC: 29 MMOL/L (ref 20–31)
CREAT SERPL-MCNC: 0.5 MG/DL (ref 0.7–1.2)
EOSINOPHIL # BLD: 0.12 K/UL (ref 0–0.44)
EOSINOPHILS RELATIVE PERCENT: 1 % (ref 0–4)
ERYTHROCYTE [DISTWIDTH] IN BLOOD BY AUTOMATED COUNT: 13.3 % (ref 11.5–14.9)
GFR, ESTIMATED: >90 ML/MIN/1.73M2
GLUCOSE SERPL-MCNC: 90 MG/DL (ref 74–99)
HCT VFR BLD AUTO: 46.1 % (ref 36–46)
HGB BLD-MCNC: 14.4 G/DL (ref 12–16)
IMM GRANULOCYTES # BLD AUTO: 0.04 K/UL (ref 0–0.3)
IMM GRANULOCYTES NFR BLD: 0 %
LYMPHOCYTES NFR BLD: 2.54 K/UL (ref 1.1–3.7)
LYMPHOCYTES RELATIVE PERCENT: 27 % (ref 24–44)
MCH RBC QN AUTO: 27.5 PG (ref 26–34)
MCHC RBC AUTO-ENTMCNC: 31.2 G/DL (ref 31–37)
MCV RBC AUTO: 88.1 FL (ref 80–100)
MONOCYTES NFR BLD: 0.77 K/UL (ref 0.1–1.2)
MONOCYTES NFR BLD: 8 % (ref 3–12)
NEUTROPHILS NFR BLD: 63 % (ref 36–66)
NEUTS SEG NFR BLD: 5.79 K/UL (ref 1.5–8.1)
NRBC BLD-RTO: 0 PER 100 WBC
PLATELET # BLD AUTO: 437 K/UL (ref 150–450)
PMV BLD AUTO: 9.8 FL (ref 8–13.5)
POTASSIUM SERPL-SCNC: 4.1 MMOL/L (ref 3.7–5.3)
RBC # BLD AUTO: 5.23 M/UL (ref 3.95–5.11)
SODIUM SERPL-SCNC: 141 MMOL/L (ref 136–145)
WBC OTHER # BLD: 9.3 K/UL (ref 3.5–11)

## 2025-08-13 PROCEDURE — G0378 HOSPITAL OBSERVATION PER HR: HCPCS

## 2025-08-13 PROCEDURE — 6370000000 HC RX 637 (ALT 250 FOR IP)

## 2025-08-13 PROCEDURE — 97530 THERAPEUTIC ACTIVITIES: CPT

## 2025-08-13 PROCEDURE — 85025 COMPLETE CBC W/AUTO DIFF WBC: CPT

## 2025-08-13 PROCEDURE — 36415 COLL VENOUS BLD VENIPUNCTURE: CPT

## 2025-08-13 PROCEDURE — 2500000003 HC RX 250 WO HCPCS

## 2025-08-13 PROCEDURE — 6360000002 HC RX W HCPCS

## 2025-08-13 PROCEDURE — 96374 THER/PROPH/DIAG INJ IV PUSH: CPT

## 2025-08-13 PROCEDURE — 97161 PT EVAL LOW COMPLEX 20 MIN: CPT

## 2025-08-13 PROCEDURE — 99223 1ST HOSP IP/OBS HIGH 75: CPT | Performed by: INTERNAL MEDICINE

## 2025-08-13 PROCEDURE — 80048 BASIC METABOLIC PNL TOTAL CA: CPT

## 2025-08-13 PROCEDURE — 97165 OT EVAL LOW COMPLEX 30 MIN: CPT

## 2025-08-13 PROCEDURE — 71045 X-RAY EXAM CHEST 1 VIEW: CPT

## 2025-08-13 RX ORDER — POTASSIUM CHLORIDE 1500 MG/1
40 TABLET, EXTENDED RELEASE ORAL PRN
Status: DISCONTINUED | OUTPATIENT
Start: 2025-08-13 | End: 2025-08-13 | Stop reason: HOSPADM

## 2025-08-13 RX ORDER — SODIUM CHLORIDE 0.9 % (FLUSH) 0.9 %
10 SYRINGE (ML) INJECTION PRN
Status: DISCONTINUED | OUTPATIENT
Start: 2025-08-13 | End: 2025-08-13 | Stop reason: HOSPADM

## 2025-08-13 RX ORDER — MORPHINE SULFATE 2 MG/ML
2 INJECTION, SOLUTION INTRAMUSCULAR; INTRAVENOUS EVERY 4 HOURS PRN
Refills: 0 | Status: DISCONTINUED | OUTPATIENT
Start: 2025-08-13 | End: 2025-08-13 | Stop reason: HOSPADM

## 2025-08-13 RX ORDER — DOCUSATE SODIUM 100 MG/1
100 CAPSULE, LIQUID FILLED ORAL 2 TIMES DAILY
Status: DISCONTINUED | OUTPATIENT
Start: 2025-08-13 | End: 2025-08-13 | Stop reason: HOSPADM

## 2025-08-13 RX ORDER — BISACODYL 10 MG
10 SUPPOSITORY, RECTAL RECTAL DAILY PRN
Status: DISCONTINUED | OUTPATIENT
Start: 2025-08-13 | End: 2025-08-13 | Stop reason: HOSPADM

## 2025-08-13 RX ORDER — CLOPIDOGREL BISULFATE 75 MG/1
75 TABLET ORAL DAILY
Status: DISCONTINUED | OUTPATIENT
Start: 2025-08-13 | End: 2025-08-13 | Stop reason: HOSPADM

## 2025-08-13 RX ORDER — ATORVASTATIN CALCIUM 80 MG/1
80 TABLET, FILM COATED ORAL DAILY
Status: DISCONTINUED | OUTPATIENT
Start: 2025-08-13 | End: 2025-08-13 | Stop reason: HOSPADM

## 2025-08-13 RX ORDER — MAGNESIUM SULFATE HEPTAHYDRATE 40 MG/ML
2000 INJECTION, SOLUTION INTRAVENOUS PRN
Status: DISCONTINUED | OUTPATIENT
Start: 2025-08-13 | End: 2025-08-13 | Stop reason: HOSPADM

## 2025-08-13 RX ORDER — SODIUM CHLORIDE 0.9 % (FLUSH) 0.9 %
5-40 SYRINGE (ML) INJECTION EVERY 12 HOURS SCHEDULED
Status: DISCONTINUED | OUTPATIENT
Start: 2025-08-13 | End: 2025-08-13 | Stop reason: HOSPADM

## 2025-08-13 RX ORDER — SODIUM CHLORIDE 9 MG/ML
INJECTION, SOLUTION INTRAVENOUS PRN
Status: DISCONTINUED | OUTPATIENT
Start: 2025-08-13 | End: 2025-08-13 | Stop reason: HOSPADM

## 2025-08-13 RX ORDER — LIDOCAINE 50 MG/G
1 PATCH TOPICAL DAILY
COMMUNITY

## 2025-08-13 RX ORDER — LIDOCAINE 4 G/G
1 PATCH TOPICAL DAILY
Status: DISCONTINUED | OUTPATIENT
Start: 2025-08-13 | End: 2025-08-13 | Stop reason: HOSPADM

## 2025-08-13 RX ORDER — ACETAMINOPHEN 650 MG/1
650 SUPPOSITORY RECTAL EVERY 6 HOURS PRN
Status: DISCONTINUED | OUTPATIENT
Start: 2025-08-13 | End: 2025-08-13 | Stop reason: HOSPADM

## 2025-08-13 RX ORDER — TRAMADOL HYDROCHLORIDE 50 MG/1
50 TABLET ORAL EVERY 8 HOURS PRN
Qty: 30 TABLET | Refills: 0 | Status: SHIPPED | OUTPATIENT
Start: 2025-08-13 | End: 2025-08-23

## 2025-08-13 RX ORDER — ONDANSETRON 4 MG/1
4 TABLET, ORALLY DISINTEGRATING ORAL EVERY 8 HOURS PRN
Status: DISCONTINUED | OUTPATIENT
Start: 2025-08-13 | End: 2025-08-13 | Stop reason: HOSPADM

## 2025-08-13 RX ORDER — TRAMADOL HYDROCHLORIDE 50 MG/1
50 TABLET ORAL EVERY 8 HOURS PRN
Status: ON HOLD | COMMUNITY
End: 2025-08-13

## 2025-08-13 RX ORDER — ONDANSETRON 2 MG/ML
4 INJECTION INTRAMUSCULAR; INTRAVENOUS EVERY 6 HOURS PRN
Status: DISCONTINUED | OUTPATIENT
Start: 2025-08-13 | End: 2025-08-13 | Stop reason: HOSPADM

## 2025-08-13 RX ORDER — FLUOCINONIDE 0.5 MG/G
OINTMENT TOPICAL DAILY
Status: DISCONTINUED | OUTPATIENT
Start: 2025-08-13 | End: 2025-08-13 | Stop reason: HOSPADM

## 2025-08-13 RX ORDER — POTASSIUM CHLORIDE 7.45 MG/ML
10 INJECTION INTRAVENOUS PRN
Status: DISCONTINUED | OUTPATIENT
Start: 2025-08-13 | End: 2025-08-13 | Stop reason: HOSPADM

## 2025-08-13 RX ORDER — ASPIRIN 81 MG/1
81 TABLET, CHEWABLE ORAL DAILY
Status: DISCONTINUED | OUTPATIENT
Start: 2025-08-13 | End: 2025-08-13 | Stop reason: HOSPADM

## 2025-08-13 RX ORDER — TRAMADOL HYDROCHLORIDE 50 MG/1
50 TABLET ORAL EVERY 8 HOURS PRN
Status: DISCONTINUED | OUTPATIENT
Start: 2025-08-13 | End: 2025-08-13 | Stop reason: HOSPADM

## 2025-08-13 RX ORDER — HYDROXYZINE HYDROCHLORIDE 25 MG/1
25 TABLET, FILM COATED ORAL 3 TIMES DAILY PRN
Status: DISCONTINUED | OUTPATIENT
Start: 2025-08-13 | End: 2025-08-13 | Stop reason: HOSPADM

## 2025-08-13 RX ORDER — LANOLIN ALCOHOL/MO/W.PET/CERES
1000 CREAM (GRAM) TOPICAL DAILY
Status: DISCONTINUED | OUTPATIENT
Start: 2025-08-13 | End: 2025-08-13 | Stop reason: HOSPADM

## 2025-08-13 RX ORDER — ACETAMINOPHEN 325 MG/1
650 TABLET ORAL EVERY 6 HOURS PRN
Status: DISCONTINUED | OUTPATIENT
Start: 2025-08-13 | End: 2025-08-13 | Stop reason: HOSPADM

## 2025-08-13 RX ORDER — TRIAMCINOLONE ACETONIDE 1 MG/G
CREAM TOPICAL 2 TIMES DAILY
Status: DISCONTINUED | OUTPATIENT
Start: 2025-08-13 | End: 2025-08-13 | Stop reason: HOSPADM

## 2025-08-13 RX ORDER — BACLOFEN 10 MG/1
15 TABLET ORAL 3 TIMES DAILY
Status: DISCONTINUED | OUTPATIENT
Start: 2025-08-13 | End: 2025-08-13 | Stop reason: HOSPADM

## 2025-08-13 RX ADMIN — SODIUM CHLORIDE, PRESERVATIVE FREE 10 ML: 5 INJECTION INTRAVENOUS at 01:39

## 2025-08-13 RX ADMIN — DOCUSATE SODIUM 100 MG: 100 CAPSULE, LIQUID FILLED ORAL at 09:44

## 2025-08-13 RX ADMIN — MORPHINE SULFATE 2 MG: 2 INJECTION, SOLUTION INTRAMUSCULAR; INTRAVENOUS at 01:39

## 2025-08-13 RX ADMIN — CYANOCOBALAMIN TAB 1000 MCG 1000 MCG: 1000 TAB at 09:44

## 2025-08-13 RX ADMIN — BACLOFEN 15 MG: 10 TABLET ORAL at 13:21

## 2025-08-13 RX ADMIN — TRIAMCINOLONE ACETONIDE: 1 CREAM TOPICAL at 09:46

## 2025-08-13 RX ADMIN — ATORVASTATIN CALCIUM 80 MG: 80 TABLET, FILM COATED ORAL at 09:44

## 2025-08-13 RX ADMIN — FLUOCINONIDE: 0.5 OINTMENT TOPICAL at 09:47

## 2025-08-13 RX ADMIN — ASPIRIN 81 MG: 81 TABLET, CHEWABLE ORAL at 09:44

## 2025-08-13 RX ADMIN — BACLOFEN 15 MG: 10 TABLET ORAL at 09:44

## 2025-08-13 RX ADMIN — TRAMADOL HYDROCHLORIDE 50 MG: 50 TABLET, COATED ORAL at 09:43

## 2025-08-13 ASSESSMENT — PAIN SCALES - GENERAL
PAINLEVEL_OUTOF10: 4
PAINLEVEL_OUTOF10: 8
PAINLEVEL_OUTOF10: 8

## 2025-08-13 ASSESSMENT — PAIN - FUNCTIONAL ASSESSMENT
PAIN_FUNCTIONAL_ASSESSMENT: 0-10
PAIN_FUNCTIONAL_ASSESSMENT: PREVENTS OR INTERFERES SOME ACTIVE ACTIVITIES AND ADLS
PAIN_FUNCTIONAL_ASSESSMENT: 0-10

## 2025-08-13 ASSESSMENT — ENCOUNTER SYMPTOMS
BACK PAIN: 1
SHORTNESS OF BREATH: 0

## 2025-08-13 ASSESSMENT — PAIN DESCRIPTION - LOCATION
LOCATION: BACK
LOCATION: KNEE

## 2025-08-13 ASSESSMENT — PAIN DESCRIPTION - ORIENTATION
ORIENTATION: MID
ORIENTATION: RIGHT;LEFT

## 2025-08-13 ASSESSMENT — PAIN DESCRIPTION - DESCRIPTORS
DESCRIPTORS: ACHING
DESCRIPTORS: ACHING

## 2025-08-15 ENCOUNTER — HOSPITAL ENCOUNTER (OUTPATIENT)
Age: 75
Setting detail: SPECIMEN
Discharge: HOME | End: 2025-08-15

## 2025-08-15 LAB
ANION GAP SERPL CALCULATED.3IONS-SCNC: 12 MMOL/L (ref 9–16)
BASOPHILS # BLD: 0.06 K/UL (ref 0–0.2)
BASOPHILS NFR BLD: 1 % (ref 0–2)
BUN SERPL-MCNC: 7 MG/DL (ref 8–23)
CALCIUM SERPL-MCNC: 9 MG/DL (ref 8.8–10.2)
CHLORIDE SERPL-SCNC: 100 MMOL/L (ref 98–107)
CO2 SERPL-SCNC: 27 MMOL/L (ref 20–31)
CREAT SERPL-MCNC: 0.6 MG/DL (ref 0.5–0.9)
EOSINOPHIL # BLD: 0.26 K/UL (ref 0–0.44)
EOSINOPHILS RELATIVE PERCENT: 3 % (ref 1–4)
ERYTHROCYTE [DISTWIDTH] IN BLOOD BY AUTOMATED COUNT: 13.7 % (ref 11.8–14.4)
GFR, ESTIMATED: >90 ML/MIN/1.73M2
GLUCOSE SERPL-MCNC: 105 MG/DL (ref 82–115)
HCT VFR BLD AUTO: 43.9 % (ref 36.3–47.1)
HGB BLD-MCNC: 14.3 G/DL (ref 11.9–15.1)
IMM GRANULOCYTES # BLD AUTO: 0.02 K/UL (ref 0–0.3)
IMM GRANULOCYTES NFR BLD: 0 %
LYMPHOCYTES NFR BLD: 2.74 K/UL (ref 1.1–3.7)
LYMPHOCYTES RELATIVE PERCENT: 33 % (ref 24–43)
MCH RBC QN AUTO: 28.7 PG (ref 25.2–33.5)
MCHC RBC AUTO-ENTMCNC: 32.6 G/DL (ref 28.4–34.8)
MCV RBC AUTO: 88 FL (ref 82.6–102.9)
MONOCYTES NFR BLD: 0.81 K/UL (ref 0.1–1.2)
MONOCYTES NFR BLD: 10 % (ref 3–12)
NEUTROPHILS NFR BLD: 53 % (ref 36–65)
NEUTS SEG NFR BLD: 4.48 K/UL (ref 1.5–8.1)
NRBC BLD-RTO: 0 PER 100 WBC
PLATELET # BLD AUTO: 386 K/UL (ref 138–453)
PMV BLD AUTO: 10.4 FL (ref 8.1–13.5)
POTASSIUM SERPL-SCNC: 4.7 MMOL/L (ref 3.7–5.3)
RBC # BLD AUTO: 4.99 M/UL (ref 3.95–5.11)
SODIUM SERPL-SCNC: 140 MMOL/L (ref 136–145)
WBC OTHER # BLD: 8.4 K/UL (ref 3.5–11.3)

## 2025-08-15 PROCEDURE — 36415 COLL VENOUS BLD VENIPUNCTURE: CPT

## 2025-08-15 PROCEDURE — 85025 COMPLETE CBC W/AUTO DIFF WBC: CPT

## 2025-08-15 PROCEDURE — 80048 BASIC METABOLIC PNL TOTAL CA: CPT

## 2025-08-26 ENCOUNTER — HOSPITAL ENCOUNTER (OUTPATIENT)
Age: 75
Setting detail: SPECIMEN
Discharge: HOME | End: 2025-08-26

## 2025-08-28 ENCOUNTER — HOSPITAL ENCOUNTER (OUTPATIENT)
Age: 75
Setting detail: SPECIMEN
Discharge: HOME | End: 2025-08-28

## 2025-08-28 PROCEDURE — 81001 URINALYSIS AUTO W/SCOPE: CPT

## 2025-08-28 PROCEDURE — 87086 URINE CULTURE/COLONY COUNT: CPT

## 2025-08-29 ENCOUNTER — HOSPITAL ENCOUNTER (OUTPATIENT)
Age: 75
Setting detail: SPECIMEN
Discharge: HOME | End: 2025-08-29

## 2025-08-29 LAB
BACTERIA URNS QL MICRO: ABNORMAL
BILIRUB UR QL STRIP: NEGATIVE
CLARITY UR: ABNORMAL
COLOR UR: YELLOW
EPI CELLS #/AREA URNS HPF: ABNORMAL /HPF (ref 0–5)
GLUCOSE UR STRIP-MCNC: NEGATIVE MG/DL
HGB UR QL STRIP.AUTO: NEGATIVE
KETONES UR STRIP-MCNC: NEGATIVE MG/DL
LEUKOCYTE ESTERASE UR QL STRIP: ABNORMAL
NITRITE UR QL STRIP: POSITIVE
PH UR STRIP: 5.5 [PH] (ref 5–8)
PROT UR STRIP-MCNC: NEGATIVE MG/DL
RBC #/AREA URNS HPF: ABNORMAL /HPF (ref 0–2)
SP GR UR STRIP: 1.01 (ref 1–1.03)
UROBILINOGEN UR STRIP-ACNC: NORMAL EU/DL (ref 0–1)
WBC #/AREA URNS HPF: ABNORMAL /HPF (ref 0–5)

## 2025-08-31 LAB
MICROORGANISM SPEC CULT: NORMAL
SPECIMEN DESCRIPTION: NORMAL

## 2025-09-04 ENCOUNTER — HOSPITAL ENCOUNTER (OUTPATIENT)
Age: 75
Setting detail: SPECIMEN
Discharge: HOME | End: 2025-09-04

## 2025-09-04 LAB
BACTERIA URNS QL MICRO: ABNORMAL
BILIRUB UR QL STRIP: NEGATIVE
CLARITY UR: ABNORMAL
COLOR UR: ABNORMAL
EPI CELLS #/AREA URNS HPF: ABNORMAL /HPF (ref 0–5)
GLUCOSE UR STRIP-MCNC: NEGATIVE MG/DL
HGB UR QL STRIP.AUTO: ABNORMAL
KETONES UR STRIP-MCNC: NEGATIVE MG/DL
LEUKOCYTE ESTERASE UR QL STRIP: ABNORMAL
MUCOUS THREADS URNS QL MICRO: ABNORMAL
NITRITE UR QL STRIP: POSITIVE
PH UR STRIP: 5.5 [PH] (ref 5–8)
PROT UR STRIP-MCNC: ABNORMAL MG/DL
RBC #/AREA URNS HPF: ABNORMAL /HPF (ref 0–2)
SP GR UR STRIP: 1.02 (ref 1–1.03)
UROBILINOGEN UR STRIP-ACNC: NORMAL EU/DL (ref 0–1)
WBC #/AREA URNS HPF: ABNORMAL /HPF (ref 0–5)

## 2025-09-04 PROCEDURE — 87086 URINE CULTURE/COLONY COUNT: CPT

## 2025-09-04 PROCEDURE — 81001 URINALYSIS AUTO W/SCOPE: CPT

## 2025-09-06 LAB
MICROORGANISM SPEC CULT: NORMAL
SERVICE CMNT-IMP: NORMAL
SPECIMEN DESCRIPTION: NORMAL

## (undated) DEVICE — SPONGE LAP W18XL18IN WHT COT 4 PLY FLD STRUNG RADPQ DISP ST 2 PER PACK

## (undated) DEVICE — APPLIER CLP M L L11.4IN DIA10MM ENDOSCP ROT MULT FOR LIG

## (undated) DEVICE — PACK PROCEDURE SURG LUMBAR SPINE SVMMC

## (undated) DEVICE — SUTURE VCRL SZ 0 L18IN ABSRB UD L36MM CT-1 1/2 CIR J840D

## (undated) DEVICE — MITT SURG PREP L ADH DISPOSABLE

## (undated) DEVICE — INTENDED FOR TISSUE SEPARATION, AND OTHER PROCEDURES THAT REQUIRE A SHARP SURGICAL BLADE TO PUNCTURE OR CUT.: Brand: BARD-PARKER ® CARBON RIB-BACK BLADES

## (undated) DEVICE — TISSUE RETRIEVAL SYSTEM: Brand: INZII RETRIEVAL SYSTEM

## (undated) DEVICE — NEEDLE HYPO 25GA L1.5IN BLU POLYPR HUB S STL REG BVL STR

## (undated) DEVICE — YANKAUER,FLEXIBLE HANDLE,REGLR CAPACITY: Brand: MEDLINE INDUSTRIES, INC.

## (undated) DEVICE — Device

## (undated) DEVICE — 3.0MM PRECISION NEURO (MATCH HEAD)

## (undated) DEVICE — SPHINCTEROTOME: Brand: DREAMTOME™ RX 44

## (undated) DEVICE — BLADE ES ELASTOMERIC COAT INSUL DURABLE BEND UPTO 90DEG

## (undated) DEVICE — GLOVE SURG SZ 6 THK91MIL LTX FREE SYN POLYISOPRENE ANTI

## (undated) DEVICE — TOWEL,OR,DSP,ST,NATURAL,DLX,4/PK,20PK/CS: Brand: MEDLINE

## (undated) DEVICE — GOWN,SIRUS,NONRNF,SETINSLV,XL,20/CS: Brand: MEDLINE

## (undated) DEVICE — MEDI-TRACE CADENCE ADULT, DEFIBRILLATION ELECTRODE -RTS  (10 PR/PK) - PHYSIO-CONTROL: Brand: MEDI-TRACE CADENCE

## (undated) DEVICE — ELECTRODE PT RET AD L9FT HI MOIST COND ADH HYDRGEL CORDED

## (undated) DEVICE — STERILE POLYISOPRENE POWDER-FREE SURGICAL GLOVES WITH EMOLLIENT COATING: Brand: PROTEXIS

## (undated) DEVICE — CONNECTOR TBNG WHT PLAS SUCT STR 5IN1 LTWT W/ M CONN

## (undated) DEVICE — SYRINGE, LUER LOCK, 10ML: Brand: MEDLINE

## (undated) DEVICE — PACK LAP BASIC

## (undated) DEVICE — Z DISCONTINUED USE 2859063 SUTURE VICRYL 3-0 L27IN ABSRB UD PSL L30MM 1/2 CIR TAPERPOINT J502H

## (undated) DEVICE — GOWN,SIRUS,NONRNF,SETINSLV,2XL,18/CS: Brand: MEDLINE

## (undated) DEVICE — KIT DRN FLAT W/ 100CC EVAC 7MM FULL PERF

## (undated) DEVICE — RETRIEVAL BALLOON CATHETER: Brand: EXTRACTOR™ PRO RX

## (undated) DEVICE — TROCAR: Brand: KII® SLEEVE

## (undated) DEVICE — SPONGE,NEURO,0.5"X0.5",XR,STRL,10/PK: Brand: MEDLINE

## (undated) DEVICE — SOLUTION ANTIFOG VIS SYS CLEARIFY LAPSCP

## (undated) DEVICE — SUTURE VICRYL 2-0 L27IN ABSRB CT BRAID COAT UD J275H

## (undated) DEVICE — GAUZE,SPONGE,FLUFF,6"X6.75",STRL,5/TRAY: Brand: MEDLINE

## (undated) DEVICE — SURGICEL ENDOSCP APPL

## (undated) DEVICE — STRAP ARMBRD W1.5XL32IN FOAM STR YET SFT W/ HK AND LOOP

## (undated) DEVICE — SUTURE VCRL + SZ 0 L27IN ABSRB VLT L26MM UR-6 5/8 CIR VCP603H

## (undated) DEVICE — SPONGE LAP W18XL18IN WHT COT 4 PLY FLD STRUNG RADPQ DISP ST

## (undated) DEVICE — SOLUTION IV 0.9% NACL IRRIGATION 3000ML BAG

## (undated) DEVICE — TROCAR: Brand: KII FIOS FIRST ENTRY

## (undated) DEVICE — AGENT HEMSTAT 3GM OXIDIZED REGENERATED CELOS ABSRB FOR CONT (ORDER MULTIPLES OF 5EA)

## (undated) DEVICE — GOWN,AURORA,NONREINFORCED,LARGE: Brand: MEDLINE

## (undated) DEVICE — C-ARM: Brand: UNBRANDED

## (undated) DEVICE — ST FLUFF LG 1 PLY: Brand: DEROYAL

## (undated) DEVICE — SYRINGE,CONTROL,LL,FINGER,GRIP: Brand: MEDLINE INDUSTRIES, INC.

## (undated) DEVICE — GLOVE SURG SZ 65 THK91MIL LTX FREE SYN POLYISOPRENE

## (undated) DEVICE — CONNECTOR,TUBING,5-IN-1,NON-STERILE: Brand: MEDLINE INDUSTRIES, INC.

## (undated) DEVICE — DRESSING TRNSPAR W2XL2.75IN FLM SHT SEMIPERMEABLE WIND

## (undated) DEVICE — COVER,MAYO STAND,STERILE: Brand: MEDLINE

## (undated) DEVICE — DRAPE,REIN 53X77,STERILE: Brand: MEDLINE

## (undated) DEVICE — CODMAN® SURGICAL PATTIES 1/2" X 3" (1.27CM X 7.62CM): Brand: CODMAN®

## (undated) DEVICE — DRAPE,LAP,CHOLE,W/TROUGHS,STERILE: Brand: MEDLINE

## (undated) DEVICE — SUTURE VCRL SZ 2-0 L18IN ABSRB UD CT-1 L36MM 1/2 CIR J839D

## (undated) DEVICE — SINGLE-USE POLYPECTOMY SNARE: Brand: CAPTIFLEX

## (undated) DEVICE — SYRINGE MED 10ML TRNSLUC BRL PLUNG BLK MRK POLYPR CTRL

## (undated) DEVICE — SPONGE,NEURO,0.5"X3",XR,STRL,LF,10/PK: Brand: MEDLINE

## (undated) DEVICE — AGENT HEMOSTATIC SURGIFLOW MATRIX KIT W/THROMBIN

## (undated) DEVICE — INSUFFLATION NEEDLE TO ESTABLISH PNEUMOPERITONEUM.: Brand: INSUFFLATION NEEDLE

## (undated) DEVICE — APPLICATOR MEDICATED 10.5 CC SOLUTION HI LT ORNG CHLORAPREP

## (undated) DEVICE — TOTAL TRAY, 16FR 10ML SIL FOLEY, URN: Brand: MEDLINE

## (undated) DEVICE — ELECTRODE LAP L36CM PTFE WIRE J HK CLEANCOAT

## (undated) DEVICE — SUTURE SZ 0 27IN 5/8 CIR UR-6  TAPER PT VIOLET ABSRB VICRYL J603H

## (undated) DEVICE — STRIP,CLOSURE,WOUND,MEDI-STRIP,1/2X4: Brand: MEDLINE

## (undated) DEVICE — DISSECTOR LAP DIA5MM BLNT TIP ENDOPATH

## (undated) DEVICE — ADHESIVE SKIN CLOSURE TOP 36 CC HI VISC DERMBND MINI

## (undated) DEVICE — SCISSOR SURG METZ CRV TIP

## (undated) DEVICE — APPLICATOR MEDICATED 26 CC SOLUTION HI LT ORNG CHLORAPREP

## (undated) DEVICE — TRIPLE LUMEN NEEDLE KNIFE: Brand: RX NEEDLE KNIFE XL

## (undated) DEVICE — PROTECTOR ULN NRV PUR FOAM HK LOOP STRP ANATOMICALLY

## (undated) DEVICE — 3M™ IOBAN™ 2 ANTIMICROBIAL INCISE DRAPE 6650EZ: Brand: IOBAN™ 2

## (undated) DEVICE — SUTURE MCRYL SZ 4-0 L18IN ABSRB UD L16MM PC-3 3/8 CIR PRIM Y845G

## (undated) DEVICE — KIT MICRO INTRO 4FR STIFF 40CM NIGHTENALL TUNGSTEN 7SMT

## (undated) DEVICE — PAD,NON-ADHERENT,3X8,STERILE,LF,1/PK: Brand: MEDLINE

## (undated) DEVICE — GLOVE ORANGE PI 7 1/2   MSG9075

## (undated) DEVICE — GLOVE ORANGE PI 7   MSG9070

## (undated) DEVICE — SUTURE STRATAFIX SYMMETRIC PDS + SZ 0 L18IN ABSRB L36MM SXPP1A401

## (undated) DEVICE — GLOVE ORANGE PI 8   MSG9080

## (undated) DEVICE — THE MILL DISPOSABLE - MEDIUM

## (undated) DEVICE — GARMENT,MEDLINE,DVT,INT,CALF,MED, GEN2: Brand: MEDLINE

## (undated) DEVICE — SYRINGE IRRIG 60ML SFT PLIABLE BLB EZ TO GRP 1 HND USE W/

## (undated) DEVICE — MARKER,SKIN,WI/RULER AND LABELS: Brand: MEDLINE

## (undated) DEVICE — DEVICE TRCR 12X9X3IN WHT CLSR DISP OMNICLOSE